# Patient Record
Sex: FEMALE | Race: ASIAN | NOT HISPANIC OR LATINO | Employment: UNEMPLOYED
[De-identification: names, ages, dates, MRNs, and addresses within clinical notes are randomized per-mention and may not be internally consistent; named-entity substitution may affect disease eponyms.]

---

## 2018-06-08 ENCOUNTER — RECORDS - HEALTHEAST (OUTPATIENT)
Dept: ADMINISTRATIVE | Facility: OTHER | Age: 53
End: 2018-06-08

## 2018-12-27 ENCOUNTER — RECORDS - HEALTHEAST (OUTPATIENT)
Dept: ADMINISTRATIVE | Facility: OTHER | Age: 53
End: 2018-12-27

## 2018-12-28 ENCOUNTER — COMMUNICATION - HEALTHEAST (OUTPATIENT)
Dept: PULMONOLOGY | Facility: OTHER | Age: 53
End: 2018-12-28

## 2019-01-03 ENCOUNTER — AMBULATORY - HEALTHEAST (OUTPATIENT)
Dept: PULMONOLOGY | Facility: OTHER | Age: 54
End: 2019-01-03

## 2019-01-03 ENCOUNTER — COMMUNICATION - HEALTHEAST (OUTPATIENT)
Dept: PULMONOLOGY | Facility: OTHER | Age: 54
End: 2019-01-03

## 2019-01-03 DIAGNOSIS — J45.909 ASTHMA: ICD-10-CM

## 2019-02-22 ENCOUNTER — OFFICE VISIT - HEALTHEAST (OUTPATIENT)
Dept: PULMONOLOGY | Facility: OTHER | Age: 54
End: 2019-02-22

## 2019-02-22 ENCOUNTER — RECORDS - HEALTHEAST (OUTPATIENT)
Dept: ADMINISTRATIVE | Facility: OTHER | Age: 54
End: 2019-02-22

## 2019-02-22 DIAGNOSIS — J45.40 MODERATE PERSISTENT ASTHMA WITHOUT COMPLICATION: ICD-10-CM

## 2019-02-22 RX ORDER — BUDESONIDE AND FORMOTEROL FUMARATE DIHYDRATE 160; 4.5 UG/1; UG/1
2 AEROSOL RESPIRATORY (INHALATION) 2 TIMES DAILY
Qty: 1 INHALER | Refills: 11 | Status: SHIPPED | OUTPATIENT
Start: 2019-02-22

## 2019-02-22 RX ORDER — ALBUTEROL SULFATE 90 UG/1
2 AEROSOL, METERED RESPIRATORY (INHALATION) EVERY 6 HOURS PRN
Status: SHIPPED | COMMUNITY
Start: 2019-02-22

## 2019-02-22 ASSESSMENT — MIFFLIN-ST. JEOR: SCORE: 1115.27

## 2019-04-02 ENCOUNTER — OFFICE VISIT - HEALTHEAST (OUTPATIENT)
Dept: PULMONOLOGY | Facility: OTHER | Age: 54
End: 2019-04-02

## 2019-04-02 DIAGNOSIS — J45.40 MODERATE PERSISTENT ASTHMA WITHOUT COMPLICATION: ICD-10-CM

## 2019-04-02 RX ORDER — MONTELUKAST SODIUM 10 MG/1
10 TABLET ORAL AT BEDTIME
Qty: 30 TABLET | Refills: 11 | Status: SHIPPED | OUTPATIENT
Start: 2019-04-02 | End: 2024-09-29

## 2019-04-02 ASSESSMENT — MIFFLIN-ST. JEOR: SCORE: 1127.52

## 2020-11-26 ENCOUNTER — ANESTHESIA - HEALTHEAST (OUTPATIENT)
Dept: SURGERY | Facility: HOSPITAL | Age: 55
End: 2020-11-26

## 2020-11-26 ENCOUNTER — SURGERY - HEALTHEAST (OUTPATIENT)
Dept: SURGERY | Facility: HOSPITAL | Age: 55
End: 2020-11-26

## 2020-11-26 ASSESSMENT — MIFFLIN-ST. JEOR
SCORE: 1197.38
SCORE: 1197.38

## 2020-11-27 ENCOUNTER — SURGERY - HEALTHEAST (OUTPATIENT)
Dept: SURGERY | Facility: HOSPITAL | Age: 55
End: 2020-11-27

## 2020-11-27 ENCOUNTER — ANESTHESIA - HEALTHEAST (OUTPATIENT)
Dept: SURGERY | Facility: HOSPITAL | Age: 55
End: 2020-11-27

## 2020-11-27 ENCOUNTER — COMMUNICATION - HEALTHEAST (OUTPATIENT)
Dept: SCHEDULING | Facility: CLINIC | Age: 55
End: 2020-11-27

## 2020-11-29 ASSESSMENT — MIFFLIN-ST. JEOR
SCORE: 1256.38
SCORE: 1256.38

## 2020-12-04 ASSESSMENT — MIFFLIN-ST. JEOR
SCORE: 1164.72
SCORE: 1164.72

## 2020-12-06 ASSESSMENT — MIFFLIN-ST. JEOR
SCORE: 1154.28
SCORE: 1154.28

## 2020-12-14 ENCOUNTER — RECORDS - HEALTHEAST (OUTPATIENT)
Dept: ADMINISTRATIVE | Facility: OTHER | Age: 55
End: 2020-12-14

## 2021-01-15 ENCOUNTER — COMMUNICATION - HEALTHEAST (OUTPATIENT)
Dept: TELEHEALTH | Facility: CLINIC | Age: 56
End: 2021-01-15

## 2021-01-15 ENCOUNTER — HOSPITAL ENCOUNTER (OUTPATIENT)
Dept: CARDIOLOGY | Facility: HOSPITAL | Age: 56
Discharge: HOME | End: 2021-01-15

## 2021-01-15 DIAGNOSIS — I47.10 SUPRAVENTRICULAR TACHYCARDIA (H): ICD-10-CM

## 2021-01-15 LAB
AORTIC ROOT: 3.2 CM
AORTIC VALVE MEAN VELOCITY: 82.3 CM/S
ASCENDING AORTA: 3.5 CM
AV DIMENSIONLESS INDEX VTI: 0.8
AV MEAN GRADIENT: 3 MMHG
AV PEAK GRADIENT: 5.5 MMHG
AV VALVE AREA: 2.4 CM2
AV VELOCITY RATIO: 0.9
BSA FOR ECHO PROCEDURE: 1.62 M2
CV BLOOD PRESSURE: ABNORMAL MMHG
CV ECHO HEIGHT: 61 IN
CV ECHO WEIGHT: 135 LBS
DOP CALC AO PEAK VEL: 117 CM/S
DOP CALC AO VTI: 19.4 CM
DOP CALC LVOT AREA: 2.83 CM2
DOP CALC LVOT DIAMETER: 1.9 CM
DOP CALC LVOT PEAK VEL: 105 CM/S
DOP CALC LVOT STROKE VOLUME: 45.9 CM3
DOP CALCLVOT PEAK VEL VTI: 16.2 CM
EJECTION FRACTION: 58 % (ref 55–75)
FRACTIONAL SHORTENING: 22.2 % (ref 28–44)
INTERVENTRICULAR SEPTUM IN END DIASTOLE: 0.9 CM (ref 0.6–0.9)
IVS/PW RATIO: 1
LA AREA 1: 12.3 CM2
LA AREA 2: 13 CM2
LEFT ATRIUM LENGTH: 4.73 CM
LEFT ATRIUM SIZE: 2.6 CM
LEFT ATRIUM TO AORTIC ROOT RATIO: 0.81 NO UNITS
LEFT ATRIUM VOLUME INDEX: 17.7 ML/M2
LEFT ATRIUM VOLUME: 28.7 ML
LEFT VENTRICLE CARDIAC INDEX: 2.3 L/MIN/M2
LEFT VENTRICLE CARDIAC OUTPUT: 3.8 L/MIN
LEFT VENTRICLE DIASTOLIC VOLUME INDEX: 20.4 CM3/M2 (ref 29–61)
LEFT VENTRICLE DIASTOLIC VOLUME: 33 CM3 (ref 46–106)
LEFT VENTRICLE HEART RATE: 82 BPM
LEFT VENTRICLE MASS INDEX: 82 G/M2
LEFT VENTRICLE SYSTOLIC VOLUME INDEX: 8.6 CM3/M2 (ref 8–24)
LEFT VENTRICLE SYSTOLIC VOLUME: 14 CM3 (ref 14–42)
LEFT VENTRICULAR INTERNAL DIMENSION IN DIASTOLE: 4.5 CM (ref 3.8–5.2)
LEFT VENTRICULAR INTERNAL DIMENSION IN SYSTOLE: 3.5 CM (ref 2.2–3.5)
LEFT VENTRICULAR MASS: 132.8 G
LEFT VENTRICULAR OUTFLOW TRACT MEAN GRADIENT: 2 MMHG
LEFT VENTRICULAR OUTFLOW TRACT MEAN VELOCITY: 64.9 CM/S
LEFT VENTRICULAR OUTFLOW TRACT PEAK GRADIENT: 4 MMHG
LEFT VENTRICULAR POSTERIOR WALL IN END DIASTOLE: 0.9 CM (ref 0.6–0.9)
LV STROKE VOLUME INDEX: 28.3 ML/M2
MITRAL VALVE E/A RATIO: 0.6
MV AVERAGE E/E' RATIO: 6.4 CM/S
MV DECELERATION TIME: 148 MS
MV E'TISSUE VEL-LAT: 9.26 CM/S
MV E'TISSUE VEL-MED: 6.24 CM/S
MV LATERAL E/E' RATIO: 5.3
MV MEDIAL E/E' RATIO: 7.9
MV PEAK A VELOCITY: 80 CM/S
MV PEAK E VELOCITY: 49.4 CM/S
NUC REST DIASTOLIC VOLUME INDEX: 2158.4 LBS
NUC REST SYSTOLIC VOLUME INDEX: 61 IN
RIGHT ATRIUM PEAK SYSTOLIC PRESSURE: 10 MMHG
TRICUSPID REGURGITATION PEAK PRESSURE GRADIENT: 19.4 MMHG
TRICUSPID VALVE ANULAR PLANE SYSTOLIC EXCURSION: 1.5 CM
TRICUSPID VALVE PEAK REGURGITANT VELOCITY: 220 CM/S

## 2021-01-15 ASSESSMENT — MIFFLIN-ST. JEOR: SCORE: 1154.28

## 2021-05-27 NOTE — PROGRESS NOTES
Pulmonary Clinic Follow Up    Cc: asthma, poorly controlled    HPI: 53yoF with history of asthma presents for follow up.    ID: first met her 2/2019 with worsening dyspnea. [place]ng[place]ing troubles since in [place]. Disability since 1996 due to asthma.   We discovered she was taking dulera PRN and rarely her albuterol.     She returns today with the aid of a [employer] . She is still not using inhalers correctly. Using Symbicort once dailiy and albuterol once or twice per week. She has not been taking the Singulair.     ACT previously 16: 4+4+4+2+3=17    Triggers include cigarette smoke and car exhaust.     ROS: 12-point review performed and notable for hearing loss, visual disturbance, shortness of breath, snoring, wheezing, confusion and depression and anxiety. She also mentions numbness and tingling of her right arm (sees PCP 4/20/19) The remainder reviewed and negative.     Current Outpatient Medications on File Prior to Visit   Medication Sig Dispense Refill     albuterol (PROAIR HFA;PROVENTIL HFA;VENTOLIN HFA) 90 mcg/actuation inhaler Inhale 2 puffs every 6 (six) hours as needed for wheezing.       budesonide-formoterol (SYMBICORT) 160-4.5 mcg/actuation inhaler Inhale 2 puffs 2 (two) times a day. 1 Inhaler 11     montelukast (SINGULAIR) 10 mg tablet Take 10 mg by mouth at bedtime.       No current facility-administered medications on file prior to visit.      Vitals:    04/02/19 1558   BP: 130/80   Pulse: (!) 125   Resp: 12   SpO2: 95%   RA  Gen: pleasant female in no distress      HEENT: no lymphadenopathy  C/V: RRR, S1 and S2  Resp:clear bilaterally with decent air entry  Ext: no clubbing or cyanosis  Skin: no rashes  Neuro: grossly normal    PFT 2019 personally reviewed  FEV1/FVC is 65 and is reduced.  FEV1 is 88% predicted and is normal.  FVC is 115% predicted and is normal.  There was improvement in spirometry after a single inhaled dose of bronchodilator.  TLC is 210% predicted and is  increased.  RV is 282% predicted and is increased.  DLCO is 123% predicted and is increased when it   is corrected for hemoglobin.  The flow volume loop is normal No.  NIOx done <25, no ongoing airway inflammation      ASSESSMENT/PLAN:  Moderate persistent asthma  Rediscussed Symbicort two times a day  Add Singulair  Albuterol PRN  RTC 6 months

## 2021-06-02 VITALS — HEIGHT: 59 IN | WEIGHT: 133.3 LBS | BODY MASS INDEX: 26.87 KG/M2

## 2021-06-02 VITALS — HEIGHT: 59 IN | WEIGHT: 136 LBS | BODY MASS INDEX: 27.42 KG/M2

## 2021-06-05 VITALS
BODY MASS INDEX: 25.47 KG/M2 | HEIGHT: 61 IN | HEIGHT: 61 IN | WEIGHT: 134.9 LBS | BODY MASS INDEX: 25.47 KG/M2 | WEIGHT: 134.9 LBS

## 2021-06-05 VITALS — WEIGHT: 134.9 LBS | BODY MASS INDEX: 25.47 KG/M2 | HEIGHT: 61 IN

## 2021-06-13 NOTE — ANESTHESIA PREPROCEDURE EVALUATION
Anesthesia Evaluation      Patient summary reviewed     Airway   Comment: Intubated   Pulmonary - normal exam   (+) asthma                           Cardiovascular - negative ROS and normal exam   Neuro/Psych - negative ROS     Endo/Other - negative ROS      GI/Hepatic/Renal - negative ROS           Dental                         Anesthesia Plan  Planned anesthetic: general endotracheal    COVID neg 11/26  ASA 4   Induction: inhalational   Anesthetic plan and risks discussed with: child/children    Post-op plan: extended intubation/vent support

## 2021-06-13 NOTE — ANESTHESIA POSTPROCEDURE EVALUATION
Patient: Kerry Mills  Procedure(s):  INCISION AND DRAINAGE, NECK  Anesthesia type: general    Patient location: ICU  Last vitals:   Vitals Value Taken Time   /70 11/27/20 1300   Temp 36.6  C (97.9  F) 11/27/20 1230   Pulse 55 11/27/20 1359   Resp 16 11/27/20 1359   SpO2 98 % 11/27/20 1359   Vitals shown include unvalidated device data.  Post vital signs: stable  Level of consciousness: return to baseline  Post-anesthesia pain: pain controlled  Post-anesthesia nausea and vomiting: no  Pulmonary: return to baseline, ventilator  Cardiovascular: stable and blood pressure at baseline  Hydration: adequate  Anesthetic events: no    QCDR Measures:  ASA# 11 - Laury-op Cardiac Arrest: ASA11B - Patient did NOT experience unanticipated cardiac arrest  ASA# 12 - Laury-op Mortality Rate: ASA12B - Patient did NOT die  ASA# 13 - PACU Re-Intubation Rate: ASA13B - Patient did NOT require a new airway mgmt  ASA# 10 - Composite Anes Safety: ASA10A - No serious adverse event    Additional Notes:

## 2021-06-13 NOTE — ANESTHESIA CARE TRANSFER NOTE
Last vitals:   Vitals:    11/27/20 1052   BP: 118/77   Pulse: 65   Resp: 16   Temp: 36.6  C (97.8  F)   SpO2: 98%     Patient's level of consciousness is unresponsive  Spontaneous respirations: no  Maintains airway independently: no  Dentition unchanged: yes  Oropharynx: oropharynx clear of all foreign objects    QCDR Measures:  ASA# 20 - Surgical Safety Checklist: WHO surgical safety checklist completed prior to induction    PQRS# 430 - Adult PONV Prevention: 4558F - Pt received => 2 anti-emetic agents (different classes) preop & intraop  ASA# 8 - Peds PONV Prevention: NA - Not pediatric patient, not GA or 2 or more risk factors NOT present  PQRS# 424 - Laury-op Temp Management: 4559F - At least one body temp DOCUMENTED => 35.5C or 95.9F within required timeframe  PQRS# 426 - PACU Transfer Protocol: - Transfer of care checklist used  ASA# 14 - Acute Post-op Pain: ASA14B - Patient did NOT experience pain >= 7 out of 10

## 2021-06-16 PROBLEM — R00.0 SINUS TACHYCARDIA: Status: ACTIVE | Noted: 2020-12-03

## 2021-06-16 PROBLEM — J45.40 MODERATE PERSISTENT ASTHMA WITHOUT COMPLICATION: Status: ACTIVE | Noted: 2019-02-22

## 2021-06-16 PROBLEM — K12.2 LUDWIG'S ANGINA: Status: ACTIVE | Noted: 2020-11-26

## 2021-06-18 NOTE — LETTER
Letter by Britt Rosales MD at      Author: Britt Rosales MD Service: -- Author Type: --    Filed:  Encounter Date: 1/3/2019 Status: (Other)       Kerry Mills  [address] [place] [id number]    January 3, 2019    Dear Ms. Mills,    Welcome to [facility]! Your appointment information is below.   Please bring the following to your appointment:    Insurance Card, so we may scan it for our records    Drivers license or valid ID, so we may scan it for our records    Co-pay (as applicable per your insurance plan)    A current list of your medications including over the counter products such as vitamins and supplements    Your medical records including copies of X-Ray films if you are transferring your care from another clinic.  If you do not have your records, please fill out the release of information form and we will request those records.     Provider: Britt Rosales MD  Appointment Date:  Friday, February 22, 2019  Arrival Time:   10:00 PFT,  11:00 dr morris.    Location: [address]        [facility], [address]    **Please allow adequate time for your commute and parking. If you are more than 10 minutes late, you may be asked to reschedule.     If you need to cancel or reschedule your appointment, please notify us at least 24 hours prior to your appointment time so we are able to make this time available for another patient.    Thank you for choosing the [facility] for your health care needs. If you have any questions, please do not hesitate to contact us at any time at   [phone]. We look forward to caring for you.     Sincerely,     [facility] staff

## 2021-06-24 NOTE — PROGRESS NOTES
Pulmonary Clinic Visit    Cc: shortness of breath    HPI: 53 y.o. female with history of asthma presents with ongoing shortness of breath.     She is here today with . She reports breathing trouble started in her teens after her 2nd pregnancy (has 10 children). [place], but started in [place]. Breathing was worse there and improved in [place]. She was hospitalized once for her asthma, 15 years ago in [place]. She has been on disability since  due to her asthma. 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    ACT: 2+4+4+3+3=16    Unfortunately she is not taking her medications correctly. She was on airduo but felt the dulera worked better. Her pharmacy said she could have either based on her insurnace but neither was refilled.       Past Medical History:   Diagnosis Date     Asthma      Chronic constipation          Social History     Tobacco Use     Smoking status: Never Smoker     Smokeless tobacco: Never Used   Substance Use Topics     Alcohol use: Not on file         Family History   Problem Relation Age of Onset     Kidney failure Mother         on Dialysis     Hypertension Mother      Gout Mother      No Medical Problems Father          age 60 in [place]     Kidney failure Brother      Asthma Son          Current Outpatient Medications   Medication Sig     albuterol (PROAIR HFA;PROVENTIL HFA;VENTOLIN HFA) 90 mcg/actuation inhaler Inhale 2 puffs every 6 (six) hours as needed for wheezing.     mometasone-formoterol (DULERA) 200-5 mcg/actuation HFAA inhaler Inhale 2 puffs 2 (two) times a day.     montelukast (SINGULAIR) 10 mg tablet Take 10 mg by mouth at bedtime.       No Known Allergies      Review of Systems   Constitutional: Positive for unexpected weight change.   HENT: Negative.    Eyes: Negative.    Respiratory: Positive for cough, shortness of breath and wheezing.    Cardiovascular: Positive  for palpitations.   Gastrointestinal: Positive for diarrhea.   Endocrine: Negative.    Genitourinary: Negative.    Musculoskeletal: Positive for arthralgias and myalgias.   Allergic/Immunologic: Negative.    Neurological: Positive for dizziness.   Hematological: Negative.    Psychiatric/Behavioral: Positive for dysphoric mood. The patient is nervous/anxious.        Vitals:    02/22/19 1048   BP: 118/66   Pulse: 72   Resp: 16   SpO2: 93%   RA  Physical Exam   Constitutional: She appears well-developed and well-nourished.   HENT:   Head: Normocephalic and atraumatic.   Eyes: Conjunctivae and EOM are normal.   Neck: Normal range of motion. No tracheal deviation present.   Cardiovascular: Normal rate and regular rhythm.   Pulmonary/Chest: Effort normal. She has no wheezes. She has no rales.   Abdominal: Soft. There is no tenderness.   Musculoskeletal: Normal range of motion. She exhibits no edema.   Neurological: She is alert.   Skin: Skin is warm and dry.   Psychiatric: She has a normal mood and affect. Her behavior is normal. Thought content normal.     PFT 2019 personally reviewed  FEV1/FVC is 65 and is reduced.  FEV1 is 88% predicted and is normal.  FVC is 115% predicted and is normal.  There was improvement in spirometry after a single inhaled dose of bronchodilator.  TLC is 210% predicted and is increased.  RV is 282% predicted and is increased.  DLCO is 123% predicted and is increased when it   is corrected for hemoglobin.  The flow volume loop is normal No.  NIOx done <25, no ongoing airway inflammation    Assessment/Plan  53yoF with poorly controlled asthma in part due to non-compliance in not understanding her inhalers. Discussed at length proper use of controller and rescue inhalers.   RTC 5 weeks    Britt Rosales MD  Electronically signed on 2/22/2019 11:20 AM    >50% of this 45 minute visit spent in direct patient counseling and coordination of care.

## 2021-06-24 NOTE — PROGRESS NOTES
Instructed on proper use of her inhalers, demonstrated back correctly.  She had no questions at this time.

## 2021-12-03 ENCOUNTER — HOSPITAL ENCOUNTER (OUTPATIENT)
Dept: MAMMOGRAPHY | Facility: CLINIC | Age: 56
Discharge: HOME | End: 2021-12-03
Attending: REGISTERED NURSE | Admitting: REGISTERED NURSE
Payer: COMMERCIAL

## 2021-12-03 DIAGNOSIS — Z12.31 ENCOUNTER FOR SCREENING MAMMOGRAM FOR MALIGNANT NEOPLASM OF BREAST: ICD-10-CM

## 2021-12-03 PROCEDURE — 77067 SCR MAMMO BI INCL CAD: CPT

## 2024-06-13 ENCOUNTER — HOSPITAL ENCOUNTER (OUTPATIENT)
Dept: MAMMOGRAPHY | Facility: CLINIC | Age: 59
Discharge: HOME | End: 2024-06-13
Attending: FAMILY MEDICINE
Payer: COMMERCIAL

## 2024-06-13 DIAGNOSIS — N63.20 LEFT BREAST LUMP: ICD-10-CM

## 2024-06-13 PROCEDURE — 76642 ULTRASOUND BREAST LIMITED: CPT | Mod: LT

## 2024-06-13 PROCEDURE — 77062 BREAST TOMOSYNTHESIS BI: CPT

## 2024-06-18 ENCOUNTER — HOSPITAL ENCOUNTER (OUTPATIENT)
Dept: MAMMOGRAPHY | Facility: CLINIC | Age: 59
Discharge: HOME | End: 2024-06-18
Attending: FAMILY MEDICINE
Payer: COMMERCIAL

## 2024-06-18 DIAGNOSIS — N63.20 LEFT BREAST LUMP: ICD-10-CM

## 2024-06-18 PROCEDURE — 88360 TUMOR IMMUNOHISTOCHEM/MANUAL: CPT | Mod: 26 | Performed by: PATHOLOGY

## 2024-06-18 PROCEDURE — 250N000009 HC RX 250: Performed by: RADIOLOGY

## 2024-06-18 PROCEDURE — 999N000065 MA POST PROCEDURE LEFT

## 2024-06-18 PROCEDURE — 88377 M/PHMTRC ALYS ISHQUANT/SEMIQ: CPT

## 2024-06-18 PROCEDURE — 272N000713 US BREAST BIOPSY CORE NEEDLE LEFT

## 2024-06-18 PROCEDURE — 88305 TISSUE EXAM BY PATHOLOGIST: CPT | Mod: 26 | Performed by: PATHOLOGY

## 2024-06-18 PROCEDURE — 88377 M/PHMTRC ALYS ISHQUANT/SEMIQ: CPT | Mod: 26 | Performed by: MEDICAL GENETICS

## 2024-06-18 PROCEDURE — 88360 TUMOR IMMUNOHISTOCHEM/MANUAL: CPT | Mod: TC

## 2024-06-18 RX ADMIN — LIDOCAINE HYDROCHLORIDE 10 ML: 10 SOLUTION INTRAVENOUS at 11:29

## 2024-06-18 RX ADMIN — LIDOCAINE HYDROCHLORIDE 10 ML: 10 SOLUTION INTRAVENOUS at 11:32

## 2024-06-18 NOTE — LETTER
[facility]  [facility], [address]  [facility] [address]  Phone: [phone]  Fax: [phone]    June 18, 2024        Kerry Mills  [address] [place] [id number]          To whom it may concern:    RE: Kerry Mills    Patient was seen and treated today at our clinic. Pt cannot lay prone for 24 hours beginning at 1130am, Tuesday, 6/18/24, due to procedure that was performed today.    Please contact me for questions or concerns.      Sincerely,      Dr Cinthia Syed, Radiologist

## 2024-06-18 NOTE — PROGRESS NOTES
Kerry arrived at [facility], accompanied by daughter-in-lawGinny. In-person [facility] , [place], [place] [id number], with Radha Marks, is present for today's visit. I escorted them to the Breast [place] consult room. Pt was identified using full name and . Wristband is on pt wrist. Pt was able to state which procedure and correct side breast biopsy was occurring today. I reviewed the consent form with the pt, using , before pt signed.     I reviewed post breast biopsy care. Pt acknowledged understanding of post biopsy care. Pt was given written post breast biopsy care handouts to take home in English and Hmong.    I explained results are expected in 3-5 business days and Breast Center RN will call pt at number pt provided today. Pt requests we call daughter in lawGinny, at [phone], if pt does not answer the phone. Pt is aware results will be given if pt is on the call as well. Pt has does not have active MyChart. Pt verbalizes understanding.    Pt had no questions or concerns.     I escorted pt and  to the changing room and pt changed into gown. Pt placed personal belongings in a locker and pt has the key. I escorted pt and  to US room and each sat on a chair. I offered pt the aroma therapy of [employer] oil and a warm blanket, and pt declined both. I notified "[employer] Norman Zuñiga, pt was ready and waiting in US room with .     Nidhi Frank, RN, BSN, CBCN  [facility]

## 2024-06-25 ENCOUNTER — TELEPHONE (OUTPATIENT)
Dept: MAMMOGRAPHY | Facility: CLINIC | Age: 59
End: 2024-06-25
Payer: COMMERCIAL

## 2024-06-25 ENCOUNTER — APPOINTMENT (OUTPATIENT)
Dept: INTERPRETER SERVICES | Facility: CLINIC | Age: 59
End: 2024-06-25
Payer: COMMERCIAL

## 2024-06-25 NOTE — TELEPHONE ENCOUNTER
[facility], the pt was telephoned so I could update pt with status of pending breast biopsy results that were expected today.     I was informed by the [facility] , [facility], that Kerry's results are expected to be available tomorrow per Dr Dawson, from pt's 6/18/24 breast biopsy. I relayed that message to pt.     Nidhi Frank, RN, BSN, [facility]

## 2024-06-26 ENCOUNTER — TELEPHONE (OUTPATIENT)
Dept: MAMMOGRAPHY | Facility: CLINIC | Age: 59
End: 2024-06-26

## 2024-06-26 ENCOUNTER — VIRTUAL VISIT (OUTPATIENT)
Dept: INTERPRETER SERVICES | Facility: CLINIC | Age: 59
End: 2024-06-26
Payer: COMMERCIAL

## 2024-06-26 NOTE — TELEPHONE ENCOUNTER
Using [facility] , Lance Martinez, for this call.    At the request of Radiologist, Dr. Justyna Rosas, I telephoned patient, confirming pt identity using name and , to inform patient of malignant pathology from 24, left breast biopsy performed at [facility]. We discussed breast anatomy, pathology findings, and next steps. Patient's questions were answered to the best of my ability.     Patient is scheduled for consult with Breast Surgeon, Dr Iwona Choe on 24, arriving at 1:00pm, at [facility]. Patient verbalized understanding of appointment details, location, and visitor policy of up to two people being allowed to accompany pt to appt.     Pt requested I call daughter-in-law, Ginny, at [phone], to give results and to give appt information and location of appt. Ginny accompanied pt to breast biopsy appt on 24.    I provided emotional support and encouragement. Pt verbalized understanding of information given and acceptance of plan. Calls welcomed.    I left a courtesy message regarding the above, with Jacquelyn at [facility] for Dr Ceasar Zuñiga.     Nidhi Frank, RN, BSN, [facility]

## 2024-06-27 ENCOUNTER — PATIENT OUTREACH (OUTPATIENT)
Dept: ONCOLOGY | Facility: CLINIC | Age: 59
End: 2024-06-27
Payer: COMMERCIAL

## 2024-06-27 DIAGNOSIS — C50.912 INVASIVE DUCTAL CARCINOMA OF BREAST, FEMALE, LEFT (H): Primary | ICD-10-CM

## 2024-06-27 NOTE — PROGRESS NOTES
New Patient Oncology Nurse Navigator Note     Referring provider: Iwona Choe DO     Referring Clinic/Organization: [facility] Surgery     Referred to (specialty:) Medical Oncology      Date Referral Received: June 27, 2024     Evaluation for:  C50.912 (ICD-10-CM) - Invasive ductal carcinoma of breast, female, left (H)     Clinical History (per Nurse review of records provided):      Patient presented with a palpable lump in the left breast. Diagnostic imaging was performed on 6/13.   Targeted left breast ultrasound at the area of palpable concern at the 10:00 position, 1 cm from the nipple demonstrates a 2.3 x 3.0 x 3.1 cm irregular, hypoechoic mass with lobulated margins and mild increased internal vascularity.  Sonographic evaluation of the left axilla demonstrates a few nonenlarged morphologically normal lymph nodes.  IMPRESSION: BI-RADS CATEGORY: 5 - Highly Suggestive of Malignancy.    1.  Suspicious mass within the left breast at the 10:00 position correlates with the palpable area of concern. Recommendation ultrasound-guided biopsy of this mass.  2.  No left axillary lymphadenopathy.    6/18/24 - Case: [id number]   BREAST BIOPSY WITH NEOPLASTIC LESION:  - BIOPSY TYPE: ULTRASOUND-GUIDED CORE BIOPSIES  - BIOPSY SITE: LEFT BREAST, 10:00, 1 CM FROM NIPPLE  - HISTOLOGIC TYPE: INVASIVE DUCTAL CARCINOMA  - JAN HISTOLOGIC SCORE:  - TUBULAR DIFFERENTIATION SCORE 3  - NUCLEAR PLEOMORPHISM SCORE 3  - MITOTIC RATE SCORE 1  - OVERALL GRADE 2 (TOTAL SCORE 7/9)  - EXTENT OF TUMOR: 18 MM TUMOR LENGTH  - DUCTAL CARCINOMA IN SITU (DCIS): NOT IDENTIFIED  - SMALL-VESSEL LYMPHATIC INVASION: NOT IDENTIFIED  - MICROCALCIFICATIONS: NOT IDENTIFIED  - ADDITIONAL FINDINGS: EXTENSIVE NECROSIS, PROLIFERATIVE  FIBROCYSTIC CHANGES  - ADDITIONAL STUDIES: SEE BREAST BIOMARKER SYNOPTIC REPORT BELOW  - SUMMARY OF SYNOPSIS:  ESTROGEN RECEPTORS NEGATIVE (<1% OF TUMOR NUCLEI STAIN)  PROGESTERONE RECEPTORS NEGATIVE (<1% OF TUMOR  NUCLEI STAIN)  HER2/CAPRICE RECEPTORS EQUIVOCAL (2+ MEMBRANE STAINING)  HER2 by FISH subsequently reported - Please see report (book marked) for details    Patient speaks Hmong and her daughter in law, Ginny, has been involved with care and appointments.     Patient has appt with Dr. Choe on 7/2 at 1:15pm at [facility].     6/27 1127 - Telephoned and spoke with Ginny, patient's daughter-in-law. Explained my role and purpose for the call. Writer received referral, reviewed for appropriate plan, and call warm transferred to New Patient Scheduling for completion.    Records Location: See Bookmarked material     Records Needed: Breast imaging for past 5 years

## 2024-06-28 NOTE — TELEPHONE ENCOUNTER
RECORDS STATUS - BREAST    RECORDS REQUESTED FROM: [facility]   NOTES DETAILS STATUS   OFFICE NOTE from referring provider [facility] Dr. Iwona Choe   OPERATIVE REPORT Epic 6/18/24: US Breast Bx   MEDICATION LIST [facility]    LABS     PATHOLOGY REPORTS  (Tissue diagnosis, Stage, ER/MS percentage positive and intensity of staining, HER2 IHC, FISH, and all biopsies from breast and any distant metastasis)                 Report in Epic 6/18/24: [id number]    IMAGING (NEED IMAGES & REPORT)     MAMMO PACS 6/18/24-12/3/21   ULTRASOUND PACS 6/18/24: US Breast Bx  6/13/24: US Breast

## 2024-07-01 LAB — INTERPRETATION: NORMAL

## 2024-07-02 ENCOUNTER — PRE VISIT (OUTPATIENT)
Dept: ONCOLOGY | Facility: HOSPITAL | Age: 59
End: 2024-07-02
Payer: COMMERCIAL

## 2024-07-02 ENCOUNTER — ONCOLOGY VISIT (OUTPATIENT)
Dept: ONCOLOGY | Facility: HOSPITAL | Age: 59
End: 2024-07-02
Attending: SURGERY
Payer: COMMERCIAL

## 2024-07-02 ENCOUNTER — OFFICE VISIT (OUTPATIENT)
Dept: SURGERY | Facility: HOSPITAL | Age: 59
End: 2024-07-02
Attending: FAMILY MEDICINE
Payer: COMMERCIAL

## 2024-07-02 VITALS
DIASTOLIC BLOOD PRESSURE: 87 MMHG | BODY MASS INDEX: 27.38 KG/M2 | OXYGEN SATURATION: 97 % | HEART RATE: 80 BPM | WEIGHT: 145 LBS | RESPIRATION RATE: 16 BRPM | SYSTOLIC BLOOD PRESSURE: 154 MMHG | TEMPERATURE: 98 F | HEIGHT: 61 IN

## 2024-07-02 VITALS — HEIGHT: 61 IN | WEIGHT: 145.5 LBS | BODY MASS INDEX: 27.47 KG/M2

## 2024-07-02 DIAGNOSIS — C50.912 INVASIVE DUCTAL CARCINOMA OF BREAST, FEMALE, LEFT (H): Primary | ICD-10-CM

## 2024-07-02 PROCEDURE — G0463 HOSPITAL OUTPT CLINIC VISIT: HCPCS | Mod: 27 | Performed by: INTERNAL MEDICINE

## 2024-07-02 PROCEDURE — T1013 SIGN LANG/ORAL INTERPRETER: HCPCS | Mod: GT | Performed by: INTERPRETER

## 2024-07-02 PROCEDURE — G0463 HOSPITAL OUTPT CLINIC VISIT: HCPCS | Performed by: SURGERY

## 2024-07-02 PROCEDURE — 99204 OFFICE O/P NEW MOD 45 MIN: CPT | Performed by: SURGERY

## 2024-07-02 PROCEDURE — 99245 OFF/OP CONSLTJ NEW/EST HI 55: CPT | Performed by: INTERNAL MEDICINE

## 2024-07-02 RX ORDER — CEFAZOLIN SODIUM/WATER 2 G/20 ML
2 SYRINGE (ML) INTRAVENOUS
Status: CANCELLED | OUTPATIENT
Start: 2024-07-09

## 2024-07-02 ASSESSMENT — PAIN SCALES - GENERAL
PAINLEVEL: SEVERE PAIN (7)
PAINLEVEL: SEVERE PAIN (7)

## 2024-07-02 NOTE — LETTER
2024      Kerry Mills  [address] [place] [id number]      Dear Colleague,    Thank you for referring your patient, Kerry Mills, to the [facility]. Please see a copy of my visit note below.    [facility] Hematology and Oncology Consult Note    Patient: Kerry Mills  MRN: [id number]  Date of Service: 2024       Reason for Visit    Chief Complaint   Patient presents with     Oncology Clinic Visit     New consult invasive ductal carcinoma of breast cancer             ECOG Performance 0 - Independent        _____________________________________________________________________________    History Of Present Illness    Ms. Kerry Mills is a very pleasant 58 year old female of long descent who has recently been diagnosed with breast cancer.  She is here with her daughter and daughter-in-law to establish care and to proceed with next steps.  Patient initially noticed a lump in her breast and presented to her physician after talking to her family.  She was found to have a suspicious mass in her left breast along.  Ultrasound was 2.3 into 3.1 to 3.0 cm and was irregular hypoechoic and had increased internal vascularity.Patient then underwent a biopsy and was found to be consistent with an invasive ductal carcinoma that was overall grade 2 lymphovascular invasion was not identified the tumor was ER negative AL negative and HER2/kobe 2+.  HER2/kobe testing by FISH was negative.  Patient reports that she had no pain in the mass prior to the biopsy and there was no nipple discharge.    Patient denies any family history of breast or ovarian cancer she has 3 sisters and 2 brothers who are alive and they do not have any history of cancer.  Both parents are  and there was no cancer in them.  Patient has 7 daughters and 3 sons and none of them have any cancer.    Menarche was at age 17 menopause was at 51 patient had 10 full-term pregnancies  But did not use of any oral  "contraceptive pills or exogenous hormones      Review of systems.  Apart from describing in history, the remainder of comprehensive ROS was negative.    Past History    Past Medical History:   Diagnosis Date     Asthma      Chronic constipation      Past Surgical History:   Procedure Laterality Date     INCISION AND DRAINAGE OF WOUND N/A 2020    Procedure: INCISION AND DRAINAGE, NECK;  Surgeon: Arnel James MD;  Location: [facility];  Service: ENT     PICC INSERTION - TRIPLE LUMEN  2020          TRACHEOSTOMY N/A 2020    Procedure: EMERGENT INTUBATION IN OR WITH CREATION, TRACHEOSTOMY;  Surgeon: Dennise Justice MD;  Location: [facility];  Service: General     Family History   Problem Relation Age of Onset     Kidney failure Mother         on Dialysis     Hypertension Mother      Gout Mother      No Known Problems Father          age 60 in [place]     Kidney failure Brother      Asthma Son      Social History     Socioeconomic History     Marital status:      Spouse name: None     Number of children: None     Years of education: None     Highest education level: None   Tobacco Use     Smoking status: Never     Smokeless tobacco: Never       Allergies    No Known Allergies    Physical Exam    BP (!) 154/87   Pulse 80   Temp 98  F (36.7  C)   Resp 16   Ht 1.549 m (5' 1\")   Wt 65.8 kg (145 lb)   SpO2 97%   BMI 27.40 kg/m      General: alert, awake, not in acute distress  HEENT: Head: Normal, normocephalic, atraumatic.  Eye: Normal external eye, conjunctiva, lids cornea, MIKAYLA.  Nose: Normal external nose, mucus membranes and septum.  Pharynx: Normal buccal mucosa. Normal pharynx.  Neck / Thyroid: Supple, no masses, nodes, nodules or enlargement.  Lymphatics: No abnormally enlarged lymph nodes.  Chest: Normal chest wall and respirations. Clear to auscultation.  Heart: S1 S2 RRR, no murmur.   Abdomen: abdomen is soft without significant tenderness, masses, " organomegaly or guarding  Extremities: normal strength, tone, and muscle mass  Skin: normal. no rash or abnormalities  CNS: non focal.    Lab Results    No results found for this or any previous visit (from the past 168 hour(s)).    Imaging Results    US Breast Biopsy Core Needle Left    Addendum Date: 6/27/2024    Pathology shows invasive ductal carcinoma. This is consistent with imaging findings. A verbal report was given to the patient. Surgical consultation is recommended.     Result Date: 6/27/2024  US Breast Biopsy Core Needle Left INDICATION: Left breast mass. PROCEDURE: Informed consent was obtained from the patient. Ultrasound was used to localize the mass at the 10 o'clock position of the left breast, 1 cm from the nipple.  The skin was marked, then prepped and draped in a sterile fashion. 1% lidocaine was used for local anesthesia. Under direct sonographic guidance, a 12-gauge coaxial needle was used to obtain 3 core biopsies.  A biopsy marking clip was then placed.  Digital mammograms performed in a separate room, using separate equipment, to document clip placement. The mammogram showed the clip to be in good position. The patient tolerated this well; there are no immediate complications.    IMPRESSION: 1. Ultrasound-guided biopsy of mass in the left breast. Pathology pending. 2. Post procedure mammogram for marker placement     MA Post Procedure Left    Addendum Date: 6/27/2024    Pathology shows invasive ductal carcinoma. This is consistent with imaging findings. A verbal report was given to the patient. Surgical consultation is recommended.     Result Date: 6/27/2024  US Breast Biopsy Core Needle Left INDICATION: Left breast mass. PROCEDURE: Informed consent was obtained from the patient. Ultrasound was used to localize the mass at the 10 o'clock position of the left breast, 1 cm from the nipple.  The skin was marked, then prepped and draped in a sterile fashion. 1% lidocaine was used for local  anesthesia. Under direct sonographic guidance, a 12-gauge coaxial needle was used to obtain 3 core biopsies.  A biopsy marking clip was then placed.  Digital mammograms performed in a separate room, using separate equipment, to document clip placement. The mammogram showed the clip to be in good position. The patient tolerated this well; there are no immediate complications.    IMPRESSION: 1. Ultrasound-guided biopsy of mass in the left breast. Pathology pending. 2. Post procedure mammogram for marker placement     US Breast Left Limited 1-3 Quadrants    Result Date: 6/14/2024  EXAM: MA DIAGNOSTIC BILATERAL W/ JAYLIN, US BREAST LEFT LIMITED 1-3 QUADRANTS, 6/13/2024 9:16 AM COMPARISONS: 12/3/2021 HISTORY: 58 year old female presents with a palpable lump within the left breast for a few weeks. Patient also reports associated tenderness. BREAST DENSITY: There are scattered areas of fibroglandular density. FINDINGS: A BB marker was placed on the skin overlying the area of palpable concern within the left breast. There is an underlying irregular, dense mass. Recommend targeted left breast ultrasound at the area of palpable concern for further evaluation. The remaining left breast parenchyma is unremarkable. There are no new or suspicious masses, calcifications, or architectural distortion within the right breast. Visual inspection of the left breast demonstrates a normal-appearing, everted left nipple. Physical examination at the palpable area of concern demonstrates a firm, mobile mass. Targeted left breast ultrasound at the area of palpable concern at the 10:00 position, 1 cm from the nipple demonstrates a 2.3 x 3.0 x 3.1 cm irregular, hypoechoic mass with lobulated margins and mild increased internal vascularity. Sonographic evaluation of the left axilla demonstrates a few nonenlarged morphologically normal lymph nodes.     IMPRESSION: BI-RADS CATEGORY: 5 - Highly Suggestive of Malignancy.  1.  Suspicious mass within  the left breast at the 10:00 position correlates with the palpable area of concern. Recommendation ultrasound-guided biopsy of this mass. 2.  No left axillary lymphadenopathy. RECOMMENDED FOLLOW-UP: Biopsy. I personally discussed the findings and recommendations with the patient, and the patient's daughter-in-law who served as her , at the conclusion of the examination. GEOVANNA DUGAN MD   SYSTEM ID:  [id number]    MA Diagnostic Bilateral w/Jaylin    Result Date: 6/13/2024  EXAM: MA DIAGNOSTIC BILATERAL W/ JAYLIN, US BREAST LEFT LIMITED 1-3 QUADRANTS, 6/13/2024 9:16 AM COMPARISONS: 12/3/2021 HISTORY: 58 year old female presents with a palpable lump within the left breast for a few weeks. Patient also reports associated tenderness. BREAST DENSITY: There are scattered areas of fibroglandular density. FINDINGS: A BB marker was placed on the skin overlying the area of palpable concern within the left breast. There is an underlying irregular, dense mass. Recommend targeted left breast ultrasound at the area of palpable concern for further evaluation. The remaining left breast parenchyma is unremarkable. There are no new or suspicious masses, calcifications, or architectural distortion within the right breast. Visual inspection of the left breast demonstrates a normal-appearing, everted left nipple. Physical examination at the palpable area of concern demonstrates a firm, mobile mass. Targeted left breast ultrasound at the area of palpable concern at the 10:00 position, 1 cm from the nipple demonstrates a 2.3 x 3.0 x 3.1 cm irregular, hypoechoic mass with lobulated margins and mild increased internal vascularity. Sonographic evaluation of the left axilla demonstrates a few nonenlarged morphologically normal lymph nodes.     IMPRESSION: BI-RADS CATEGORY: 5 - Highly Suggestive of Malignancy.  1.  Suspicious mass within the left breast at the 10:00 position correlates with the palpable area of concern. Recommendation  ultrasound-guided biopsy of this mass. 2.  No left axillary lymphadenopathy. RECOMMENDED FOLLOW-UP: Biopsy. I personally discussed the findings and recommendations with the patient, and the patient's daughter-in-law who served as her , at the conclusion of the examination. GEOVANNA DUGAN MD   SYSTEM ID:  [id number]         Assessment/Plan    Triple negative breast cancer T2 N0 M0 stage IIa  This is a 58-year-old white female of [place] descent who was recently been diagnosed with a triple negative breast cancer.  On ultrasound the mass is 3.1 to 3.0 cm.  The diagnosis prognosis treatment options and the rationale behind treatment was discussed with the patient through the  and then later on explained separately to the daughter and the daughter-in-law in detail.  I explained to them that breast cancer in the standard age is treated upon the subtype and triple negative breast cancer is a distinct subtype for this on treatment protocol.  Patients who have at least stage II disease benefit from neoadjuvant chemotherapy as the degree of pathologic response determine subsequent treatment after surgery.  I would like to treat her on the basis of the clinical trial keynote 522 which was published in the [employer] Journal of medicine in February 2020.  This trial randomized stage II and stage IIIa triple negative breast cancer patients to new adjuvant chemotherapy alone versus neoadjuvant chemotherapy and immunotherapy.  The immunotherapy arm had a complete pathologic response of 64% versus 51% and the chemotherapy on the arm.  The disease-free survival was also significantly superior in the immunotherapy arm.  75% of the patients in the trial or stage II.  Based on that I will plan to treat her with 4 cycles of carboplatinum Taxol and pembrolizumab followed by 4 cycles of AC and pembrolizumab prior to surgery.  We will monitor response clinically if possible however if clinical response is not evident we  "will do an interim MRI.  Baseline MRI of the breast would also be done.  She will then be referred to surgery and if she does has a complete pathologic response she will complete 1 year of immunotherapy with pembrolizumab.  If she does not has a complete pathologic response I will consider the use of capecitabine at that point in time.  I expect the significant decrease in the size of the mass and most likely she would be able to have a lumpectomy at that point but that decision will be made by Dr. Loera at the time when chemotherapy is completed.  She will also need a consult with genetics as ordered triple negative cancer patient should be evaluated for genetic mutations and an extended gene panel should be done.  Patient will need a port to be placed which is already been ordered.    1.  Patient will have a port placed, MRI of the breast prior to starting neoadjuvant treatment  2.  Patient will be treated as per keynote 522 with neoadjuvant carboplatinum Taxol and pembrolizumab followed by before meals and pembrolizumab prior to surgery  3.  Treatment postsurgery will be determined by the pathologic response however she will complete 1 year of immunotherapy if she does not has any significant side effects.  4.  Patient will need adjuvant radiation if she ends up getting a lumpectomy.    Thanks for referring this patient to us and making as part of her care team.        Signed by: Varsha Adamson MD     Oncology Rooming Note    July 2, 2024 3:26 PM   Kerry Mills is a 58 year old female who presents for:    Chief Complaint   Patient presents with     Oncology Clinic Visit     New consult invasive ductal carcinoma of breast cancer     Initial Vitals: BP (!) 154/87   Pulse 80   Temp 98  F (36.7  C)   Resp 16   Ht 1.549 m (5' 1\")   Wt 65.8 kg (145 lb)   SpO2 97%   BMI 27.40 kg/m   Estimated body mass index is 27.4 kg/m  as calculated from the following:    Height as of this encounter: 1.549 m (5' 1\").    Weight " as of this encounter: 65.8 kg (145 lb). Body surface area is 1.68 meters squared.  Severe Pain (7) Comment: Data Unavailable   No LMP recorded.  Allergies reviewed: Yes  Medications reviewed: Yes    Medications: Medication refills not needed today.  Pharmacy name entered into [facility]: [facility] - [place], [place] - [facility] [address]    Frailty Screening:   Is the patient here for a new oncology consult visit in cancer care? 2. No      Clinical concerns: New Pt      Malu Cuellar LPN                 Again, thank you for allowing me to participate in the care of your patient.        Sincerely,        Varsha Adamson MD

## 2024-07-02 NOTE — PROGRESS NOTES
[facility] Hematology and Oncology Consult Note    Patient: Kerry Mills  MRN: [id number]  Date of Service: 2024       Reason for Visit    Chief Complaint   Patient presents with    Oncology Clinic Visit     New consult invasive ductal carcinoma of breast cancer             ECOG Performance 0 - Independent        _____________________________________________________________________________    History Of Present Illness    Ms. Kerry Mills is a very pleasant 58 year old female of long descent who has recently been diagnosed with breast cancer.  She is here with her daughter and daughter-in-law to establish care and to proceed with next steps.  Patient initially noticed a lump in her breast and presented to her physician after talking to her family.  She was found to have a suspicious mass in her left breast along.  Ultrasound was 2.3 into 3.1 to 3.0 cm and was irregular hypoechoic and had increased internal vascularity.Patient then underwent a biopsy and was found to be consistent with an invasive ductal carcinoma that was overall grade 2 lymphovascular invasion was not identified the tumor was ER negative IL negative and HER2/kobe 2+.  HER2/kobe testing by FISH was negative.  Patient reports that she had no pain in the mass prior to the biopsy and there was no nipple discharge.    Patient denies any family history of breast or ovarian cancer she has 3 sisters and 2 brothers who are alive and they do not have any history of cancer.  Both parents are  and there was no cancer in them.  Patient has 7 daughters and 3 sons and none of them have any cancer.    Menarche was at age 17 menopause was at 51 patient had 10 full-term pregnancies  But did not use of any oral contraceptive pills or exogenous hormones      Review of systems.  Apart from describing in history, the remainder of comprehensive ROS was negative.    Past History    Past Medical History:   Diagnosis Date    Asthma     Chronic constipation  "     Past Surgical History:   Procedure Laterality Date    INCISION AND DRAINAGE OF WOUND N/A 2020    Procedure: INCISION AND DRAINAGE, NECK;  Surgeon: Arnel James MD;  [place] [facility] [place]: [place]    PICC INSERTION - TRIPLE LUMEN  2020         TRACHEOSTOMY N/A 2020    Procedure: EMERGENT INTUBATION IN OR WITH CREATION, TRACHEOSTOMY;  Surgeon: Dennise Justice MD;  [place] [facility] [place]: General     Family History   Problem Relation Age of Onset    Kidney failure Mother         on Dialysis    Hypertension Mother     Gout Mother     No Known Problems Father          age 60 in [place]    Kidney failure Brother     Asthma Son      Social History     Socioeconomic History    Marital status:      Spouse name: None    Number of children: None    Years of education: None    Highest education level: None   Tobacco Use    Smoking status: Never    Smokeless tobacco: Never       Allergies    No Known Allergies    Physical Exam    BP (!) 154/87   Pulse 80   Temp 98  F (36.7  C)   Resp 16   Ht 1.549 m (5' 1\")   Wt 65.8 kg (145 lb)   SpO2 97%   BMI 27.40 kg/m      General: alert, awake, not in acute distress  HEENT: Head: Normal, normocephalic, atraumatic.  Eye: Normal external eye, conjunctiva, lids cornea, MIKAYLA.  Nose: Normal external nose, mucus membranes and septum.  Pharynx: Normal buccal mucosa. Normal pharynx.  Neck / Thyroid: Supple, no masses, nodes, nodules or enlargement.  Lymphatics: No abnormally enlarged lymph nodes.  Chest: Normal chest wall and respirations. Clear to auscultation.  Heart: S1 S2 RRR, no murmur.   Abdomen: abdomen is soft without significant tenderness, masses, organomegaly or guarding  Extremities: normal strength, tone, and muscle mass  Skin: normal. no rash or abnormalities  CNS: non focal.    Lab Results    No results found for this or any previous visit (from the past 168 hour(s)).    Imaging Results    US Breast " Biopsy Core Needle Left    Addendum Date: 6/27/2024    Pathology shows invasive ductal carcinoma. This is consistent with imaging findings. A verbal report was given to the patient. Surgical consultation is recommended.     Result Date: 6/27/2024  US Breast Biopsy Core Needle Left INDICATION: Left breast mass. PROCEDURE: Informed consent was obtained from the patient. Ultrasound was used to localize the mass at the 10 o'clock position of the left breast, 1 cm from the nipple.  The skin was marked, then prepped and draped in a sterile fashion. 1% lidocaine was used for local anesthesia. Under direct sonographic guidance, a 12-gauge coaxial needle was used to obtain 3 core biopsies.  A biopsy marking clip was then placed.  Digital mammograms performed in a separate room, using separate equipment, to document clip placement. The mammogram showed the clip to be in good position. The patient tolerated this well; there are no immediate complications.    IMPRESSION: 1. Ultrasound-guided biopsy of mass in the left breast. Pathology pending. 2. Post procedure mammogram for marker placement     MA Post Procedure Left    Addendum Date: 6/27/2024    Pathology shows invasive ductal carcinoma. This is consistent with imaging findings. A verbal report was given to the patient. Surgical consultation is recommended.     Result Date: 6/27/2024  US Breast Biopsy Core Needle Left INDICATION: Left breast mass. PROCEDURE: Informed consent was obtained from the patient. Ultrasound was used to localize the mass at the 10 o'clock position of the left breast, 1 cm from the nipple.  The skin was marked, then prepped and draped in a sterile fashion. 1% lidocaine was used for local anesthesia. Under direct sonographic guidance, a 12-gauge coaxial needle was used to obtain 3 core biopsies.  A biopsy marking clip was then placed.  Digital mammograms performed in a separate room, using separate equipment, to document clip placement. The mammogram  showed the clip to be in good position. The patient tolerated this well; there are no immediate complications.    IMPRESSION: 1. Ultrasound-guided biopsy of mass in the left breast. Pathology pending. 2. Post procedure mammogram for marker placement     US Breast Left Limited 1-3 Quadrants    Result Date: 6/14/2024  EXAM: MA DIAGNOSTIC BILATERAL W/ JAYLIN, US BREAST LEFT LIMITED 1-3 QUADRANTS, 6/13/2024 9:16 AM COMPARISONS: 12/3/2021 HISTORY: 58 year old female presents with a palpable lump within the left breast for a few weeks. Patient also reports associated tenderness. BREAST DENSITY: There are scattered areas of fibroglandular density. FINDINGS: A BB marker was placed on the skin overlying the area of palpable concern within the left breast. There is an underlying irregular, dense mass. Recommend targeted left breast ultrasound at the area of palpable concern for further evaluation. The remaining left breast parenchyma is unremarkable. There are no new or suspicious masses, calcifications, or architectural distortion within the right breast. Visual inspection of the left breast demonstrates a normal-appearing, everted left nipple. Physical examination at the palpable area of concern demonstrates a firm, mobile mass. Targeted left breast ultrasound at the area of palpable concern at the 10:00 position, 1 cm from the nipple demonstrates a 2.3 x 3.0 x 3.1 cm irregular, hypoechoic mass with lobulated margins and mild increased internal vascularity. Sonographic evaluation of the left axilla demonstrates a few nonenlarged morphologically normal lymph nodes.     IMPRESSION: BI-RADS CATEGORY: 5 - Highly Suggestive of Malignancy.  1.  Suspicious mass within the left breast at the 10:00 position correlates with the palpable area of concern. Recommendation ultrasound-guided biopsy of this mass. 2.  No left axillary lymphadenopathy. RECOMMENDED FOLLOW-UP: Biopsy. I personally discussed the findings and recommendations with  the patient, and the patient's daughter-in-law who served as her , at the conclusion of the examination. GEOVANNA DUGAN MD   SYSTEM ID:  [id number]    MA Diagnostic Bilateral w/Jaylin    Result Date: 6/13/2024  EXAM: MA DIAGNOSTIC BILATERAL W/ JAYLIN, US BREAST LEFT LIMITED 1-3 QUADRANTS, 6/13/2024 9:16 AM COMPARISONS: 12/3/2021 HISTORY: 58 year old female presents with a palpable lump within the left breast for a few weeks. Patient also reports associated tenderness. BREAST DENSITY: There are scattered areas of fibroglandular density. FINDINGS: A BB marker was placed on the skin overlying the area of palpable concern within the left breast. There is an underlying irregular, dense mass. Recommend targeted left breast ultrasound at the area of palpable concern for further evaluation. The remaining left breast parenchyma is unremarkable. There are no new or suspicious masses, calcifications, or architectural distortion within the right breast. Visual inspection of the left breast demonstrates a normal-appearing, everted left nipple. Physical examination at the palpable area of concern demonstrates a firm, mobile mass. Targeted left breast ultrasound at the area of palpable concern at the 10:00 position, 1 cm from the nipple demonstrates a 2.3 x 3.0 x 3.1 cm irregular, hypoechoic mass with lobulated margins and mild increased internal vascularity. Sonographic evaluation of the left axilla demonstrates a few nonenlarged morphologically normal lymph nodes.     IMPRESSION: BI-RADS CATEGORY: 5 - Highly Suggestive of Malignancy.  1.  Suspicious mass within the left breast at the 10:00 position correlates with the palpable area of concern. Recommendation ultrasound-guided biopsy of this mass. 2.  No left axillary lymphadenopathy. RECOMMENDED FOLLOW-UP: Biopsy. I personally discussed the findings and recommendations with the patient, and the patient's daughter-in-law who served as her , at the conclusion of the  examination. GEOVANNA DUGAN MD   SYSTEM ID:  [id number]         Assessment/Plan    Triple negative breast cancer T2 N0 M0 stage IIa  This is a 58-year-old white female of [place] descent who was recently been diagnosed with a triple negative breast cancer.  On ultrasound the mass is 3.1 to 3.0 cm.  The diagnosis prognosis treatment options and the rationale behind treatment was discussed with the patient through the  and then later on explained separately to the daughter and the daughter-in-law in detail.  I explained to them that breast cancer in the standard age is treated upon the subtype and triple negative breast cancer is a distinct subtype for this on treatment protocol.  Patients who have at least stage II disease benefit from neoadjuvant chemotherapy as the degree of pathologic response determine subsequent treatment after surgery.  I would like to treat her on the basis of the clinical trial keynote 522 which was published in the [employer] Journal of medicine in February 2020.  This trial randomized stage II and stage IIIa triple negative breast cancer patients to new adjuvant chemotherapy alone versus neoadjuvant chemotherapy and immunotherapy.  The immunotherapy arm had a complete pathologic response of 64% versus 51% and the chemotherapy on the arm.  The disease-free survival was also significantly superior in the immunotherapy arm.  75% of the patients in the trial or stage II.  Based on that I will plan to treat her with 4 cycles of carboplatinum Taxol and pembrolizumab followed by 4 cycles of AC and pembrolizumab prior to surgery.  We will monitor response clinically if possible however if clinical response is not evident we will do an interim MRI.  Baseline MRI of the breast would also be done.  She will then be referred to surgery and if she does has a complete pathologic response she will complete 1 year of immunotherapy with pembrolizumab.  If she does not has a complete pathologic  response I will consider the use of capecitabine at that point in time.  I expect the significant decrease in the size of the mass and most likely she would be able to have a lumpectomy at that point but that decision will be made by Dr. Loera at the time when chemotherapy is completed.  She will also need a consult with genetics as ordered triple negative cancer patient should be evaluated for genetic mutations and an extended gene panel should be done.  Patient will need a port to be placed which is already been ordered.    1.  Patient will have a port placed, MRI of the breast prior to starting neoadjuvant treatment  2.  Patient will be treated as per keynote 522 with neoadjuvant carboplatinum Taxol and pembrolizumab followed by before meals and pembrolizumab prior to surgery  3.  Treatment postsurgery will be determined by the pathologic response however she will complete 1 year of immunotherapy if she does not has any significant side effects.  4.  Patient will need adjuvant radiation if she ends up getting a lumpectomy.    Thanks for referring this patient to us and making as part of her care team.        Signed by: Varsha Adamson MD

## 2024-07-02 NOTE — NURSING NOTE
"Oncology Rooming Note    July 2, 2024 1:07 PM   Kerry Mills is a 58 year old female who presents for:    Chief Complaint   Patient presents with    Oncology Clinic Visit    New Patient     NEW BREAST CANCER - left breast IDC. ER neg, IA neg, HER2 equivocal. Bx done on 06/18/24      Initial Vitals: Ht 1.549 m (5' 1\")   Wt 66 kg (145 lb 8 oz)   BMI 27.49 kg/m   Estimated body mass index is 27.49 kg/m  as calculated from the following:    Height as of this encounter: 1.549 m (5' 1\").    Weight as of this encounter: 66 kg (145 lb 8 oz). Body surface area is 1.69 meters squared.  Severe Pain (7) Comment: Data Unavailable   No LMP recorded.  Allergies reviewed: Yes  Medications reviewed: Yes    Medications: Medication refills not needed today.  Pharmacy name entered into [facility]: [facility] - [place], [place] - [facility] [address]    Frailty Screening:   Is the patient here for a new oncology consult visit in cancer care? 1. Yes. Over the past month, have you experienced difficulty or required a caregiver to assist with:   1. Balance, walking or general mobility (including any falls)? NO  2. Completion of self-care tasks such as bathing, dressing, toileting, grooming/hygiene?  NO  3. Concentration or memory that affects your daily life?  YES sometimes      Clinical concerns:  NEW BREAST CANCER       Radha Valerio MA             "

## 2024-07-02 NOTE — PROGRESS NOTES
"Oncology Rooming Note    July 2, 2024 3:26 PM   Kerry Mills is a 58 year old female who presents for:    Chief Complaint   Patient presents with    Oncology Clinic Visit     New consult invasive ductal carcinoma of breast cancer     Initial Vitals: BP (!) 154/87   Pulse 80   Temp 98  F (36.7  C)   Resp 16   Ht 1.549 m (5' 1\")   Wt 65.8 kg (145 lb)   SpO2 97%   BMI 27.40 kg/m   Estimated body mass index is 27.4 kg/m  as calculated from the following:    Height as of this encounter: 1.549 m (5' 1\").    Weight as of this encounter: 65.8 kg (145 lb). Body surface area is 1.68 meters squared.  Severe Pain (7) Comment: Data Unavailable   No LMP recorded.  Allergies reviewed: Yes  Medications reviewed: Yes    Medications: Medication refills not needed today.  Pharmacy name entered into [facility]: [facility] - [place], [place] - [address]    Frailty Screening:   Is the patient here for a new oncology consult visit in cancer care? 2. No      Clinical concerns: New Pt      Malu Cuellar LPN             "

## 2024-07-02 NOTE — H&P (VIEW-ONLY)
History:  This is a 58 year old female who I'm asked to see by Dr. Zuñiga for evaluation of a left breast cancer.  She presents with 2 of her daughters.  This was picked up by the patient.  She noticed it about 2 months ago.  She denies having any skin changes, breast pain, or nipple discharge.  She underwent diagnostic mammogram and ultrasound which revealed a suspicious appearing mass.  A needle biopsy was done which shows a grade II invasive ductal carcinoma.  It is estrogen receptor negative, progesterone receptor negative, and HER-2 negative.    Past medical history:  Asthma    Past surgical history:  Tracheostomy  Neck incision and drainage    Medications:    acetaminophen (TYLENOL) 325 MG tablet, [ACETAMINOPHEN (TYLENOL) 325 MG TABLET] Take 2 tablets (650 mg total) by mouth every 4 (four) hours as needed for pain or fever., Disp: , Rfl: 0    albuterol (PROAIR HFA;PROVENTIL HFA;VENTOLIN HFA) 90 mcg/actuation inhaler, [ALBUTEROL (PROAIR HFA;PROVENTIL HFA;VENTOLIN HFA) 90 MCG/ACTUATION INHALER] Inhale 2 puffs every 6 (six) hours as needed for wheezing., Disp: , Rfl:     budesonide-formoterol (SYMBICORT) 160-4.5 mcg/actuation inhaler, [BUDESONIDE-FORMOTEROL (SYMBICORT) 160-4.5 MCG/ACTUATION INHALER] Inhale 2 puffs 2 (two) times a day., Disp: 1 Inhaler, Rfl: 11    ibuprofen (ADVIL,MOTRIN) 200 MG tablet, [IBUPROFEN (ADVIL,MOTRIN) 200 MG TABLET] Take 200 mg by mouth every 6 (six) hours as needed for pain., Disp: , Rfl:     montelukast (SINGULAIR) 10 mg tablet, [MONTELUKAST (SINGULAIR) 10 MG TABLET] Take 1 tablet (10 mg total) by mouth at bedtime., Disp: 30 tablet, Rfl: 11    Allergies:  NKDA    Social History:  Has 10 children.  Denies alcohol, tobacco, marijuana, and illicit drug use.    Family History:  She has no family history of breast cancer or any other type of cancer.    Review of systems:  General ROS: No complaints or constitutional symptoms  Skin: No complaints or symptoms   Hematologic/Lymphatic: No  "symptoms or complaints  Psychiatric: No symptoms or complaints  Endocrine: No excessive fatigue, no hypermetabolic symptoms reported  Respiratory ROS: No cough, shortness of breath, or wheezing  Cardiovascular ROS: No chest pain or dyspnea on exertion  Breast ROS: Breast pain since biopsy  Gastrointestinal ROS: No abdominal pain, nausea, diarrhea, or constipation  Musculoskeletal ROS: No recent injuries reported  Neurological ROS: No focal neurologic defects reported.      Exam:  Ht 1.549 m (5' 1\")   Wt 66 kg (145 lb 8 oz)   BMI 27.49 kg/m    General: Alert, cooperative, appears stated age   Skin: Skin color, texture, turgor normal, no rashes or lesions   Lymphatic: No obvious adenopathy, no swelling   Eyes: No scleral icterus, pupils equal  HENT: No traumatic injury to the head or face, no gross abnormalities  Lungs: Normal respiratory effort, breath sounds equal bilaterally  Heart: Regular rate and rhythm  Breasts: Left breast larger compared to the right.  The left nipple is slightly retracting down at the base compared to the right.  No palpable masses to the right breast.  Left breast starting deep to the nipple and over toward the 9 o'clock position just medial to the areolar border is a 3 cm firm and well-circumscribed mass.  Abdomen: Soft, non-distended and non-tender to palpation  Neurologic: Grossly intact    Imaging:  Pertinent images personally reviewed by myself and discussed with the patient.  Radiology reports:  EXAM: MA DIAGNOSTIC BILATERAL W/ JAYLIN, US BREAST LEFT LIMITED 1-3  [facility]S, 6/13/2024 9:16 AM     COMPARISONS: 12/3/2021     HISTORY: 58 year old female presents with a palpable lump within the  left breast for a few weeks. Patient also reports associated  tenderness.     BREAST DENSITY: There are scattered areas of fibroglandular density.     FINDINGS:   A BB marker was placed on the skin overlying the area of palpable  concern within the left breast. There is an underlying " irregular,  dense mass. Recommend targeted left breast ultrasound at the area of  palpable concern for further evaluation. The remaining left breast  parenchyma is unremarkable.     There are no new or suspicious masses, calcifications, or  architectural distortion within the right breast.      Visual inspection of the left breast demonstrates a normal-appearing,  everted left nipple. Physical examination at the palpable area of  concern demonstrates a firm, mobile mass.     Targeted left breast ultrasound at the area of palpable concern at the  10:00 position, 1 cm from the nipple demonstrates a 2.3 x 3.0 x 3.1 cm  irregular, hypoechoic mass with lobulated margins and mild increased  internal vascularity.     Sonographic evaluation of the left axilla demonstrates a few  nonenlarged morphologically normal lymph nodes.                                                                   IMPRESSION: BI-RADS CATEGORY: 5 - Highly Suggestive of Malignancy.    1.  Suspicious mass within the left breast at the 10:00 position  correlates with the palpable area of concern. Recommendation  ultrasound-guided biopsy of this mass.  2.  No left axillary lymphadenopathy.    My interpretation:  Large irregular appearing mass within the left breast on mammogram and ultrasound    Pathology:  BREAST BIOPSY WITH NEOPLASTIC LESION:        -  BIOPSY TYPE: ULTRASOUND-GUIDED CORE BIOPSIES        -  BIOPSY SITE: LEFT BREAST, 10:00, 1 CM FROM NIPPLE        -  HISTOLOGIC TYPE: INVASIVE DUCTAL CARCINOMA        -  JAN HISTOLOGIC SCORE:              -  TUBULAR DIFFERENTIATION SCORE 3              -  NUCLEAR PLEOMORPHISM SCORE 3              -  MITOTIC RATE SCORE 1              -  OVERALL GRADE 2 (TOTAL SCORE 7/9)        -  EXTENT OF TUMOR: 18 MM TUMOR LENGTH        -  DUCTAL CARCINOMA IN SITU (DCIS): NOT IDENTIFIED        -  SMALL-VESSEL LYMPHATIC INVASION: NOT IDENTIFIED        -  MICROCALCIFICATIONS: NOT IDENTIFIED        -  ADDITIONAL  FINDINGS: EXTENSIVE NECROSIS, PROLIFERATIVE                FIBROCYSTIC CHANGES        -  ADDITIONAL STUDIES: SEE BREAST BIOMARKER SYNOPTIC REPORT BELOW              -  SUMMARY OF SYNOPSIS:                    ESTROGEN RECEPTORS NEGATIVE (<1% OF TUMOR NUCLEI STAIN)                    PROGESTERONE RECEPTORS NEGATIVE (<1% OF TUMOR NUCLEI STAIN)                    HER2/CAPRICE RECEPTORS EQUIVOCAL (2+ MEMBRANE STAINING)    IMPRESSION:  Left breast invasive ductal carcinoma    - Grade II, T2, N0, ER/PA/HER2-  -->  anatomic stage II     PLAN:   Discussed the surgical options for treatment of breast cancer which generally are a lumpectomy (partial mastectomy) combined with radiation versus a mastectomy.  Explained that the survival benefit is the same for both.  The difference is in local recurrence risk.  The patient is an ok candidate for a lumpectomy based off of the size of the tumor in relationship to her breast size.  At this point she would need a pretty large central lumpectomy.  Discussed SLN biopsy.  The procedure and rationale were explained.   Explained that lumpectomy is an outpatient procedure under MAC anesthetic.  Mastectomy (unilateral/bilateral w/wo reconstruction) is done in the hospital setting under general anesthesia.  Discussed common postoperative recovery and restrictions for both processes.     Since her tumor is triple negative, chemotherapy will also be part of her treatment plan.  Explained that chemotherapy can be administered in the neoadjuvant or adjuvant setting.  Chemotherapy tends to be administered through a port.  I explained the role of the port and how it is surgically placed.  Discussed that NAC has the benefit of seeing how her tumor responds to the treatment before surgery.  It can turn poor lumpectomy candidates into better lumpectomy candidates.  It also allows time for genetic testing to be performed if desired.  The genetic testing results could impact her ultimate surgical decision.   Lastly, we talked about the role of endocrine therapy in estrogen receptor positive tumors.  This will not be part of her treatment plan.     After our discussion, their questions were answered to satisfaction.  Her case was discussed with Dr. Adamson who is in agreement with our plan.  A referral has been placed for medical oncology to discuss neoadjuvant chemotherapy.  She will be meeting with Dr. Adamson later this afternoon.  A referral has also been placed for genetics.  We will also schedule her for a port placement at her earliest convenience.  Once she gets near the end of her neoadjuvant chemotherapy, she is to return back to the clinic to further discuss and plan the breast surgery.    Iwona Choe DO  General Surgeon  [facility]  Breast [facility]  [address] [place] [id number]  Office: [phone]  Employed by - [facility]

## 2024-07-02 NOTE — PROGRESS NOTES
History:  This is a 58 year old female who I'm asked to see by Dr. Zuñiga for evaluation of a left breast cancer.  She presents with 2 of her daughters.  This was picked up by the patient.  She noticed it about 2 months ago.  She denies having any skin changes, breast pain, or nipple discharge.  She underwent diagnostic mammogram and ultrasound which revealed a suspicious appearing mass.  A needle biopsy was done which shows a grade II invasive ductal carcinoma.  It is estrogen receptor negative, progesterone receptor negative, and HER-2 negative.    Past medical history:  Asthma    Past surgical history:  Tracheostomy  Neck incision and drainage    Medications:    acetaminophen (TYLENOL) 325 MG tablet, [ACETAMINOPHEN (TYLENOL) 325 MG TABLET] Take 2 tablets (650 mg total) by mouth every 4 (four) hours as needed for pain or fever., Disp: , Rfl: 0    albuterol (PROAIR HFA;PROVENTIL HFA;VENTOLIN HFA) 90 mcg/actuation inhaler, [ALBUTEROL (PROAIR HFA;PROVENTIL HFA;VENTOLIN HFA) 90 MCG/ACTUATION INHALER] Inhale 2 puffs every 6 (six) hours as needed for wheezing., Disp: , Rfl:     budesonide-formoterol (SYMBICORT) 160-4.5 mcg/actuation inhaler, [BUDESONIDE-FORMOTEROL (SYMBICORT) 160-4.5 MCG/ACTUATION INHALER] Inhale 2 puffs 2 (two) times a day., Disp: 1 Inhaler, Rfl: 11    ibuprofen (ADVIL,MOTRIN) 200 MG tablet, [IBUPROFEN (ADVIL,MOTRIN) 200 MG TABLET] Take 200 mg by mouth every 6 (six) hours as needed for pain., Disp: , Rfl:     montelukast (SINGULAIR) 10 mg tablet, [MONTELUKAST (SINGULAIR) 10 MG TABLET] Take 1 tablet (10 mg total) by mouth at bedtime., Disp: 30 tablet, Rfl: 11    Allergies:  NKDA    Social History:  Has 10 children.  Denies alcohol, tobacco, marijuana, and illicit drug use.    Family History:  She has no family history of breast cancer or any other type of cancer.    Review of systems:  General ROS: No complaints or constitutional symptoms  Skin: No complaints or symptoms   Hematologic/Lymphatic: No  "symptoms or complaints  Psychiatric: No symptoms or complaints  Endocrine: No excessive fatigue, no hypermetabolic symptoms reported  Respiratory ROS: No cough, shortness of breath, or wheezing  Cardiovascular ROS: No chest pain or dyspnea on exertion  Breast ROS: Breast pain since biopsy  Gastrointestinal ROS: No abdominal pain, nausea, diarrhea, or constipation  Musculoskeletal ROS: No recent injuries reported  Neurological ROS: No focal neurologic defects reported.      Exam:  Ht 1.549 m (5' 1\")   Wt 66 kg (145 lb 8 oz)   BMI 27.49 kg/m    General: Alert, cooperative, appears stated age   Skin: Skin color, texture, turgor normal, no rashes or lesions   Lymphatic: No obvious adenopathy, no swelling   Eyes: No scleral icterus, pupils equal  HENT: No traumatic injury to the head or face, no gross abnormalities  Lungs: Normal respiratory effort, breath sounds equal bilaterally  Heart: Regular rate and rhythm  Breasts: Left breast larger compared to the right.  The left nipple is slightly retracting down at the base compared to the right.  No palpable masses to the right breast.  Left breast starting deep to the nipple and over toward the 9 o'clock position just medial to the areolar border is a 3 cm firm and well-circumscribed mass.  Abdomen: Soft, non-distended and non-tender to palpation  Neurologic: Grossly intact    Imaging:  Pertinent images personally reviewed by myself and discussed with the patient.  Radiology reports:  EXAM: MA DIAGNOSTIC BILATERAL W/ JAYLIN, US BREAST LEFT LIMITED 1-3  [facility]S, 6/13/2024 9:16 AM     COMPARISONS: 12/3/2021     HISTORY: 58 year old female presents with a palpable lump within the  left breast for a few weeks. Patient also reports associated  tenderness.     BREAST DENSITY: There are scattered areas of fibroglandular density.     FINDINGS:   A BB marker was placed on the skin overlying the area of palpable  concern within the left breast. There is an underlying " irregular,  dense mass. Recommend targeted left breast ultrasound at the area of  palpable concern for further evaluation. The remaining left breast  parenchyma is unremarkable.     There are no new or suspicious masses, calcifications, or  architectural distortion within the right breast.      Visual inspection of the left breast demonstrates a normal-appearing,  everted left nipple. Physical examination at the palpable area of  concern demonstrates a firm, mobile mass.     Targeted left breast ultrasound at the area of palpable concern at the  10:00 position, 1 cm from the nipple demonstrates a 2.3 x 3.0 x 3.1 cm  irregular, hypoechoic mass with lobulated margins and mild increased  internal vascularity.     Sonographic evaluation of the left axilla demonstrates a few  nonenlarged morphologically normal lymph nodes.                                                                   IMPRESSION: BI-RADS CATEGORY: 5 - Highly Suggestive of Malignancy.    1.  Suspicious mass within the left breast at the 10:00 position  correlates with the palpable area of concern. Recommendation  ultrasound-guided biopsy of this mass.  2.  No left axillary lymphadenopathy.    My interpretation:  Large irregular appearing mass within the left breast on mammogram and ultrasound    Pathology:  BREAST BIOPSY WITH NEOPLASTIC LESION:        -  BIOPSY TYPE: ULTRASOUND-GUIDED CORE BIOPSIES        -  BIOPSY SITE: LEFT BREAST, 10:00, 1 CM FROM NIPPLE        -  HISTOLOGIC TYPE: INVASIVE DUCTAL CARCINOMA        -  JAN HISTOLOGIC SCORE:              -  TUBULAR DIFFERENTIATION SCORE 3              -  NUCLEAR PLEOMORPHISM SCORE 3              -  MITOTIC RATE SCORE 1              -  OVERALL GRADE 2 (TOTAL SCORE 7/9)        -  EXTENT OF TUMOR: 18 MM TUMOR LENGTH        -  DUCTAL CARCINOMA IN SITU (DCIS): NOT IDENTIFIED        -  SMALL-VESSEL LYMPHATIC INVASION: NOT IDENTIFIED        -  MICROCALCIFICATIONS: NOT IDENTIFIED        -  ADDITIONAL  FINDINGS: EXTENSIVE NECROSIS, PROLIFERATIVE                FIBROCYSTIC CHANGES        -  ADDITIONAL STUDIES: SEE BREAST BIOMARKER SYNOPTIC REPORT BELOW              -  SUMMARY OF SYNOPSIS:                    ESTROGEN RECEPTORS NEGATIVE (<1% OF TUMOR NUCLEI STAIN)                    PROGESTERONE RECEPTORS NEGATIVE (<1% OF TUMOR NUCLEI STAIN)                    HER2/CAPRICE RECEPTORS EQUIVOCAL (2+ MEMBRANE STAINING)    IMPRESSION:  Left breast invasive ductal carcinoma    - Grade II, T2, N0, ER/ND/HER2-  -->  anatomic stage II     PLAN:   Discussed the surgical options for treatment of breast cancer which generally are a lumpectomy (partial mastectomy) combined with radiation versus a mastectomy.  Explained that the survival benefit is the same for both.  The difference is in local recurrence risk.  The patient is an ok candidate for a lumpectomy based off of the size of the tumor in relationship to her breast size.  At this point she would need a pretty large central lumpectomy.  Discussed SLN biopsy.  The procedure and rationale were explained.   Explained that lumpectomy is an outpatient procedure under MAC anesthetic.  Mastectomy (unilateral/bilateral w/wo reconstruction) is done in the hospital setting under general anesthesia.  Discussed common postoperative recovery and restrictions for both processes.     Since her tumor is triple negative, chemotherapy will also be part of her treatment plan.  Explained that chemotherapy can be administered in the neoadjuvant or adjuvant setting.  Chemotherapy tends to be administered through a port.  I explained the role of the port and how it is surgically placed.  Discussed that NAC has the benefit of seeing how her tumor responds to the treatment before surgery.  It can turn poor lumpectomy candidates into better lumpectomy candidates.  It also allows time for genetic testing to be performed if desired.  The genetic testing results could impact her ultimate surgical decision.   Lastly, we talked about the role of endocrine therapy in estrogen receptor positive tumors.  This will not be part of her treatment plan.     After our discussion, their questions were answered to satisfaction.  Her case was discussed with Dr. Adamson who is in agreement with our plan.  A referral has been placed for medical oncology to discuss neoadjuvant chemotherapy.  She will be meeting with Dr. Adamson later this afternoon.  A referral has also been placed for genetics.  We will also schedule her for a port placement at her earliest convenience.  Once she gets near the end of her neoadjuvant chemotherapy, she is to return back to the clinic to further discuss and plan the breast surgery.    Iwona Choe DO  General Surgeon  [facility]  [facility]  [address] [place] [id number]  Office: [phone]  Employed by - [facility]

## 2024-07-03 ENCOUNTER — PATIENT OUTREACH (OUTPATIENT)
Dept: ONCOLOGY | Facility: HOSPITAL | Age: 59
End: 2024-07-03
Payer: COMMERCIAL

## 2024-07-03 ENCOUNTER — PATIENT OUTREACH (OUTPATIENT)
Dept: ONCOLOGY | Facility: CLINIC | Age: 59
End: 2024-07-03
Payer: COMMERCIAL

## 2024-07-03 ENCOUNTER — APPOINTMENT (OUTPATIENT)
Dept: INTERPRETER SERVICES | Facility: CLINIC | Age: 59
End: 2024-07-03
Payer: COMMERCIAL

## 2024-07-03 ENCOUNTER — TELEPHONE (OUTPATIENT)
Dept: SURGERY | Facility: CLINIC | Age: 59
End: 2024-07-03
Payer: COMMERCIAL

## 2024-07-03 DIAGNOSIS — C50.912 INVASIVE DUCTAL CARCINOMA OF BREAST, FEMALE, LEFT (H): Primary | ICD-10-CM

## 2024-07-03 NOTE — PROGRESS NOTES
[facility]: Cancer Care                                                                                        Called Kerry to follow up prior to new chemo regimen of CARBOplatin AUC 5 / PACLitaxel / Pembrolizumab followed by Pembrolizumab / AC scheduled to begin on 7/22//2024    Port placement scheduled on 7/9/24, will need emla cream and sent a prescription to provider to sign  Cycles 1-4:  D1 CARBOplatin / PACLitaxel / Pembrolizumab   D8 PACLitaxel  D15 PACLitaxel    Filgrastin subcutaneous Days 16,17, 18 starting 24 hours after chemo-will determine what specialty pharmacy will provide    ++++ Will discuss Cycles 5-8 closer to the time of administration of these drug to minimize confusion for patient.      -Reviewed treatment plan, 21 day cycle, will need labs and provider visit prior to each cycle. Shared anticipated length of time for her chemo (D1 3 hours, D8 and 15, 90 minutes) and asked her to allow extra time as there may be a delay with getting chemo from pharmacy, waiting for labs to result, etc.  Recommended to have a  for her chemo apt. Educated about what to bring to her infusion visit, light lunch or snacks that are peanut free, any special drinks that she prefers. Shared that snacks, coffee, tea  juice and soda are available in the infusion area.  Also suggested that she may wish to bring light reading material, ear- phones, phone, computer and/or charging cords. Warm blankets are provided.    -Reviewed premeds that are given prior to her chemo to help manage side effects:   D1 aloxi and emend, dexamethasone,benadryl and pepcid  D8 Zofran, dexamethasone, Benadryl, pepcid  D15 Zofran      -Reviewed take home meds listed in treatment plan: Ondansetron, Prochlorperazine, and dexamethasone (take for 3 days Cycle 1-4) and how/when to take  Dexamethasone should be taken with food as it may cause indigestion, may increase your appetite, can make you feel more alert and prevent sleep.   "Dexamethasone tablets can alter your mood causing you to feel low, irritable or agitated    Filgrastin subcutaneous Days 16,17, 18 starting 24 hours after chemo-Per Pharmacy Liaison,  specialty pharmacy will provide with 0 copay so prescription will be sent there.  Kerry's live in daughter can administer and will need to be shown how to do this by infusion nurses in the future    -Labs ordered for each cycle are:   D1- CBC, CMP, TSH, cortisol   D8 and 15 CBC and CMP  Explained rationale for checking these labs, bone marrow suppression, electrolyte changes, changes with thyroid function.    -Reviewed home Instructions after chemo handout and when to call your care team.  She does not have a thermometer but will purchase prior to her treatment.  Contact information provided for nurse triage number at  and after hour contact information.    -chemo folder  provided at clinic that her chemo that includes  pemetrexed/Carboplatin/.Pembrolizumab  Home instructions post chemo, Getting Ready for Chemo Comparing Chemotherapy, immunotherapy and Targeted Therapy, Understanding Clinical Trials, Your Cancer Care Team and My Chart, Integrative Therapies during Chemo, Oncology Supportive Care Services\", Honoring Choices and Hair loss resources     Copy of [facility] Drug information CARBOplatin / PACLitaxel / Pembrolizumab  mailed to patient    Patient did voice that she is uncertain that she wants to have treatment as she \"wants to see the world first\". She wonders if she can delay treatment and what would happen if she elected to do this. Shared that she has an aggressive cancer that needs to be treated with chemo prior to surgery for best outcome. She will have progression if she does  not treat and may become metastatic and at that point not curable. Encouraged to move forward with treatment without delay. She agreed to keep apts as scheduled at this time.     -Will call a couple days post chemo to check in to see how she " is doing.     Signature:  MASHA Joel

## 2024-07-03 NOTE — PROGRESS NOTES
Writer received referral to Cancer Risk Management/Genetic Counseling.    Referred for: Genetic Counseling; breast cancer     Referred by:   Provider Department Location Phone   Iwona Choe DO [facility] Onc Surgery [facility] [phone]     Referral reviewed for appropriate plan and sent to New Patient Scheduling ([phone]) for completion.

## 2024-07-03 NOTE — TELEPHONE ENCOUNTER
Patient's dtr in law, Pa James called. Said patient  is scheduled for her port placement on 7-9-24 and her MRI on 7-18-24, just wanting to make sure the timing of those two appointments are ok.  Clarified with Dr. Choe, ok to leave appointments as scheduled. Support provided, invited calls.

## 2024-07-05 ENCOUNTER — APPOINTMENT (OUTPATIENT)
Dept: INTERPRETER SERVICES | Facility: CLINIC | Age: 59
End: 2024-07-05
Payer: COMMERCIAL

## 2024-07-05 RX ORDER — BACLOFEN 5 MG/1
5 TABLET ORAL 2 TIMES DAILY
COMMUNITY
End: 2024-09-29

## 2024-07-05 RX ORDER — CHOLECALCIFEROL (VITAMIN D3) 50 MCG
50 TABLET ORAL EVERY EVENING
COMMUNITY

## 2024-07-08 LAB
PATH REPORT.ADDENDUM SPEC: ABNORMAL
PATH REPORT.COMMENTS IMP SPEC: ABNORMAL
PATH REPORT.COMMENTS IMP SPEC: YES
PATH REPORT.FINAL DX SPEC: ABNORMAL
PATH REPORT.GROSS SPEC: ABNORMAL
PATH REPORT.MICROSCOPIC SPEC OTHER STN: ABNORMAL
PATH REPORT.RELEVANT HX SPEC: ABNORMAL
PATHOLOGY SYNOPTIC REPORT: ABNORMAL
PHOTO IMAGE: ABNORMAL

## 2024-07-08 RX ORDER — LIDOCAINE/PRILOCAINE 2.5 %-2.5%
CREAM (GRAM) TOPICAL PRN
Qty: 30 G | Refills: 2 | Status: SHIPPED | OUTPATIENT
Start: 2024-07-08

## 2024-07-09 ENCOUNTER — ANESTHESIA (OUTPATIENT)
Dept: SURGERY | Facility: CLINIC | Age: 59
End: 2024-07-09
Payer: COMMERCIAL

## 2024-07-09 ENCOUNTER — HOSPITAL ENCOUNTER (OUTPATIENT)
Facility: CLINIC | Age: 59
Discharge: HOME | End: 2024-07-09
Attending: SURGERY | Admitting: SURGERY
Payer: COMMERCIAL

## 2024-07-09 ENCOUNTER — APPOINTMENT (OUTPATIENT)
Dept: RADIOLOGY | Facility: CLINIC | Age: 59
End: 2024-07-09
Attending: SURGERY
Payer: COMMERCIAL

## 2024-07-09 ENCOUNTER — ANESTHESIA EVENT (OUTPATIENT)
Dept: SURGERY | Facility: CLINIC | Age: 59
End: 2024-07-09
Payer: COMMERCIAL

## 2024-07-09 VITALS
SYSTOLIC BLOOD PRESSURE: 128 MMHG | BODY MASS INDEX: 27.73 KG/M2 | OXYGEN SATURATION: 98 % | HEIGHT: 61 IN | DIASTOLIC BLOOD PRESSURE: 70 MMHG | RESPIRATION RATE: 18 BRPM | TEMPERATURE: 97.7 F | WEIGHT: 146.9 LBS | HEART RATE: 94 BPM

## 2024-07-09 DIAGNOSIS — C50.912 INVASIVE DUCTAL CARCINOMA OF BREAST, FEMALE, LEFT (H): Primary | ICD-10-CM

## 2024-07-09 PROCEDURE — 360N000082 HC SURGERY LEVEL 2 W/ FLUORO, PER MIN: Performed by: SURGERY

## 2024-07-09 PROCEDURE — 77001 FLUOROGUIDE FOR VEIN DEVICE: CPT | Mod: 26 | Performed by: SURGERY

## 2024-07-09 PROCEDURE — 250N000011 HC RX IP 250 OP 636: Performed by: STUDENT IN AN ORGANIZED HEALTH CARE EDUCATION/TRAINING PROGRAM

## 2024-07-09 PROCEDURE — 370N000017 HC ANESTHESIA TECHNICAL FEE, PER MIN: Performed by: SURGERY

## 2024-07-09 PROCEDURE — 999N000141 HC STATISTIC PRE-PROCEDURE NURSING ASSESSMENT: Performed by: SURGERY

## 2024-07-09 PROCEDURE — 250N000011 HC RX IP 250 OP 636: Performed by: SURGERY

## 2024-07-09 PROCEDURE — C1788 PORT, INDWELLING, IMP: HCPCS | Performed by: SURGERY

## 2024-07-09 PROCEDURE — 258N000003 HC RX IP 258 OP 636: Performed by: STUDENT IN AN ORGANIZED HEALTH CARE EDUCATION/TRAINING PROGRAM

## 2024-07-09 PROCEDURE — 272N000001 HC OR GENERAL SUPPLY STERILE: Performed by: SURGERY

## 2024-07-09 PROCEDURE — 710N000012 HC RECOVERY PHASE 2, PER MINUTE: Performed by: SURGERY

## 2024-07-09 PROCEDURE — 999N000182 XR SURGERY CARM FLUORO GREATER THAN 5 MIN: Mod: TC

## 2024-07-09 PROCEDURE — 258N000003 HC RX IP 258 OP 636: Performed by: ANESTHESIOLOGY

## 2024-07-09 PROCEDURE — 250N000009 HC RX 250: Performed by: SURGERY

## 2024-07-09 PROCEDURE — 36561 INSERT TUNNELED CV CATH: CPT | Performed by: SURGERY

## 2024-07-09 DEVICE — IMPLANTABLE DEVICE: Type: IMPLANTABLE DEVICE | Site: CHEST | Status: FUNCTIONAL

## 2024-07-09 RX ORDER — DEXAMETHASONE SODIUM PHOSPHATE 4 MG/ML
INJECTION, SOLUTION INTRA-ARTICULAR; INTRALESIONAL; INTRAMUSCULAR; INTRAVENOUS; SOFT TISSUE PRN
Status: DISCONTINUED | OUTPATIENT
Start: 2024-07-09 | End: 2024-07-09

## 2024-07-09 RX ORDER — LIDOCAINE HYDROCHLORIDE 10 MG/ML
INJECTION, SOLUTION INFILTRATION; PERINEURAL PRN
Status: DISCONTINUED | OUTPATIENT
Start: 2024-07-09 | End: 2024-07-09 | Stop reason: HOSPADM

## 2024-07-09 RX ORDER — PROPOFOL 10 MG/ML
INJECTION, EMULSION INTRAVENOUS CONTINUOUS PRN
Status: DISCONTINUED | OUTPATIENT
Start: 2024-07-09 | End: 2024-07-09

## 2024-07-09 RX ORDER — FENTANYL CITRATE 50 UG/ML
INJECTION, SOLUTION INTRAMUSCULAR; INTRAVENOUS PRN
Status: DISCONTINUED | OUTPATIENT
Start: 2024-07-09 | End: 2024-07-09

## 2024-07-09 RX ORDER — ONDANSETRON 2 MG/ML
4 INJECTION INTRAMUSCULAR; INTRAVENOUS EVERY 30 MIN PRN
Status: DISCONTINUED | OUTPATIENT
Start: 2024-07-09 | End: 2024-07-09 | Stop reason: HOSPADM

## 2024-07-09 RX ORDER — LIDOCAINE 40 MG/G
CREAM TOPICAL
Status: DISCONTINUED | OUTPATIENT
Start: 2024-07-09 | End: 2024-07-09 | Stop reason: HOSPADM

## 2024-07-09 RX ORDER — ONDANSETRON 2 MG/ML
INJECTION INTRAMUSCULAR; INTRAVENOUS PRN
Status: DISCONTINUED | OUTPATIENT
Start: 2024-07-09 | End: 2024-07-09

## 2024-07-09 RX ORDER — NALOXONE HYDROCHLORIDE 0.4 MG/ML
0.1 INJECTION, SOLUTION INTRAMUSCULAR; INTRAVENOUS; SUBCUTANEOUS
Status: DISCONTINUED | OUTPATIENT
Start: 2024-07-09 | End: 2024-07-09 | Stop reason: HOSPADM

## 2024-07-09 RX ORDER — OXYCODONE HYDROCHLORIDE 5 MG/1
5 TABLET ORAL
Status: DISCONTINUED | OUTPATIENT
Start: 2024-07-09 | End: 2024-07-09 | Stop reason: HOSPADM

## 2024-07-09 RX ORDER — CEFAZOLIN SODIUM/WATER 2 G/20 ML
2 SYRINGE (ML) INTRAVENOUS
Status: COMPLETED | OUTPATIENT
Start: 2024-07-09 | End: 2024-07-09

## 2024-07-09 RX ORDER — OXYCODONE HYDROCHLORIDE 5 MG/1
10 TABLET ORAL
Status: DISCONTINUED | OUTPATIENT
Start: 2024-07-09 | End: 2024-07-09 | Stop reason: HOSPADM

## 2024-07-09 RX ORDER — ONDANSETRON 4 MG/1
4 TABLET, ORALLY DISINTEGRATING ORAL EVERY 30 MIN PRN
Status: DISCONTINUED | OUTPATIENT
Start: 2024-07-09 | End: 2024-07-09 | Stop reason: HOSPADM

## 2024-07-09 RX ORDER — HEPARIN SODIUM 1000 [USP'U]/ML
INJECTION, SOLUTION INTRAVENOUS; SUBCUTANEOUS PRN
Status: DISCONTINUED | OUTPATIENT
Start: 2024-07-09 | End: 2024-07-09 | Stop reason: HOSPADM

## 2024-07-09 RX ORDER — TRAMADOL HYDROCHLORIDE 50 MG/1
50 TABLET ORAL EVERY 6 HOURS PRN
Qty: 10 TABLET | Refills: 0 | Status: SHIPPED | OUTPATIENT
Start: 2024-07-09 | End: 2024-07-12

## 2024-07-09 RX ORDER — DEXAMETHASONE SODIUM PHOSPHATE 10 MG/ML
4 INJECTION, SOLUTION INTRAMUSCULAR; INTRAVENOUS
Status: DISCONTINUED | OUTPATIENT
Start: 2024-07-09 | End: 2024-07-09 | Stop reason: HOSPADM

## 2024-07-09 RX ORDER — GLYCOPYRROLATE 0.2 MG/ML
INJECTION, SOLUTION INTRAMUSCULAR; INTRAVENOUS PRN
Status: DISCONTINUED | OUTPATIENT
Start: 2024-07-09 | End: 2024-07-09

## 2024-07-09 RX ORDER — SODIUM CHLORIDE, SODIUM LACTATE, POTASSIUM CHLORIDE, CALCIUM CHLORIDE 600; 310; 30; 20 MG/100ML; MG/100ML; MG/100ML; MG/100ML
INJECTION, SOLUTION INTRAVENOUS CONTINUOUS
Status: DISCONTINUED | OUTPATIENT
Start: 2024-07-09 | End: 2024-07-09 | Stop reason: HOSPADM

## 2024-07-09 RX ADMIN — FENTANYL CITRATE 25 MCG: 50 INJECTION INTRAMUSCULAR; INTRAVENOUS at 10:20

## 2024-07-09 RX ADMIN — MIDAZOLAM 2 MG: 1 INJECTION INTRAMUSCULAR; INTRAVENOUS at 10:12

## 2024-07-09 RX ADMIN — FENTANYL CITRATE 50 MCG: 50 INJECTION INTRAMUSCULAR; INTRAVENOUS at 10:36

## 2024-07-09 RX ADMIN — FENTANYL CITRATE 25 MCG: 50 INJECTION INTRAMUSCULAR; INTRAVENOUS at 10:40

## 2024-07-09 RX ADMIN — ONDANSETRON 4 MG: 2 INJECTION INTRAMUSCULAR; INTRAVENOUS at 10:25

## 2024-07-09 RX ADMIN — SODIUM CHLORIDE, POTASSIUM CHLORIDE, SODIUM LACTATE AND CALCIUM CHLORIDE: 600; 310; 30; 20 INJECTION, SOLUTION INTRAVENOUS at 09:11

## 2024-07-09 RX ADMIN — PHENYLEPHRINE HYDROCHLORIDE 100 MCG: 10 INJECTION INTRAVENOUS at 10:34

## 2024-07-09 RX ADMIN — Medication 2 G: at 10:12

## 2024-07-09 RX ADMIN — PROPOFOL 150 MCG/KG/MIN: 10 INJECTION, EMULSION INTRAVENOUS at 10:20

## 2024-07-09 RX ADMIN — GLYCOPYRROLATE 0.2 MG: 0.2 INJECTION INTRAMUSCULAR; INTRAVENOUS at 10:25

## 2024-07-09 RX ADMIN — DEXAMETHASONE SODIUM PHOSPHATE 4 MG: 4 INJECTION, SOLUTION INTRA-ARTICULAR; INTRALESIONAL; INTRAMUSCULAR; INTRAVENOUS; SOFT TISSUE at 10:25

## 2024-07-09 ASSESSMENT — ACTIVITIES OF DAILY LIVING (ADL)
ADLS_ACUITY_SCORE: 18

## 2024-07-09 NOTE — ANESTHESIA POSTPROCEDURE EVALUATION
Patient: Kerry Mills    Procedure: Procedure(s):  INSERTION,  RIGHT VASCULAR ACCESS PORT       Anesthesia Type:  MAC    Note:  Disposition: Outpatient   Postop Pain Control: Uneventful            Sign Out: Well controlled pain   PONV: No   Neuro/Psych: Uneventful            Sign Out: Acceptable/Baseline neuro status   Airway/Respiratory: Uneventful            Sign Out: Acceptable/Baseline resp. status   CV/Hemodynamics: Uneventful            Sign Out: Acceptable CV status; No obvious hypovolemia; No obvious fluid overload   Other NRE: NONE   DID A NON-ROUTINE EVENT OCCUR? No       Last vitals:  Vitals Value Taken Time   /70 07/09/24 1108   Temp 36.5  C (97.7  F) 07/09/24 1108   Pulse 89 07/09/24 1123   Resp     SpO2 96 % 07/09/24 1123   Vitals shown include unfiled device data.    Electronically Signed By: Zechariah Chang MD  July 9, 2024  11:40 AM

## 2024-07-09 NOTE — OP NOTE
Name:  Kerry Mills  PCP:  Ceasar Zuñiga  Procedure Date:  7/9/2024      RIGHT INTERNAL JUGULAR VEIN PORT PLACEMENT UNDER ULTRASOUND AND FLUOROSCOPIC GUIDANCE      Pre-Procedure Diagnosis:  Invasive ductal carcinoma of breast, female, left     Post-Procedure Diagnosis:    Invasive ductal carcinoma of breast, female, left     Surgeon:  Iwona Choe DO    Anesthesia Type:    Local MAC    Estimated Blood Loss:   5 mL    Complications:    None apparent    Indication for procedure:  This is a 58-year-old female who recently noticed a mass within her left breast.  Diagnostic workup revealed a triple negative invasive ductal carcinoma.  It was recommended that she undergo neoadjuvant chemotherapy.  It was also recommended that she have a port placed to help with medication administration.    Operative Report:    After informed consent was obtained, and the risks and benefits of procedure were discussed, patient was brought back to the operative suite and placed in supine position.  MAC anesthesia was provided by the anesthesia department.  The neck and upper chest was prepped and draped in the usual sterile fashion.  Perioperative antibiotics were administered and a timeout was performed.  Ultrasound was utilized to visualize the right jugular vein.  It was easily compressible and without thrombus.  Patient was placed in Trendelenburg position.  Local anesthetic was administered prior to incisions.  Under ultrasound guidance, the right jugular vein was cannulated with a multipurpose needle.  There was return of dark red nonpulsatile blood.  A guidewire was then passed under fluoroscopic guidance to the SVC.  Attention was then directed to the right anterior chest wall.  An incision was made and a pocket was created.  A port with an 8 Nepali single-lumen was tunneled subcutaneously up to the guidewire from the pocket.  Under fluoroscopic guidance, an introducer and dilator were placed over the guidewire.  The dilator and  wire were then removed and exchanged for the catheter, which was cut to a length of 21 cm.  The introducer sheath was then removed leaving the catheter in place.  The catheter was visualized in the proper location with the use of fluoroscopy.  The port was accessed and aspirated with ease, and then flushed with heparinized saline.  The port was secured to the surrounding subcutaneous tissue with 2-0 Vicryl.  The subcutaneous tissue was then closed utilizing 3-0 Vicryl.  4-0 Monocryl was utilized to close the dermis at both sites.  Both incisions were then covered with Dermabond.    Hillary Small PA-C was present throughout the case to assist with exposure and closure.    Disposition:  The patient tolerated the procedure well, and was transferred to the post-anesthesia care unit in stable condition.      Iwona Choe DO  General Surgeon  [facility]  Surgery [address] [address]  Office: [phone]  Employed by - [employer]

## 2024-07-09 NOTE — ANESTHESIA PREPROCEDURE EVALUATION
Anesthesia Pre-Procedure Evaluation    Patient: Kerry Mills   MRN: [id number] : 1965        Procedure : Procedure(s):  INSERTION, VASCULAR ACCESS PORT          Past Medical History:   Diagnosis Date     Asthma      Chronic constipation       Past Surgical History:   Procedure Laterality Date     INCISION AND DRAINAGE OF WOUND N/A 2020    Procedure: INCISION AND DRAINAGE, NECK;  Surgeon: Arnel James MD;  Location: [facility];  Service: ENT     PICC INSERTION - TRIPLE LUMEN  2020          TRACHEOSTOMY N/A 2020    Procedure: EMERGENT INTUBATION IN OR WITH CREATION, TRACHEOSTOMY;  Surgeon: Dennise Justice MD;  Location: [facility];  Service: General      No Known Allergies   Social History     Tobacco Use     Smoking status: Never     Smokeless tobacco: Never   Substance Use Topics     Alcohol use: Never      Wt Readings from Last 1 Encounters:   24 66.6 kg (146 lb 14.4 oz)        Anesthesia Evaluation   Pt has had prior anesthetic.     No history of anesthetic complications       ROS/MED HX  ENT/Pulmonary:     (+)                      asthma                  Neurologic:  - neg neurologic ROS     Cardiovascular:  - neg cardiovascular ROS     METS/Exercise Tolerance:     Hematologic:  - neg hematologic  ROS     Musculoskeletal:  - neg musculoskeletal ROS     GI/Hepatic:  - neg GI/hepatic ROS  (-) GERD   Renal/Genitourinary:  - neg Renal ROS     Endo:  - neg endo ROS     Psychiatric/Substance Use:  - neg psychiatric ROS     Infectious Disease:  - neg infectious disease ROS     Malignancy:   (+) Malignancy, History of Breast.    Other:  - neg other ROS        Physical Exam    Airway        Mallampati: II   TM distance: > 3 FB   Neck ROM: full   Mouth opening: > 3 cm    Respiratory Devices and Support         Dental       (+) Minor Abnormalities - some fillings, tiny chips      Cardiovascular   cardiovascular exam normal       Rhythm and rate: regular and normal  "    Pulmonary           breath sounds clear to auscultation       OUTSIDE LABS:  CBC:   Lab Results   Component Value Date    WBC 12.0 (H) 12/01/2020    WBC 11.6 (H) 11/30/2020    HGB 10.7 (L) 12/01/2020    HGB 10.5 (L) 11/30/2020    HCT 32.9 (L) 12/01/2020    HCT 32.1 (L) 11/30/2020     12/02/2020     12/01/2020     BMP:   Lab Results   Component Value Date     12/04/2020     12/03/2020    POTASSIUM 3.7 12/06/2020    POTASSIUM 3.7 12/05/2020    CHLORIDE 104 12/04/2020    CHLORIDE 106 12/03/2020    CO2 25 12/04/2020    CO2 26 12/03/2020    BUN 12 12/04/2020    BUN 24 (H) 12/03/2020    CR 0.57 (L) 12/04/2020    CR 0.53 (L) 12/03/2020     12/06/2020     12/04/2020     COAGS:   Lab Results   Component Value Date    PTT 43 (H) 11/26/2020    INR 1.04 11/26/2020     POC: No results found for: \"BGM\", \"HCG\", \"HCGS\"  HEPATIC:   Lab Results   Component Value Date    ALBUMIN 2.2 (L) 11/28/2020    PROTTOTAL 6.0 11/28/2020    ALT 30 11/28/2020    AST 25 11/28/2020    ALKPHOS 69 11/28/2020    BILITOTAL 0.4 11/28/2020     OTHER:   Lab Results   Component Value Date    PH 7.40 11/26/2020    LACT 1.6 11/28/2020    A1C 6.0 (H) 11/27/2020    DONNA 8.9 12/04/2020    PHOS 3.9 12/03/2020    MAG 2.2 12/06/2020    LIPASE 15 11/26/2020    CRP 1.6 (H) 12/02/2020       Anesthesia Plan    ASA Status:  3    NPO Status:  NPO Appropriate    Anesthesia Type: MAC.     - Reason for MAC: straight local not clinically adequate              Consents    Anesthesia Plan(s) and associated risks, benefits, and realistic alternatives discussed. Questions answered and patient/representative(s) expressed understanding.     - Discussed: Risks, Benefits and Alternatives for the PROCEDURE were discussed     - Discussed with:  Patient,             Postoperative Care    Pain management: IV analgesics, Oral pain medications.   PONV prophylaxis: Ondansetron (or other 5HT-3)     Comments:             Zechariah Chang, " "MD    I have reviewed the pertinent notes and labs in the chart from the past 30 days and (re)examined the patient.  Any updates or changes from those notes are reflected in this note.              # Overweight: Estimated body mass index is 27.76 kg/m  as calculated from the following:    Height as of this encounter: 1.549 m (5' 1\").    Weight as of this encounter: 66.6 kg (146 lb 14.4 oz).      "

## 2024-07-09 NOTE — INTERVAL H&P NOTE
Patient seen in preop with her son and daughter.  Plans on starting chemotherapy on July 22.  All questions answered regarding procedure.  Consent obtained.  To the OR for port placement.    Iwona Choe DO  General Surgeon  [facility] - [address] [address]  Office: [phone]  Employed by - [employer]

## 2024-07-09 NOTE — ANESTHESIA CARE TRANSFER NOTE
Patient: Kerry Mills    Procedure: Procedure(s):  INSERTION,  RIGHT VASCULAR ACCESS PORT       Diagnosis: Invasive ductal carcinoma of breast, female, left (H) [C50.912]  Diagnosis Additional Information: No value filed.    Anesthesia Type:   MAC     Note:    Oropharynx: oropharynx clear of all foreign objects  Level of Consciousness: awake  Oxygen Supplementation: room air    Independent Airway: airway patency satisfactory and stable  Dentition: dentition unchanged  Vital Signs Stable: post-procedure vital signs reviewed and stable  Report to RN Given: handoff report given  Patient transferred to: Phase II    Handoff Report: Identifed the Patient, Identified the Reponsible Provider, Reviewed the pertinent medical history, Discussed the surgical course, Reviewed Intra-OP anesthesia mangement and issues during anesthesia, Set expectations for post-procedure period and Allowed opportunity for questions and acknowledgement of understanding      Vitals:  Vitals Value Taken Time   /70 07/09/24 1108   Temp 36.5  C (97.7  F) 07/09/24 1108   Pulse 89 07/09/24 1123   Resp 12    SpO2 98 % 07/09/24 1130   Vitals shown include unfiled device data.    Electronically Signed By: NEW [facility] CRNA  July 9, 2024  11:59 AM

## 2024-07-12 ENCOUNTER — PATIENT OUTREACH (OUTPATIENT)
Dept: ONCOLOGY | Facility: HOSPITAL | Age: 59
End: 2024-07-12
Payer: COMMERCIAL

## 2024-07-12 NOTE — PROGRESS NOTES
[facility]: Cancer Care                                                                                        Called Ginny, daughter in law of Kerry, consent to communicate was signed at office visit on 7/2 but does not appear to have been scanned into the chart. Ginny does help Kerry with her scheduling and they have reached out to help Kerry get scheduled for her genetic consult but she did not understand why this was needed. Shared that Kerry has triple negative cancer patient should be evaluated for genetic mutations, shared rationale/implications for Kerry and her family for additional screening if Kerry is positive for a genetic mutation. Ginny stated understanding and is willing to schedule the consult with better explanation of rationale. She was warm transferred to scheduling and genetic consult scheduled on 7/19.    Signature:  Marycruz Tate RN

## 2024-07-17 ENCOUNTER — PATIENT OUTREACH (OUTPATIENT)
Dept: ONCOLOGY | Facility: HOSPITAL | Age: 59
End: 2024-07-17
Payer: COMMERCIAL

## 2024-07-17 RX ORDER — ONDANSETRON 8 MG/1
8 TABLET, FILM COATED ORAL EVERY 8 HOURS PRN
Qty: 30 TABLET | Refills: 2 | Status: SHIPPED | OUTPATIENT
Start: 2024-07-17 | End: 2024-07-22

## 2024-07-17 RX ORDER — DEXAMETHASONE 4 MG/1
8 TABLET ORAL DAILY
Qty: 6 TABLET | Refills: 7 | Status: SHIPPED | OUTPATIENT
Start: 2024-07-17 | End: 2024-07-22

## 2024-07-17 RX ORDER — PROCHLORPERAZINE MALEATE 10 MG
10 TABLET ORAL EVERY 6 HOURS PRN
Qty: 30 TABLET | Refills: 2 | Status: SHIPPED | OUTPATIENT
Start: 2024-07-17 | End: 2024-07-22

## 2024-07-17 NOTE — PROGRESS NOTES
[facility]: Cancer Care                                                                                          Called Ginny, daughter in law of Kerry, writer saw the consent to communicate  that was signed at office visit on 7/2 but does not appear to have been scanned into the chart. Shared that the take home meds for chemo were released from the treatment plan and sent to [facility], reviewed the medications. Also shared that the filgrastim-aafi (NIVESTYM) 300 MCG/0.5ML injection was released to [facility]. Per our past discussion, she was aware of this drug and Kerry's daughter will administer. Shared that [facility] should be calling her to follow up with her about delivery. They will need teaching for this and should bring, infusion nurses can review while they are in the infusion bay. This drug is not needed until days 16-18. She stated understanding.    Signature:  Marycruz Tate RN

## 2024-07-18 ENCOUNTER — HOSPITAL ENCOUNTER (OUTPATIENT)
Dept: MRI IMAGING | Facility: CLINIC | Age: 59
Discharge: HOME | End: 2024-07-18
Attending: INTERNAL MEDICINE | Admitting: INTERNAL MEDICINE
Payer: COMMERCIAL

## 2024-07-18 DIAGNOSIS — C50.912 INVASIVE DUCTAL CARCINOMA OF BREAST, FEMALE, LEFT (H): ICD-10-CM

## 2024-07-18 PROCEDURE — 77049 MRI BREAST C-+ W/CAD BI: CPT

## 2024-07-18 PROCEDURE — A9585 GADOBUTROL INJECTION: HCPCS | Performed by: INTERNAL MEDICINE

## 2024-07-18 PROCEDURE — 255N000002 HC RX 255 OP 636: Performed by: INTERNAL MEDICINE

## 2024-07-18 PROCEDURE — 250N000011 HC RX IP 250 OP 636: Performed by: INTERNAL MEDICINE

## 2024-07-18 RX ORDER — HEPARIN SODIUM (PORCINE) LOCK FLUSH IV SOLN 100 UNIT/ML 100 UNIT/ML
SOLUTION INTRAVENOUS
Status: COMPLETED
Start: 2024-07-18 | End: 2024-07-18

## 2024-07-18 RX ORDER — GADOBUTROL 604.72 MG/ML
6.5 INJECTION INTRAVENOUS ONCE
Status: COMPLETED | OUTPATIENT
Start: 2024-07-18 | End: 2024-07-18

## 2024-07-18 RX ORDER — HEPARIN SODIUM (PORCINE) LOCK FLUSH IV SOLN 100 UNIT/ML 100 UNIT/ML
5 SOLUTION INTRAVENOUS ONCE
Status: COMPLETED | OUTPATIENT
Start: 2024-07-18 | End: 2024-07-18

## 2024-07-18 RX ADMIN — HEPARIN 5 ML: 100 SYRINGE at 13:55

## 2024-07-18 RX ADMIN — HEPARIN SODIUM (PORCINE) LOCK FLUSH IV SOLN 100 UNIT/ML 5 ML: 100 SOLUTION at 13:55

## 2024-07-18 RX ADMIN — GADOBUTROL 6.5 ML: 604.72 INJECTION INTRAVENOUS at 13:54

## 2024-07-19 ENCOUNTER — PATIENT OUTREACH (OUTPATIENT)
Dept: ONCOLOGY | Facility: HOSPITAL | Age: 59
End: 2024-07-19

## 2024-07-19 ENCOUNTER — PATIENT OUTREACH (OUTPATIENT)
Dept: ONCOLOGY | Facility: HOSPITAL | Age: 59
End: 2024-07-19
Payer: COMMERCIAL

## 2024-07-19 ENCOUNTER — VIRTUAL VISIT (OUTPATIENT)
Dept: ONCOLOGY | Facility: CLINIC | Age: 59
End: 2024-07-19
Attending: SURGERY
Payer: COMMERCIAL

## 2024-07-19 DIAGNOSIS — Z80.9 FAMILY HISTORY OF CANCER: ICD-10-CM

## 2024-07-19 DIAGNOSIS — C50.912 INVASIVE DUCTAL CARCINOMA OF BREAST, FEMALE, LEFT (H): Primary | ICD-10-CM

## 2024-07-19 DIAGNOSIS — Z01.818 ENCOUNTER FOR ECHOCARDIOGRAM BEFORE INITIATION OF CHEMOTHERAPY: ICD-10-CM

## 2024-07-19 PROCEDURE — 96040 HC GENETIC COUNSELING, EACH 30 MINUTES: CPT | Mod: TEL,95 | Performed by: GENETIC COUNSELOR, MS

## 2024-07-19 PROCEDURE — T1013 SIGN LANG/ORAL INTERPRETER: HCPCS | Mod: U4,TEL,95 | Performed by: INTERPRETER

## 2024-07-19 NOTE — PROGRESS NOTES
Virtual Visit Details    Type of service:  video visit switched to telephone visit due to audio issues (total time spent: 35 minutes)    Originating Location (pt. Location): Home  Distant Location (provider location):  Off-site  Platform used for Video Visit: Fabio    7/19/2024    Referring Provider: Iwona Choe DO    Presenting Information:   I met with Kerry Mills today for genetic counseling as part of the Cancer Risk Management Program (virtual visit) to discuss her personal history of triple negative breast cancer.  She is here today to review this history, cancer screening recommendations, and available genetic testing options.    Personal History:  Kerry is a 58 year old female. 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    Family History: (Please see scanned pedigree for detailed family history information)  Kerry has 7 daughters and 3 sons  Her had a history of cancer prior to passing away (possibly colorectal).  Limited history is available  Her reported ethnicity is ong.      Discussion:  The features of sporadic, familial, and hereditary cancers were discussed, as it pertains to Kerry's history of cancer. Kerry's personal history of triple negative breast cancer may be suggestive of a hereditary cancer syndrome.  A detailed handout regarding hereditary cancer and the information we discussed can be found in the after visit summary. Topics included: inheritance pattern, cancer risks, cancer screening recommendations, as well as risks, benefits and limitations of testing.  Kerry meets current National Comprehensive Cancer Network (NCCN) criteria for genetic testing of Hereditary Breast and Ovarian Cancer syndrome (BRCA1 and BRCA2.    We discussed that there are additional genes that could cause increased risk for the cancers in Kerry's family. As many of these genes present with overlapping features in a family and accurate cancer risk cannot always be established based upon  the pedigree analysis alone, it would be reasonable for Kerry to consider panel genetic testing to analyze multiple genes at once.  The information from genetic testing may determine additional cancer screening and risk management options, including earlier/more frequent imaging, and possible risk reducing surgeries.  For individuals with an active cancer diagnosis, the results from genetic testing may guide targeted therapies (i.e. immunotherapy for individuals with Peters syndrome, PARP inhibitors, etc.). These recommendations will be discussed in detail once genetic testing is completed.   An individual's personal and/or family history of cancer may be explained by more than one inherited cancer syndrome.  We reviewed available genetic testing options to address this history, including a tailored disease-focused panel, and expanded multi-cancer panels. Discussion included risks, benefits and limitations of testing.  Kerry elected to proceed with genetic testing today.    The purpose of this genetic testing is to determine if Kerry carries an inherited variant(s) associated with increased risk to develop cancer.  Genes included on this test may be associated with multiple types of cancer and are also associated with varying levels of cancer risk.  Depending on these cancer risks, specific screening and medical management recommendations may be available.  Possible results from testing typically include  positive  (pathogenic/likely pathogenic variant),  negative  (normal), or  variant of unknown significance  (VUS).  Genetic test results have implications for Kerry's family. Should a variant be identified, close relatives may also have a risk to carry the genetic variant. Some of these genes may have additional reproductive risks and options for further testing.    There are federal laws in place that prohibit health insurers and employers from discriminating based on genetic information (for example, the Genetic  Information Nondiscrimination Act (BETSY) of 2008; some exceptions to apply.   This protection does not cover life insurance, long term care, or disability insurances.   The informed consent process has occurred, as I have provided the patient with an explanation of genetic testing and risk management options. I discussed the risks, benefits and alternatives to testing. The patient was given the opportunity to ask questions, and their questions were answered. The patient has provided consent to germline genetic testing (facilitated by interpretor).       Genetic Testing: order placed for BRCA1/2 testing (automatic reflex to Comprehensive Expanded cancer panel)    Plan:  1) Betsy elected to proceed with germline BRCA1 and BRCA2 genetic testing, including reflex to Comprehensive Expanded cancer panel.  2) A blood draw will be scheduled at an [facility] clinic   3) Betsy will be contacted once testing is completed to schedule a return genetic counseling visit, in which the results from testing and medical management recommendations will be discussed.  Test turn around time: approximately 4-6 weeks.        Vidhi Carpenter MS, [facility] [place]  Licensed, Certified Genetic Counselor

## 2024-07-19 NOTE — LETTER
7/19/2024      Kerry Mills  [address] [place] [address]      Dear Colleague,    Thank you for referring your patient, Kerry Mills, to the [facility]. Please see a copy of my visit note below.    Virtual Visit Details    Type of service:  video visit switched to telephone visit due to audio issues (total time spent: 35 minutes)    Originating Location (pt. Location): Home  Distant Location (provider location):  Off-site  Platform used for Video Visit: ImageTag    7/19/2024    Referring Provider: Iwona Choe DO    Presenting Information:   I met with Kerry Mills today for genetic counseling as part of the Cancer Risk Management Program (virtual visit) to discuss her personal history of triple negative breast cancer.  She is here today to review this history, cancer screening recommendations, and available genetic testing options.    Personal History:  Kerry is a 58 year old female. 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    Family History: (Please see scanned pedigree for detailed family history information)  Kerry has 7 daughters and 3 sons  Her had a history of cancer prior to passing away (possibly colorectal).  Limited history is available  Her reported ethnicity is ong.      Discussion:  The features of sporadic, familial, and hereditary cancers were discussed, as it pertains to Kerry's history of cancer. Kerry's personal history of triple negative breast cancer may be suggestive of a hereditary cancer syndrome.  A detailed handout regarding hereditary cancer and the information we discussed can be found in the after visit summary. Topics included: inheritance pattern, cancer risks, cancer screening recommendations, as well as risks, benefits and limitations of testing.  Kerry meets current National Comprehensive Cancer Network (NCCN) criteria for genetic testing of Hereditary Breast and Ovarian Cancer syndrome (BRCA1 and BRCA2.    We discussed that  there are additional genes that could cause increased risk for the cancers in Kerry's family. As many of these genes present with overlapping features in a family and accurate cancer risk cannot always be established based upon the pedigree analysis alone, it would be reasonable for Kerry to consider panel genetic testing to analyze multiple genes at once.  The information from genetic testing may determine additional cancer screening and risk management options, including earlier/more frequent imaging, and possible risk reducing surgeries.  For individuals with an active cancer diagnosis, the results from genetic testing may guide targeted therapies (i.e. immunotherapy for individuals with Peters syndrome, PARP inhibitors, etc.). These recommendations will be discussed in detail once genetic testing is completed.   An individual's personal and/or family history of cancer may be explained by more than one inherited cancer syndrome.  We reviewed available genetic testing options to address this history, including a tailored disease-focused panel, and expanded multi-cancer panels. Discussion included risks, benefits and limitations of testing.  Kerry elected to proceed with genetic testing today.    The purpose of this genetic testing is to determine if Kerry carries an inherited variant(s) associated with increased risk to develop cancer.  Genes included on this test may be associated with multiple types of cancer and are also associated with varying levels of cancer risk.  Depending on these cancer risks, specific screening and medical management recommendations may be available.  Possible results from testing typically include  positive  (pathogenic/likely pathogenic variant),  negative  (normal), or  variant of unknown significance  (VUS).  Genetic test results have implications for Kerry's family. Should a variant be identified, close relatives may also have a risk to carry the genetic variant. Some of these genes may  have additional reproductive risks and options for further testing.    There are federal laws in place that prohibit health insurers and employers from discriminating based on genetic information (for example, the Genetic Information Nondiscrimination Act (BETSY) of 2008; some exceptions to apply.   This protection does not cover life insurance, long term care, or disability insurances.   The informed consent process has occurred, as I have provided the patient with an explanation of genetic testing and risk management options. I discussed the risks, benefits and alternatives to testing. The patient was given the opportunity to ask questions, and their questions were answered. The patient has provided consent to germline genetic testing (facilitated by interpretor).       Genetic Testing: order placed for BRCA1/2 testing (automatic reflex to Comprehensive Expanded cancer panel)    Plan:  1) Betsy elected to proceed with germline BRCA1 and BRCA2 genetic testing, including reflex to Comprehensive Expanded cancer panel.  2) A blood draw will be scheduled at an [facility] clinic   3) Betsy will be contacted once testing is completed to schedule a return genetic counseling visit, in which the results from testing and medical management recommendations will be discussed.  Test turn around time: approximately 4-6 weeks.        Vidhi Carpenter MS, [facility] [place]  Licensed, Certified Genetic Counselor            Again, thank you for allowing me to participate in the care of your patient.        Sincerely,        Vidhi Carpenter GC

## 2024-07-19 NOTE — PROGRESS NOTES
Per infusion charge nurse, previously ordered chemotherapy regimen has been denied by patient's insurance. Dr. Adamson has ordered new chemo therapy regimen to start on Monday but patient will need STAT ECHO prior to start on Monday AM.     Call to patient utilizing phone interpretor. Updated patient that her planned medication has changed to a new medication and Echo is needed prior to chemo start. Kerry verbalizes understanding and is agreeable to plan at this time. Encouraged Kerry to return call to clinic with any questions or concerns.    Specific questions as to new treatment plan can be discussed with Dr. Adamson in person Monday during appointment. Patient transferred to schedulers to get set up with Echocardiogram.    Olivia Woo RN  07/19/24  2:38 PM

## 2024-07-19 NOTE — PATIENT INSTRUCTIONS
Assessing Cancer Risk  Cancer is a common diagnosis which impacts many families.  Individuals may develop cancer due to environmental factors (such as exposures and lifestyle), aging, genetic predisposition, or a combination of these factors.  The vast majority of cancer diagnoses are considered sporadic, and not primarily due to an inherited factor. Approximately 5-10% of cancer diagnoses are thought to be caused by inherited risk factors.       Many of the genes we are born with impact our risk of certain diseases, such as cancer.  When one of these genes is not working properly due to a mistake (known as a  mutation  or  variant ), this may lead to an increased risk of developing cancer.      Families impacted by a hereditary cancer syndrome are more likely to have relatives across several generations diagnosed with cancer, earlier ages of diagnoses (prior to age 50), certain rare tumors, or relatives diagnosed with multiple primary cancers.  However, this may not be the case for all families which carry a cancer risk gene, such as those with small family size or limited history information.         Genetic Testing  For some families, genetic testing may help to explain why their cancer developed, provide tailored management options, and clarify the risk of developing cancer in the future.      Genetic testing involves a simple blood or saliva test and will look at the genetic information in select genes for variants associated with cancer risk.  This testing may include analysis of a single gene due to a known variant in the family, multiple genes most associated with the cancers in a family, or an expanded panel of genes related to many types of cancers.    Results  There are several possible genetic test results, including:   Positive--a harmful mutation (also known as a  pathogenic  or  likely pathogenic  variant) was identified in a gene associated with increased cancer risk.  These risks, as well as  medical management options, depend on the specific genetic variant identified.    Negative--no variants were identified in the genes analyzed   Variant of unknown significance--a variant was identified in one or more genes, though it is currently unclear how this impacts cancer risk in the family.  Genetic testing labs are working to collect evidence about these uncertain variants and may provide updates in the future.    Medical Management  If a harmful mutation is found in a cancer risk gene, there may be increased cancer surveillance or preventative surgeries that can be offered. This information will be discussed after genetic testing is completed. If no mutations are found on genetic testing, screening is often recommended based on personal and/or family history of cancer. All cancer risk management options should be discussed in more detail with an individual's medical providers.    Inheritance   Variants in most cancer risk genes are inherited in an autosomal dominant pattern.  This means that if a parent has a variant, each of their children will have a 50% chance of inheriting that same variant.  Therefore, each child would have a 50% chance of being at increased risk for developing cancer.    Some cancer risk genes are inherited in an autosomal recessive pattern.  These risks are present when an individual inherits mutations from both parents in the same gene.         Genetic Information Nondiscrimination Act (BETSY)  The Genetic Information Nondiscrimination Act of 2008 (BETSY) is a federal law that protects individuals from health insurance or employment discrimination based on a genetic test result alone (with some exceptions, including employers with fewer than 15 employees, and ).  However, BETSY's protection does not cover life insurance, long term care, or disability insurances.  The [facility] [place] is a great resource to learn more.    Questions to Think About  Regarding Genetic Testing  What effect will the test result have on me and my relationship with my family members if I have an inherited gene mutation?  If I don't have a gene mutation?  Should I share my test results, and how will my family react to this news, which may also affect them?  Are my children ready to learn new information that may one day affect their own health?      TURNAROUND TIME  4 - 6 weeks    INSURANCE COVERAGE   A prior authorization will be submitted when your provider submits the order for this panel. Testing will be held until prior authorization is obtained. You will be contacted once prior authorization is completed. For details regarding coverage and out-of-pocket estimates, please contact a  at the [facility] at [phone].    About the [facility]), [facility]  In collaborative effort with the [facility] and the [place] [facility], the [facility] offers a full range of diagnostic testing services, with a strong focus on quality, accuracy, and timeliness.    Please call us if you have any questions or concerns   (Appointments: [phone])    Zelalem Ruano, MS [facility]  [phone]  Belkis Kramer, MS, [facility] [phone]  Janis Senior, MS, [facility]  [phone]  Vidhi Carpenter, MS, [facility]  [phone]  Chelsea Godwin, MS, [facility]  [phone]  Fadumo Gonzalez, MS, [facility]  [phone]  Lubna Caro, MS, [facility]  [phone]

## 2024-07-19 NOTE — NURSING NOTE
Current patient location: [address] [place] [address] [facility] [address]    Is the patient currently in the state of [place]? YES    Visit mode:VIDEO    If the visit is dropped, the patient can be reconnected by: VIDEO VISIT: Text to cell phone:   Telephone Information:   Mobile [phone]       Will anyone else be joining the visit? NO  (If patient encounters technical issues they should call [phone]    How would you like to obtain your AVS? MyChart    Are changes needed to the allergy or medication list? N/A    Are refills needed on medications prescribed by this physician? NO    Reason for visit: Consult    ROBSON HERBERT

## 2024-07-22 ENCOUNTER — INFUSION THERAPY VISIT (OUTPATIENT)
Dept: INFUSION THERAPY | Facility: HOSPITAL | Age: 59
End: 2024-07-22
Attending: INTERNAL MEDICINE
Payer: COMMERCIAL

## 2024-07-22 ENCOUNTER — ONCOLOGY VISIT (OUTPATIENT)
Dept: ONCOLOGY | Facility: HOSPITAL | Age: 59
End: 2024-07-22
Attending: INTERNAL MEDICINE
Payer: COMMERCIAL

## 2024-07-22 ENCOUNTER — PATIENT OUTREACH (OUTPATIENT)
Dept: ONCOLOGY | Facility: HOSPITAL | Age: 59
End: 2024-07-22

## 2024-07-22 ENCOUNTER — VIRTUAL VISIT (OUTPATIENT)
Dept: INTERPRETER SERVICES | Facility: CLINIC | Age: 59
End: 2024-07-22

## 2024-07-22 ENCOUNTER — HOSPITAL ENCOUNTER (OUTPATIENT)
Dept: CARDIOLOGY | Facility: CLINIC | Age: 59
Discharge: HOME | End: 2024-07-22
Attending: INTERNAL MEDICINE | Admitting: INTERNAL MEDICINE
Payer: COMMERCIAL

## 2024-07-22 VITALS
BODY MASS INDEX: 27.24 KG/M2 | HEART RATE: 85 BPM | SYSTOLIC BLOOD PRESSURE: 107 MMHG | DIASTOLIC BLOOD PRESSURE: 77 MMHG | TEMPERATURE: 97.5 F | RESPIRATION RATE: 16 BRPM | WEIGHT: 144.3 LBS | HEIGHT: 61 IN | OXYGEN SATURATION: 96 %

## 2024-07-22 VITALS
OXYGEN SATURATION: 96 % | HEIGHT: 61 IN | HEART RATE: 85 BPM | TEMPERATURE: 97.5 F | BODY MASS INDEX: 27.24 KG/M2 | DIASTOLIC BLOOD PRESSURE: 77 MMHG | WEIGHT: 144.3 LBS | RESPIRATION RATE: 16 BRPM | SYSTOLIC BLOOD PRESSURE: 107 MMHG

## 2024-07-22 DIAGNOSIS — C50.912 INVASIVE DUCTAL CARCINOMA OF BREAST, FEMALE, LEFT (H): Primary | ICD-10-CM

## 2024-07-22 DIAGNOSIS — Z01.818 ENCOUNTER FOR ECHOCARDIOGRAM BEFORE INITIATION OF CHEMOTHERAPY: ICD-10-CM

## 2024-07-22 DIAGNOSIS — C50.912 INVASIVE DUCTAL CARCINOMA OF BREAST, FEMALE, LEFT (H): ICD-10-CM

## 2024-07-22 LAB
ALBUMIN SERPL BCG-MCNC: 4.2 G/DL (ref 3.5–5.2)
ALP SERPL-CCNC: 106 U/L (ref 40–150)
ALT SERPL W P-5'-P-CCNC: 28 U/L (ref 0–50)
ANION GAP SERPL CALCULATED.3IONS-SCNC: 7 MMOL/L (ref 7–15)
AST SERPL W P-5'-P-CCNC: 45 U/L (ref 0–45)
BASOPHILS # BLD AUTO: 0 10E3/UL (ref 0–0.2)
BASOPHILS NFR BLD AUTO: 0 %
BILIRUB SERPL-MCNC: 0.9 MG/DL
BUN SERPL-MCNC: 9.8 MG/DL (ref 6–20)
CALCIUM SERPL-MCNC: 9.2 MG/DL (ref 8.8–10.4)
CHLORIDE SERPL-SCNC: 104 MMOL/L (ref 98–107)
CORTIS SERPL-MCNC: 6.9 UG/DL
CREAT SERPL-MCNC: 0.61 MG/DL (ref 0.51–0.95)
EGFRCR SERPLBLD CKD-EPI 2021: >90 ML/MIN/1.73M2
EOSINOPHIL # BLD AUTO: 0.4 10E3/UL (ref 0–0.7)
EOSINOPHIL NFR BLD AUTO: 4 %
ERYTHROCYTE [DISTWIDTH] IN BLOOD BY AUTOMATED COUNT: 12.3 % (ref 10–15)
GLUCOSE SERPL-MCNC: 131 MG/DL (ref 70–99)
HCO3 SERPL-SCNC: 29 MMOL/L (ref 22–29)
HCT VFR BLD AUTO: 40.2 % (ref 35–47)
HGB BLD-MCNC: 13.3 G/DL (ref 11.7–15.7)
IMM GRANULOCYTES # BLD: 0 10E3/UL
IMM GRANULOCYTES NFR BLD: 0 %
LDH SERPL L TO P-CCNC: 196 U/L (ref 0–250)
LVEF ECHO: NORMAL
LYMPHOCYTES # BLD AUTO: 2.2 10E3/UL (ref 0.8–5.3)
LYMPHOCYTES NFR BLD AUTO: 24 %
MCH RBC QN AUTO: 29.6 PG (ref 26.5–33)
MCHC RBC AUTO-ENTMCNC: 33.1 G/DL (ref 31.5–36.5)
MCV RBC AUTO: 89 FL (ref 78–100)
MONOCYTES # BLD AUTO: 0.9 10E3/UL (ref 0–1.3)
MONOCYTES NFR BLD AUTO: 10 %
NEUTROPHILS # BLD AUTO: 5.6 10E3/UL (ref 1.6–8.3)
NEUTROPHILS NFR BLD AUTO: 62 %
NRBC # BLD AUTO: 0 10E3/UL
NRBC BLD AUTO-RTO: 0 /100
PLATELET # BLD AUTO: 237 10E3/UL (ref 150–450)
POTASSIUM SERPL-SCNC: 3.7 MMOL/L (ref 3.4–5.3)
PROT SERPL-MCNC: 7.4 G/DL (ref 6.4–8.3)
RBC # BLD AUTO: 4.5 10E6/UL (ref 3.8–5.2)
SODIUM SERPL-SCNC: 140 MMOL/L (ref 135–145)
TSH SERPL DL<=0.005 MIU/L-ACNC: 1.81 UIU/ML (ref 0.3–4.2)
WBC # BLD AUTO: 9.1 10E3/UL (ref 4–11)

## 2024-07-22 PROCEDURE — 96367 TX/PROPH/DG ADDL SEQ IV INF: CPT

## 2024-07-22 PROCEDURE — G0463 HOSPITAL OUTPT CLINIC VISIT: HCPCS | Performed by: INTERNAL MEDICINE

## 2024-07-22 PROCEDURE — 83615 LACTATE (LD) (LDH) ENZYME: CPT

## 2024-07-22 PROCEDURE — 96413 CHEMO IV INFUSION 1 HR: CPT

## 2024-07-22 PROCEDURE — 84443 ASSAY THYROID STIM HORMONE: CPT | Performed by: INTERNAL MEDICINE

## 2024-07-22 PROCEDURE — 80053 COMPREHEN METABOLIC PANEL: CPT | Performed by: INTERNAL MEDICINE

## 2024-07-22 PROCEDURE — 96372 THER/PROPH/DIAG INJ SC/IM: CPT | Performed by: INTERNAL MEDICINE

## 2024-07-22 PROCEDURE — 96377 APPLICATON ON-BODY INJECTOR: CPT | Mod: XS

## 2024-07-22 PROCEDURE — G2211 COMPLEX E/M VISIT ADD ON: HCPCS | Performed by: INTERNAL MEDICINE

## 2024-07-22 PROCEDURE — 96375 TX/PRO/DX INJ NEW DRUG ADDON: CPT

## 2024-07-22 PROCEDURE — 82533 TOTAL CORTISOL: CPT | Performed by: INTERNAL MEDICINE

## 2024-07-22 PROCEDURE — 93356 MYOCRD STRAIN IMG SPCKL TRCK: CPT | Performed by: INTERNAL MEDICINE

## 2024-07-22 PROCEDURE — 36591 DRAW BLOOD OFF VENOUS DEVICE: CPT | Performed by: INTERNAL MEDICINE

## 2024-07-22 PROCEDURE — T1013 SIGN LANG/ORAL INTERPRETER: HCPCS | Mod: U4,TEL,95 | Performed by: INTERPRETER

## 2024-07-22 PROCEDURE — 93356 MYOCRD STRAIN IMG SPCKL TRCK: CPT

## 2024-07-22 PROCEDURE — 85041 AUTOMATED RBC COUNT: CPT | Performed by: INTERNAL MEDICINE

## 2024-07-22 PROCEDURE — 258N000003 HC RX IP 258 OP 636: Performed by: INTERNAL MEDICINE

## 2024-07-22 PROCEDURE — 93306 TTE W/DOPPLER COMPLETE: CPT | Mod: 26 | Performed by: INTERNAL MEDICINE

## 2024-07-22 PROCEDURE — 99215 OFFICE O/P EST HI 40 MIN: CPT | Performed by: INTERNAL MEDICINE

## 2024-07-22 PROCEDURE — 250N000011 HC RX IP 250 OP 636: Performed by: INTERNAL MEDICINE

## 2024-07-22 PROCEDURE — 96411 CHEMO IV PUSH ADDL DRUG: CPT

## 2024-07-22 RX ORDER — DEXAMETHASONE 4 MG/1
8 TABLET ORAL DAILY
Qty: 6 TABLET | Refills: 0 | Status: SHIPPED | OUTPATIENT
Start: 2024-07-22 | End: 2024-07-30

## 2024-07-22 RX ORDER — DOXORUBICIN HYDROCHLORIDE 2 MG/ML
60 INJECTION, SOLUTION INTRAVENOUS ONCE
Status: COMPLETED | OUTPATIENT
Start: 2024-07-22 | End: 2024-07-22

## 2024-07-22 RX ORDER — PALONOSETRON 0.05 MG/ML
0.25 INJECTION, SOLUTION INTRAVENOUS ONCE
Status: CANCELLED | OUTPATIENT
Start: 2024-07-22

## 2024-07-22 RX ORDER — DOXORUBICIN HYDROCHLORIDE 2 MG/ML
60 INJECTION, SOLUTION INTRAVENOUS ONCE
Status: CANCELLED | OUTPATIENT
Start: 2024-07-22

## 2024-07-22 RX ORDER — HEPARIN SODIUM (PORCINE) LOCK FLUSH IV SOLN 100 UNIT/ML 100 UNIT/ML
5 SOLUTION INTRAVENOUS
Status: CANCELLED | OUTPATIENT
Start: 2024-07-22

## 2024-07-22 RX ORDER — ALBUTEROL SULFATE 90 UG/1
1-2 AEROSOL, METERED RESPIRATORY (INHALATION)
Status: CANCELLED
Start: 2024-07-22

## 2024-07-22 RX ORDER — ONDANSETRON 8 MG/1
8 TABLET, FILM COATED ORAL EVERY 8 HOURS PRN
Qty: 30 TABLET | Refills: 2 | Status: SHIPPED | OUTPATIENT
Start: 2024-07-22

## 2024-07-22 RX ORDER — METHYLPREDNISOLONE SODIUM SUCCINATE 125 MG/2ML
125 INJECTION, POWDER, LYOPHILIZED, FOR SOLUTION INTRAMUSCULAR; INTRAVENOUS
Status: CANCELLED
Start: 2024-07-22

## 2024-07-22 RX ORDER — DIPHENHYDRAMINE HYDROCHLORIDE 50 MG/ML
50 INJECTION INTRAMUSCULAR; INTRAVENOUS
Status: CANCELLED
Start: 2024-07-22

## 2024-07-22 RX ORDER — EPINEPHRINE 1 MG/ML
0.3 INJECTION, SOLUTION INTRAMUSCULAR; SUBCUTANEOUS EVERY 5 MIN PRN
Status: CANCELLED | OUTPATIENT
Start: 2024-07-22

## 2024-07-22 RX ORDER — HEPARIN SODIUM (PORCINE) LOCK FLUSH IV SOLN 100 UNIT/ML 100 UNIT/ML
5 SOLUTION INTRAVENOUS
Status: DISCONTINUED | OUTPATIENT
Start: 2024-07-22 | End: 2024-07-22 | Stop reason: HOSPADM

## 2024-07-22 RX ORDER — PROCHLORPERAZINE MALEATE 10 MG
10 TABLET ORAL EVERY 6 HOURS PRN
Qty: 30 TABLET | Refills: 2 | Status: SHIPPED | OUTPATIENT
Start: 2024-07-22

## 2024-07-22 RX ORDER — HEPARIN SODIUM,PORCINE 10 UNIT/ML
5-20 VIAL (ML) INTRAVENOUS DAILY PRN
Status: CANCELLED | OUTPATIENT
Start: 2024-07-22

## 2024-07-22 RX ORDER — MEPERIDINE HYDROCHLORIDE 50 MG/ML
25 INJECTION INTRAMUSCULAR; INTRAVENOUS; SUBCUTANEOUS EVERY 30 MIN PRN
Status: CANCELLED | OUTPATIENT
Start: 2024-07-22

## 2024-07-22 RX ORDER — ALBUTEROL SULFATE 0.83 MG/ML
2.5 SOLUTION RESPIRATORY (INHALATION)
Status: CANCELLED | OUTPATIENT
Start: 2024-07-22

## 2024-07-22 RX ORDER — LORAZEPAM 2 MG/ML
0.5 INJECTION INTRAMUSCULAR EVERY 4 HOURS PRN
Status: CANCELLED | OUTPATIENT
Start: 2024-07-22

## 2024-07-22 RX ORDER — PALONOSETRON 0.05 MG/ML
0.25 INJECTION, SOLUTION INTRAVENOUS ONCE
Status: COMPLETED | OUTPATIENT
Start: 2024-07-22 | End: 2024-07-22

## 2024-07-22 RX ADMIN — PEGFILGRASTIM 6 MG: KIT SUBCUTANEOUS at 14:52

## 2024-07-22 RX ADMIN — CYCLOPHOSPHAMIDE 1000 MG: 1 INJECTION, POWDER, FOR SOLUTION INTRAVENOUS; ORAL at 14:14

## 2024-07-22 RX ADMIN — FOSAPREPITANT: 150 INJECTION, POWDER, LYOPHILIZED, FOR SOLUTION INTRAVENOUS at 13:22

## 2024-07-22 RX ADMIN — DOXORUBICIN HYDROCHLORIDE 100 MG: 2 INJECTION, SOLUTION INTRAVENOUS at 14:00

## 2024-07-22 RX ADMIN — PALONOSETRON 0.25 MG: 0.05 INJECTION, SOLUTION INTRAVENOUS at 13:22

## 2024-07-22 RX ADMIN — HEPARIN 5 ML: 100 SYRINGE at 14:51

## 2024-07-22 RX ADMIN — SODIUM CHLORIDE 250 ML: 9 INJECTION, SOLUTION INTRAVENOUS at 13:22

## 2024-07-22 ASSESSMENT — PAIN SCALES - GENERAL: PAINLEVEL: NO PAIN (0)

## 2024-07-22 NOTE — LETTER
"7/22/2024      Kerry Mills  [address] [place] [address]      Dear Colleague,    Thank you for referring your patient, Kerry Mills, to the [facility] Please see a copy of my visit note below.    Oncology Rooming Note    July 22, 2024 9:55 AM   Kerry Mills is a 58 year old female who presents for:    Chief Complaint   Patient presents with     Oncology Clinic Visit     Invasive ductal carcinoma of breast, female, left     Initial Vitals: /77   Pulse 85   Temp 97.5  F (36.4  C)   Resp 16   Ht 1.549 m (5' 1\")   Wt 65.5 kg (144 lb 4.8 oz)   SpO2 96%   BMI 27.27 kg/m   Estimated body mass index is 27.27 kg/m  as calculated from the following:    Height as of this encounter: 1.549 m (5' 1\").    Weight as of this encounter: 65.5 kg (144 lb 4.8 oz). Body surface area is 1.68 meters squared.  No Pain (0) Comment: Data Unavailable   No LMP recorded. Patient is postmenopausal.  Allergies reviewed: Yes  Medications reviewed: Yes    Medications: Medication refills not needed today.  Pharmacy name entered into [facility]:    [facility] - [place], [place] - [address]  [facility] - [place], [place] - [address]    Frailty Screening:   Is the patient here for a new oncology consult visit in cancer care? 2. No      Clinical concerns:        Anahi Irwin              [facility] Progress Note    Patient: Kerry Mills  MRN: [id number]  Date of Service: Jul 22, 2024             ECOG Performance    0 - Independent          ______________________________________________________________________________  Oncologic history  1) invasive ductal carcinoma of the breast ER negative IA negative HER2/kobe 2+, negative by FISH.  3.9 cm in maximum dimension on MRI.  Diagnosed July 2024    Treatment  1) neoadjuvant AC started on 7/22/2024.  CarboTaxol and Pembro denied by insurance and   2)Plan to give AC followed by paclitaxel before " "referring patient for surgery    History of Present Illness    Ms. Kerry Mills is here in follow-up.  She is here to start chemotherapy.  She is accompanied by her daughter.  Most of the conversation was done through the .  Patient feels fine she has no concernsReview of systems  A comprehensive 12 point review of system was done that was negative except what is mentioned in the history of present illness    Past History    Past Medical History:   Diagnosis Date     Asthma      Chronic constipation        Past Surgical History:   Procedure Laterality Date     INCISION AND DRAINAGE OF WOUND N/A 11/27/2020    Procedure: INCISION AND DRAINAGE, NECK;  Surgeon: Arnel James MD;  Location: [facility];  Service: ENT     INSERT PORT VASCULAR ACCESS Right 7/9/2024    Procedure: INSERTION,  RIGHT VASCULAR ACCESS PORT;  Surgeon: Iwona Choe DO;  Location: [facility]     PICC INSERTION - TRIPLE LUMEN  11/26/2020          TRACHEOSTOMY N/A 11/26/2020    Procedure: EMERGENT INTUBATION IN OR WITH CREATION, TRACHEOSTOMY;  Surgeon: Dennise Justice MD;  Location: [facility];  Service: General       Physical Exam    /77   Pulse 85   Temp 97.5  F (36.4  C)   Resp 16   Ht 1.549 m (5' 1\")   Wt 65.5 kg (144 lb 4.8 oz)   SpO2 96%   BMI 27.27 kg/m      General: alert, awake, not in acute distress  HEENT: Head: Normal, normocephalic, atraumatic.  Eye: Normal external eye, conjunctiva, lids cornea, MIKAYLA.  Nose: Normal external nose, mucus membranes and septum.  Pharynx: Normal buccal mucosa. Normal pharynx.  Neck / Thyroid: Supple, no masses, nodes, nodules or enlargement.  Lymphatics: No abnormally enlarged lymph nodes.  Chest: Normal chest wall and respirations. Clear to auscultation.  No crackles or rhonchi's  Heart: S1 S2 RRR, no murmur.   Abdomen: abdomen is soft without significant tenderness, masses, organomegaly or guarding  Extremities: normal strength, tone, and muscle " mass  Skin: normal. no rash or abnormalities  CNS: non focal.    Lab Results    Recent Results (from the past 240 hour(s))   Comprehensive metabolic panel    Collection Time: 07/22/24  9:34 AM   Result Value Ref Range    Sodium 140 135 - 145 mmol/L    Potassium 3.7 3.4 - 5.3 mmol/L    Carbon Dioxide (CO2) 29 22 - 29 mmol/L    Anion Gap 7 7 - 15 mmol/L    Urea Nitrogen 9.8 6.0 - 20.0 mg/dL    Creatinine 0.61 0.51 - 0.95 mg/dL    GFR Estimate >90 >60 mL/min/1.73m2    Calcium 9.2 8.8 - 10.4 mg/dL    Chloride 104 98 - 107 mmol/L    Glucose 131 (H) 70 - 99 mg/dL    Alkaline Phosphatase 106 40 - 150 U/L    AST 45 0 - 45 U/L    ALT 28 0 - 50 U/L    Protein Total 7.4 6.4 - 8.3 g/dL    Albumin 4.2 3.5 - 5.2 g/dL    Bilirubin Total 0.9 <=1.2 mg/dL   TSH with free T4 reflex    Collection Time: 07/22/24  9:34 AM   Result Value Ref Range    TSH 1.81 0.30 - 4.20 uIU/mL   Lactate Dehydrogenase    Collection Time: 07/22/24  9:34 AM   Result Value Ref Range    Lactate Dehydrogenase 196 0 - 250 U/L   CBC with platelets and differential    Collection Time: 07/22/24  9:34 AM   Result Value Ref Range    WBC Count 9.1 4.0 - 11.0 10e3/uL    RBC Count 4.50 3.80 - 5.20 10e6/uL    Hemoglobin 13.3 11.7 - 15.7 g/dL    Hematocrit 40.2 35.0 - 47.0 %    MCV 89 78 - 100 fL    MCH 29.6 26.5 - 33.0 pg    MCHC 33.1 31.5 - 36.5 g/dL    RDW 12.3 10.0 - 15.0 %    Platelet Count 237 150 - 450 10e3/uL    % Neutrophils 62 %    % Lymphocytes 24 %    % Monocytes 10 %    % Eosinophils 4 %    % Basophils 0 %    % Immature Granulocytes 0 %    NRBCs per 100 WBC 0 <1 /100    Absolute Neutrophils 5.6 1.6 - 8.3 10e3/uL    Absolute Lymphocytes 2.2 0.8 - 5.3 10e3/uL    Absolute Monocytes 0.9 0.0 - 1.3 10e3/uL    Absolute Eosinophils 0.4 0.0 - 0.7 10e3/uL    Absolute Basophils 0.0 0.0 - 0.2 10e3/uL    Absolute Immature Granulocytes 0.0 <=0.4 10e3/uL    Absolute NRBCs 0.0 10e3/uL         Imaging    MR Breast Bilateral w/o & w Contrast    Result Date: 7/18/2024  EXAM:  Bilateral breast MRI with and without contrast, and computer aided kinetic analysis. HISTORY/INDICATION: Recently diagnosed LEFT breast cancer. COMPARISON: Mammogram and ultrasound dated 6/13/2024. No prior comparison MRI. TECHNIQUE: Multiplanar, multisequence imaging performed prior to contrast administration and at multiple time points after contrast administration. Post-processing including subtractions, MIPs and color encoding of post contrast dynamic acquisitions. IV contrast: 6.5 mL Gadavist SEDATION: None FINDINGS: Right Breast: Breast composition: Scattered fibroglandular tissue Background parenchymal enhancement: Mild No concerning areas of enhancement. Right Axilla: No lymphadenopathy. Right Internal Mammary Chain: No lymphadenopathy. Left Breast: Breast composition: Scattered fibroglandular tissue Background parenchymal enhancement: Mild Biopsy-proven LEFT breast invasive ductal carcinoma is seen as a heterogeneously enhancing irregularly shaped mass with indistinct margins measuring 3.9 x 3.0 x 3.3 cm (series 7 image 93 and series 14 image 120). No other concerning areas of enhancement are seen. Left Axilla: No lymphadenopathy. Left Internal Mammary Chain: No lymphadenopathy.     IMPRESSION: 1. Biopsy-proven LEFT breast cancer measures up to 3.9 cm in maximal dimension. 2. No other concerning areas of enhancement in either breast. 3. No lymphadenopathy. Right Breast: BI-RADS CATEGORY: 1 -  NEGATIVE. Left Breast: BI-RADS CATEGORY: 6 - Known Biopsy-Proven Malignancy-Appropriate Action Should Be Taken. RECOMMENDATION: Continued oncologic/surgical management. MATILDE MCGINNIS MD   SYSTEM ID:  [id number]    XR Surgery MARLA  Fluoro G/T 5 Min    Result Date: 7/9/2024  This exam was marked as non-reportable because it will not be read by a radiologist or a [place] non-radiologist provider.          Assessment and Plan      Triple negative breast cancer  Patient is here to start treatment.  We had originally planned  to start patient on carboplatinum Taxol and pembrolizumab.  That was denied by insurance.  At this time we will switch to the others regimen of AC followed by paclitaxel.  The side effects of AC including but not limited to alopecia tiredness fatigue nausea vomiting diarrhea mucositis neutropenia neutropenic fever and sepsis were all discussed with the patient in detail.  She will get Neulasta to prophylax against neutropenia.  She was told to call us if she has any fevers or any other side effects in the interim.  I will plan to do day 8 labs in the first cycle.  We will also plan to monitor the breast mass clinically on each visit.  I will consider doing an interim MRI after 4 cycles.  All these issues were discussed with the patient she had multiple questions that were answered.  Patient will get an echo done today prior to receiving Adriamycin.  1) patient received 4 cycles of dose dense AC followed by weekly paclitaxel as neoadjuvant chemotherapy  2) patient will be referred to surgery after chemotherapy is complete.  Interim evaluation of the tumor will be done  3).  Post op treatment will be determined by pathologic response        Varsha Adamson MD        CC: HIRAL CHAVEZ MD       Again, thank you for allowing me to participate in the care of your patient.        Sincerely,        Varsha Adamson MD

## 2024-07-22 NOTE — PROGRESS NOTES
"Oncology Rooming Note    July 22, 2024 9:55 AM   Kerry Mills is a 58 year old female who presents for:    Chief Complaint   Patient presents with    Oncology Clinic Visit     Invasive ductal carcinoma of breast, female, left     Initial Vitals: /77   Pulse 85   Temp 97.5  F (36.4  C)   Resp 16   Ht 1.549 m (5' 1\")   Wt 65.5 kg (144 lb 4.8 oz)   SpO2 96%   BMI 27.27 kg/m   Estimated body mass index is 27.27 kg/m  as calculated from the following:    Height as of this encounter: 1.549 m (5' 1\").    Weight as of this encounter: 65.5 kg (144 lb 4.8 oz). Body surface area is 1.68 meters squared.  No Pain (0) Comment: Data Unavailable   No LMP recorded. Patient is postmenopausal.  Allergies reviewed: Yes  Medications reviewed: Yes    Medications: Medication refills not needed today.  Pharmacy name entered into [facility]:    [facility] - [place], [place] - [address]  [facility] - [place], [place] - [address]    Frailty Screening:   Is the patient here for a new oncology consult visit in cancer care? 2. No      Clinical concerns:        Anahi Irwin            "

## 2024-07-22 NOTE — PATIENT INSTRUCTIONS
Take home medications     Ondansetron (Zofran) - use for nausea after the first 3 days.   Prochlorperazine (Compazine) - use for nausea for the first 3 days then use Ondansetron (Zofran) first.   Dexamethasone (Decadron) - Take in the morning with food, starting the day after chemotherapy (Tuesday 7/23) daily for 3 days (Tues, Wed & Thurs).  This will help with energy and nausea.     + Zofran and Compazine may cause constipation. Monitor bowel movements and use Senna and/or Miralax as needed to get relief.     +Take over the counter Claritin daily to help with bone pain from Neulasta OnPro.

## 2024-07-22 NOTE — PROGRESS NOTES
Infusion Nursing Note:  Kerry Mills presents today for C1D1 AC and Neulasta OnPro.    Patient seen by provider today: Yes: Dr. Adamson.   present during visit today: Yes, Language: Hmong.     Note: Patient is new to the infusion room today and is receiving Adriamycin, Cytoxan and Neulasta for the first time.  Patient oriented to infusion room, location of bathrooms and nutrition stations, and call light.  Verified that patient recieved written chemotherapy information previously.  Verbally reviewed chemotherapy teaching, side effects, take-home medications, and follow-up schedule with patient. Patient instructed to call triage with any questions or if she experiences a temperature >100.4, shaking chills, uncontrolled nausea/vomiting/diarrhea, dizziness, shortness of breath, bleeding not relieved with pressure, or with any other concerns.      Intravenous Access:  Implanted Port.    Treatment Conditions:  Lab Results   Component Value Date    HGB 13.3 07/22/2024    WBC 9.1 07/22/2024    ANEUTAUTO 5.6 07/22/2024     07/22/2024        Lab Results   Component Value Date     07/22/2024    POTASSIUM 3.7 07/22/2024    MAG 2.2 12/06/2020    CR 0.61 07/22/2024    DONNA 9.2 07/22/2024    BILITOTAL 0.9 07/22/2024    ALBUMIN 4.2 07/22/2024    ALT 28 07/22/2024    AST 45 07/22/2024     Results reviewed, labs MET treatment parameters, ok to proceed with treatment.  ECHO/MUGA completed 7/22/24  EF 60-65%.    Post Infusion Assessment:  Patient tolerated infusion without incident.  Blood return noted pre and post infusion.  Site patent and intact, free from redness, edema or discomfort.  No evidence of extravasations.  Access discontinued per protocol.     Neulasta On Pro- On Body injector applied to patient today on the left upper abdomen at 1500 with light facing up. Writer discussed Neulasta injection would start tomorrow at 1800, approximately 27 hours after application applied today.  Written and Verbal  instruction reviewed with patient.  Pt instructed when the dose delivery starts, it will take about 45 minutes to complete. Pt aware Neulasta Onpro On-Body should have green flashing light and to call triage or on-call MD if injector flashes red or appears to be leaking. Pt aware to keep Onpro On-Body Neualsta 4 inches away from electrical equipment and to avoid showering 4 hours prior to injection.     Discharge Plan:   Discharge instructions reviewed with: Patient and Family.  Patient and/or family verbalized understanding of discharge instructions and all questions answered.  Copy of AVS reviewed with patient and/or family.  Patient will return next Monday for lab check and 8/5 for labs, provider and chemotherapy treatment.  Patient discharged in stable condition accompanied by: self and daughter.  Departure Mode: Ambulatory.      Zoe Goodwin RN

## 2024-07-22 NOTE — PROGRESS NOTES
"[facility] Hematology and Oncology Progress Note    Patient: Kerry Mills  MRN: [id number]  Date of Service: Jul 22, 2024             ECOG Performance    0 - Independent          ______________________________________________________________________________  Oncologic history  1) invasive ductal carcinoma of the breast ER negative MI negative HER2/kobe 2+, negative by FISH.  3.9 cm in maximum dimension on MRI.  Diagnosed July 2024    Treatment  1) neoadjuvant AC started on 7/22/2024.  CarboTaxol and Pembro denied by insurance and   2)Plan to give AC followed by paclitaxel before referring patient for surgery    History of Present Illness    Ms. Kerry Mills is here in follow-up.  She is here to start chemotherapy.  She is accompanied by her daughter.  Most of the conversation was done through the .  Patient feels fine she has no concernsReview of systems  A comprehensive 12 point review of system was done that was negative except what is mentioned in the history of present illness    Past History    Past Medical History:   Diagnosis Date    Asthma     Chronic constipation        Past Surgical History:   Procedure Laterality Date    INCISION AND DRAINAGE OF WOUND N/A 11/27/2020    Procedure: INCISION AND DRAINAGE, NECK;  Surgeon: Arnel James MD;  Location: [facility] [place];  Service: ENT    INSERT PORT VASCULAR ACCESS Right 7/9/2024    Procedure: INSERTION,  RIGHT VASCULAR ACCESS PORT;  Surgeon: Iwona Choe DO;  Location: [facility]    PICC INSERTION - TRIPLE LUMEN  11/26/2020         TRACHEOSTOMY N/A 11/26/2020    Procedure: EMERGENT INTUBATION IN OR WITH CREATION, TRACHEOSTOMY;  Surgeon: Dennise Justice MD;  Location: [facility] [place];  Service: General       Physical Exam    /77   Pulse 85   Temp 97.5  F (36.4  C)   Resp 16   Ht 1.549 m (5' 1\")   Wt 65.5 kg (144 lb 4.8 oz)   SpO2 96%   BMI 27.27 kg/m      General: alert, awake, not in acute distress  HEENT: Head: " Normal, normocephalic, atraumatic.  Eye: Normal external eye, conjunctiva, lids cornea, MIKAYLA.  Nose: Normal external nose, mucus membranes and septum.  Pharynx: Normal buccal mucosa. Normal pharynx.  Neck / Thyroid: Supple, no masses, nodes, nodules or enlargement.  Lymphatics: No abnormally enlarged lymph nodes.  Chest: Normal chest wall and respirations. Clear to auscultation.  No crackles or rhonchi's  Heart: S1 S2 RRR, no murmur.   Abdomen: abdomen is soft without significant tenderness, masses, organomegaly or guarding  Extremities: normal strength, tone, and muscle mass  Skin: normal. no rash or abnormalities  CNS: non focal.    Lab Results    Recent Results (from the past 240 hour(s))   Comprehensive metabolic panel    Collection Time: 07/22/24  9:34 AM   Result Value Ref Range    Sodium 140 135 - 145 mmol/L    Potassium 3.7 3.4 - 5.3 mmol/L    Carbon Dioxide (CO2) 29 22 - 29 mmol/L    Anion Gap 7 7 - 15 mmol/L    Urea Nitrogen 9.8 6.0 - 20.0 mg/dL    Creatinine 0.61 0.51 - 0.95 mg/dL    GFR Estimate >90 >60 mL/min/1.73m2    Calcium 9.2 8.8 - 10.4 mg/dL    Chloride 104 98 - 107 mmol/L    Glucose 131 (H) 70 - 99 mg/dL    Alkaline Phosphatase 106 40 - 150 U/L    AST 45 0 - 45 U/L    ALT 28 0 - 50 U/L    Protein Total 7.4 6.4 - 8.3 g/dL    Albumin 4.2 3.5 - 5.2 g/dL    Bilirubin Total 0.9 <=1.2 mg/dL   TSH with free T4 reflex    Collection Time: 07/22/24  9:34 AM   Result Value Ref Range    TSH 1.81 0.30 - 4.20 uIU/mL   Lactate Dehydrogenase    Collection Time: 07/22/24  9:34 AM   Result Value Ref Range    Lactate Dehydrogenase 196 0 - 250 U/L   CBC with platelets and differential    Collection Time: 07/22/24  9:34 AM   Result Value Ref Range    WBC Count 9.1 4.0 - 11.0 10e3/uL    RBC Count 4.50 3.80 - 5.20 10e6/uL    Hemoglobin 13.3 11.7 - 15.7 g/dL    Hematocrit 40.2 35.0 - 47.0 %    MCV 89 78 - 100 fL    MCH 29.6 26.5 - 33.0 pg    MCHC 33.1 31.5 - 36.5 g/dL    RDW 12.3 10.0 - 15.0 %    Platelet Count 237 150  - 450 10e3/uL    % Neutrophils 62 %    % Lymphocytes 24 %    % Monocytes 10 %    % Eosinophils 4 %    % Basophils 0 %    % Immature Granulocytes 0 %    NRBCs per 100 WBC 0 <1 /100    Absolute Neutrophils 5.6 1.6 - 8.3 10e3/uL    Absolute Lymphocytes 2.2 0.8 - 5.3 10e3/uL    Absolute Monocytes 0.9 0.0 - 1.3 10e3/uL    Absolute Eosinophils 0.4 0.0 - 0.7 10e3/uL    Absolute Basophils 0.0 0.0 - 0.2 10e3/uL    Absolute Immature Granulocytes 0.0 <=0.4 10e3/uL    Absolute NRBCs 0.0 10e3/uL         Imaging    MR Breast Bilateral w/o & w Contrast    Result Date: 7/18/2024  EXAM: Bilateral breast MRI with and without contrast, and computer aided kinetic analysis. HISTORY/INDICATION: Recently diagnosed LEFT breast cancer. COMPARISON: Mammogram and ultrasound dated 6/13/2024. No prior comparison MRI. TECHNIQUE: Multiplanar, multisequence imaging performed prior to contrast administration and at multiple time points after contrast administration. Post-processing including subtractions, MIPs and color encoding of post contrast dynamic acquisitions. IV contrast: 6.5 mL Gadavist SEDATION: None FINDINGS: Right Breast: Breast composition: Scattered fibroglandular tissue Background parenchymal enhancement: Mild No concerning areas of enhancement. Right Axilla: No lymphadenopathy. Right Internal Mammary Chain: No lymphadenopathy. Left Breast: Breast composition: Scattered fibroglandular tissue Background parenchymal enhancement: Mild Biopsy-proven LEFT breast invasive ductal carcinoma is seen as a heterogeneously enhancing irregularly shaped mass with indistinct margins measuring 3.9 x 3.0 x 3.3 cm (series 7 image 93 and series 14 image 120). No other concerning areas of enhancement are seen. Left Axilla: No lymphadenopathy. Left Internal Mammary Chain: No lymphadenopathy.     IMPRESSION: 1. Biopsy-proven LEFT breast cancer measures up to 3.9 cm in maximal dimension. 2. No other concerning areas of enhancement in either breast. 3. No  lymphadenopathy. Right Breast: BI-RADS CATEGORY: 1 -  NEGATIVE. Left Breast: BI-RADS CATEGORY: 6 - Known Biopsy-Proven Malignancy-Appropriate Action Should Be Taken. RECOMMENDATION: Continued oncologic/surgical management. MATILDE MCGINNIS MD   SYSTEM ID:  [id number]    XR Surgery MARLA  Fluoro G/T 5 Min    Result Date: 7/9/2024  This exam was marked as non-reportable because it will not be read by a radiologist or a [place] non-radiologist provider.          Assessment and Plan      Triple negative breast cancer  Patient is here to start treatment.  We had originally planned to start patient on carboplatinum Taxol and pembrolizumab.  That was denied by insurance.  At this time we will switch to the others regimen of AC followed by paclitaxel.  The side effects of AC including but not limited to alopecia tiredness fatigue nausea vomiting diarrhea mucositis neutropenia neutropenic fever and sepsis were all discussed with the patient in detail.  She will get Neulasta to prophylax against neutropenia.  She was told to call us if she has any fevers or any other side effects in the interim.  I will plan to do day 8 labs in the first cycle.  We will also plan to monitor the breast mass clinically on each visit.  I will consider doing an interim MRI after 4 cycles.  All these issues were discussed with the patient she had multiple questions that were answered.  Patient will get an echo done today prior to receiving Adriamycin.  1) patient received 4 cycles of dose dense AC followed by weekly paclitaxel as neoadjuvant chemotherapy  2) patient will be referred to surgery after chemotherapy is complete.  Interim evaluation of the tumor will be done  3).  Post op treatment will be determined by pathologic response        Varsha Adamson MD        CC: HIRAL CHAVEZ MD

## 2024-07-22 NOTE — PROGRESS NOTES
[facility]: Cancer Care                                                                                          Spoke with Ginny by phone, consent to communicate was signed at office visit on 7/2 but does not appear to have been scanned into the chart.  She was not with Kerry today at her infusion apt but her sister in law was. Chemo drugs were changed due to insurance not approving the first regimen. New regimen was reviewed by MD and infusion nurse at clinic today with Kerry. Writer reviewed DOXOrubicin / Cyclophosphamide followed by Weekly PACLitaxel protocol with Ginny.  Ac is 14 day cycle. Kerry is scheduled for lab only on 7/29 to check counts. Reviewed take home meds and how to take dexamethasone, Take 2 tablets (8 mg) by mouth daily for 3 days Start on Day 2 of Cycles 1 through 4.  She will need a refill for cycles 2-4.   She understands that with this regimen, onpro for neulasta was placed, this plan does not require the filgrastim to be given subcutaneous at home as was planning in the first regimen. She stated understanding of the above. Will call to check in later this week. Will call Ginny with  and then conference Kerry into the phone call.     Signature:  Marycruz Tate RN

## 2024-07-24 ENCOUNTER — APPOINTMENT (OUTPATIENT)
Dept: INTERPRETER SERVICES | Facility: CLINIC | Age: 59
End: 2024-07-24
Payer: COMMERCIAL

## 2024-07-24 ENCOUNTER — PATIENT OUTREACH (OUTPATIENT)
Dept: ONCOLOGY | Facility: HOSPITAL | Age: 59
End: 2024-07-24
Payer: COMMERCIAL

## 2024-07-24 NOTE — PROGRESS NOTES
[facility]: Cancer Care Follow-Up Note                                    Discussion with Patient:                                                      Called Kerry, with the assistance of [facility] ,  to follow up post D1C1 AC, that was initiated on 7/22/24.  She shared that she had been fatigued since chemo. Did confirm that she is taking her dexamethasone as directed , Take 2 tablets (8 mg) by mouth daily for 3 days Start on Day 2.  Inquired on her fluid intake and she is drinking several glasses of water per day and urinates at least every hour and her urine is clear. She does not have an appetite and is eating two meals per day. Encouraged her to continue good oral hydration as this will help prevent dehydration which is also can cause fatigue. Did educate that she should eat small frequent meals as her body does require calories to heal, she will try to  do this. Inquired about her activity level and she is moving about the house and is walking on her patio and in her yard. She walks for a few minutes and then takes a rest. Encouraged her to continue to alternate activity and rest and educated that exercise/activity is one of the evidnece based ways to help manage fatigue. She will continue to engage in activity.  She has not experienced any nausea or vomiting,  She did have a bowel movement today, no diarrhea.  She does relay that she started to have a cough around the time of her biopsy, 6/18, but it has worsened since chemo, she coughs a couple times every 30-60 min and has yellow/green tinged mucus. No sore throat or runny nose. She does not have a fever and has not taken any fever reducers, but is on dex.  Will discuss her symptom with Dr. Adamson and follow up with his recommendations. Message sent/phoned MD. Will follow up           Dates of Treatment:                                                      Infusion given in last 28 days       Administered MAR Action Medication Dose  Rate Visit    07/22/2024 14:00 Given DOXOrubicin (ADRIAMYCIN) injection 100 mg 100 mg   Infusion Therapy Visit on 07/22/2024 in [facility]    07/22/2024 14:14 New Bag cycloPHOSphamide (CYTOXAN) 1,000 mg in sodium chloride 0.9 % 325 mL infusion 1,000 mg 650 mL/hr Infusion Therapy Visit on 07/22/2024 in [facility]            Assessment:                                                      See assessment above, patient reports cough  with yellow/ greenish sputum that is worsened since chemo. No fever.        Intervention/Education provided during outreach:                                                       See assessment above. Will follow up with recommendations from Dr. Adamson upon response.  Patient has lab only on D8 scheduled on 7/29.  Confirmed patient has clinic and triage numbers    Signature:  Marycruz Tate RN

## 2024-07-25 ENCOUNTER — APPOINTMENT (OUTPATIENT)
Dept: INTERPRETER SERVICES | Facility: CLINIC | Age: 59
End: 2024-07-25
Payer: COMMERCIAL

## 2024-07-25 NOTE — PROGRESS NOTES
[facility]: Cancer Care                                                                                          Called Kerry to follow up after our phone conversation on 7/24 about her symptom of cough, Of note, She reported that she has had a cough since her bx on 6/18 and it has worsened since chemo, she coughs a couple times every 30-60 min and has yellow/green tinged mucus. She has a slight sore throat  from coughing or runny nose. She still does not have a fever and has not taken any fever reducers, but is on dex.   Per Dr. Adamson, no antibiotics at this time, manage wit supportive care. She is continuing to drink 6-8 glasses of fluid per day,  relayed that warm fluids can be soothing, encouraged warm showers with humidity, can use cough drops, alternate rest and activity. She was directed to continue to monitor her temp and call us if she starts to run 100.5 or higher or if her URI symptoms worsen. She stated understanding, Support and encouragment provided.    Signature:  Marycruz Tate RN

## 2024-07-27 ENCOUNTER — APPOINTMENT (OUTPATIENT)
Dept: INTERPRETER SERVICES | Facility: CLINIC | Age: 59
End: 2024-07-27
Payer: COMMERCIAL

## 2024-07-27 ENCOUNTER — HEALTH MAINTENANCE LETTER (OUTPATIENT)
Age: 59
End: 2024-07-27

## 2024-07-29 ENCOUNTER — LAB (OUTPATIENT)
Dept: INFUSION THERAPY | Facility: HOSPITAL | Age: 59
End: 2024-07-29
Attending: SURGERY
Payer: COMMERCIAL

## 2024-07-29 ENCOUNTER — TELEPHONE (OUTPATIENT)
Dept: ONCOLOGY | Facility: HOSPITAL | Age: 59
End: 2024-07-29

## 2024-07-29 DIAGNOSIS — C50.912 INVASIVE DUCTAL CARCINOMA OF BREAST, FEMALE, LEFT (H): ICD-10-CM

## 2024-07-29 LAB
BASOPHILS # BLD MANUAL: 0 10E3/UL (ref 0–0.2)
BASOPHILS NFR BLD MANUAL: 1 %
EOSINOPHIL # BLD MANUAL: 0.2 10E3/UL (ref 0–0.7)
EOSINOPHIL NFR BLD MANUAL: 9 %
ERYTHROCYTE [DISTWIDTH] IN BLOOD BY AUTOMATED COUNT: 11.9 % (ref 10–15)
HCT VFR BLD AUTO: 35.9 % (ref 35–47)
HGB BLD-MCNC: 12 G/DL (ref 11.7–15.7)
LYMPHOCYTES # BLD MANUAL: 1 10E3/UL (ref 0.8–5.3)
LYMPHOCYTES NFR BLD MANUAL: 55 %
MCH RBC QN AUTO: 29.6 PG (ref 26.5–33)
MCHC RBC AUTO-ENTMCNC: 33.4 G/DL (ref 31.5–36.5)
MCV RBC AUTO: 89 FL (ref 78–100)
MONOCYTES # BLD MANUAL: 0.1 10E3/UL (ref 0–1.3)
MONOCYTES NFR BLD MANUAL: 6 %
NEUTROPHILS # BLD MANUAL: 0.5 10E3/UL (ref 1.6–8.3)
NEUTROPHILS NFR BLD MANUAL: 29 %
NRBC # BLD AUTO: 0 10E3/UL
NRBC BLD AUTO-RTO: 0 /100
PLAT MORPH BLD: ABNORMAL
PLATELET # BLD AUTO: 107 10E3/UL (ref 150–450)
RBC # BLD AUTO: 4.05 10E6/UL (ref 3.8–5.2)
RBC MORPH BLD: ABNORMAL
VARIANT LYMPHS BLD QL SMEAR: PRESENT
WBC # BLD AUTO: 1.8 10E3/UL (ref 4–11)

## 2024-07-29 PROCEDURE — 85027 COMPLETE CBC AUTOMATED: CPT

## 2024-07-29 PROCEDURE — 36591 DRAW BLOOD OFF VENOUS DEVICE: CPT

## 2024-07-29 PROCEDURE — 85007 BL SMEAR W/DIFF WBC COUNT: CPT

## 2024-07-29 PROCEDURE — 250N000011 HC RX IP 250 OP 636: Performed by: SURGERY

## 2024-07-29 RX ORDER — HEPARIN SODIUM (PORCINE) LOCK FLUSH IV SOLN 100 UNIT/ML 100 UNIT/ML
300-600 SOLUTION INTRAVENOUS
Status: COMPLETED | OUTPATIENT
Start: 2024-07-29 | End: 2024-07-29

## 2024-07-29 RX ADMIN — HEPARIN 500 UNITS: 100 SYRINGE at 13:10

## 2024-07-29 NOTE — TELEPHONE ENCOUNTER
I called Kerry with the use of a [email or web address] interpretor, [place], [place] [id number]. I was able to identify Kerry using name and . I identified myself and that I was calling with lab results from today. I explained that her WBC and specifically the neutrophils are low. I explained that she is at significant risk of infection. I asked if she has a thermometer at home and she said no. I impressed upon her how important it is to have a thermometer. I gave her the phone number to call if she should get a fever. I told her the best way to avoid infection is good hand washing and avoiding ppl that are sick. I asked if she understood all the information that I just gave her and she said she did. She is without any signs of infection that I asked about. I wished her well and we ended the call. LEIF Davila RN, OCN, CBCN

## 2024-07-30 DIAGNOSIS — C50.912 INVASIVE DUCTAL CARCINOMA OF BREAST, FEMALE, LEFT (H): ICD-10-CM

## 2024-07-30 RX ORDER — DEXAMETHASONE 4 MG/1
8 TABLET ORAL DAILY
Qty: 6 TABLET | Refills: 2 | Status: SHIPPED | OUTPATIENT
Start: 2024-07-30 | End: 2024-09-29

## 2024-07-30 NOTE — TELEPHONE ENCOUNTER
"[facility]: Cancer Care                                                                                          Patient is scheduled for D1C2  AC on 8/5/24. Dexamethasone originally released from  Take Home medications in treatment plan  with 6 tablets, 0 refills with instructions to \"Take 2 tablets (8 mg) by mouth daily for 3 days Start on Day 2 of Cycles 1 through 4. - Oral.\"  Dex prescription was queued up with 2 refills to  provide enough dexamethasone for the remaining cycles of AC (2-4)    Signature:  Marycruz Tate RN   "

## 2024-07-31 ENCOUNTER — MYC MEDICAL ADVICE (OUTPATIENT)
Dept: ONCOLOGY | Facility: HOSPITAL | Age: 59
End: 2024-07-31
Payer: COMMERCIAL

## 2024-07-31 NOTE — TELEPHONE ENCOUNTER
Called and spoke with daughter in law Ginny. She reports that sore throat has been present since first treatment on 7/22. Pader looked in patient's mouth yesterday, did not see any sores or white patches. Patient does not have a fever. She has had a very mild cough since the sore throat started, but feels that she is coughing because of her throat. Patient does not have any nasal symptoms or throat swelling. Pader reports that patient is eating and drinking, but is wanting not to eat because of her throat. Family is encouraging her to keep eating. She has been drinking water or tea with honey and has used Tylenol for pain. Next appointment is on 8/5/24.    Message will be sent to Dr. Adamson to advise on symptom.    Mis Finn RN

## 2024-07-31 NOTE — TELEPHONE ENCOUNTER
Return call placed to Ginny. Dr. Adamson recommends symptomatic treatment for now. Discussed diet modifications to accommodate throat pain with foods that are moist, soft, and easy to chew and swallow. Foods that are cool or room temperature are typically less irritating. Drinking plenty of fluids to keep mouth and throat moist. Patient can continue to use Tylenol as needed. Patient should call the clinic or be seen if symptoms are worsening, any fever, or development of further upper respiratory symptoms prior to clinic visit on Monday 8/5/24. Ginny verbalizes understanding and will give information to patient.     Mis Finn RN

## 2024-08-05 ENCOUNTER — LAB (OUTPATIENT)
Dept: INFUSION THERAPY | Facility: HOSPITAL | Age: 59
End: 2024-08-05
Attending: INTERNAL MEDICINE
Payer: COMMERCIAL

## 2024-08-05 ENCOUNTER — PATIENT OUTREACH (OUTPATIENT)
Dept: ONCOLOGY | Facility: HOSPITAL | Age: 59
End: 2024-08-05

## 2024-08-05 ENCOUNTER — ONCOLOGY VISIT (OUTPATIENT)
Dept: ONCOLOGY | Facility: HOSPITAL | Age: 59
End: 2024-08-05
Attending: INTERNAL MEDICINE
Payer: COMMERCIAL

## 2024-08-05 VITALS
HEART RATE: 88 BPM | TEMPERATURE: 98.2 F | DIASTOLIC BLOOD PRESSURE: 64 MMHG | WEIGHT: 146.2 LBS | RESPIRATION RATE: 16 BRPM | HEIGHT: 61 IN | BODY MASS INDEX: 27.6 KG/M2 | SYSTOLIC BLOOD PRESSURE: 97 MMHG | OXYGEN SATURATION: 96 %

## 2024-08-05 VITALS
SYSTOLIC BLOOD PRESSURE: 138 MMHG | DIASTOLIC BLOOD PRESSURE: 79 MMHG | HEART RATE: 87 BPM | OXYGEN SATURATION: 96 % | RESPIRATION RATE: 16 BRPM | TEMPERATURE: 98.8 F

## 2024-08-05 DIAGNOSIS — C50.912 INVASIVE DUCTAL CARCINOMA OF BREAST, FEMALE, LEFT (H): Primary | ICD-10-CM

## 2024-08-05 DIAGNOSIS — R53.83 CHEMOTHERAPY-INDUCED FATIGUE: ICD-10-CM

## 2024-08-05 DIAGNOSIS — R53.0 NEOPLASTIC MALIGNANT RELATED FATIGUE: ICD-10-CM

## 2024-08-05 DIAGNOSIS — T45.1X5A CHEMOTHERAPY-INDUCED FATIGUE: ICD-10-CM

## 2024-08-05 LAB
BASOPHILS # BLD AUTO: 0 10E3/UL (ref 0–0.2)
BASOPHILS NFR BLD AUTO: 0 %
EOSINOPHIL # BLD AUTO: 0.1 10E3/UL (ref 0–0.7)
EOSINOPHIL NFR BLD AUTO: 1 %
ERYTHROCYTE [DISTWIDTH] IN BLOOD BY AUTOMATED COUNT: 12.5 % (ref 10–15)
HCT VFR BLD AUTO: 35.3 % (ref 35–47)
HGB BLD-MCNC: 11.6 G/DL (ref 11.7–15.7)
IMM GRANULOCYTES # BLD: 0.5 10E3/UL
IMM GRANULOCYTES NFR BLD: 5 %
LYMPHOCYTES # BLD AUTO: 1.8 10E3/UL (ref 0.8–5.3)
LYMPHOCYTES NFR BLD AUTO: 15 %
MCH RBC QN AUTO: 29.5 PG (ref 26.5–33)
MCHC RBC AUTO-ENTMCNC: 32.9 G/DL (ref 31.5–36.5)
MCV RBC AUTO: 90 FL (ref 78–100)
MONOCYTES # BLD AUTO: 1.4 10E3/UL (ref 0–1.3)
MONOCYTES NFR BLD AUTO: 12 %
NEUTROPHILS # BLD AUTO: 7.8 10E3/UL (ref 1.6–8.3)
NEUTROPHILS NFR BLD AUTO: 67 %
NRBC # BLD AUTO: 0 10E3/UL
NRBC BLD AUTO-RTO: 0 /100
PLATELET # BLD AUTO: 272 10E3/UL (ref 150–450)
RBC # BLD AUTO: 3.93 10E6/UL (ref 3.8–5.2)
WBC # BLD AUTO: 11.5 10E3/UL (ref 4–11)

## 2024-08-05 PROCEDURE — 250N000011 HC RX IP 250 OP 636

## 2024-08-05 PROCEDURE — 96372 THER/PROPH/DIAG INJ SC/IM: CPT

## 2024-08-05 PROCEDURE — 258N000003 HC RX IP 258 OP 636

## 2024-08-05 PROCEDURE — 96367 TX/PROPH/DG ADDL SEQ IV INF: CPT

## 2024-08-05 PROCEDURE — G0463 HOSPITAL OUTPT CLINIC VISIT: HCPCS | Mod: 25

## 2024-08-05 PROCEDURE — 99417 PROLNG OP E/M EACH 15 MIN: CPT

## 2024-08-05 PROCEDURE — G2211 COMPLEX E/M VISIT ADD ON: HCPCS

## 2024-08-05 PROCEDURE — 36591 DRAW BLOOD OFF VENOUS DEVICE: CPT | Performed by: INTERNAL MEDICINE

## 2024-08-05 PROCEDURE — 96377 APPLICATON ON-BODY INJECTOR: CPT | Mod: XS

## 2024-08-05 PROCEDURE — 96413 CHEMO IV INFUSION 1 HR: CPT

## 2024-08-05 PROCEDURE — 99215 OFFICE O/P EST HI 40 MIN: CPT

## 2024-08-05 PROCEDURE — 96375 TX/PRO/DX INJ NEW DRUG ADDON: CPT

## 2024-08-05 PROCEDURE — 85025 COMPLETE CBC W/AUTO DIFF WBC: CPT | Performed by: INTERNAL MEDICINE

## 2024-08-05 RX ORDER — ALBUTEROL SULFATE 0.83 MG/ML
2.5 SOLUTION RESPIRATORY (INHALATION)
Status: DISCONTINUED | OUTPATIENT
Start: 2024-08-05 | End: 2024-08-05 | Stop reason: HOSPADM

## 2024-08-05 RX ORDER — EPINEPHRINE 1 MG/ML
0.3 INJECTION, SOLUTION INTRAMUSCULAR; SUBCUTANEOUS EVERY 5 MIN PRN
Status: DISCONTINUED | OUTPATIENT
Start: 2024-08-05 | End: 2024-08-05 | Stop reason: HOSPADM

## 2024-08-05 RX ORDER — HEPARIN SODIUM,PORCINE 10 UNIT/ML
5-20 VIAL (ML) INTRAVENOUS DAILY PRN
Status: CANCELLED | OUTPATIENT
Start: 2024-08-05

## 2024-08-05 RX ORDER — MEPERIDINE HYDROCHLORIDE 50 MG/ML
25 INJECTION INTRAMUSCULAR; INTRAVENOUS; SUBCUTANEOUS EVERY 30 MIN PRN
Status: DISCONTINUED | OUTPATIENT
Start: 2024-08-05 | End: 2024-08-05 | Stop reason: HOSPADM

## 2024-08-05 RX ORDER — ALBUTEROL SULFATE 90 UG/1
1-2 AEROSOL, METERED RESPIRATORY (INHALATION)
Status: DISCONTINUED | OUTPATIENT
Start: 2024-08-05 | End: 2024-08-05 | Stop reason: HOSPADM

## 2024-08-05 RX ORDER — METHYLPREDNISOLONE SODIUM SUCCINATE 125 MG/2ML
125 INJECTION, POWDER, LYOPHILIZED, FOR SOLUTION INTRAMUSCULAR; INTRAVENOUS
Status: CANCELLED
Start: 2024-08-05

## 2024-08-05 RX ORDER — LORAZEPAM 2 MG/ML
0.5 INJECTION INTRAMUSCULAR EVERY 4 HOURS PRN
Status: CANCELLED | OUTPATIENT
Start: 2024-08-05

## 2024-08-05 RX ORDER — HEPARIN SODIUM,PORCINE 10 UNIT/ML
5-20 VIAL (ML) INTRAVENOUS DAILY PRN
Status: DISCONTINUED | OUTPATIENT
Start: 2024-08-05 | End: 2024-08-05 | Stop reason: HOSPADM

## 2024-08-05 RX ORDER — HEPARIN SODIUM (PORCINE) LOCK FLUSH IV SOLN 100 UNIT/ML 100 UNIT/ML
5 SOLUTION INTRAVENOUS
Status: DISCONTINUED | OUTPATIENT
Start: 2024-08-05 | End: 2024-08-05 | Stop reason: HOSPADM

## 2024-08-05 RX ORDER — ALBUTEROL SULFATE 90 UG/1
1-2 AEROSOL, METERED RESPIRATORY (INHALATION)
Status: CANCELLED
Start: 2024-08-05

## 2024-08-05 RX ORDER — LORAZEPAM 2 MG/ML
0.5 INJECTION INTRAMUSCULAR EVERY 4 HOURS PRN
Status: DISCONTINUED | OUTPATIENT
Start: 2024-08-05 | End: 2024-08-05 | Stop reason: HOSPADM

## 2024-08-05 RX ORDER — MEPERIDINE HYDROCHLORIDE 50 MG/ML
25 INJECTION INTRAMUSCULAR; INTRAVENOUS; SUBCUTANEOUS EVERY 30 MIN PRN
Status: CANCELLED | OUTPATIENT
Start: 2024-08-05

## 2024-08-05 RX ORDER — DOXORUBICIN HYDROCHLORIDE 2 MG/ML
60 INJECTION, SOLUTION INTRAVENOUS ONCE
Status: CANCELLED | OUTPATIENT
Start: 2024-08-05

## 2024-08-05 RX ORDER — DIPHENHYDRAMINE HYDROCHLORIDE 50 MG/ML
50 INJECTION INTRAMUSCULAR; INTRAVENOUS
Status: DISCONTINUED | OUTPATIENT
Start: 2024-08-05 | End: 2024-08-05 | Stop reason: HOSPADM

## 2024-08-05 RX ORDER — PALONOSETRON 0.05 MG/ML
0.25 INJECTION, SOLUTION INTRAVENOUS ONCE
Status: CANCELLED | OUTPATIENT
Start: 2024-08-05

## 2024-08-05 RX ORDER — DIPHENHYDRAMINE HYDROCHLORIDE 50 MG/ML
50 INJECTION INTRAMUSCULAR; INTRAVENOUS
Status: CANCELLED
Start: 2024-08-05

## 2024-08-05 RX ORDER — HEPARIN SODIUM (PORCINE) LOCK FLUSH IV SOLN 100 UNIT/ML 100 UNIT/ML
5 SOLUTION INTRAVENOUS
Status: CANCELLED | OUTPATIENT
Start: 2024-08-05

## 2024-08-05 RX ORDER — ALBUTEROL SULFATE 0.83 MG/ML
2.5 SOLUTION RESPIRATORY (INHALATION)
Status: CANCELLED | OUTPATIENT
Start: 2024-08-05

## 2024-08-05 RX ORDER — METHYLPREDNISOLONE SODIUM SUCCINATE 125 MG/2ML
125 INJECTION, POWDER, LYOPHILIZED, FOR SOLUTION INTRAMUSCULAR; INTRAVENOUS
Status: DISCONTINUED | OUTPATIENT
Start: 2024-08-05 | End: 2024-08-05 | Stop reason: HOSPADM

## 2024-08-05 RX ORDER — PALONOSETRON 0.05 MG/ML
0.25 INJECTION, SOLUTION INTRAVENOUS ONCE
Status: COMPLETED | OUTPATIENT
Start: 2024-08-05 | End: 2024-08-05

## 2024-08-05 RX ORDER — DOXORUBICIN HYDROCHLORIDE 2 MG/ML
60 INJECTION, SOLUTION INTRAVENOUS ONCE
Status: COMPLETED | OUTPATIENT
Start: 2024-08-05 | End: 2024-08-05

## 2024-08-05 RX ORDER — EPINEPHRINE 1 MG/ML
0.3 INJECTION, SOLUTION INTRAMUSCULAR; SUBCUTANEOUS EVERY 5 MIN PRN
Status: CANCELLED | OUTPATIENT
Start: 2024-08-05

## 2024-08-05 RX ADMIN — HEPARIN 5 ML: 100 SYRINGE at 16:33

## 2024-08-05 RX ADMIN — CYCLOPHOSPHAMIDE 1000 MG: 1 INJECTION, POWDER, FOR SOLUTION INTRAVENOUS; ORAL at 15:54

## 2024-08-05 RX ADMIN — PEGFILGRASTIM 6 MG: KIT SUBCUTANEOUS at 16:36

## 2024-08-05 RX ADMIN — FOSAPREPITANT: 150 INJECTION, POWDER, LYOPHILIZED, FOR SOLUTION INTRAVENOUS at 14:51

## 2024-08-05 RX ADMIN — SODIUM CHLORIDE 250 ML: 9 INJECTION, SOLUTION INTRAVENOUS at 14:47

## 2024-08-05 RX ADMIN — PALONOSETRON 0.25 MG: 0.05 INJECTION, SOLUTION INTRAVENOUS at 15:14

## 2024-08-05 RX ADMIN — DOXORUBICIN HYDROCHLORIDE 100 MG: 2 INJECTION, SOLUTION INTRAVENOUS at 15:31

## 2024-08-05 ASSESSMENT — PAIN SCALES - GENERAL: PAINLEVEL: EXTREME PAIN (8)

## 2024-08-05 NOTE — PROGRESS NOTES
Infusion Nursing Note:  Kerry Mills presents today for Infusion of Adriamycin and Cytoxan. Application of Neulasta ( Onpro)    Patient seen by provider today: Yes: Brianna Mancilla NP   present during visit today: Patient refused  services and a waiver form has been signed. Daughter Po here to interpret.    Note: Patient ambulated into Infusion Care accompanied by her daughter, Po. PORT was accessed in the clinic and labs drawn. Patient was seen by Brianna Mancilla NP. Premeds administered and PPE donned. Adriamycin double checked with Katerin ObregonRN. Patient tolerated infusion of Adriamycin without any problems. Cytoxan double checked with Katerin Obregon RN. Patient tolerated an infusion of Cytoxan without any problems. PORT flushed with Normal Saline and 6 ml Heparin and de-accessed. Dry 2 x 2 gauze taped over PORT site. Neulasta Onpro activated and applied  to patient's left upper abdomen. Reviewed purpose and activation of Onpro device. All questions answered and patient and her daughter were discharged.      Intravenous Access:  Implanted Port.    Treatment Conditions:  Lab Results   Component Value Date    HGB 11.6 (L) 08/05/2024    WBC 11.5 (H) 08/05/2024    ANEU 0.5 (L) 07/29/2024    ANEUTAUTO 7.8 08/05/2024     08/05/2024        Lab Results   Component Value Date     07/22/2024    POTASSIUM 3.7 07/22/2024    MAG 2.2 12/06/2020    CR 0.61 07/22/2024    DONNA 9.2 07/22/2024    BILITOTAL 0.9 07/22/2024    ALBUMIN 4.2 07/22/2024    ALT 28 07/22/2024    AST 45 07/22/2024       Results reviewed, labs MET treatment parameters, ok to proceed with treatment.      Post Infusion Assessment:  Patient tolerated infusion without incident.  Site patent and intact, free from redness, edema or discomfort.  No evidence of extravasations.  Access discontinued per protocol.       Discharge Plan:   Patient and/or family verbalized understanding of discharge instructions and all questions  answered.  Patient discharged in stable condition accompanied by: daughter.  Departure Mode: Ambulatory.      Brigida Jarrett RN,OCN

## 2024-08-05 NOTE — LETTER
8/5/2024      Kerry Mills  [address] [place] [address]      Dear Colleague,    Thank you for referring your patient, Kerry Mills, to the [facility] [place]. Please see a copy of my visit note below.    [facility] Outpatient Progress Note    Patient: Kerry Mills  MRN: [id number]  Date of Service: Aug 5, 2024          Reason for Visit    Chief Complaint   Patient presents with     Oncology Clinic Visit       Invasive ductal carcinoma of breast, female          Primary Hematologist/Oncologist: Dr. Adamson        Assessment/Plan  #. Invasive ductal carcinoma of breast, female, left (Triple negative breast cancer)  #. Neoplastic malignant related fatigue  #. Chemotherapy-induced fatigue  Patient reports fair toleration to first dose of AC, though she would prefer to proceed with surgery first.  She has increased fatigue and weakness, and is not eating as much since this treatment has started.  We reviewed labs today including a CBC which shows a hemoglobin of 11.6 which is a slight downtrend, and a white blood cell count of 11.5. I do recommend to proceed with treatment today, and patient is agreeable to this.  Does not wish to discuss further treatment plans in detail with Dr. Adamson at her next visit, specifically she would like to hold off on chemotherapy and proceed with surgery.  I did review the current recommendations and rationale for the current treatment plan.  Proceed with cycle 2 dose dense AC today, with labs in 1 week.  Follow-up with Dr. Adamson in 2 weeks for further discussion of treatment plan and consideration of cycle 3 AC.  We will also continue to monitor the breast mass clinically on each visit.  Today there has not been a considerable change noted.    Will consider doing an interim MRI after 4 cycles.    Pre-treatment echo completed prior to receiving Adriamycin.  Current plan is for 4 cycles of dose dense AC followed by weekly paclitaxel as  "neoadjuvant chemotherapy after which patient will be referred to surgery after chemotherapy is complete.    Post op treatment will be determined by pathologic response    ______________________________________________________________________________    History of Present Illness/ Interval History    Ms. Kerry Mills  is a 58 year old female patient returns in follow-up with her daughter who is also serving as .  She started C1D1 AC on 7/22, and is here today for C2. C1 went ok, though she feels weak and is not eating as much because it doesn't taste the same. Still eating, but a bit less. No nausea, diarrhea. No fevers, chills, body aches. Coughing a lot, now has pain to ribs on both sides. Cough is not new, but maybe more often. No pain with deep breaths.  Muscles feel sore with coughing though.  She has 1 tiny sore on the tip of her tongue.  Her throat pain seems to be improved.  Kerry tells me that she would prefer to skip chemotherapy and proceed right away to surgery noting that this first cycle was exhausting, and she worrys about the next ones.      ECOG performance status   0- Fully active, without restriction      Pain  Pain Score: Extreme Pain (8)  Pain Loc: Other - see comment (side pain from cough)    ROS  A 14 point review of systems was obtained.  Positive findings noted in the history.  The remainder of the review of system is otherwise negative.      Oncology History/Treatment  Oncologic history  1) invasive ductal carcinoma of the breast ER negative GA negative HER2/kobe 2+, negative by FISH.  3.9 cm in maximum dimension on MRI.  Diagnosed July 2024    Treatment  1) neoadjuvant AC started on 7/22/2024.  CarboTaxol and Pembro denied by insurance and   2)Plan to give AC followed by paclitaxel before referring patient for surgery    Physical Exam    BP 97/64   Pulse 88   Temp 98.2  F (36.8  C)   Resp 16   Ht 1.549 m (5' 1\")   Wt 66.3 kg (146 lb 3.2 oz)   SpO2 96%   BMI 27.62 kg/m  "     General: Well-appearing female in no acute distress. Cooperative in conversation.  Eyes: EOMI, PERRL. No scleral icterus.  ENT: Oral mucosa is moist without thrush.  1 tiny ulceration is noted on the tip of her tongue.  No significant erythema present  Lymphatic: Neck is supple without cervical, axillary, or supraclavicular lymphadenopathy.   Cardiovascular: RRR No murmurs, gallops, or rubs. No peripheral edema.  Breasts: Left breast mass palpable, nontender  Respiratory: CTA bilaterally. No wheezes or crackles.  Gastrointestinal: BS +. Abdomen soft, non-tender. No palpable hepatosplenomegaly or masses.   Neurologic: Alert and oriented x3. Cranial nerves II through XII are grossly intact.  Skin: No rashes, petechiae, or bruising noted. Warm, dry, intact.      Lab Results    Recent Results (from the past 168 hour(s))   CBC with platelets and differential   Result Value Ref Range    WBC Count 11.5 (H) 4.0 - 11.0 10e3/uL    RBC Count 3.93 3.80 - 5.20 10e6/uL    Hemoglobin 11.6 (L) 11.7 - 15.7 g/dL    Hematocrit 35.3 35.0 - 47.0 %    MCV 90 78 - 100 fL    MCH 29.5 26.5 - 33.0 pg    MCHC 32.9 31.5 - 36.5 g/dL    RDW 12.5 10.0 - 15.0 %    Platelet Count 272 150 - 450 10e3/uL    % Neutrophils 67 %    % Lymphocytes 15 %    % Monocytes 12 %    % Eosinophils 1 %    % Basophils 0 %    % Immature Granulocytes 5 %    NRBCs per 100 WBC 0 <1 /100    Absolute Neutrophils 7.8 1.6 - 8.3 10e3/uL    Absolute Lymphocytes 1.8 0.8 - 5.3 10e3/uL    Absolute Monocytes 1.4 (H) 0.0 - 1.3 10e3/uL    Absolute Eosinophils 0.1 0.0 - 0.7 10e3/uL    Absolute Basophils 0.0 0.0 - 0.2 10e3/uL    Absolute Immature Granulocytes 0.5 (H) <=0.4 10e3/uL    Absolute NRBCs 0.0 10e3/uL     I reviewed the above labs today.  Imaging    Echocardiogram Complete    Result Date: 2024  [id number]^ADAM^LYNSEY^[place] [address]  Name: BETSY PINEDO MRN: [id number] : 1965  Study Date: 07/22/2024 11:14 AM Age: 58 yrs Gender: Female Patient Location: [facility] Reason For Study: Invasive ductal carcinoma of breast, female, left (H), Encounter Ordering Physician: LYNSEY MEDINA Referring Physician: LYNSEY MEDINA Performed By: TODD  BSA: 1.7 m2 Height: 61 in Weight: 146 lb HR: 77 BP: 107/77 mmHg ______________________________________________________________________________ Procedure Complete Echo Adult. Myocardial Strain Imaging performed. No hemodynamically significant valvular abnormalities on 2D or color flow imaging. ______________________________________________________________________________ Interpretation Summary  The left ventricle is normal in size. Left ventricular function is normal.The ejection fraction is 60-65%. Global peak LV longitudinal strain is averaged at -19%. This is within reported normal limits (normal <-18%). Normal right ventricle size and systolic function. No hemodynamically significant valvular abnormalities on 2D or color flow imaging. ______________________________________________________________________________ Left Ventricle The left ventricle is normal in size. Left ventricular function is normal.The ejection fraction is 60-65%. There is normal left ventricular wall thickness. Left ventricular diastolic function is normal. Global peak LV longitudinal strain is averaged at -19%. This is within reported normal limits (normal <- 18%). No regional wall motion abnormalities noted.  Right Ventricle Normal right ventricle size and systolic function.  Atria Normal left atrial size. Right atrial size is normal. There is no color Doppler evidence of an atrial shunt.  Mitral Valve Mitral valve leaflets appear normal. There is no evidence of mitral stenosis or clinically significant mitral regurgitation. There is trace mitral regurgitation.  Tricuspid Valve The tricuspid valve is not well visualized, but is grossly normal. Right ventricle systolic  pressure estimate normal. There is mild (1+) tricuspid regurgitation.  Aortic Valve Aortic valve leaflets appear normal. There is no evidence of aortic stenosis or clinically significant aortic regurgitation.  Pulmonic Valve The pulmonic valve is not well visualized. This degree of valvular regurgitation is within normal limits.  Vessels The aorta root is normal. Normal size ascending aorta. IVC diameter <2.1 cm collapsing >50% with sniff suggests a normal RA pressure of 3 mmHg.  Pericardium There is no pericardial effusion.  ______________________________________________________________________________ MMode/2D Measurements & Calculations IVSd: 1.0 cm LVIDd: 4.3 cm LVIDs: 2.9 cm LVPWd: 1.0 cm FS: 32.8 % LV mass(C)d: 152.9 grams LV mass(C)dI: 92.5 grams/m2  Ao root diam: 3.5 cm asc Aorta Diam: 3.5 cm LVOT diam: 2.1 cm LVOT area: 3.6 cm2 Ao root diam index Ht(cm/m): 2.3 Ao root diam index BSA (cm/m2): 2.1 Asc Ao diam index BSA (cm/m2): 2.1 Asc Ao diam index Ht(cm/m): 2.3 EF Biplane: 60.8 % LA Volume (BP): 28.2 ml LA Volume Index (BP): 17.1 ml/m2  LA Volume Indexed (AL/bp): 18.5 ml/m2 RV Base: 2.9 cm RWT: 0.48 TAPSE: 2.0 cm  Time Measurements MM HR: 76.0 BPM  Doppler Measurements & Calculations MV E max antelmo: 71.0 cm/sec MV A max antelmo: 93.2 cm/sec MV E/A: 0.76 MV dec slope: 336.0 cm/sec2 MV dec time: 0.21 sec Ao V2 max: 127.6 cm/sec Ao max P.0 mmHg Ao V2 mean: 95.5 cm/sec Ao mean PG: 3.9 mmHg Ao V2 VTI: 26.0 cm PAULA(I,D): 2.9 cm2 PAULA(V,D): 2.9 cm2 LV V1 max P.1 mmHg LV V1 max: 100.9 cm/sec LV V1 VTI: 20.5 cm SV(LVOT): 74.4 ml SI(LVOT): 45.0 ml/m2 PA V2 max: 77.8 cm/sec PA max P.4 mmHg PA acc time: 0.17 sec TR max antelmo: 224.0 cm/sec TR max P.1 mmHg AV Antelmo Ratio (DI): 0.79 PAULA Index (cm2/m2): 1.7 E/E': 9.5 E/E' avg: 10.2 Lateral E/e': 10.1 Medial E/e': 10.3 Peak E' Antelmo: 7.5 cm/sec RV S Antelmo: 10.2 cm/sec  Measurements from QLAB LV GLS Endo Peak A2C (AS): -19.6 % LV GLS Endo Peak A3C (AS): -19.0 % LV GLS Endo  Peak A4C (AS): -18.9 % LV GLS Endo Peak Avg (AS): -19.1 % ______________________________________________________________________________ Report approved by: Christianne Noyola 07/22/2024 12:31 PM       MR Breast Bilateral w/o & w Contrast    Result Date: 7/18/2024  EXAM: Bilateral breast MRI with and without contrast, and computer aided kinetic analysis. HISTORY/INDICATION: Recently diagnosed LEFT breast cancer. COMPARISON: Mammogram and ultrasound dated 6/13/2024. No prior comparison MRI. TECHNIQUE: Multiplanar, multisequence imaging performed prior to contrast administration and at multiple time points after contrast administration. Post-processing including subtractions, MIPs and color encoding of post contrast dynamic acquisitions. IV contrast: 6.5 mL Gadavist SEDATION: None FINDINGS: Right Breast: Breast composition: Scattered fibroglandular tissue Background parenchymal enhancement: Mild No concerning areas of enhancement. Right Axilla: No lymphadenopathy. Right Internal Mammary Chain: No lymphadenopathy. Left Breast: Breast composition: Scattered fibroglandular tissue Background parenchymal enhancement: Mild Biopsy-proven LEFT breast invasive ductal carcinoma is seen as a heterogeneously enhancing irregularly shaped mass with indistinct margins measuring 3.9 x 3.0 x 3.3 cm (series 7 image 93 and series 14 image 120). No other concerning areas of enhancement are seen. Left Axilla: No lymphadenopathy. Left Internal Mammary Chain: No lymphadenopathy.     IMPRESSION: 1. Biopsy-proven LEFT breast cancer measures up to 3.9 cm in maximal dimension. 2. No other concerning areas of enhancement in either breast. 3. No lymphadenopathy. Right Breast: BI-RADS CATEGORY: 1 -  NEGATIVE. Left Breast: BI-RADS CATEGORY: 6 - Known Biopsy-Proven Malignancy-Appropriate Action Should Be Taken. RECOMMENDATION: Continued oncologic/surgical management. MATILDE MCGINNIS MD   SYSTEM ID:  [id number]    XR Surgery MARLA  Fluoro G/T 5  "Min    Result Date: 7/9/2024  This exam was marked as non-reportable because it will not be read by a radiologist or a [facility] non-radiologist provider.        Billing  Total time 56 minutes, to include face to face visit, review of EMR, ordering, documentation and coordination of care on date of service   complexity modifier for longitudinal care.     Signed by: NEW Yost CNP    Oncology Rooming Note    August 5, 2024 1:19 PM   Kerry Mills is a 58 year old female who presents for:    Chief Complaint   Patient presents with     Oncology Clinic Visit       Invasive ductal carcinoma of breast, female        Initial Vitals: BP 97/64   Pulse 88   Temp 98.2  F (36.8  C)   Resp 16   Ht 1.549 m (5' 1\")   Wt 66.3 kg (146 lb 3.2 oz)   SpO2 96%   BMI 27.62 kg/m   Estimated body mass index is 27.62 kg/m  as calculated from the following:    Height as of this encounter: 1.549 m (5' 1\").    Weight as of this encounter: 66.3 kg (146 lb 3.2 oz). Body surface area is 1.69 meters squared.  Extreme Pain (8) Comment: Data Unavailable   No LMP recorded. Patient is postmenopausal.  Allergies reviewed: Yes  Medications reviewed: Yes    Medications: Medication refills not needed today.  Pharmacy name entered into [facility]:    [facility] - [place], [place] - [address]  [facility] - [place], [place] - [address]    Frailty Screening:   Is the patient here for a new oncology consult visit in cancer care? 2. No      Clinical concerns:  2 week labs and treatment;  waiver signed by patient, her daughter Juan Mills and myself      Anahi Irwin                Again, thank you for allowing me to participate in the care of your patient.        Sincerely,        NEW Yost CNP  "

## 2024-08-05 NOTE — PROGRESS NOTES
[facility]: Cancer Care                                                                                          Met with Kerry and her daughter Pa when she came to clinic for provider visit/ C2 AC. Paperwork documentation was completed for Mammogram insurance benefit reward program. She shared about her good support system, she has 7 daughters and they take turns bringing her to clinic. She is overall feeling ok today. She will discuss her care any symptoms further at provider visit today. She has our contact information for future reference. Encouraged her to call with any questions or concerns    Signature:  Marycruz Tate RN

## 2024-08-05 NOTE — PROGRESS NOTES
"Oncology Rooming Note    August 5, 2024 1:19 PM   Kerry Mills is a 58 year old female who presents for:    Chief Complaint   Patient presents with    Oncology Clinic Visit       Invasive ductal carcinoma of breast, female        Initial Vitals: BP 97/64   Pulse 88   Temp 98.2  F (36.8  C)   Resp 16   Ht 1.549 m (5' 1\")   Wt 66.3 kg (146 lb 3.2 oz)   SpO2 96%   BMI 27.62 kg/m   Estimated body mass index is 27.62 kg/m  as calculated from the following:    Height as of this encounter: 1.549 m (5' 1\").    Weight as of this encounter: 66.3 kg (146 lb 3.2 oz). Body surface area is 1.69 meters squared.  Extreme Pain (8) Comment: Data Unavailable   No LMP recorded. Patient is postmenopausal.  Allergies reviewed: Yes  Medications reviewed: Yes    Medications: Medication refills not needed today.  Pharmacy name entered into [employer]:    [facility] - [place], [place] - [address]  [facility] - [place], [place] - [address]    Frailty Screening:   Is the patient here for a new oncology consult visit in cancer care? 2. No      Clinical concerns:  2 week labs and treatment;  waiver signed by patient, her daughter Juan Mills and myself      Anahi Irwin              "

## 2024-08-05 NOTE — PROGRESS NOTES
[facility] Hematology and Oncology Outpatient Progress Note    Patient: Kerry Mills  MRN: [id number]  Date of Service: Aug 5, 2024          Reason for Visit    Chief Complaint   Patient presents with    Oncology Clinic Visit       Invasive ductal carcinoma of breast, female          Primary Hematologist/Oncologist: Dr. Adamson        Assessment/Plan  #. Invasive ductal carcinoma of breast, female, left (Triple negative breast cancer)  #. Neoplastic malignant related fatigue  #. Chemotherapy-induced fatigue  Patient reports fair toleration to first dose of AC, though she would prefer to proceed with surgery first.  She has increased fatigue and weakness, and is not eating as much since this treatment has started.  We reviewed labs today including a CBC which shows a hemoglobin of 11.6 which is a slight downtrend, and a white blood cell count of 11.5. I do recommend to proceed with treatment today, and patient is agreeable to this.  Does not wish to discuss further treatment plans in detail with Dr. Adamson at her next visit, specifically she would like to hold off on chemotherapy and proceed with surgery.  I did review the current recommendations and rationale for the current treatment plan.  Proceed with cycle 2 dose dense AC today, with labs in 1 week.  Follow-up with Dr. Adamson in 2 weeks for further discussion of treatment plan and consideration of cycle 3 AC.  We will also continue to monitor the breast mass clinically on each visit.  Today there has not been a considerable change noted.    Will consider doing an interim MRI after 4 cycles.    Pre-treatment echo completed prior to receiving Adriamycin.  Current plan is for 4 cycles of dose dense AC followed by weekly paclitaxel as neoadjuvant chemotherapy after which patient will be referred to surgery after chemotherapy is complete.    Post op treatment will be determined by pathologic  "response    ______________________________________________________________________________    History of Present Illness/ Interval History    Ms. Kerry Mills  is a 58 year old female patient returns in follow-up with her daughter who is also serving as .  She started C1D1 AC on 7/22, and is here today for C2. C1 went ok, though she feels weak and is not eating as much because it doesn't taste the same. Still eating, but a bit less. No nausea, diarrhea. No fevers, chills, body aches. Coughing a lot, now has pain to ribs on both sides. Cough is not new, but maybe more often. No pain with deep breaths.  Muscles feel sore with coughing though.  She has 1 tiny sore on the tip of her tongue.  Her throat pain seems to be improved.  Kerry tells me that she would prefer to skip chemotherapy and proceed right away to surgery noting that this first cycle was exhausting, and she worrys about the next ones.      ECOG performance status   0- Fully active, without restriction      Pain  Pain Score: Extreme Pain (8)  Pain Loc: Other - see comment (side pain from cough)    ROS  A 14 point review of systems was obtained.  Positive findings noted in the history.  The remainder of the review of system is otherwise negative.      Oncology History/Treatment  Oncologic history  1) invasive ductal carcinoma of the breast ER negative [facility] negative HER2/kobe 2+, negative by FISH.  3.9 cm in maximum dimension on MRI.  Diagnosed July 2024    Treatment  1) neoadjuvant AC started on 7/22/2024.  CarboTaxol and Pembro denied by insurance and   2)Plan to give AC followed by paclitaxel before referring patient for surgery    Physical Exam    BP 97/64   Pulse 88   Temp 98.2  F (36.8  C)   Resp 16   Ht 1.549 m (5' 1\")   Wt 66.3 kg (146 lb 3.2 oz)   SpO2 96%   BMI 27.62 kg/m      General: Well-appearing female in no acute distress. Cooperative in conversation.  Eyes: EOMI, PERRL. No scleral icterus.  ENT: Oral mucosa is moist without " thrush.  1 tiny ulceration is noted on the tip of her tongue.  No significant erythema present  Lymphatic: Neck is supple without cervical, axillary, or supraclavicular lymphadenopathy.   Cardiovascular: RRR No murmurs, gallops, or rubs. No peripheral edema.  Breasts: Left breast mass palpable, nontender  Respiratory: CTA bilaterally. No wheezes or crackles.  Gastrointestinal: BS +. Abdomen soft, non-tender. No palpable hepatosplenomegaly or masses.   Neurologic: Alert and oriented x3. Cranial nerves II through XII are grossly intact.  Skin: No rashes, petechiae, or bruising noted. Warm, dry, intact.      Lab Results    Recent Results (from the past 168 hour(s))   CBC with platelets and differential   Result Value Ref Range    WBC Count 11.5 (H) 4.0 - 11.0 10e3/uL    RBC Count 3.93 3.80 - 5.20 10e6/uL    Hemoglobin 11.6 (L) 11.7 - 15.7 g/dL    Hematocrit 35.3 35.0 - 47.0 %    MCV 90 78 - 100 fL    MCH 29.5 26.5 - 33.0 pg    MCHC 32.9 31.5 - 36.5 g/dL    RDW 12.5 10.0 - 15.0 %    Platelet Count 272 150 - 450 10e3/uL    % Neutrophils 67 %    % Lymphocytes 15 %    % Monocytes 12 %    % Eosinophils 1 %    % Basophils 0 %    % Immature Granulocytes 5 %    NRBCs per 100 WBC 0 <1 /100    Absolute Neutrophils 7.8 1.6 - 8.3 10e3/uL    Absolute Lymphocytes 1.8 0.8 - 5.3 10e3/uL    Absolute Monocytes 1.4 (H) 0.0 - 1.3 10e3/uL    Absolute Eosinophils 0.1 0.0 - 0.7 10e3/uL    Absolute Basophils 0.0 0.0 - 0.2 10e3/uL    Absolute Immature Granulocytes 0.5 (H) <=0.4 10e3/uL    Absolute NRBCs 0.0 10e3/uL     I reviewed the above labs today.  Imaging    Echocardiogram Complete    Result Date: 2024  [id number]^ADAM^[place] [address]  Name: BETSY PINEDO MRN: [id number] : 1965 Study Date: 2024 11:14 AM Age: 58 yrs Gender: Female Patient Location: [facility] Reason For Study: Invasive ductal carcinoma of breast, female, left (H),  Encounter Ordering Physician: LYNSEY MEDINA Referring Physician: LYNSEY MEDINA Performed By: TODD  BSA: 1.7 m2 Height: 61 in Weight: 146 lb HR: 77 BP: 107/77 mmHg ______________________________________________________________________________ Procedure Complete Echo Adult. Myocardial Strain Imaging performed. No hemodynamically significant valvular abnormalities on 2D or color flow imaging. ______________________________________________________________________________ Interpretation Summary  The left ventricle is normal in size. Left ventricular function is normal.The ejection fraction is 60-65%. Global peak LV longitudinal strain is averaged at -19%. This is within reported normal limits (normal <-18%). Normal right ventricle size and systolic function. No hemodynamically significant valvular abnormalities on 2D or color flow imaging. ______________________________________________________________________________ Left Ventricle The left ventricle is normal in size. Left ventricular function is normal.The ejection fraction is 60-65%. There is normal left ventricular wall thickness. Left ventricular diastolic function is normal. Global peak LV longitudinal strain is averaged at -19%. This is within reported normal limits (normal <- 18%). No regional wall motion abnormalities noted.  Right Ventricle Normal right ventricle size and systolic function.  Atria Normal left atrial size. Right atrial size is normal. There is no color Doppler evidence of an atrial shunt.  Mitral Valve Mitral valve leaflets appear normal. There is no evidence of mitral stenosis or clinically significant mitral regurgitation. There is trace mitral regurgitation.  Tricuspid Valve The tricuspid valve is not well visualized, but is grossly normal. Right ventricle systolic pressure estimate normal. There is mild (1+) tricuspid regurgitation.  Aortic Valve Aortic valve leaflets appear normal. There is no evidence of aortic  stenosis or clinically significant aortic regurgitation.  Pulmonic Valve The pulmonic valve is not well visualized. This degree of valvular regurgitation is within normal limits.  Vessels The aorta root is normal. Normal size ascending aorta. IVC diameter <2.1 cm collapsing >50% with sniff suggests a normal RA pressure of 3 mmHg.  Pericardium There is no pericardial effusion.  ______________________________________________________________________________ MMode/2D Measurements & Calculations IVSd: 1.0 cm LVIDd: 4.3 cm LVIDs: 2.9 cm LVPWd: 1.0 cm FS: 32.8 % LV mass(C)d: 152.9 grams LV mass(C)dI: 92.5 grams/m2  Ao root diam: 3.5 cm asc Aorta Diam: 3.5 cm LVOT diam: 2.1 cm LVOT area: 3.6 cm2 Ao root diam index Ht(cm/m): 2.3 Ao root diam index BSA (cm/m2): 2.1 Asc Ao diam index BSA (cm/m2): 2.1 Asc Ao diam index Ht(cm/m): 2.3 EF Biplane: 60.8 % LA Volume (BP): 28.2 ml LA Volume Index (BP): 17.1 ml/m2  LA Volume Indexed (AL/bp): 18.5 ml/m2 RV Base: 2.9 cm RWT: 0.48 TAPSE: 2.0 cm  Time Measurements MM HR: 76.0 BPM  Doppler Measurements & Calculations MV E max antelmo: 71.0 cm/sec MV A max antelmo: 93.2 cm/sec MV E/A: 0.76 MV dec slope: 336.0 cm/sec2 MV dec time: 0.21 sec Ao V2 max: 127.6 cm/sec Ao max P.0 mmHg Ao V2 mean: 95.5 cm/sec Ao mean PG: 3.9 mmHg Ao V2 VTI: 26.0 cm PAULA(I,D): 2.9 cm2 PAULA(V,D): 2.9 cm2 LV V1 max P.1 mmHg LV V1 max: 100.9 cm/sec LV V1 VTI: 20.5 cm SV(LVOT): 74.4 ml SI(LVOT): 45.0 ml/m2 PA V2 max: 77.8 cm/sec PA max P.4 mmHg PA acc time: 0.17 sec TR max antelmo: 224.0 cm/sec TR max P.1 mmHg AV Antelmo Ratio (DI): 0.79 PAULA Index (cm2/m2): 1.7 E/E': 9.5 E/E' avg: 10.2 Lateral E/e': 10.1 Medial E/e': 10.3 Peak E' Antelmo: 7.5 cm/sec RV S Antelmo: 10.2 cm/sec  Measurements from QLAB LV GLS Endo Peak A2C (AS): -19.6 % LV GLS Endo Peak A3C (AS): -19.0 % LV GLS Endo Peak A4C (AS): -18.9 % LV GLS Endo Peak Avg (AS): -19.1 % ______________________________________________________________________________ Report approved  by: Christianne Noyola 07/22/2024 12:31 PM       MR Breast Bilateral w/o & w Contrast    Result Date: 7/18/2024  EXAM: Bilateral breast MRI with and without contrast, and computer aided kinetic analysis. HISTORY/INDICATION: Recently diagnosed LEFT breast cancer. COMPARISON: Mammogram and ultrasound dated 6/13/2024. No prior comparison MRI. TECHNIQUE: Multiplanar, multisequence imaging performed prior to contrast administration and at multiple time points after contrast administration. Post-processing including subtractions, MIPs and color encoding of post contrast dynamic acquisitions. IV contrast: 6.5 mL Gadavist SEDATION: None FINDINGS: Right Breast: Breast composition: Scattered fibroglandular tissue Background parenchymal enhancement: Mild No concerning areas of enhancement. Right Axilla: No lymphadenopathy. Right Internal Mammary Chain: No lymphadenopathy. Left Breast: Breast composition: Scattered fibroglandular tissue Background parenchymal enhancement: Mild Biopsy-proven LEFT breast invasive ductal carcinoma is seen as a heterogeneously enhancing irregularly shaped mass with indistinct margins measuring 3.9 x 3.0 x 3.3 cm (series 7 image 93 and series 14 image 120). No other concerning areas of enhancement are seen. Left Axilla: No lymphadenopathy. Left Internal Mammary Chain: No lymphadenopathy.     IMPRESSION: 1. Biopsy-proven LEFT breast cancer measures up to 3.9 cm in maximal dimension. 2. No other concerning areas of enhancement in either breast. 3. No lymphadenopathy. Right Breast: BI-RADS CATEGORY: 1 -  NEGATIVE. Left Breast: BI-RADS CATEGORY: 6 - Known Biopsy-Proven Malignancy-Appropriate Action Should Be Taken. RECOMMENDATION: Continued oncologic/surgical management. MATILDE MCGINNIS MD   SYSTEM ID:  [id number]    XR Surgery MARLA  Fluoro G/T 5 Min    Result Date: 7/9/2024  This exam was marked as non-reportable because it will not be read by a radiologist or a [place] non-radiologist provider.         Billing  Total time 56 minutes, to include face to face visit, review of EMR, ordering, documentation and coordination of care on date of service   complexity modifier for longitudinal care.     Signed by: NEW Yost CNP

## 2024-08-12 ENCOUNTER — LAB (OUTPATIENT)
Dept: LAB | Facility: CLINIC | Age: 59
End: 2024-08-12
Payer: COMMERCIAL

## 2024-08-12 DIAGNOSIS — C50.912 INVASIVE DUCTAL CARCINOMA OF BREAST, FEMALE, LEFT (H): ICD-10-CM

## 2024-08-12 LAB
BASOPHILS # BLD AUTO: 0 10E3/UL (ref 0–0.2)
BASOPHILS NFR BLD AUTO: 1 %
EOSINOPHIL # BLD AUTO: 0 10E3/UL (ref 0–0.7)
EOSINOPHIL NFR BLD AUTO: 0 %
ERYTHROCYTE [DISTWIDTH] IN BLOOD BY AUTOMATED COUNT: 12.7 % (ref 10–15)
HCT VFR BLD AUTO: 36.3 % (ref 35–47)
HGB BLD-MCNC: 11.9 G/DL (ref 11.7–15.7)
IMM GRANULOCYTES # BLD: 0.1 10E3/UL
IMM GRANULOCYTES NFR BLD: 3 %
LYMPHOCYTES # BLD AUTO: 0.8 10E3/UL (ref 0.8–5.3)
LYMPHOCYTES NFR BLD AUTO: 44 %
MCH RBC QN AUTO: 29.4 PG (ref 26.5–33)
MCHC RBC AUTO-ENTMCNC: 32.8 G/DL (ref 31.5–36.5)
MCV RBC AUTO: 90 FL (ref 78–100)
MONOCYTES # BLD AUTO: 0.2 10E3/UL (ref 0–1.3)
MONOCYTES NFR BLD AUTO: 13 %
NEUTROPHILS # BLD AUTO: 0.7 10E3/UL (ref 1.6–8.3)
NEUTROPHILS NFR BLD AUTO: 40 %
NRBC # BLD AUTO: 0 10E3/UL
NRBC BLD AUTO-RTO: 0 /100
PLAT MORPH BLD: NORMAL
PLATELET # BLD AUTO: 142 10E3/UL (ref 150–450)
RBC # BLD AUTO: 4.05 10E6/UL (ref 3.8–5.2)
RBC MORPH BLD: NORMAL
WBC # BLD AUTO: 1.7 10E3/UL (ref 4–11)

## 2024-08-12 PROCEDURE — 85025 COMPLETE CBC W/AUTO DIFF WBC: CPT

## 2024-08-12 PROCEDURE — 36415 COLL VENOUS BLD VENIPUNCTURE: CPT

## 2024-08-19 ENCOUNTER — ONCOLOGY VISIT (OUTPATIENT)
Dept: ONCOLOGY | Facility: HOSPITAL | Age: 59
End: 2024-08-19
Attending: INTERNAL MEDICINE
Payer: COMMERCIAL

## 2024-08-19 ENCOUNTER — INFUSION THERAPY VISIT (OUTPATIENT)
Dept: INFUSION THERAPY | Facility: HOSPITAL | Age: 59
End: 2024-08-19
Attending: INTERNAL MEDICINE
Payer: COMMERCIAL

## 2024-08-19 ENCOUNTER — PATIENT OUTREACH (OUTPATIENT)
Dept: ONCOLOGY | Facility: HOSPITAL | Age: 59
End: 2024-08-19

## 2024-08-19 VITALS
HEART RATE: 81 BPM | SYSTOLIC BLOOD PRESSURE: 113 MMHG | RESPIRATION RATE: 16 BRPM | WEIGHT: 142.5 LBS | DIASTOLIC BLOOD PRESSURE: 73 MMHG | HEIGHT: 61 IN | BODY MASS INDEX: 26.91 KG/M2 | OXYGEN SATURATION: 95 % | TEMPERATURE: 98.2 F

## 2024-08-19 DIAGNOSIS — T45.1X5A CHEMOTHERAPY-INDUCED FATIGUE: ICD-10-CM

## 2024-08-19 DIAGNOSIS — R53.83 CHEMOTHERAPY-INDUCED FATIGUE: ICD-10-CM

## 2024-08-19 DIAGNOSIS — C50.912 INVASIVE DUCTAL CARCINOMA OF BREAST, FEMALE, LEFT (H): Primary | ICD-10-CM

## 2024-08-19 LAB
ALBUMIN SERPL BCG-MCNC: 3.7 G/DL (ref 3.5–5.2)
ALP SERPL-CCNC: 130 U/L (ref 40–150)
ALT SERPL W P-5'-P-CCNC: 29 U/L (ref 0–50)
ANION GAP SERPL CALCULATED.3IONS-SCNC: 17 MMOL/L (ref 7–15)
AST SERPL W P-5'-P-CCNC: 28 U/L (ref 0–45)
BASOPHILS # BLD MANUAL: 0.3 10E3/UL (ref 0–0.2)
BASOPHILS NFR BLD MANUAL: 2 %
BILIRUB SERPL-MCNC: 0.2 MG/DL
BUN SERPL-MCNC: 9.9 MG/DL (ref 6–20)
CALCIUM SERPL-MCNC: 8.7 MG/DL (ref 8.8–10.4)
CHLORIDE SERPL-SCNC: 103 MMOL/L (ref 98–107)
CREAT SERPL-MCNC: 0.51 MG/DL (ref 0.51–0.95)
EGFRCR SERPLBLD CKD-EPI 2021: >90 ML/MIN/1.73M2
EOSINOPHIL # BLD MANUAL: 0 10E3/UL (ref 0–0.7)
EOSINOPHIL NFR BLD MANUAL: 0 %
ERYTHROCYTE [DISTWIDTH] IN BLOOD BY AUTOMATED COUNT: 13.7 % (ref 10–15)
GLUCOSE SERPL-MCNC: 133 MG/DL (ref 70–99)
HCO3 SERPL-SCNC: 21 MMOL/L (ref 22–29)
HCT VFR BLD AUTO: 37.4 % (ref 35–47)
HGB BLD-MCNC: 12.3 G/DL (ref 11.7–15.7)
LYMPHOCYTES # BLD MANUAL: 1.8 10E3/UL (ref 0.8–5.3)
LYMPHOCYTES NFR BLD MANUAL: 14 %
MCH RBC QN AUTO: 29.5 PG (ref 26.5–33)
MCHC RBC AUTO-ENTMCNC: 32.9 G/DL (ref 31.5–36.5)
MCV RBC AUTO: 90 FL (ref 78–100)
METAMYELOCYTES # BLD MANUAL: 0.4 10E3/UL
METAMYELOCYTES NFR BLD MANUAL: 3 %
MONOCYTES # BLD MANUAL: 0.8 10E3/UL (ref 0–1.3)
MONOCYTES NFR BLD MANUAL: 6 %
MYELOCYTES # BLD MANUAL: 1 10E3/UL
MYELOCYTES NFR BLD MANUAL: 8 %
NEUTROPHILS # BLD MANUAL: 8.4 10E3/UL (ref 1.6–8.3)
NEUTROPHILS NFR BLD MANUAL: 67 %
NRBC # BLD AUTO: 0 10E3/UL
NRBC BLD AUTO-RTO: 0 /100
PLAT MORPH BLD: ABNORMAL
PLATELET # BLD AUTO: 236 10E3/UL (ref 150–450)
POTASSIUM SERPL-SCNC: 3.2 MMOL/L (ref 3.4–5.3)
PROT SERPL-MCNC: 6.6 G/DL (ref 6.4–8.3)
RBC # BLD AUTO: 4.17 10E6/UL (ref 3.8–5.2)
RBC MORPH BLD: ABNORMAL
SODIUM SERPL-SCNC: 141 MMOL/L (ref 135–145)
WBC # BLD AUTO: 12.5 10E3/UL (ref 4–11)

## 2024-08-19 PROCEDURE — 96411 CHEMO IV PUSH ADDL DRUG: CPT

## 2024-08-19 PROCEDURE — 80053 COMPREHEN METABOLIC PANEL: CPT

## 2024-08-19 PROCEDURE — 96375 TX/PRO/DX INJ NEW DRUG ADDON: CPT

## 2024-08-19 PROCEDURE — 36591 DRAW BLOOD OFF VENOUS DEVICE: CPT

## 2024-08-19 PROCEDURE — 250N000011 HC RX IP 250 OP 636: Performed by: INTERNAL MEDICINE

## 2024-08-19 PROCEDURE — G0463 HOSPITAL OUTPT CLINIC VISIT: HCPCS | Mod: 25 | Performed by: INTERNAL MEDICINE

## 2024-08-19 PROCEDURE — G2211 COMPLEX E/M VISIT ADD ON: HCPCS | Performed by: INTERNAL MEDICINE

## 2024-08-19 PROCEDURE — 96372 THER/PROPH/DIAG INJ SC/IM: CPT | Performed by: INTERNAL MEDICINE

## 2024-08-19 PROCEDURE — 96377 APPLICATON ON-BODY INJECTOR: CPT | Mod: XS

## 2024-08-19 PROCEDURE — 96367 TX/PROPH/DG ADDL SEQ IV INF: CPT

## 2024-08-19 PROCEDURE — 99215 OFFICE O/P EST HI 40 MIN: CPT | Performed by: INTERNAL MEDICINE

## 2024-08-19 PROCEDURE — 258N000003 HC RX IP 258 OP 636: Performed by: INTERNAL MEDICINE

## 2024-08-19 PROCEDURE — 96413 CHEMO IV INFUSION 1 HR: CPT

## 2024-08-19 PROCEDURE — T1013 SIGN LANG/ORAL INTERPRETER: HCPCS | Mod: U4 | Performed by: INTERPRETER

## 2024-08-19 PROCEDURE — 85007 BL SMEAR W/DIFF WBC COUNT: CPT

## 2024-08-19 PROCEDURE — 85027 COMPLETE CBC AUTOMATED: CPT

## 2024-08-19 RX ORDER — MEPERIDINE HYDROCHLORIDE 50 MG/ML
25 INJECTION INTRAMUSCULAR; INTRAVENOUS; SUBCUTANEOUS EVERY 30 MIN PRN
Status: CANCELLED | OUTPATIENT
Start: 2024-08-19

## 2024-08-19 RX ORDER — ALBUTEROL SULFATE 0.83 MG/ML
2.5 SOLUTION RESPIRATORY (INHALATION)
Status: CANCELLED | OUTPATIENT
Start: 2024-08-19

## 2024-08-19 RX ORDER — ALBUTEROL SULFATE 90 UG/1
1-2 AEROSOL, METERED RESPIRATORY (INHALATION)
Status: CANCELLED
Start: 2024-08-19

## 2024-08-19 RX ORDER — METHYLPREDNISOLONE SODIUM SUCCINATE 125 MG/2ML
125 INJECTION, POWDER, LYOPHILIZED, FOR SOLUTION INTRAMUSCULAR; INTRAVENOUS
Status: DISCONTINUED | OUTPATIENT
Start: 2024-08-19 | End: 2024-08-19 | Stop reason: HOSPADM

## 2024-08-19 RX ORDER — ALBUTEROL SULFATE 0.83 MG/ML
2.5 SOLUTION RESPIRATORY (INHALATION)
Status: DISCONTINUED | OUTPATIENT
Start: 2024-08-19 | End: 2024-08-19 | Stop reason: HOSPADM

## 2024-08-19 RX ORDER — PALONOSETRON 0.05 MG/ML
0.25 INJECTION, SOLUTION INTRAVENOUS ONCE
Status: CANCELLED | OUTPATIENT
Start: 2024-08-19

## 2024-08-19 RX ORDER — PALONOSETRON 0.05 MG/ML
0.25 INJECTION, SOLUTION INTRAVENOUS ONCE
Status: COMPLETED | OUTPATIENT
Start: 2024-08-19 | End: 2024-08-19

## 2024-08-19 RX ORDER — DOXORUBICIN HYDROCHLORIDE 2 MG/ML
60 INJECTION, SOLUTION INTRAVENOUS ONCE
Status: COMPLETED | OUTPATIENT
Start: 2024-08-19 | End: 2024-08-19

## 2024-08-19 RX ORDER — DIPHENHYDRAMINE HYDROCHLORIDE 50 MG/ML
50 INJECTION INTRAMUSCULAR; INTRAVENOUS
Status: CANCELLED
Start: 2024-08-19

## 2024-08-19 RX ORDER — MEPERIDINE HYDROCHLORIDE 50 MG/ML
25 INJECTION INTRAMUSCULAR; INTRAVENOUS; SUBCUTANEOUS EVERY 30 MIN PRN
Status: DISCONTINUED | OUTPATIENT
Start: 2024-08-19 | End: 2024-08-19 | Stop reason: HOSPADM

## 2024-08-19 RX ORDER — HEPARIN SODIUM (PORCINE) LOCK FLUSH IV SOLN 100 UNIT/ML 100 UNIT/ML
5 SOLUTION INTRAVENOUS
Status: DISCONTINUED | OUTPATIENT
Start: 2024-08-19 | End: 2024-08-19 | Stop reason: HOSPADM

## 2024-08-19 RX ORDER — EPINEPHRINE 1 MG/ML
0.3 INJECTION, SOLUTION INTRAMUSCULAR; SUBCUTANEOUS EVERY 5 MIN PRN
Status: CANCELLED | OUTPATIENT
Start: 2024-08-19

## 2024-08-19 RX ORDER — HEPARIN SODIUM,PORCINE 10 UNIT/ML
5-20 VIAL (ML) INTRAVENOUS DAILY PRN
Status: CANCELLED | OUTPATIENT
Start: 2024-08-19

## 2024-08-19 RX ORDER — ALBUTEROL SULFATE 90 UG/1
1-2 AEROSOL, METERED RESPIRATORY (INHALATION)
Status: DISCONTINUED | OUTPATIENT
Start: 2024-08-19 | End: 2024-08-19 | Stop reason: HOSPADM

## 2024-08-19 RX ORDER — EPINEPHRINE 1 MG/ML
0.3 INJECTION, SOLUTION INTRAMUSCULAR; SUBCUTANEOUS EVERY 5 MIN PRN
Status: DISCONTINUED | OUTPATIENT
Start: 2024-08-19 | End: 2024-08-19 | Stop reason: HOSPADM

## 2024-08-19 RX ORDER — DIPHENHYDRAMINE HYDROCHLORIDE 50 MG/ML
50 INJECTION INTRAMUSCULAR; INTRAVENOUS
Status: DISCONTINUED | OUTPATIENT
Start: 2024-08-19 | End: 2024-08-19 | Stop reason: HOSPADM

## 2024-08-19 RX ORDER — LORAZEPAM 2 MG/ML
0.5 INJECTION INTRAMUSCULAR EVERY 4 HOURS PRN
Status: CANCELLED | OUTPATIENT
Start: 2024-08-19

## 2024-08-19 RX ORDER — METHYLPREDNISOLONE SODIUM SUCCINATE 125 MG/2ML
125 INJECTION, POWDER, LYOPHILIZED, FOR SOLUTION INTRAMUSCULAR; INTRAVENOUS
Status: CANCELLED
Start: 2024-08-19

## 2024-08-19 RX ORDER — DOXORUBICIN HYDROCHLORIDE 2 MG/ML
60 INJECTION, SOLUTION INTRAVENOUS ONCE
Status: CANCELLED | OUTPATIENT
Start: 2024-08-19

## 2024-08-19 RX ORDER — HEPARIN SODIUM (PORCINE) LOCK FLUSH IV SOLN 100 UNIT/ML 100 UNIT/ML
5 SOLUTION INTRAVENOUS
Status: CANCELLED | OUTPATIENT
Start: 2024-08-19

## 2024-08-19 RX ADMIN — PALONOSETRON 0.25 MG: 0.05 INJECTION, SOLUTION INTRAVENOUS at 11:38

## 2024-08-19 RX ADMIN — DOXORUBICIN HYDROCHLORIDE 100 MG: 2 INJECTION, SOLUTION INTRAVENOUS at 12:46

## 2024-08-19 RX ADMIN — CYCLOPHOSPHAMIDE 1000 MG: 1 INJECTION, POWDER, FOR SOLUTION INTRAVENOUS; ORAL at 13:00

## 2024-08-19 RX ADMIN — PEGFILGRASTIM 6 MG: KIT SUBCUTANEOUS at 13:33

## 2024-08-19 RX ADMIN — SODIUM CHLORIDE 250 ML: 9 INJECTION, SOLUTION INTRAVENOUS at 11:38

## 2024-08-19 RX ADMIN — HEPARIN 5 ML: 100 SYRINGE at 13:33

## 2024-08-19 RX ADMIN — FOSAPREPITANT: 150 INJECTION, POWDER, LYOPHILIZED, FOR SOLUTION INTRAVENOUS at 11:58

## 2024-08-19 ASSESSMENT — PAIN SCALES - GENERAL: PAINLEVEL: NO PAIN (0)

## 2024-08-19 NOTE — PROGRESS NOTES
[facility] Hematology and Oncology Progress Note    Patient: Kerry Mills  MRN: [id number]  Date of Service: Aug 19, 2024           [facility] Hematology and Oncology Progress Note    Patient: Kerry Mills  MRN: [id number]  Date of Service: Aug 19, 2024             ECOG Performance    0 - Independent          ______________________________________________________________________________  Oncologic history  1) invasive ductal carcinoma of the breast ER negative [facility] negative HER2/kobe 2+, negative by FISH.  3.9 cm in maximum dimension on MRI.  Diagnosed July 2024    Treatment  1) neoadjuvant AC started on 7/22/2024.  CarboTaxol and Pembro denied by insurance and   2)Plan to give AC followed by paclitaxel before referring patient for surgery    History of Present Illness    Ms. Kerry Mills is here in follow-up.  She is due for her third cycle of chemotherapy today.  She continues to complain of tiredness and fatigue after the cycle.  She however denies any nausea vomiting diarrhea or mucositis.  She again insisted that she would like to go ahead and have surgery done and not get any more chemotherapy.  She has noticed that the mass has become softer and is harder to palpate      12 point review of system was negative    Past History    Past Medical History:   Diagnosis Date    Asthma     Chronic constipation        Past Surgical History:   Procedure Laterality Date    INCISION AND DRAINAGE OF WOUND N/A 11/27/2020    Procedure: INCISION AND DRAINAGE, NECK;  Surgeon: Arnel James MD;  Location: [facility];  Service: ENT    INSERT PORT VASCULAR ACCESS Right 7/9/2024    Procedure: INSERTION,  RIGHT VASCULAR ACCESS PORT;  Surgeon: Iwona Choe DO;  Location: [facility]    PICC INSERTION - TRIPLE LUMEN  11/26/2020         TRACHEOSTOMY N/A 11/26/2020    Procedure: EMERGENT INTUBATION IN OR WITH CREATION, TRACHEOSTOMY;  Surgeon: Dennise Justice MD;  Location: [facility];  Service:  "General       Physical Exam    /73 (BP Location: Right arm, Patient Position: Sitting, Cuff Size: Adult Regular)   Pulse 81   Temp 98.2  F (36.8  C) (Tympanic)   Resp 16   Ht 1.549 m (5' 1\")   Wt 64.6 kg (142 lb 8 oz)   SpO2 95%   BMI 26.93 kg/m      General: alert, awake, not in acute distress  HEENT: Head: Normal, normocephalic, atraumatic.  Eye: Normal external eye, conjunctiva, lids cornea, MIKAYLA.  Nose: Normal external nose, mucus membranes and septum.  Pharynx: Normal buccal mucosa. Normal pharynx.  Neck / Thyroid: Supple, no masses, nodes, nodules or enlargement.  Lymphatics: No abnormally enlarged lymph nodes.  Chest: Normal chest wall and respirations. Clear to auscultation.  No crackles or rhonchi's  Heart: S1 S2 RRR, no murmur.   Abdomen: abdomen is soft without significant tenderness, masses, organomegaly or guarding  Extremities: normal strength, tone, and muscle mass  Skin: normal. no rash or abnormalities  CNS: non focal.  Breast exam today showed that the mass was much harder to palpate and was much smaller    Lab Results    Recent Results (from the past 240 hour(s))   CBC with platelets and differential    Collection Time: 08/12/24 10:16 AM   Result Value Ref Range    WBC Count 1.7 (L) 4.0 - 11.0 10e3/uL    RBC Count 4.05 3.80 - 5.20 10e6/uL    Hemoglobin 11.9 11.7 - 15.7 g/dL    Hematocrit 36.3 35.0 - 47.0 %    MCV 90 78 - 100 fL    MCH 29.4 26.5 - 33.0 pg    MCHC 32.8 31.5 - 36.5 g/dL    RDW 12.7 10.0 - 15.0 %    Platelet Count 142 (L) 150 - 450 10e3/uL    % Neutrophils 40 %    % Lymphocytes 44 %    % Monocytes 13 %    % Eosinophils 0 %    % Basophils 1 %    % Immature Granulocytes 3 %    NRBCs per 100 WBC 0 <1 /100    Absolute Neutrophils 0.7 (L) 1.6 - 8.3 10e3/uL    Absolute Lymphocytes 0.8 0.8 - 5.3 10e3/uL    Absolute Monocytes 0.2 0.0 - 1.3 10e3/uL    Absolute Eosinophils 0.0 0.0 - 0.7 10e3/uL    Absolute Basophils 0.0 0.0 - 0.2 10e3/uL    Absolute Immature Granulocytes 0.1 <=0.4 " 10e3/uL    Absolute NRBCs 0.0 10e3/uL   RBC and Platelet Morphology    Collection Time: 24 10:16 AM   Result Value Ref Range    RBC Morphology Confirmed RBC Indices     Platelet Assessment  Automated Count Confirmed. Platelet morphology is normal.     Automated Count Confirmed. Platelet morphology is normal.   Comprehensive metabolic panel    Collection Time: 24 10:21 AM   Result Value Ref Range    Sodium 141 135 - 145 mmol/L    Potassium 3.2 (L) 3.4 - 5.3 mmol/L    Carbon Dioxide (CO2) 21 (L) 22 - 29 mmol/L    Anion Gap 17 (H) 7 - 15 mmol/L    Urea Nitrogen 9.9 6.0 - 20.0 mg/dL    Creatinine 0.51 0.51 - 0.95 mg/dL    GFR Estimate >90 >60 mL/min/1.73m2    Calcium 8.7 (L) 8.8 - 10.4 mg/dL    Chloride 103 98 - 107 mmol/L    Glucose 133 (H) 70 - 99 mg/dL    Alkaline Phosphatase 130 40 - 150 U/L    AST 28 0 - 45 U/L    ALT 29 0 - 50 U/L    Protein Total 6.6 6.4 - 8.3 g/dL    Albumin 3.7 3.5 - 5.2 g/dL    Bilirubin Total 0.2 <=1.2 mg/dL         Imaging    Echocardiogram Complete    Result Date: 2024  [id number]^ADAM^LYNSEY^[place] [address]  Name: BETSY PINEDO MRN: [id number] : 1965 Study Date: 2024 11:14 AM Age: 58 yrs Gender: Female Patient Location: [facility] Reason For Study: Invasive ductal carcinoma of breast, female, left (H), Encounter Ordering Physician: LYNSEY MEDINA Referring Physician: LYNSEY MEDINA Performed By: TODD  BSA: 1.7 m2 Height: 61 in Weight: 146 lb HR: 77 BP: 107/77 mmHg ______________________________________________________________________________ Procedure Complete Echo Adult. Myocardial Strain Imaging performed. No hemodynamically significant valvular abnormalities on 2D or color flow imaging. ______________________________________________________________________________ Interpretation Summary  The left ventricle is normal in size. Left ventricular function is  normal.The ejection fraction is 60-65%. Global peak LV longitudinal strain is averaged at -19%. This is within reported normal limits (normal <-18%). Normal right ventricle size and systolic function. No hemodynamically significant valvular abnormalities on 2D or color flow imaging. ______________________________________________________________________________ Left Ventricle The left ventricle is normal in size. Left ventricular function is normal.The ejection fraction is 60-65%. There is normal left ventricular wall thickness. Left ventricular diastolic function is normal. Global peak LV longitudinal strain is averaged at -19%. This is within reported normal limits (normal <- 18%). No regional wall motion abnormalities noted.  Right Ventricle Normal right ventricle size and systolic function.  Atria Normal left atrial size. Right atrial size is normal. There is no color Doppler evidence of an atrial shunt.  Mitral Valve Mitral valve leaflets appear normal. There is no evidence of mitral stenosis or clinically significant mitral regurgitation. There is trace mitral regurgitation.  Tricuspid Valve The tricuspid valve is not well visualized, but is grossly normal. Right ventricle systolic pressure estimate normal. There is mild (1+) tricuspid regurgitation.  Aortic Valve Aortic valve leaflets appear normal. There is no evidence of aortic stenosis or clinically significant aortic regurgitation.  Pulmonic Valve The pulmonic valve is not well visualized. This degree of valvular regurgitation is within normal limits.  Vessels The aorta root is normal. Normal size ascending aorta. IVC diameter <2.1 cm collapsing >50% with sniff suggests a normal RA pressure of 3 mmHg.  Pericardium There is no pericardial effusion.  ______________________________________________________________________________ MMode/2D Measurements & Calculations IVSd: 1.0 cm LVIDd: 4.3 cm LVIDs: 2.9 cm LVPWd: 1.0 cm FS: 32.8 % LV mass(C)d: 152.9 grams LV  mass(C)dI: 92.5 grams/m2  Ao root diam: 3.5 cm asc Aorta Diam: 3.5 cm LVOT diam: 2.1 cm LVOT area: 3.6 cm2 Ao root diam index Ht(cm/m): 2.3 Ao root diam index BSA (cm/m2): 2.1 Asc Ao diam index BSA (cm/m2): 2.1 Asc Ao diam index Ht(cm/m): 2.3 EF Biplane: 60.8 % LA Volume (BP): 28.2 ml LA Volume Index (BP): 17.1 ml/m2  LA Volume Indexed (AL/bp): 18.5 ml/m2 RV Base: 2.9 cm RWT: 0.48 TAPSE: 2.0 cm  Time Measurements MM HR: 76.0 BPM  Doppler Measurements & Calculations MV E max antelmo: 71.0 cm/sec MV A max antelmo: 93.2 cm/sec MV E/A: 0.76 MV dec slope: 336.0 cm/sec2 MV dec time: 0.21 sec Ao V2 max: 127.6 cm/sec Ao max P.0 mmHg Ao V2 mean: 95.5 cm/sec Ao mean PG: 3.9 mmHg Ao V2 VTI: 26.0 cm PAULA(I,D): 2.9 cm2 PAULA(V,D): 2.9 cm2 LV V1 max P.1 mmHg LV V1 max: 100.9 cm/sec LV V1 VTI: 20.5 cm SV(LVOT): 74.4 ml SI(LVOT): 45.0 ml/m2 PA V2 max: 77.8 cm/sec PA max P.4 mmHg PA acc time: 0.17 sec TR max antelmo: 224.0 cm/sec TR max P.1 mmHg AV Antelmo Ratio (DI): 0.79 PAULA Index (cm2/m2): 1.7 E/E': 9.5 E/E' avg: 10.2 Lateral E/e': 10.1 Medial E/e': 10.3 Peak E' Antelmo: 7.5 cm/sec RV S Antelmo: 10.2 cm/sec  Measurements from QLAB LV GLS Endo Peak A2C (AS): -19.6 % LV GLS Endo Peak A3C (AS): -19.0 % LV GLS Endo Peak A4C (AS): -18.9 % LV GLS Endo Peak Avg (AS): -19.1 % ______________________________________________________________________________ Report approved by: Christianne Noyola 2024 12:31 PM            Assessment and Plan      Breast cancer, triple negative patient receiving neoadjuvant chemotherapy    Patient is here in follow-up.  She is due for her third cycle of chemotherapy.  She continues to tolerate chemotherapy well with some fatigue.  There has been a significant decrease in the size of the mass on examination.  I will proceed with the third cycle today she will come back in 2 weeks for her fourth cycle.  Patient insisted during the visit that she does not wants any further chemo I was able to convince her to get  at least 4 cycles of AC.  I will plan to refer her to surgery after that.  She is already having a good response.  I did mention to her that I will like to complete paclitaxel after surgery and she is open to considering that at that time.  I will plan to do an ultrasound after the fourth cycle and will refer her to surgery.  She will also be a candidate for adjuvant radiation if she undergoes a lumpectomy.  The visit was done through the  patient did had a number of questions about the reasoning behind more chemo and that was explained to her.    Varsha Adamson MD        CC: HIRAL CHAVEZ MD       CC: HIRAL CHAVEZ MD

## 2024-08-19 NOTE — LETTER
"8/19/2024      Kerry Mills  [address] [place] [address]      Dear Colleague,    Thank you for referring your patient, Kerry Mills, to the [facility]. Please see a copy of my visit note below.    Oncology Rooming Note    August 19, 2024 10:49 AM   Kerry Mills is a 58 year old female who presents for:    Chief Complaint   Patient presents with     Oncology Clinic Visit     Invasive ductal carcinoma of breast, female, left      Initial Vitals: /73 (BP Location: Right arm, Patient Position: Sitting, Cuff Size: Adult Regular)   Pulse 81   Temp 98.2  F (36.8  C) (Tympanic)   Resp 16   Ht 1.549 m (5' 1\")   Wt 64.6 kg (142 lb 8 oz)   SpO2 95%   BMI 26.93 kg/m   Estimated body mass index is 26.93 kg/m  as calculated from the following:    Height as of this encounter: 1.549 m (5' 1\").    Weight as of this encounter: 64.6 kg (142 lb 8 oz). Body surface area is 1.67 meters squared.  No Pain (0) Comment: Data Unavailable   No LMP recorded. Patient is postmenopausal.  Allergies reviewed: Yes  Medications reviewed: Yes    Medications: Medication refills not needed today.  Pharmacy name entered into [facility]:    [facility] - [place], [place] - [address]  [facility] - [place], [place] - [address]    Frailty Screening:   Is the patient here for a new oncology consult visit in cancer care? 2. No      Clinical concerns:   Follow up on treatment plan per pt.       Pebbles Dumont MA              [facility] Hematology and Oncology Progress Note    Patient: Kerry Mills  MRN: [id number]  Date of Service: Aug 19, 2024           [facility] Hematology and Oncology Progress Note    Patient: Kerry Mills  MRN: [id number]  Date of Service: Aug 19, 2024             ECOG Performance    0 - Independent          ______________________________________________________________________________  Oncologic history  1) invasive ductal carcinoma of the breast " "ER negative IA negative HER2/kobe 2+, negative by FISH.  3.9 cm in maximum dimension on MRI.  Diagnosed July 2024    Treatment  1) neoadjuvant AC started on 7/22/2024.  CarboTaxol and Pembro denied by insurance and   2)Plan to give AC followed by paclitaxel before referring patient for surgery    History of Present Illness    Ms. Kerry Mills is here in follow-up.  She is due for her third cycle of chemotherapy today.  She continues to complain of tiredness and fatigue after the cycle.  She however denies any nausea vomiting diarrhea or mucositis.  She again insisted that she would like to go ahead and have surgery done and not get any more chemotherapy.  She has noticed that the mass has become softer and is harder to palpate      12 point review of system was negative    Past History    Past Medical History:   Diagnosis Date     Asthma      Chronic constipation        Past Surgical History:   Procedure Laterality Date     INCISION AND DRAINAGE OF WOUND N/A 11/27/2020    Procedure: INCISION AND DRAINAGE, NECK;  Surgeon: Arnel James MD;  Location: [facility];  Service: ENT     INSERT PORT VASCULAR ACCESS Right 7/9/2024    Procedure: INSERTION,  RIGHT VASCULAR ACCESS PORT;  Surgeon: Iwona Choe DO;  Location: [facility]     PICC INSERTION - TRIPLE LUMEN  11/26/2020          TRACHEOSTOMY N/A 11/26/2020    Procedure: EMERGENT INTUBATION IN OR WITH CREATION, TRACHEOSTOMY;  Surgeon: Dennise Justice MD;  Location: [facility];  Service: General       Physical Exam    /73 (BP Location: Right arm, Patient Position: Sitting, Cuff Size: Adult Regular)   Pulse 81   Temp 98.2  F (36.8  C) (Tympanic)   Resp 16   Ht 1.549 m (5' 1\")   Wt 64.6 kg (142 lb 8 oz)   SpO2 95%   BMI 26.93 kg/m      General: alert, awake, not in acute distress  HEENT: Head: Normal, normocephalic, atraumatic.  Eye: Normal external eye, conjunctiva, lids cornea, MIKAYLA.  Nose: Normal external nose, mucus membranes " and septum.  Pharynx: Normal buccal mucosa. Normal pharynx.  Neck / Thyroid: Supple, no masses, nodes, nodules or enlargement.  Lymphatics: No abnormally enlarged lymph nodes.  Chest: Normal chest wall and respirations. Clear to auscultation.  No crackles or rhonchi's  Heart: S1 S2 RRR, no murmur.   Abdomen: abdomen is soft without significant tenderness, masses, organomegaly or guarding  Extremities: normal strength, tone, and muscle mass  Skin: normal. no rash or abnormalities  CNS: non focal.  Breast exam today showed that the mass was much harder to palpate and was much smaller    Lab Results    Recent Results (from the past 240 hour(s))   CBC with platelets and differential    Collection Time: 08/12/24 10:16 AM   Result Value Ref Range    WBC Count 1.7 (L) 4.0 - 11.0 10e3/uL    RBC Count 4.05 3.80 - 5.20 10e6/uL    Hemoglobin 11.9 11.7 - 15.7 g/dL    Hematocrit 36.3 35.0 - 47.0 %    MCV 90 78 - 100 fL    MCH 29.4 26.5 - 33.0 pg    MCHC 32.8 31.5 - 36.5 g/dL    RDW 12.7 10.0 - 15.0 %    Platelet Count 142 (L) 150 - 450 10e3/uL    % Neutrophils 40 %    % Lymphocytes 44 %    % Monocytes 13 %    % Eosinophils 0 %    % Basophils 1 %    % Immature Granulocytes 3 %    NRBCs per 100 WBC 0 <1 /100    Absolute Neutrophils 0.7 (L) 1.6 - 8.3 10e3/uL    Absolute Lymphocytes 0.8 0.8 - 5.3 10e3/uL    Absolute Monocytes 0.2 0.0 - 1.3 10e3/uL    Absolute Eosinophils 0.0 0.0 - 0.7 10e3/uL    Absolute Basophils 0.0 0.0 - 0.2 10e3/uL    Absolute Immature Granulocytes 0.1 <=0.4 10e3/uL    Absolute NRBCs 0.0 10e3/uL   RBC and Platelet Morphology    Collection Time: 08/12/24 10:16 AM   Result Value Ref Range    RBC Morphology Confirmed RBC Indices     Platelet Assessment  Automated Count Confirmed. Platelet morphology is normal.     Automated Count Confirmed. Platelet morphology is normal.   Comprehensive metabolic panel    Collection Time: 08/19/24 10:21 AM   Result Value Ref Range    Sodium 141 135 - 145 mmol/L    Potassium 3.2 (L)  3.4 - 5.3 mmol/L    Carbon Dioxide (CO2) 21 (L) 22 - 29 mmol/L    Anion Gap 17 (H) 7 - 15 mmol/L    Urea Nitrogen 9.9 6.0 - 20.0 mg/dL    Creatinine 0.51 0.51 - 0.95 mg/dL    GFR Estimate >90 >60 mL/min/1.73m2    Calcium 8.7 (L) 8.8 - 10.4 mg/dL    Chloride 103 98 - 107 mmol/L    Glucose 133 (H) 70 - 99 mg/dL    Alkaline Phosphatase 130 40 - 150 U/L    AST 28 0 - 45 U/L    ALT 29 0 - 50 U/L    Protein Total 6.6 6.4 - 8.3 g/dL    Albumin 3.7 3.5 - 5.2 g/dL    Bilirubin Total 0.2 <=1.2 mg/dL         Imaging    Echocardiogram Complete    Result Date: 2024  [id number]^ADAM^LYNSEY^[place] [address]  Name: BETSY PINEDO MRN: [id number] : 1965 Study Date: 2024 11:14 AM Age: 58 yrs Gender: Female Patient Location: [facility] Reason For Study: Invasive ductal carcinoma of breast, female, left (H), Encounter Ordering Physician: LYNSEY MEDINA Referring Physician: LYNSEY MEDINA Performed By: TODD  BSA: 1.7 m2 Height: 61 in Weight: 146 lb HR: 77 BP: 107/77 mmHg ______________________________________________________________________________ Procedure Complete Echo Adult. Myocardial Strain Imaging performed. No hemodynamically significant valvular abnormalities on 2D or color flow imaging. ______________________________________________________________________________ Interpretation Summary  The left ventricle is normal in size. Left ventricular function is normal.The ejection fraction is 60-65%. Global peak LV longitudinal strain is averaged at -19%. This is within reported normal limits (normal <-18%). Normal right ventricle size and systolic function. No hemodynamically significant valvular abnormalities on 2D or color flow imaging. ______________________________________________________________________________ Left Ventricle The left ventricle is normal in size. Left ventricular function is normal.The ejection  fraction is 60-65%. There is normal left ventricular wall thickness. Left ventricular diastolic function is normal. Global peak LV longitudinal strain is averaged at -19%. This is within reported normal limits (normal <- 18%). No regional wall motion abnormalities noted.  Right Ventricle Normal right ventricle size and systolic function.  Atria Normal left atrial size. Right atrial size is normal. There is no color Doppler evidence of an atrial shunt.  Mitral Valve Mitral valve leaflets appear normal. There is no evidence of mitral stenosis or clinically significant mitral regurgitation. There is trace mitral regurgitation.  Tricuspid Valve The tricuspid valve is not well visualized, but is grossly normal. Right ventricle systolic pressure estimate normal. There is mild (1+) tricuspid regurgitation.  Aortic Valve Aortic valve leaflets appear normal. There is no evidence of aortic stenosis or clinically significant aortic regurgitation.  Pulmonic Valve The pulmonic valve is not well visualized. This degree of valvular regurgitation is within normal limits.  Vessels The aorta root is normal. Normal size ascending aorta. IVC diameter <2.1 cm collapsing >50% with sniff suggests a normal RA pressure of 3 mmHg.  Pericardium There is no pericardial effusion.  ______________________________________________________________________________ MMode/2D Measurements & Calculations IVSd: 1.0 cm LVIDd: 4.3 cm LVIDs: 2.9 cm LVPWd: 1.0 cm FS: 32.8 % LV mass(C)d: 152.9 grams LV mass(C)dI: 92.5 grams/m2  Ao root diam: 3.5 cm asc Aorta Diam: 3.5 cm LVOT diam: 2.1 cm LVOT area: 3.6 cm2 Ao root diam index Ht(cm/m): 2.3 Ao root diam index BSA (cm/m2): 2.1 Asc Ao diam index BSA (cm/m2): 2.1 Asc Ao diam index Ht(cm/m): 2.3 EF Biplane: 60.8 % LA Volume (BP): 28.2 ml LA Volume Index (BP): 17.1 ml/m2  LA Volume Indexed (AL/bp): 18.5 ml/m2 RV Base: 2.9 cm RWT: 0.48 TAPSE: 2.0 cm  Time Measurements MM HR: 76.0 BPM  Doppler Measurements &  Calculations MV E max antelmo: 71.0 cm/sec MV A max antelmo: 93.2 cm/sec MV E/A: 0.76 MV dec slope: 336.0 cm/sec2 MV dec time: 0.21 sec Ao V2 max: 127.6 cm/sec Ao max P.0 mmHg Ao V2 mean: 95.5 cm/sec Ao mean PG: 3.9 mmHg Ao V2 VTI: 26.0 cm PAULA(I,D): 2.9 cm2 PAULA(V,D): 2.9 cm2 LV V1 max P.1 mmHg LV V1 max: 100.9 cm/sec LV V1 VTI: 20.5 cm SV(LVOT): 74.4 ml SI(LVOT): 45.0 ml/m2 PA V2 max: 77.8 cm/sec PA max P.4 mmHg PA acc time: 0.17 sec TR max antelmo: 224.0 cm/sec TR max P.1 mmHg AV Antelmo Ratio (DI): 0.79 PAULA Index (cm2/m2): 1.7 E/E': 9.5 E/E' avg: 10.2 Lateral E/e': 10.1 Medial E/e': 10.3 Peak E' Antelmo: 7.5 cm/sec RV S Antelmo: 10.2 cm/sec  Measurements from QLAB LV GLS Endo Peak A2C (AS): -19.6 % LV GLS Endo Peak A3C (AS): -19.0 % LV GLS Endo Peak A4C (AS): -18.9 % LV GLS Endo Peak Avg (AS): -19.1 % ______________________________________________________________________________ Report approved by: Christianne Noyola 2024 12:31 PM            Assessment and Plan      Breast cancer, triple negative patient receiving neoadjuvant chemotherapy    Patient is here in follow-up.  She is due for her third cycle of chemotherapy.  She continues to tolerate chemotherapy well with some fatigue.  There has been a significant decrease in the size of the mass on examination.  I will proceed with the third cycle today she will come back in 2 weeks for her fourth cycle.  Patient insisted during the visit that she does not wants any further chemo I was able to convince her to get at least 4 cycles of AC.  I will plan to refer her to surgery after that.  She is already having a good response.  I did mention to her that I will like to complete paclitaxel after surgery and she is open to considering that at that time.  I will plan to do an ultrasound after the fourth cycle and will refer her to surgery.  She will also be a candidate for adjuvant radiation if she undergoes a lumpectomy.  The visit was done through the   patient did had a number of questions about the reasoning behind more chemo and that was explained to her.    Varsha Adamson MD        CC: HIRAL CHAVEZ MD       CC: HIRAL CHAVEZ MD       Again, thank you for allowing me to participate in the care of your patient.        Sincerely,        Varsha Adamson MD

## 2024-08-19 NOTE — PROGRESS NOTES
[facility]: Cancer Care Follow-Up Note                                    Discussion with Patient:                                                      Met with Kerry, and her daughter in law, Ginny, in the infusion bay when she was here for C3 chemo.  Ginny assisted ith interpreting today and Kerry was also able to speak some English. She states that she is walking outside in her garden each day and has picked corn  daily to make a special corn pancake that is appealing to her appetite.She states that it makes her feel good to be up and about each day. She has good support from all of her children and they are encouraging her to continue  with her treatment. She relays that her side effects are well  managed and she is tolerated C3 better than C1 and 2. Encouraged her to call our clinic if she needs any help with symptom management or any questions or concerns arise. She has no concerns at this time for writer.   Of note, Per Dr. Adamson, she does not need D8C3 labs, disregard in the plan     Dates of Treatment:                                                      Infusion given in last 28 days       Administered MAR Action Medication Dose Rate Visit    07/22/2024 14:00 Given DOXOrubicin (ADRIAMYCIN) injection 100 mg 100 mg   Infusion Therapy Visit on 07/22/2024 in [facility]    07/22/2024 14:14 New Bag cycloPHOSphamide (CYTOXAN) 1,000 mg in sodium chloride 0.9 % 325 mL infusion 1,000 mg 650 mL/hr Infusion Therapy Visit on 07/22/2024 in [facility]    08/05/2024 15:31 Given DOXOrubicin (ADRIAMYCIN) injection 100 mg 100 mg   Infusion Therapy Visit on 08/05/2024 in [facility]    08/05/2024 15:54 New Bag cycloPHOSphamide (CYTOXAN) 1,000 mg in sodium chloride 0.9 % 325 mL infusion 1,000 mg 650 mL/hr Infusion Therapy Visit on 08/05/2024 in [facility]    08/19/2024 12:46 Given DOXOrubicin (ADRIAMYCIN)  injection 100 mg 100 mg   Infusion Therapy Visit on 08/19/2024 in [facility]    08/19/2024 13:00 New Bag cycloPHOSphamide (CYTOXAN) 1,000 mg in sodium chloride 0.9 % 325 mL infusion 1,000 mg 650 mL/hr Infusion Therapy Visit on 08/19/2024 in [facility]            Assessment:                                                      D1C3, see above    Intervention/Education provided during outreach:                                                       Patient will keep her apts as scheduled.    Confirmed patient has clinic and triage numbers    Signature:  Marycruz Tate RN

## 2024-08-19 NOTE — PROGRESS NOTES
"Oncology Rooming Note    August 19, 2024 10:49 AM   Kerry Mills is a 58 year old female who presents for:    Chief Complaint   Patient presents with    Oncology Clinic Visit     Invasive ductal carcinoma of breast, female, left      Initial Vitals: /73 (BP Location: Right arm, Patient Position: Sitting, Cuff Size: Adult Regular)   Pulse 81   Temp 98.2  F (36.8  C) (Tympanic)   Resp 16   Ht 1.549 m (5' 1\")   Wt 64.6 kg (142 lb 8 oz)   SpO2 95%   BMI 26.93 kg/m   Estimated body mass index is 26.93 kg/m  as calculated from the following:    Height as of this encounter: 1.549 m (5' 1\").    Weight as of this encounter: 64.6 kg (142 lb 8 oz). Body surface area is 1.67 meters squared.  No Pain (0) Comment: Data Unavailable   No LMP recorded. Patient is postmenopausal.  Allergies reviewed: Yes  Medications reviewed: Yes    Medications: Medication refills not needed today.  Pharmacy name entered into [facility]:    [facility] - [place], [place] - [address]  [facility] - [place], [place] - [address]    Frailty Screening:   Is the patient here for a new oncology consult visit in cancer care? 2. No      Clinical concerns:   Follow up on treatment plan per pt.       Pebbles Dumont MA            "

## 2024-08-19 NOTE — PROGRESS NOTES
Infusion Nursing Note:  Kerry Mills presents today for C3D1 Adriamyacin and Cytoxan.    Patient seen by provider today: Yes:    present during visit today: Not Applicable.    Note: Patient presents to Infusion center with her daughter in law to assist with interpretation. Aloxi, Emend and Dexamethasone given for pre-medications.      Intravenous Access:  Implanted Port.    Treatment Conditions:  Results reviewed, labs MET treatment parameters, ok to proceed with treatment.      Post Infusion Assessment:  Patient tolerated infusion without incident.  Site patent and intact, free from redness, edema or discomfort.  No evidence of extravasations.  Access discontinued per protocol.       Discharge Plan:   Patient and/or family verbalized understanding of discharge instructions and all questions answered.      Katerin Obregon RN

## 2024-08-28 ENCOUNTER — DOCUMENTATION ONLY (OUTPATIENT)
Dept: LAB | Facility: CLINIC | Age: 59
End: 2024-08-28
Payer: COMMERCIAL

## 2024-08-28 NOTE — PROGRESS NOTES
Prior authorization for Kerry Mills's genetic testing is approved. Authorization valid 7/23/24-12/23/24. eOOP <$[id number] so MDL will proceed with testing once lab draw received. Patient scheduled for upcoming lab appointment on 9/3/24. We will reach out with results when they are available.      Meliton Martinez  Genomics Billing    [facility]  Molecular Diagnostics Laboratory  [address] [address]  [email or web address]  Office: [phone]  Fax:   Employed by [facility]

## 2024-09-03 ENCOUNTER — HOSPITAL ENCOUNTER (OUTPATIENT)
Dept: RADIOLOGY | Facility: CLINIC | Age: 59
Discharge: HOME | End: 2024-09-03
Attending: INTERNAL MEDICINE | Admitting: INTERNAL MEDICINE
Payer: COMMERCIAL

## 2024-09-03 ENCOUNTER — LAB (OUTPATIENT)
Dept: INFUSION THERAPY | Facility: HOSPITAL | Age: 59
End: 2024-09-03
Attending: INTERNAL MEDICINE
Payer: COMMERCIAL

## 2024-09-03 ENCOUNTER — PATIENT OUTREACH (OUTPATIENT)
Dept: ONCOLOGY | Facility: HOSPITAL | Age: 59
End: 2024-09-03

## 2024-09-03 ENCOUNTER — ONCOLOGY VISIT (OUTPATIENT)
Dept: ONCOLOGY | Facility: HOSPITAL | Age: 59
End: 2024-09-03
Attending: INTERNAL MEDICINE
Payer: COMMERCIAL

## 2024-09-03 VITALS
DIASTOLIC BLOOD PRESSURE: 70 MMHG | WEIGHT: 142.9 LBS | BODY MASS INDEX: 26.98 KG/M2 | HEART RATE: 80 BPM | OXYGEN SATURATION: 98 % | RESPIRATION RATE: 19 BRPM | SYSTOLIC BLOOD PRESSURE: 117 MMHG | HEIGHT: 61 IN

## 2024-09-03 DIAGNOSIS — C50.912 INVASIVE DUCTAL CARCINOMA OF BREAST, FEMALE, LEFT (H): Primary | ICD-10-CM

## 2024-09-03 DIAGNOSIS — T45.1X5A CHEMOTHERAPY-INDUCED FATIGUE: ICD-10-CM

## 2024-09-03 DIAGNOSIS — R53.83 CHEMOTHERAPY-INDUCED FATIGUE: ICD-10-CM

## 2024-09-03 DIAGNOSIS — C50.912 INVASIVE DUCTAL CARCINOMA OF BREAST, FEMALE, LEFT (H): ICD-10-CM

## 2024-09-03 LAB
ALBUMIN SERPL BCG-MCNC: 3.9 G/DL (ref 3.5–5.2)
ALP SERPL-CCNC: 104 U/L (ref 40–150)
ALT SERPL W P-5'-P-CCNC: 18 U/L (ref 0–50)
ANION GAP SERPL CALCULATED.3IONS-SCNC: 14 MMOL/L (ref 7–15)
AST SERPL W P-5'-P-CCNC: 24 U/L (ref 0–45)
BASOPHILS # BLD MANUAL: 0 10E3/UL (ref 0–0.2)
BASOPHILS NFR BLD MANUAL: 0 %
BILIRUB SERPL-MCNC: 0.3 MG/DL
BUN SERPL-MCNC: 6.6 MG/DL (ref 6–20)
CALCIUM SERPL-MCNC: 8.7 MG/DL (ref 8.8–10.4)
CHLORIDE SERPL-SCNC: 105 MMOL/L (ref 98–107)
CREAT SERPL-MCNC: 0.53 MG/DL (ref 0.51–0.95)
EGFRCR SERPLBLD CKD-EPI 2021: >90 ML/MIN/1.73M2
EOSINOPHIL # BLD MANUAL: 0 10E3/UL (ref 0–0.7)
EOSINOPHIL NFR BLD MANUAL: 0 %
ERYTHROCYTE [DISTWIDTH] IN BLOOD BY AUTOMATED COUNT: 15.9 % (ref 10–15)
GLUCOSE SERPL-MCNC: 142 MG/DL (ref 70–99)
HCO3 SERPL-SCNC: 24 MMOL/L (ref 22–29)
HCT VFR BLD AUTO: 35.3 % (ref 35–47)
HGB BLD-MCNC: 11.5 G/DL (ref 11.7–15.7)
LDH SERPL L TO P-CCNC: 207 U/L (ref 0–250)
LYMPHOCYTES # BLD MANUAL: 1.8 10E3/UL (ref 0.8–5.3)
LYMPHOCYTES NFR BLD MANUAL: 12 %
MCH RBC QN AUTO: 30.2 PG (ref 26.5–33)
MCHC RBC AUTO-ENTMCNC: 32.6 G/DL (ref 31.5–36.5)
MCV RBC AUTO: 93 FL (ref 78–100)
METAMYELOCYTES # BLD MANUAL: 0.3 10E3/UL
METAMYELOCYTES NFR BLD MANUAL: 2 %
MONOCYTES # BLD MANUAL: 0.5 10E3/UL (ref 0–1.3)
MONOCYTES NFR BLD MANUAL: 3 %
MYELOCYTES # BLD MANUAL: 0.3 10E3/UL
MYELOCYTES NFR BLD MANUAL: 2 %
NEUTROPHILS # BLD MANUAL: 12.2 10E3/UL (ref 1.6–8.3)
NEUTROPHILS NFR BLD MANUAL: 81 %
NRBC # BLD AUTO: 0 10E3/UL
NRBC BLD AUTO-RTO: 0 /100
PLAT MORPH BLD: ABNORMAL
PLATELET # BLD AUTO: 176 10E3/UL (ref 150–450)
POTASSIUM SERPL-SCNC: 3.3 MMOL/L (ref 3.4–5.3)
PROT SERPL-MCNC: 6.6 G/DL (ref 6.4–8.3)
RBC # BLD AUTO: 3.81 10E6/UL (ref 3.8–5.2)
RBC MORPH BLD: ABNORMAL
SODIUM SERPL-SCNC: 143 MMOL/L (ref 135–145)
VARIANT LYMPHS BLD QL SMEAR: PRESENT
WBC # BLD AUTO: 15.1 10E3/UL (ref 4–11)

## 2024-09-03 PROCEDURE — 84460 ALANINE AMINO (ALT) (SGPT): CPT

## 2024-09-03 PROCEDURE — 85007 BL SMEAR W/DIFF WBC COUNT: CPT

## 2024-09-03 PROCEDURE — 258N000003 HC RX IP 258 OP 636: Performed by: INTERNAL MEDICINE

## 2024-09-03 PROCEDURE — 96413 CHEMO IV INFUSION 1 HR: CPT

## 2024-09-03 PROCEDURE — 83615 LACTATE (LD) (LDH) ENZYME: CPT

## 2024-09-03 PROCEDURE — 99215 OFFICE O/P EST HI 40 MIN: CPT | Performed by: INTERNAL MEDICINE

## 2024-09-03 PROCEDURE — 96377 APPLICATON ON-BODY INJECTOR: CPT | Mod: XS

## 2024-09-03 PROCEDURE — 96375 TX/PRO/DX INJ NEW DRUG ADDON: CPT

## 2024-09-03 PROCEDURE — 96372 THER/PROPH/DIAG INJ SC/IM: CPT | Performed by: INTERNAL MEDICINE

## 2024-09-03 PROCEDURE — G2211 COMPLEX E/M VISIT ADD ON: HCPCS | Performed by: INTERNAL MEDICINE

## 2024-09-03 PROCEDURE — 96411 CHEMO IV PUSH ADDL DRUG: CPT

## 2024-09-03 PROCEDURE — G0463 HOSPITAL OUTPT CLINIC VISIT: HCPCS | Performed by: INTERNAL MEDICINE

## 2024-09-03 PROCEDURE — 36591 DRAW BLOOD OFF VENOUS DEVICE: CPT

## 2024-09-03 PROCEDURE — 96367 TX/PROPH/DG ADDL SEQ IV INF: CPT

## 2024-09-03 PROCEDURE — 85014 HEMATOCRIT: CPT

## 2024-09-03 PROCEDURE — 250N000011 HC RX IP 250 OP 636: Performed by: INTERNAL MEDICINE

## 2024-09-03 PROCEDURE — 71046 X-RAY EXAM CHEST 2 VIEWS: CPT

## 2024-09-03 PROCEDURE — 82374 ASSAY BLOOD CARBON DIOXIDE: CPT

## 2024-09-03 RX ORDER — ALBUTEROL SULFATE 90 UG/1
1-2 AEROSOL, METERED RESPIRATORY (INHALATION)
Status: DISCONTINUED | OUTPATIENT
Start: 2024-09-03 | End: 2024-09-03 | Stop reason: HOSPADM

## 2024-09-03 RX ORDER — MEPERIDINE HYDROCHLORIDE 50 MG/ML
25 INJECTION INTRAMUSCULAR; INTRAVENOUS; SUBCUTANEOUS EVERY 30 MIN PRN
Status: CANCELLED | OUTPATIENT
Start: 2024-09-03

## 2024-09-03 RX ORDER — EPINEPHRINE 1 MG/ML
0.3 INJECTION, SOLUTION INTRAMUSCULAR; SUBCUTANEOUS EVERY 5 MIN PRN
Status: CANCELLED | OUTPATIENT
Start: 2024-09-03

## 2024-09-03 RX ORDER — EPINEPHRINE 1 MG/ML
0.3 INJECTION, SOLUTION INTRAMUSCULAR; SUBCUTANEOUS EVERY 5 MIN PRN
Status: DISCONTINUED | OUTPATIENT
Start: 2024-09-03 | End: 2024-09-03 | Stop reason: HOSPADM

## 2024-09-03 RX ORDER — DOXORUBICIN HYDROCHLORIDE 2 MG/ML
60 INJECTION, SOLUTION INTRAVENOUS ONCE
Status: COMPLETED | OUTPATIENT
Start: 2024-09-03 | End: 2024-09-03

## 2024-09-03 RX ORDER — PALONOSETRON 0.05 MG/ML
0.25 INJECTION, SOLUTION INTRAVENOUS ONCE
Status: COMPLETED | OUTPATIENT
Start: 2024-09-03 | End: 2024-09-03

## 2024-09-03 RX ORDER — PALONOSETRON 0.05 MG/ML
0.25 INJECTION, SOLUTION INTRAVENOUS ONCE
Status: CANCELLED | OUTPATIENT
Start: 2024-09-03

## 2024-09-03 RX ORDER — DOXORUBICIN HYDROCHLORIDE 2 MG/ML
60 INJECTION, SOLUTION INTRAVENOUS ONCE
Status: CANCELLED | OUTPATIENT
Start: 2024-09-03

## 2024-09-03 RX ORDER — METHYLPREDNISOLONE SODIUM SUCCINATE 125 MG/2ML
125 INJECTION, POWDER, LYOPHILIZED, FOR SOLUTION INTRAMUSCULAR; INTRAVENOUS
Status: CANCELLED
Start: 2024-09-03

## 2024-09-03 RX ORDER — ALBUTEROL SULFATE 0.83 MG/ML
2.5 SOLUTION RESPIRATORY (INHALATION)
Status: DISCONTINUED | OUTPATIENT
Start: 2024-09-03 | End: 2024-09-03 | Stop reason: HOSPADM

## 2024-09-03 RX ORDER — DIPHENHYDRAMINE HYDROCHLORIDE 50 MG/ML
50 INJECTION INTRAMUSCULAR; INTRAVENOUS
Status: DISCONTINUED | OUTPATIENT
Start: 2024-09-03 | End: 2024-09-03 | Stop reason: HOSPADM

## 2024-09-03 RX ORDER — HEPARIN SODIUM,PORCINE 10 UNIT/ML
5-20 VIAL (ML) INTRAVENOUS DAILY PRN
Status: DISCONTINUED | OUTPATIENT
Start: 2024-09-03 | End: 2024-09-03 | Stop reason: HOSPADM

## 2024-09-03 RX ORDER — ALBUTEROL SULFATE 90 UG/1
1-2 AEROSOL, METERED RESPIRATORY (INHALATION)
Status: CANCELLED
Start: 2024-09-03

## 2024-09-03 RX ORDER — HEPARIN SODIUM,PORCINE 10 UNIT/ML
5-20 VIAL (ML) INTRAVENOUS DAILY PRN
Status: CANCELLED | OUTPATIENT
Start: 2024-09-03

## 2024-09-03 RX ORDER — HEPARIN SODIUM (PORCINE) LOCK FLUSH IV SOLN 100 UNIT/ML 100 UNIT/ML
5 SOLUTION INTRAVENOUS
Status: DISCONTINUED | OUTPATIENT
Start: 2024-09-03 | End: 2024-09-03 | Stop reason: HOSPADM

## 2024-09-03 RX ORDER — ALBUTEROL SULFATE 0.83 MG/ML
2.5 SOLUTION RESPIRATORY (INHALATION)
Status: CANCELLED | OUTPATIENT
Start: 2024-09-03

## 2024-09-03 RX ORDER — HEPARIN SODIUM (PORCINE) LOCK FLUSH IV SOLN 100 UNIT/ML 100 UNIT/ML
5 SOLUTION INTRAVENOUS
Status: CANCELLED | OUTPATIENT
Start: 2024-09-03

## 2024-09-03 RX ORDER — LORAZEPAM 2 MG/ML
0.5 INJECTION INTRAMUSCULAR EVERY 4 HOURS PRN
Status: CANCELLED | OUTPATIENT
Start: 2024-09-03

## 2024-09-03 RX ORDER — DIPHENHYDRAMINE HYDROCHLORIDE 50 MG/ML
50 INJECTION INTRAMUSCULAR; INTRAVENOUS
Status: CANCELLED
Start: 2024-09-03

## 2024-09-03 RX ORDER — METHYLPREDNISOLONE SODIUM SUCCINATE 125 MG/2ML
125 INJECTION, POWDER, LYOPHILIZED, FOR SOLUTION INTRAMUSCULAR; INTRAVENOUS
Status: DISCONTINUED | OUTPATIENT
Start: 2024-09-03 | End: 2024-09-03 | Stop reason: HOSPADM

## 2024-09-03 RX ORDER — MEPERIDINE HYDROCHLORIDE 50 MG/ML
25 INJECTION INTRAMUSCULAR; INTRAVENOUS; SUBCUTANEOUS EVERY 30 MIN PRN
Status: DISCONTINUED | OUTPATIENT
Start: 2024-09-03 | End: 2024-09-03 | Stop reason: HOSPADM

## 2024-09-03 RX ADMIN — PALONOSETRON 0.25 MG: 0.05 INJECTION, SOLUTION INTRAVENOUS at 12:35

## 2024-09-03 RX ADMIN — PEGFILGRASTIM 6 MG: KIT SUBCUTANEOUS at 14:12

## 2024-09-03 RX ADMIN — DOXORUBICIN HYDROCHLORIDE 100 MG: 2 INJECTION, SOLUTION INTRAVENOUS at 13:23

## 2024-09-03 RX ADMIN — HEPARIN 5 ML: 100 SYRINGE at 14:11

## 2024-09-03 RX ADMIN — SODIUM CHLORIDE 250 ML: 9 INJECTION, SOLUTION INTRAVENOUS at 12:35

## 2024-09-03 RX ADMIN — CYCLOPHOSPHAMIDE 1000 MG: 1 INJECTION, POWDER, FOR SOLUTION INTRAVENOUS; ORAL at 13:35

## 2024-09-03 RX ADMIN — FOSAPREPITANT: 150 INJECTION, POWDER, LYOPHILIZED, FOR SOLUTION INTRAVENOUS at 12:58

## 2024-09-03 ASSESSMENT — PAIN SCALES - GENERAL: PAINLEVEL: EXTREME PAIN (8)

## 2024-09-03 NOTE — PROGRESS NOTES
[facility] Hematology and Oncology Progress Note    Patient: Kerry Mills  MRN: [id number]  Date of Service: Sep 3, 2024           [facility] Hematology and Oncology Progress Note    Patient: Kerry Mills  MRN: [id number]  Date of Service: Sep 3, 2024             ECOG Performance    0 - Independent          ______________________________________________________________________________  Oncologic history  1) invasive ductal carcinoma of the breast ER negative MO negative HER2/kobe 2+, negative by FISH.  3.9 cm in maximum dimension on MRI.  Diagnosed July 2024    Treatment  1) neoadjuvant AC started on 7/22/2024.  CarboTaxol and Pembro denied by insurance and   2)Plan to give AC followed by paclitaxel before referring patient for surgery    History of Present Illness  Meliton is here for follow-up and is accompanied by her daughter.  She is due for her fourth cycle of AC.  She has had a great clinical response and her breast mass was not palpable even on her last visit.  She continues to say that she cannot feel it anymore.  She is having a cough and is bringing up some green phlegm and is worried about this.  She denies any significant nausea or vomiting.  She denies any diarrhea or any febrile illnesses      12 point review of system was negative    Past History    Past Medical History:   Diagnosis Date    Asthma     Chronic constipation        Past Surgical History:   Procedure Laterality Date    INCISION AND DRAINAGE OF WOUND N/A 11/27/2020    Procedure: INCISION AND DRAINAGE, NECK;  Surgeon: Arnel James MD;  Location: [facility] OR;  Service: ENT    INSERT PORT VASCULAR ACCESS Right 7/9/2024    Procedure: INSERTION,  RIGHT VASCULAR ACCESS PORT;  Surgeon: Iwona Choe DO;  Location: [facility]    PICC INSERTION - TRIPLE LUMEN  11/26/2020         TRACHEOSTOMY N/A 11/26/2020    Procedure: EMERGENT INTUBATION IN OR WITH CREATION, TRACHEOSTOMY;  Surgeon: Dennise Justice MD;  Location:  "[facility] OR;  Service: General       Physical Exam    /70 (BP Location: Left arm, Patient Position: Sitting, Cuff Size: Adult Regular)   Pulse 80   Resp 19   Ht 1.549 m (5' 1\")   Wt 64.8 kg (142 lb 14.4 oz)   SpO2 98%   BMI 27.00 kg/m      General: alert, awake, not in acute distress  HEENT: Head: Normal, normocephalic, atraumatic.  Eye: Normal external eye, conjunctiva, lids cornea, MIKAYLA.  Nose: Normal external nose, mucus membranes and septum.  Pharynx: Normal buccal mucosa. Normal pharynx.  Neck / Thyroid: Supple, no masses, nodes, nodules or enlargement.  Lymphatics: No abnormally enlarged lymph nodes.  Chest: Normal chest wall and respirations. Clear to auscultation.  No crackles or rhonchi's  Heart: S1 S2 RRR, no murmur.   Abdomen: abdomen is soft without significant tenderness, masses, organomegaly or guarding  Extremities: normal strength, tone, and muscle mass  Skin: normal. no rash or abnormalities  CNS: non focal.  Breast exam today showed that the mass was much harder to palpate and was much smaller    Lab Results    Recent Results (from the past 240 hour(s))   Comprehensive metabolic panel    Collection Time: 09/03/24 10:50 AM   Result Value Ref Range    Sodium 143 135 - 145 mmol/L    Potassium 3.3 (L) 3.4 - 5.3 mmol/L    Carbon Dioxide (CO2) 24 22 - 29 mmol/L    Anion Gap 14 7 - 15 mmol/L    Urea Nitrogen 6.6 6.0 - 20.0 mg/dL    Creatinine 0.53 0.51 - 0.95 mg/dL    GFR Estimate >90 >60 mL/min/1.73m2    Calcium 8.7 (L) 8.8 - 10.4 mg/dL    Chloride 105 98 - 107 mmol/L    Glucose 142 (H) 70 - 99 mg/dL    Alkaline Phosphatase 104 40 - 150 U/L    AST 24 0 - 45 U/L    ALT 18 0 - 50 U/L    Protein Total 6.6 6.4 - 8.3 g/dL    Albumin 3.9 3.5 - 5.2 g/dL    Bilirubin Total 0.3 <=1.2 mg/dL   Lactate Dehydrogenase    Collection Time: 09/03/24 10:50 AM   Result Value Ref Range    Lactate Dehydrogenase 207 0 - 250 U/L   CBC with platelets and differential    Collection Time: 09/03/24 10:50 AM "   Result Value Ref Range    WBC Count 15.1 (H) 4.0 - 11.0 10e3/uL    RBC Count 3.81 3.80 - 5.20 10e6/uL    Hemoglobin 11.5 (L) 11.7 - 15.7 g/dL    Hematocrit 35.3 35.0 - 47.0 %    MCV 93 78 - 100 fL    MCH 30.2 26.5 - 33.0 pg    MCHC 32.6 31.5 - 36.5 g/dL    RDW 15.9 (H) 10.0 - 15.0 %    Platelet Count 176 150 - 450 10e3/uL    NRBCs per 100 WBC 0 <1 /100    Absolute NRBCs 0.0 10e3/uL   Manual Differential    Collection Time: 09/03/24 10:50 AM   Result Value Ref Range    % Neutrophils 81 %    % Lymphocytes 12 %    % Monocytes 3 %    % Eosinophils 0 %    % Basophils 0 %    % Metamyelocytes 2 %    % Myelocytes 2 %    Absolute Neutrophils 12.2 (H) 1.6 - 8.3 10e3/uL    Absolute Lymphocytes 1.8 0.8 - 5.3 10e3/uL    Absolute Monocytes 0.5 0.0 - 1.3 10e3/uL    Absolute Eosinophils 0.0 0.0 - 0.7 10e3/uL    Absolute Basophils 0.0 0.0 - 0.2 10e3/uL    Absolute Metamyelocytes 0.3 (H) <=0.0 10e3/uL    Absolute Myelocytes 0.3 (H) <=0.0 10e3/uL    RBC Morphology Confirmed RBC Indices     Platelet Assessment  Automated Count Confirmed. Platelet morphology is normal.     Automated Count Confirmed. Platelet morphology is normal.    Reactive Lymphocytes Present (A) None Seen         Imaging    No results found.        Assessment and Plan      Breast cancer, triple negative patient receiving neoadjuvant chemotherapy   patient is here in follow-up.  She is due for her fourth cycle of AC today.  She has had an excellent clinical response and the breast mass is no longer palpable.  Patient says today that she no longer wants any more chemo after the fourth cycle and would like to go ahead with surgery.  I had an extensive discussion through the  went through her daughter about the benefit of having a complete course of neoadjuvant chemotherapy prior to surgery.  I explained to her that it would give her important information that would tell us if we need to give her more chemo in the form of capecitabine once her surgery is  complete.  Patient ask a number of questions but continues to insist that she does not wants any chemo right now.  She would like to go ahead with surgery after the fourth cycle of AC and will consider chemo after surgery is complete.  I will have the patient get an ultrasound done and have her come back to see me in 2 weeks.  I will also tentatively set the patient up to see our surgical colleagues in 2 to 3 weeks.  The daughter did want to talk to her mother at home to see whether they can convince her to continue chemo.  If the patient does change her mind they will call us and we will switch her to weekly paclitaxel in 2 weeks.  If she does not change her mind she will see Dr. Motley and proceed with surgery .all other intervention would then be done after her surgical management is complete.  1.Patient to get her fourth cycle of AC today and will have an ultrasound In 1 to 2 weeks to monitor for response  2.Patient does not want to proceed with paclitaxel in 2 weeks and wants to go ahead with surgery despite an extensive discussion.  Patient to see Dr. Santillan in 2 to 3 weeks to be evaluated for surgery  3.  Will proceed with paclitaxel in 2 weeks if patient changes her mind.    Cough  Patient does complains of a cough productive of some green phlegm.  She is afebrile.  Will do an x-ray today.      Varsha Adamson MD        CC: HIRAL CHAVEZ MD       CC: HIRAL CHAVEZ MD

## 2024-09-03 NOTE — PROGRESS NOTES
I have reviewed discharge instructions with the patient. The patient verbalized understanding. Pt in no distress at time of discharge and agreeable to terms of discharge. Pt states getting a ride home. [facility]: Cancer Care Follow-Up Note                                    Discussion with Patient:                                                      9/3/24 Patient was at clinic today for follow up and infusion apt. They requested a refill of dexamethasone. Writer review chart and noted that there should still be a refill on file.   1420 Called [facility] and confirmed that there was still one refill on file. Asked them to fill the prescription and the patient will be picking it up soon.  -1427 Called Ginny daughter in law of Kerry, and HUSSAIN with the above.   -1626 Called Ginny, daughter in law of Kerry and LM that writer was calling to follow up on the results of the imaging. Contact information was provided and invited a return call.  Of note, when Ginny calls back inform her about the dex refill and that the Chest X ray looks fine, no further recommendations.  1643 Called Kerry with the assistance of [employer]  [phone] and HUSSAIN that writer was calling to follow up with results. Contact information provided for return call    ADDENDUM  9/4/24  0940 Called Kerry with the assistance of [employer]  [phone]. Did not   0942 Called Ginny daughter in law of Kerry. Confirmed that  they were able to  the dexamethasone at [facility] yesterday.   Relayed that the Chest X ray looked fine, no concerns.  Ginny has spoken with Kerry further after they left clinic and Kerry is continuing to consider moving forward with more chemo at this time. She is scheduled on 9.17 in case she elects to continue with chemo. Shared that referral over to breast center with Dr. Choe and should be contacting her soon.       Dates of Treatment:                                                      Infusion given in last 28 days       Administered MAR Action Medication Dose Rate Visit    08/19/2024 12:46 Given DOXOrubicin (ADRIAMYCIN) injection 100 mg 100 mg   Infusion Therapy Visit on 08/19/2024 in [facility]  [facility]    08/19/2024 13:00 New Bag cycloPHOSphamide (CYTOXAN) 1,000 mg in sodium chloride 0.9 % 325 mL infusion 1,000 mg 650 mL/hr Infusion Therapy Visit on 08/19/2024 in [facility]    09/03/2024 13:23 Given DOXOrubicin (ADRIAMYCIN) injection 100 mg 100 mg   Infusion Therapy Visit on 09/03/2024 in [facility]    09/03/2024 13:35 New Bag cycloPHOSphamide (CYTOXAN) 1,000 mg in sodium chloride 0.9 % 325 mL infusion 1,000 mg 650 mL/hr Infusion Therapy Visit on 09/03/2024 in [facility]            Assessment:                                                      See above        Intervention/Education provided during outreach:                                                       Plan as documented above, will monitor.    Confirmed patient has clinic and triage numbers for future reference, Encouraged them to call when Dena makes her final decision bout chemo on 9/17.    Signature:  Marycruz Tate RN

## 2024-09-03 NOTE — LETTER
"9/3/2024      Kerry Mills  [address] [place] [address]      Dear Colleague,    Thank you for referring your patient, Kerry Mills, to the [facility]. Please see a copy of my visit note below.    Oncology Rooming Note    September 3, 2024 11:11 AM   Kerry Mills is a 58 year old female who presents for:    Chief Complaint   Patient presents with     Oncology Clinic Visit     Invasive ductal carcinoma of breast, female, left     Initial Vitals: /70 (BP Location: Left arm, Patient Position: Sitting, Cuff Size: Adult Regular)   Pulse 80   Resp 19   Ht 1.549 m (5' 1\")   Wt 64.8 kg (142 lb 14.4 oz)   SpO2 98%   BMI 27.00 kg/m   Estimated body mass index is 27 kg/m  as calculated from the following:    Height as of this encounter: 1.549 m (5' 1\").    Weight as of this encounter: 64.8 kg (142 lb 14.4 oz). Body surface area is 1.67 meters squared.  Extreme Pain (8) Comment: Data Unavailable   No LMP recorded. Patient is postmenopausal.  Allergies reviewed: Yes  Medications reviewed: Yes    Medications: Medication refills not needed today.  Pharmacy name entered into [facility]:    [facility] - [place], [place] - [address]  [facility] - [place], [place] - [address]    Frailty Screening:   Is the patient here for a new oncology consult visit in cancer care? 2. No    Concerns:   Left side rib cage area pain 7-8 score, x1 week. PT reported a cough causing left side pain, wheezing and headaches.       Pebbles Dumont MA              [facility] Hematology and Oncology Progress Note    Patient: Kerry Mills  MRN: [id number]  Date of Service: Sep 3, 2024           [facility] Hematology and Oncology Progress Note    Patient: Kerry Mills  MRN: [id number]  Date of Service: Sep 3, 2024             ECOG Performance    0 - Independent          ______________________________________________________________________________  Oncologic history  1) " "invasive ductal carcinoma of the breast ER negative [place] negative HER2/kobe 2+, negative by FISH.  3.9 cm in maximum dimension on MRI.  Diagnosed July 2024    Treatment  1) neoadjuvant AC started on 7/22/2024.  CarboTaxol and Pembro denied by insurance and   2)Plan to give AC followed by paclitaxel before referring patient for surgery    History of Present Illness  Meliton is here for follow-up and is accompanied by her daughter.  She is due for her fourth cycle of AC.  She has had a great clinical response and her breast mass was not palpable even on her last visit.  She continues to say that she cannot feel it anymore.  She is having a cough and is bringing up some green phlegm and is worried about this.  She denies any significant nausea or vomiting.  She denies any diarrhea or any febrile illnesses      12 point review of system was negative    Past History    Past Medical History:   Diagnosis Date     Asthma      Chronic constipation        Past Surgical History:   Procedure Laterality Date     INCISION AND DRAINAGE OF WOUND N/A 11/27/2020    Procedure: INCISION AND DRAINAGE, NECK;  Surgeon: Arnel James MD;  Location: [facility];  Service: ENT     INSERT PORT VASCULAR ACCESS Right 7/9/2024    Procedure: INSERTION,  RIGHT VASCULAR ACCESS PORT;  Surgeon: Iwona Choe DO;  Location: [facility]     PICC INSERTION - TRIPLE LUMEN  11/26/2020          TRACHEOSTOMY N/A 11/26/2020    Procedure: EMERGENT INTUBATION IN OR WITH CREATION, TRACHEOSTOMY;  Surgeon: Dennise Justice MD;  Location: [facility];  Service: General       Physical Exam    /70 (BP Location: Left arm, Patient Position: Sitting, Cuff Size: Adult Regular)   Pulse 80   Resp 19   Ht 1.549 m (5' 1\")   Wt 64.8 kg (142 lb 14.4 oz)   SpO2 98%   BMI 27.00 kg/m      General: alert, awake, not in acute distress  HEENT: Head: Normal, normocephalic, atraumatic.  Eye: Normal external eye, conjunctiva, lids cornea, MIKAYLA.  Nose: " Normal external nose, mucus membranes and septum.  Pharynx: Normal buccal mucosa. Normal pharynx.  Neck / Thyroid: Supple, no masses, nodes, nodules or enlargement.  Lymphatics: No abnormally enlarged lymph nodes.  Chest: Normal chest wall and respirations. Clear to auscultation.  No crackles or rhonchi's  Heart: S1 S2 RRR, no murmur.   Abdomen: abdomen is soft without significant tenderness, masses, organomegaly or guarding  Extremities: normal strength, tone, and muscle mass  Skin: normal. no rash or abnormalities  CNS: non focal.  Breast exam today showed that the mass was much harder to palpate and was much smaller    Lab Results    Recent Results (from the past 240 hour(s))   Comprehensive metabolic panel    Collection Time: 09/03/24 10:50 AM   Result Value Ref Range    Sodium 143 135 - 145 mmol/L    Potassium 3.3 (L) 3.4 - 5.3 mmol/L    Carbon Dioxide (CO2) 24 22 - 29 mmol/L    Anion Gap 14 7 - 15 mmol/L    Urea Nitrogen 6.6 6.0 - 20.0 mg/dL    Creatinine 0.53 0.51 - 0.95 mg/dL    GFR Estimate >90 >60 mL/min/1.73m2    Calcium 8.7 (L) 8.8 - 10.4 mg/dL    Chloride 105 98 - 107 mmol/L    Glucose 142 (H) 70 - 99 mg/dL    Alkaline Phosphatase 104 40 - 150 U/L    AST 24 0 - 45 U/L    ALT 18 0 - 50 U/L    Protein Total 6.6 6.4 - 8.3 g/dL    Albumin 3.9 3.5 - 5.2 g/dL    Bilirubin Total 0.3 <=1.2 mg/dL   Lactate Dehydrogenase    Collection Time: 09/03/24 10:50 AM   Result Value Ref Range    Lactate Dehydrogenase 207 0 - 250 U/L   CBC with platelets and differential    Collection Time: 09/03/24 10:50 AM   Result Value Ref Range    WBC Count 15.1 (H) 4.0 - 11.0 10e3/uL    RBC Count 3.81 3.80 - 5.20 10e6/uL    Hemoglobin 11.5 (L) 11.7 - 15.7 g/dL    Hematocrit 35.3 35.0 - 47.0 %    MCV 93 78 - 100 fL    MCH 30.2 26.5 - 33.0 pg    MCHC 32.6 31.5 - 36.5 g/dL    RDW 15.9 (H) 10.0 - 15.0 %    Platelet Count 176 150 - 450 10e3/uL    NRBCs per 100 WBC 0 <1 /100    Absolute NRBCs 0.0 10e3/uL   Manual Differential    Collection  Time: 09/03/24 10:50 AM   Result Value Ref Range    % Neutrophils 81 %    % Lymphocytes 12 %    % Monocytes 3 %    % Eosinophils 0 %    % Basophils 0 %    % Metamyelocytes 2 %    % Myelocytes 2 %    Absolute Neutrophils 12.2 (H) 1.6 - 8.3 10e3/uL    Absolute Lymphocytes 1.8 0.8 - 5.3 10e3/uL    Absolute Monocytes 0.5 0.0 - 1.3 10e3/uL    Absolute Eosinophils 0.0 0.0 - 0.7 10e3/uL    Absolute Basophils 0.0 0.0 - 0.2 10e3/uL    Absolute Metamyelocytes 0.3 (H) <=0.0 10e3/uL    Absolute Myelocytes 0.3 (H) <=0.0 10e3/uL    RBC Morphology Confirmed RBC Indices     Platelet Assessment  Automated Count Confirmed. Platelet morphology is normal.     Automated Count Confirmed. Platelet morphology is normal.    Reactive Lymphocytes Present (A) None Seen         Imaging    No results found.        Assessment and Plan      Breast cancer, triple negative patient receiving neoadjuvant chemotherapy   patient is here in follow-up.  She is due for her fourth cycle of AC today.  She has had an excellent clinical response and the breast mass is no longer palpable.  Patient says today that she no longer wants any more chemo after the fourth cycle and would like to go ahead with surgery.  I had an extensive discussion through the  went through her daughter about the benefit of having a complete course of neoadjuvant chemotherapy prior to surgery.  I explained to her that it would give her important information that would tell us if we need to give her more chemo in the form of capecitabine once her surgery is complete.  Patient ask a number of questions but continues to insist that she does not wants any chemo right now.  She would like to go ahead with surgery after the fourth cycle of AC and will consider chemo after surgery is complete.  I will have the patient get an ultrasound done and have her come back to see me in 2 weeks.  I will also tentatively set the patient up to see our surgical colleagues in 2 to 3 weeks.  The  daughter did want to talk to her mother at home to see whether they can convince her to continue chemo.  If the patient does change her mind they will call us and we will switch her to weekly paclitaxel in 2 weeks.  If she does not change her mind she will see Dr. Motley and proceed with surgery .all other intervention would then be done after her surgical management is complete.  1.Patient to get her fourth cycle of AC today and will have an ultrasound In 1 to 2 weeks to monitor for response  2.Patient does not want to proceed with paclitaxel in 2 weeks and wants to go ahead with surgery despite an extensive discussion.  Patient to see Dr. Santillan in 2 to 3 weeks to be evaluated for surgery  3.  Will proceed with paclitaxel in 2 weeks if patient changes her mind.    Cough  Patient does complains of a cough productive of some green phlegm.  She is afebrile.  Will do an x-ray today.      Varsha Adamson MD        CC: HIRAL CHAVEZ MD       CC: HIRAL CHAVEZ MD       Again, thank you for allowing me to participate in the care of your patient.        Sincerely,        Varsha Adamson MD

## 2024-09-03 NOTE — PROGRESS NOTES
"Oncology Rooming Note    September 3, 2024 11:11 AM   Kerry Mills is a 58 year old female who presents for:    Chief Complaint   Patient presents with    Oncology Clinic Visit     Invasive ductal carcinoma of breast, female, left     Initial Vitals: /70 (BP Location: Left arm, Patient Position: Sitting, Cuff Size: Adult Regular)   Pulse 80   Resp 19   Ht 1.549 m (5' 1\")   Wt 64.8 kg (142 lb 14.4 oz)   SpO2 98%   BMI 27.00 kg/m   Estimated body mass index is 27 kg/m  as calculated from the following:    Height as of this encounter: 1.549 m (5' 1\").    Weight as of this encounter: 64.8 kg (142 lb 14.4 oz). Body surface area is 1.67 meters squared.  Extreme Pain (8) Comment: Data Unavailable   No LMP recorded. Patient is postmenopausal.  Allergies reviewed: Yes  Medications reviewed: Yes    Medications: Medication refills not needed today.  Pharmacy name entered into [facility]:    [facility] - [place], [place] - [address]  [facility] - [place], [place] - [address]    Frailty Screening:   Is the patient here for a new oncology consult visit in cancer care? 2. No    Concerns:   Left side rib cage area pain 7-8 score, x1 week. PT reported a cough causing left side pain, wheezing and headaches.       Pebbles Dumont MA            "

## 2024-09-03 NOTE — PROGRESS NOTES
Infusion Nursing Note:  Kerry Mills presents today for D8C4 Adriamycin and cytoxan.    Patient seen by provider today: Yes: Dr Adamson   present during visit today: Yes, Language: hmong and her family.     Note: pt given emend/dex and aloxi as pre meds, Neulasta on pro applied to left lower abd and green light flashing. Instructed to remove after 630pm tomorrow.  Handout given to pt daughter for foods high in potassium and discussed with pt family    Intravenous Access:  Implanted Port.    Treatment Conditions:  Lab Results   Component Value Date    HGB 11.5 (L) 09/03/2024    WBC 15.1 (H) 09/03/2024    ANEU 12.2 (H) 09/03/2024    ANEUTAUTO 0.7 (L) 08/12/2024     09/03/2024        Lab Results   Component Value Date     09/03/2024    POTASSIUM 3.3 (L) 09/03/2024    MAG 2.2 12/06/2020    CR 0.53 09/03/2024    DONNA 8.7 (L) 09/03/2024    BILITOTAL 0.3 09/03/2024    ALBUMIN 3.9 09/03/2024    ALT 18 09/03/2024    AST 24 09/03/2024       Results reviewed, labs MET treatment parameters, ok to proceed with treatment.      Post Infusion Assessment:  Patient tolerated infusion without incident.  Blood return noted pre and post infusion.  Site patent and intact, free from redness, edema or discomfort.  Access discontinued per protocol.       Discharge Plan:   Patient and/or family verbalized understanding of discharge instructions and all questions answered.  Pt went over the [facility] radiology center for a chest xray after her visit here.    Edith White RN

## 2024-09-06 ENCOUNTER — TELEPHONE (OUTPATIENT)
Dept: SURGERY | Facility: CLINIC | Age: 59
End: 2024-09-06
Payer: COMMERCIAL

## 2024-09-06 ENCOUNTER — PATIENT OUTREACH (OUTPATIENT)
Dept: ONCOLOGY | Facility: HOSPITAL | Age: 59
End: 2024-09-06
Payer: COMMERCIAL

## 2024-09-06 NOTE — TELEPHONE ENCOUNTER
Per request of patient's Care Coordinator, Marycruz, called patient's dtr in law, Ginny, to help her set up a follow up appointment with Dr. Choe.  Ginny states patient prefers the  location. Scheduled her for 9-10-24.  In person  requested.

## 2024-09-06 NOTE — PROGRESS NOTES
[facility]: Cancer Care                                                                                        Called Ginny, Consent to Communicate on file dated 7/2/24, to check in. They have not yet been contacted by the Breast Clinic to arrange a follow up with Dr. Choe for consideration of surgical intervention. Kerry is scheduled for her ultrasound breast on 9/10/24. Per Ginny, Kerry is still undecided about whether she is willing to move forward with more chemo vs surgical intervention. She is scheduled for chemo on 9/17 in case she elects to continue with chemo. Surgical follow up should be scheduled to keep her care moving, in the event that she decides she will not have more chemo, and apt could be cancelled if she decides to continue with chemo. Attempted warm transfer to the Breast Center. Will have schedulers reach out to her directly. Breast Nurse updated.    Signature:  Marycruz Tate RN

## 2024-09-09 NOTE — PROGRESS NOTES
History:  Kerry Mills presents for follow-up of breast cancer.  She is currently undergoing NAC.  She has completed four rounds of AC.  She was thinking she needed a break from chemo.  But now with the  on the phone, she would like to complete it.    Physical exam:  BREAST: No masses palpable within the left breast    Imaging:  Report not available yet.  Tumor is still present within the left breast, but smaller than before NAC.    ASSESSMENT:  Kerry Mills is a 58-year-old female with a triple negative invasive ductal carcinoma involving the left breast    PLAN:  US reviewed.  Pt will complete chemo  Will follow back up with myself in December to further discuss lumpectomy vs mastectomy.  Pt and daughter understand that she would proceed with radiation after the lumpectomy.    35 min was spent in chart prep, discussion and coordination of care.    Iwona Choe DO  General Surgeon  [facility]  [facility] [place]  [address] [place] [id number]  Office: [phone]  Employed by - [facility]

## 2024-09-10 ENCOUNTER — ONCOLOGY VISIT (OUTPATIENT)
Dept: SURGERY | Facility: HOSPITAL | Age: 59
End: 2024-09-10
Attending: INTERNAL MEDICINE
Payer: COMMERCIAL

## 2024-09-10 ENCOUNTER — PATIENT OUTREACH (OUTPATIENT)
Dept: ONCOLOGY | Facility: HOSPITAL | Age: 59
End: 2024-09-10

## 2024-09-10 ENCOUNTER — HOSPITAL ENCOUNTER (OUTPATIENT)
Dept: MAMMOGRAPHY | Facility: CLINIC | Age: 59
Discharge: HOME | End: 2024-09-10
Attending: INTERNAL MEDICINE | Admitting: INTERNAL MEDICINE
Payer: COMMERCIAL

## 2024-09-10 VITALS — HEIGHT: 61 IN | BODY MASS INDEX: 26.36 KG/M2 | WEIGHT: 139.6 LBS

## 2024-09-10 DIAGNOSIS — C50.912 INVASIVE DUCTAL CARCINOMA OF BREAST, FEMALE, LEFT (H): Primary | ICD-10-CM

## 2024-09-10 DIAGNOSIS — T45.1X5A CHEMOTHERAPY-INDUCED FATIGUE: ICD-10-CM

## 2024-09-10 DIAGNOSIS — R53.83 CHEMOTHERAPY-INDUCED FATIGUE: ICD-10-CM

## 2024-09-10 DIAGNOSIS — C50.912 INVASIVE DUCTAL CARCINOMA OF BREAST, FEMALE, LEFT (H): ICD-10-CM

## 2024-09-10 PROCEDURE — T1013 SIGN LANG/ORAL INTERPRETER: HCPCS | Mod: U4 | Performed by: INTERPRETER

## 2024-09-10 PROCEDURE — 99214 OFFICE O/P EST MOD 30 MIN: CPT | Performed by: SURGERY

## 2024-09-10 PROCEDURE — 76642 ULTRASOUND BREAST LIMITED: CPT | Mod: LT

## 2024-09-10 PROCEDURE — G0463 HOSPITAL OUTPT CLINIC VISIT: HCPCS | Performed by: SURGERY

## 2024-09-10 ASSESSMENT — PAIN SCALES - GENERAL: PAINLEVEL: EXTREME PAIN (8)

## 2024-09-10 NOTE — PROGRESS NOTES
"Oncology Rooming Note    September 10, 2024 1:22 PM   Kerry Mills is a 58 year old female who presents for:    Chief Complaint   Patient presents with    Surgical Followup     Invasive ductal carcinoma of breast, female, left     Initial Vitals: Ht 1.549 m (5' 1\")   Wt 63.3 kg (139 lb 9.6 oz)   BMI 26.38 kg/m   Estimated body mass index is 26.38 kg/m  as calculated from the following:    Height as of this encounter: 1.549 m (5' 1\").    Weight as of this encounter: 63.3 kg (139 lb 9.6 oz). Body surface area is 1.65 meters squared.  Extreme Pain (8) Comment: Data Unavailable   No LMP recorded. Patient is postmenopausal.  Allergies reviewed: Yes  Medications reviewed: Yes    Medications: MEDICATION REFILLS NEEDED TODAY. Provider was notified.  Pharmacy name entered into [employer]:    [facility] - [place], [place] - [address]  [facility] - [place], [place] - [address]    Frailty Screening:   Is the patient here for a new oncology consult visit in cancer care? 2. No      Clinical concerns:    Multiple concerns. Pt is in clinic with daughter Renato rAriaga.    via phone - [phone] Raymundo  Pt reported cough causing left side underarm pain and discomfort that radiates to left side upper back.         Pebbles Dumont MA              "

## 2024-09-10 NOTE — PROGRESS NOTES
[facility]: Cancer Care Follow-Up Note                                    Discussion with Patient:                                                      Kerry was at clinic today and had a follow up with Dr. Choe, Surgeon.  She has made a decision that she wants to move forward with chemo at this time and will then proceed with surgery.  Spoke with Kerry with the assistance of a Hilda  via phone in an exam room. Reviewed her decision to continue chemo at this time and have surgery after all cycles are completed. She is scheduled on 9/17 for labs, provider visit and infusion. Shared that the chemo drug she will be given in the next cycles is Paclitaxel and will be administered weekly. Printed off the Drug sheet for Paclitaxel from [facility] and reviewed side effects. She will not need to take the dexamethasone on Day 2 of Paclitaxel as that was for Cycles 1-4 with AC.  She has a chemo folder from the past and still has the Home Instructions After Chemo sheet to refer to, briefly reviewed. She understands that this chemo is given weekly. She does have multiple family members but they work during the day. They can help sometimes to bring her but they need to take off work when they bring her. They would appreciate assistance with transportation when they are unable to bring her. SW referral placed and relayed that they will follow up with her to discuss resources. She would prefer that they talk with her daughter in law Pader when they reach out    Dates of Treatment:                                                      Infusion given in last 28 days       Administered MAR Action Medication Dose Rate Visit    08/19/2024 12:46 Given DOXOrubicin (ADRIAMYCIN) injection 100 mg 100 mg   Infusion Therapy Visit on 08/19/2024 in [facility]    08/19/2024 13:00 New Bag cycloPHOSphamide (CYTOXAN) 1,000 mg in sodium chloride 0.9 % 325 mL infusion 1,000 mg 650 mL/hr Infusion Therapy  Visit on 08/19/2024 in [facility]    09/03/2024 13:23 Given DOXOrubicin (ADRIAMYCIN) injection 100 mg 100 mg   Infusion Therapy Visit on 09/03/2024 in [facility]    09/03/2024 13:35 New Bag cycloPHOSphamide (CYTOXAN) 1,000 mg in sodium chloride 0.9 % 325 mL infusion 1,000 mg 650 mL/hr Infusion Therapy Visit on 09/03/2024 in [facility]            Assessment:                                                      Patient has completed 4 cycles of AC and is scheduled to start her first cycle of paclitaxel on 9/17/24    Intervention/Education provided during outreach:                                                       See documentation above    She will keep her apts as scheduled. She will see a provider every other cycle of chemo.    Signature:  Marycruz Tate RN

## 2024-09-11 ENCOUNTER — PATIENT OUTREACH (OUTPATIENT)
Dept: CARE COORDINATION | Facility: CLINIC | Age: 59
End: 2024-09-11
Payer: COMMERCIAL

## 2024-09-11 NOTE — PROGRESS NOTES
Social Work - Intervention  [facility]  Data/Intervention:    Patient Name: Kerry Mills Goes By: Kerry WELLINGTONB/Age: 1965 (58 year old)     Visit Type: Telephone  Referral Source: Marycruz Tate RN  Reason for Referral: Resources/transportation    Collaborated With:    -Ginny, pt's dtr in [facility]  -Marycruz Tate RN     Psychosocial Information/Concerns:  Kerry is a 58 year old with a diagnosis of breast cancer. Pt expressed interest in transportation resources at last visit. She requested SW to contact her dtr in law Ginny to discuss.      Intervention/Education/Resources Provided:  -SW called Ginny and introduced self and role on team  -Explored transportation needs. Family is planning to rotate taking pt to her appts. Pt doesn't want to be a burden but her family is able to assist. Confirmed they are aware she has a transportation benefit with her insurance.  -Discussed breast cancer grants. Declined need at this time, but will reach out if that changes.  -Emotional support provided.     Assessment/Plan:  Provided patient/family with contact information and availability.    DESEAN Uriarte, [facility]  Adult Oncology Clinics  [place] (M,W), [place] (T) & [place] (Th)  *I am off Friday  Office: [phone]

## 2024-09-16 NOTE — PROGRESS NOTES
[facility] Hematology and Oncology Outpatient Progress Note    Patient: Kerry Mills  MRN: [id number]  Date of Service: Sep 17, 2024          Reason for Visit    Chief Complaint   Patient presents with    Oncology Clinic Visit     Invasive ductal carcinoma of breast, female, left (H)       Primary Hematologist/Oncologist: Dr. Adamson        Assessment/Plan  #. Invasive ductal carcinoma of breast, female, left (triple negative breast cancer)  #. Neoplastic malignant related fatigue  #. Chemotherapy-induced fatigue  #. Cough, persistent since ~early August, 2024  Patient is clinically stable. She is here today for consideration of 1st dose of weekly paclitaxel. We reviewed her decision to continue chemo at this time and have surgery after all cycles are completed. She is feeling fair, but notes a persistent cough. We checked an xray recently that did not indicate an infection. She remains fever free and without evidence for infection on exam. No antibiotics recommended at this time. Will try some guaifenesin with codeine. Interim imaging with left breast ultrasound following DDAC was reviewed noting treatment effect with decrease size in known left breast malignancy. We reviewed labs today including a CBC which shows a hemoglobin of 11.0 which is a stable, and a white blood cell count of 6.5. I do recommend to proceed with first cycle of weekly paclitaxel, and patient is agreeable to this.    Proceed with paclitaxel today and weekly.   Follow-up with Dr. Adamson in 2 weeks for labs, and assessment prior to paclitaxel.   We will also continue to monitor the breast mass clinically on each visit.    Current plan is to continue weekly paclitaxel as neoadjuvant chemotherapy after which patient will be referred to surgery.  Will follow back up with Dr. Choe in December to further discuss lumpectomy vs mastectomy. Pt and daughter understand that she would proceed with radiation after the surgery.  "    ______________________________________________________________________________    History of Present Illness/ Interval History    Ms. Kerry Mills  is a 58 year old female patient returns in follow-up with her daughter for consideration of next cycle of chemotherapy.  She completed DDAC on 9/3/2024, and is here today for consideration of next phase of chemo using weekly paclitaxel. She notes ongoing cough and states its not getting better. She tells me she usually feels well when she eats well and she hasn't had the best appetite lately. Sometimes she has to force herself to eat. She is working on getting more protein intake into her diet. She denies N/V/D/C. She has pain when she coughs to the left ribs. Denies significant SOB. Cough is productive and leads to fatigue and frustration. Cough is not new, this has been present since beginning treatment. Recent xray on 9/3 did not show infection. Coughing causes pain in rib and doesn't seem to be getting better. Has phlegm. Has tried some OTC cough medicine without much relief.     ECOG performance status   0- Fully active, without restriction      Pain  Pain Score: Moderate Pain (5)    ROS  A 14 point review of systems was obtained.  Positive findings noted in the history.  The remainder of the review of system is otherwise negative.      Oncology History/Treatment  Oncologic history  1) invasive ductal carcinoma of the breast ER negative MS negative HER2/kobe 2+, negative by FISH.  3.9 cm in maximum dimension on MRI.  Diagnosed July 2024    Treatment  1) neoadjuvant AC started on 7/22/2024.  CarboTaxol and Pembro denied by insurance and   2)Plan to give AC followed by paclitaxel before referring patient for surgery    Physical Exam    /74   Pulse 85   Temp 98.1  F (36.7  C)   Resp 16   Ht 1.549 m (5' 1\")   Wt 63.1 kg (139 lb 1.6 oz)   SpO2 96%   BMI 26.28 kg/m      General: Well-appearing female in no acute distress. Cooperative in conversation.  Eyes: " EOMI, PERRL. No scleral icterus.  ENT: Oral mucosa is moist without thrush.  No significant erythema present  Lymphatic: Neck is supple without cervical, axillary, or supraclavicular lymphadenopathy.   Cardiovascular: RRR No murmurs, gallops, or rubs. No peripheral edema.  Breasts: Left breast mass palpable, nontender  Respiratory: CTA bilaterally. No wheezes or crackles.  Gastrointestinal: BS +. Abdomen soft, non-tender. No palpable hepatosplenomegaly or masses.   Neurologic: Alert and oriented x3. Cranial nerves II through XII are grossly intact.  Skin: No rashes, petechiae, or bruising noted. Warm, dry, intact.      Lab Results    Recent Results (from the past 168 hour(s))   Hepatic panel   Result Value Ref Range    Protein Total 6.9 6.4 - 8.3 g/dL    Albumin 4.1 3.5 - 5.2 g/dL    Bilirubin Total 0.4 <=1.2 mg/dL    Alkaline Phosphatase 108 40 - 150 U/L    AST 19 0 - 45 U/L    ALT 16 0 - 50 U/L    Bilirubin Direct <0.20 0.00 - 0.30 mg/dL   CBC with platelets and differential   Result Value Ref Range    WBC Count 6.5 4.0 - 11.0 10e3/uL    RBC Count 3.62 (L) 3.80 - 5.20 10e6/uL    Hemoglobin 11.0 (L) 11.7 - 15.7 g/dL    Hematocrit 33.4 (L) 35.0 - 47.0 %    MCV 92 78 - 100 fL    MCH 30.4 26.5 - 33.0 pg    MCHC 32.9 31.5 - 36.5 g/dL    RDW 16.9 (H) 10.0 - 15.0 %    Platelet Count 191 150 - 450 10e3/uL    NRBCs per 100 WBC 0 <1 /100    Absolute NRBCs 0.0 10e3/uL   Manual Differential   Result Value Ref Range    % Neutrophils 60 %    % Lymphocytes 25 %    % Monocytes 14 %    % Eosinophils 0 %    % Basophils 0 %    % Myelocytes 1 %    Absolute Neutrophils 3.9 1.6 - 8.3 10e3/uL    Absolute Lymphocytes 1.6 0.8 - 5.3 10e3/uL    Absolute Monocytes 0.9 0.0 - 1.3 10e3/uL    Absolute Eosinophils 0.0 0.0 - 0.7 10e3/uL    Absolute Basophils 0.0 0.0 - 0.2 10e3/uL    Absolute Myelocytes 0.1 (H) <=0.0 10e3/uL    RBC Morphology Confirmed RBC Indices     Platelet Assessment  Automated Count Confirmed. Platelet morphology is normal.      Automated Count Confirmed. Platelet morphology is normal.    Reactive Lymphocytes Present (A) None Seen       I reviewed the above labs today.  Imaging    US Breast Left Limited 1-3 Quadrants    Result Date: 9/10/2024  EXAM: US BREAST LEFT LIMITED 1-3 QUADRANTS, 9/10/2024 1:03 PM HISTORY: 58-year-old woman with known left breast invasive ductal carcinoma on neoadjuvant chemotherapy COMPARISONS: 6/13/2024 FINDINGS: ULTRASOUND: Ultrasound targeted to the left 10:00 1 cm from the nipple reveals decreased size of the irregular, hypoechoic solid mass, now measuring 1.9 x 1.5 x 1.2 cm, previously 3.1 x 3.0 x 2.3 cm.     IMPRESSION: BI-RADS CATEGORY: 6 - Known Biopsy-Proven Malignancy. Treatment effect with decreased size of known left breast malignancy. RECOMMENDED FOLLOW-UP: Clinical Follow-up. Results given to the patient. BRIDGETTE TERAN MD   SYSTEM ID:  [id number]    XR Chest 2 Views    Result Date: 9/3/2024  EXAM: XR CHEST 2 VIEWS LOCATION: [facility] DATE: 9/3/2024 INDICATION: pt coughing with green phlegm COMPARISON: 5/11/2024     IMPRESSION: Interval placement of a right IJ Port-A-Cath in good position. Interval clearing of the linear fibroatelectasis in the left base. Minimal fluid or thickening of the right minor fissure is unchanged. No new parenchymal disease. No effusions. Heart size normal size. No suspicious bone lesions.       Billing  Total time 60 minutes, to include face to face visit, review of EMR, ordering, documentation and coordination of care on date of service. The longitudinal plan of care for the diagnosis(es)/condition(s) as documented were addressed during this visit. Due to the added complexity in care, I will continue to support Kerry in the subsequent management and with ongoing continuity of care.    Signed by: NEW Yost CNP         Chart documentation with Dragon Voice recognition Software. Although reviewed after completion, some words and grammatical  errors may remain.

## 2024-09-17 ENCOUNTER — LAB (OUTPATIENT)
Dept: INFUSION THERAPY | Facility: HOSPITAL | Age: 59
End: 2024-09-17
Attending: INTERNAL MEDICINE
Payer: COMMERCIAL

## 2024-09-17 ENCOUNTER — ONCOLOGY VISIT (OUTPATIENT)
Dept: ONCOLOGY | Facility: HOSPITAL | Age: 59
End: 2024-09-17
Attending: INTERNAL MEDICINE
Payer: COMMERCIAL

## 2024-09-17 VITALS
HEART RATE: 85 BPM | HEIGHT: 61 IN | TEMPERATURE: 98.1 F | SYSTOLIC BLOOD PRESSURE: 116 MMHG | WEIGHT: 139.1 LBS | RESPIRATION RATE: 16 BRPM | BODY MASS INDEX: 26.26 KG/M2 | DIASTOLIC BLOOD PRESSURE: 74 MMHG | OXYGEN SATURATION: 96 %

## 2024-09-17 DIAGNOSIS — C50.912 INVASIVE DUCTAL CARCINOMA OF BREAST, FEMALE, LEFT (H): Primary | ICD-10-CM

## 2024-09-17 DIAGNOSIS — R05.9 COUGH, UNSPECIFIED TYPE: ICD-10-CM

## 2024-09-17 DIAGNOSIS — R53.83 CHEMOTHERAPY-INDUCED FATIGUE: ICD-10-CM

## 2024-09-17 DIAGNOSIS — R53.0 NEOPLASTIC MALIGNANT RELATED FATIGUE: ICD-10-CM

## 2024-09-17 DIAGNOSIS — T45.1X5A CHEMOTHERAPY-INDUCED FATIGUE: ICD-10-CM

## 2024-09-17 LAB
ALBUMIN SERPL BCG-MCNC: 4.1 G/DL (ref 3.5–5.2)
ALP SERPL-CCNC: 108 U/L (ref 40–150)
ALT SERPL W P-5'-P-CCNC: 16 U/L (ref 0–50)
AST SERPL W P-5'-P-CCNC: 19 U/L (ref 0–45)
BASOPHILS # BLD MANUAL: 0 10E3/UL (ref 0–0.2)
BASOPHILS NFR BLD MANUAL: 0 %
BILIRUB DIRECT SERPL-MCNC: <0.2 MG/DL (ref 0–0.3)
BILIRUB SERPL-MCNC: 0.4 MG/DL
EOSINOPHIL # BLD MANUAL: 0 10E3/UL (ref 0–0.7)
EOSINOPHIL NFR BLD MANUAL: 0 %
ERYTHROCYTE [DISTWIDTH] IN BLOOD BY AUTOMATED COUNT: 16.9 % (ref 10–15)
HCT VFR BLD AUTO: 33.4 % (ref 35–47)
HGB BLD-MCNC: 11 G/DL (ref 11.7–15.7)
LYMPHOCYTES # BLD MANUAL: 1.6 10E3/UL (ref 0.8–5.3)
LYMPHOCYTES NFR BLD MANUAL: 25 %
MCH RBC QN AUTO: 30.4 PG (ref 26.5–33)
MCHC RBC AUTO-ENTMCNC: 32.9 G/DL (ref 31.5–36.5)
MCV RBC AUTO: 92 FL (ref 78–100)
MONOCYTES # BLD MANUAL: 0.9 10E3/UL (ref 0–1.3)
MONOCYTES NFR BLD MANUAL: 14 %
MYELOCYTES # BLD MANUAL: 0.1 10E3/UL
MYELOCYTES NFR BLD MANUAL: 1 %
NEUTROPHILS # BLD MANUAL: 3.9 10E3/UL (ref 1.6–8.3)
NEUTROPHILS NFR BLD MANUAL: 60 %
NRBC # BLD AUTO: 0 10E3/UL
NRBC BLD AUTO-RTO: 0 /100
PLAT MORPH BLD: ABNORMAL
PLATELET # BLD AUTO: 191 10E3/UL (ref 150–450)
PROT SERPL-MCNC: 6.9 G/DL (ref 6.4–8.3)
RBC # BLD AUTO: 3.62 10E6/UL (ref 3.8–5.2)
RBC MORPH BLD: ABNORMAL
VARIANT LYMPHS BLD QL SMEAR: PRESENT
WBC # BLD AUTO: 6.5 10E3/UL (ref 4–11)

## 2024-09-17 PROCEDURE — 85007 BL SMEAR W/DIFF WBC COUNT: CPT

## 2024-09-17 PROCEDURE — 99215 OFFICE O/P EST HI 40 MIN: CPT

## 2024-09-17 PROCEDURE — 96413 CHEMO IV INFUSION 1 HR: CPT

## 2024-09-17 PROCEDURE — 80076 HEPATIC FUNCTION PANEL: CPT

## 2024-09-17 PROCEDURE — T1013 SIGN LANG/ORAL INTERPRETER: HCPCS | Mod: U4 | Performed by: INTERPRETER

## 2024-09-17 PROCEDURE — G2211 COMPLEX E/M VISIT ADD ON: HCPCS

## 2024-09-17 PROCEDURE — 96375 TX/PRO/DX INJ NEW DRUG ADDON: CPT

## 2024-09-17 PROCEDURE — 36591 DRAW BLOOD OFF VENOUS DEVICE: CPT

## 2024-09-17 PROCEDURE — G0463 HOSPITAL OUTPT CLINIC VISIT: HCPCS

## 2024-09-17 PROCEDURE — 96367 TX/PROPH/DG ADDL SEQ IV INF: CPT

## 2024-09-17 PROCEDURE — 258N000003 HC RX IP 258 OP 636

## 2024-09-17 PROCEDURE — 250N000011 HC RX IP 250 OP 636

## 2024-09-17 PROCEDURE — 99417 PROLNG OP E/M EACH 15 MIN: CPT

## 2024-09-17 PROCEDURE — 85027 COMPLETE CBC AUTOMATED: CPT

## 2024-09-17 RX ORDER — METHYLPREDNISOLONE SODIUM SUCCINATE 125 MG/2ML
125 INJECTION, POWDER, LYOPHILIZED, FOR SOLUTION INTRAMUSCULAR; INTRAVENOUS
Status: DISCONTINUED | OUTPATIENT
Start: 2024-09-17 | End: 2024-09-17 | Stop reason: HOSPADM

## 2024-09-17 RX ORDER — MEPERIDINE HYDROCHLORIDE 50 MG/ML
25 INJECTION INTRAMUSCULAR; INTRAVENOUS; SUBCUTANEOUS EVERY 30 MIN PRN
Status: DISCONTINUED | OUTPATIENT
Start: 2024-09-17 | End: 2024-09-17 | Stop reason: HOSPADM

## 2024-09-17 RX ORDER — DIPHENHYDRAMINE HYDROCHLORIDE 50 MG/ML
50 INJECTION INTRAMUSCULAR; INTRAVENOUS
Status: CANCELLED
Start: 2024-09-17

## 2024-09-17 RX ORDER — HEPARIN SODIUM (PORCINE) LOCK FLUSH IV SOLN 100 UNIT/ML 100 UNIT/ML
5 SOLUTION INTRAVENOUS
Status: DISCONTINUED | OUTPATIENT
Start: 2024-09-17 | End: 2024-09-17 | Stop reason: HOSPADM

## 2024-09-17 RX ORDER — LORAZEPAM 2 MG/ML
0.5 INJECTION INTRAMUSCULAR EVERY 4 HOURS PRN
Status: CANCELLED | OUTPATIENT
Start: 2024-09-17

## 2024-09-17 RX ORDER — ALBUTEROL SULFATE 90 UG/1
1-2 AEROSOL, METERED RESPIRATORY (INHALATION)
Status: CANCELLED
Start: 2024-09-17

## 2024-09-17 RX ORDER — ALBUTEROL SULFATE 0.83 MG/ML
2.5 SOLUTION RESPIRATORY (INHALATION)
Status: CANCELLED | OUTPATIENT
Start: 2024-09-17

## 2024-09-17 RX ORDER — ALBUTEROL SULFATE 0.83 MG/ML
2.5 SOLUTION RESPIRATORY (INHALATION)
Status: DISCONTINUED | OUTPATIENT
Start: 2024-09-17 | End: 2024-09-17 | Stop reason: HOSPADM

## 2024-09-17 RX ORDER — DIPHENHYDRAMINE HCL 50 MG
50 CAPSULE ORAL ONCE
Status: CANCELLED
Start: 2024-09-23

## 2024-09-17 RX ORDER — LORAZEPAM 2 MG/ML
0.5 INJECTION INTRAMUSCULAR EVERY 4 HOURS PRN
Status: CANCELLED | OUTPATIENT
Start: 2024-09-23

## 2024-09-17 RX ORDER — EPINEPHRINE 1 MG/ML
0.3 INJECTION, SOLUTION INTRAMUSCULAR; SUBCUTANEOUS EVERY 5 MIN PRN
Status: CANCELLED | OUTPATIENT
Start: 2024-09-23

## 2024-09-17 RX ORDER — HEPARIN SODIUM (PORCINE) LOCK FLUSH IV SOLN 100 UNIT/ML 100 UNIT/ML
5 SOLUTION INTRAVENOUS
Status: CANCELLED | OUTPATIENT
Start: 2024-09-17

## 2024-09-17 RX ORDER — DIPHENHYDRAMINE HYDROCHLORIDE 50 MG/ML
50 INJECTION INTRAMUSCULAR; INTRAVENOUS
Status: CANCELLED
Start: 2024-09-23

## 2024-09-17 RX ORDER — HEPARIN SODIUM,PORCINE 10 UNIT/ML
5-20 VIAL (ML) INTRAVENOUS DAILY PRN
Status: CANCELLED | OUTPATIENT
Start: 2024-09-17

## 2024-09-17 RX ORDER — CODEINE PHOSPHATE AND GUAIFENESIN 10; 100 MG/5ML; MG/5ML
1-2 SOLUTION ORAL EVERY 4 HOURS PRN
Qty: 237 ML | Refills: 0 | Status: SHIPPED | OUTPATIENT
Start: 2024-09-17 | End: 2024-09-24

## 2024-09-17 RX ORDER — HEPARIN SODIUM (PORCINE) LOCK FLUSH IV SOLN 100 UNIT/ML 100 UNIT/ML
5 SOLUTION INTRAVENOUS
Status: CANCELLED | OUTPATIENT
Start: 2024-09-23

## 2024-09-17 RX ORDER — METHYLPREDNISOLONE SODIUM SUCCINATE 125 MG/2ML
125 INJECTION, POWDER, LYOPHILIZED, FOR SOLUTION INTRAMUSCULAR; INTRAVENOUS
Status: CANCELLED
Start: 2024-09-17

## 2024-09-17 RX ORDER — MEPERIDINE HYDROCHLORIDE 50 MG/ML
25 INJECTION INTRAMUSCULAR; INTRAVENOUS; SUBCUTANEOUS EVERY 30 MIN PRN
Status: CANCELLED | OUTPATIENT
Start: 2024-09-17

## 2024-09-17 RX ORDER — ALBUTEROL SULFATE 0.83 MG/ML
2.5 SOLUTION RESPIRATORY (INHALATION)
Status: CANCELLED | OUTPATIENT
Start: 2024-09-23

## 2024-09-17 RX ORDER — DIPHENHYDRAMINE HCL 50 MG
50 CAPSULE ORAL ONCE
Status: CANCELLED
Start: 2024-09-17

## 2024-09-17 RX ORDER — EPINEPHRINE 1 MG/ML
0.3 INJECTION, SOLUTION INTRAMUSCULAR; SUBCUTANEOUS EVERY 5 MIN PRN
Status: CANCELLED | OUTPATIENT
Start: 2024-09-17

## 2024-09-17 RX ORDER — ALBUTEROL SULFATE 90 UG/1
1-2 AEROSOL, METERED RESPIRATORY (INHALATION)
Status: CANCELLED
Start: 2024-09-23

## 2024-09-17 RX ORDER — DIPHENHYDRAMINE HYDROCHLORIDE 50 MG/ML
50 INJECTION INTRAMUSCULAR; INTRAVENOUS
Status: DISCONTINUED | OUTPATIENT
Start: 2024-09-17 | End: 2024-09-17 | Stop reason: HOSPADM

## 2024-09-17 RX ORDER — HEPARIN SODIUM,PORCINE 10 UNIT/ML
5-20 VIAL (ML) INTRAVENOUS DAILY PRN
Status: CANCELLED | OUTPATIENT
Start: 2024-09-23

## 2024-09-17 RX ORDER — ALBUTEROL SULFATE 90 UG/1
1-2 AEROSOL, METERED RESPIRATORY (INHALATION)
Status: DISCONTINUED | OUTPATIENT
Start: 2024-09-17 | End: 2024-09-17 | Stop reason: HOSPADM

## 2024-09-17 RX ORDER — METHYLPREDNISOLONE SODIUM SUCCINATE 125 MG/2ML
125 INJECTION, POWDER, LYOPHILIZED, FOR SOLUTION INTRAMUSCULAR; INTRAVENOUS
Status: CANCELLED
Start: 2024-09-23

## 2024-09-17 RX ORDER — MEPERIDINE HYDROCHLORIDE 50 MG/ML
25 INJECTION INTRAMUSCULAR; INTRAVENOUS; SUBCUTANEOUS EVERY 30 MIN PRN
Status: CANCELLED | OUTPATIENT
Start: 2024-09-23

## 2024-09-17 RX ORDER — EPINEPHRINE 1 MG/ML
0.3 INJECTION, SOLUTION INTRAMUSCULAR; SUBCUTANEOUS EVERY 5 MIN PRN
Status: DISCONTINUED | OUTPATIENT
Start: 2024-09-17 | End: 2024-09-17 | Stop reason: HOSPADM

## 2024-09-17 RX ADMIN — SODIUM CHLORIDE 250 ML: 9 INJECTION, SOLUTION INTRAVENOUS at 10:53

## 2024-09-17 RX ADMIN — DEXAMETHASONE SODIUM PHOSPHATE: 10 INJECTION, SOLUTION INTRAMUSCULAR; INTRAVENOUS at 11:09

## 2024-09-17 RX ADMIN — DIPHENHYDRAMINE HYDROCHLORIDE 50 MG: 50 INJECTION INTRAMUSCULAR; INTRAVENOUS at 11:28

## 2024-09-17 RX ADMIN — FAMOTIDINE 20 MG: 10 INJECTION INTRAVENOUS at 10:53

## 2024-09-17 RX ADMIN — HEPARIN 5 ML: 100 SYRINGE at 13:10

## 2024-09-17 RX ADMIN — PACLITAXEL 132 MG: 6 INJECTION, SOLUTION, CONCENTRATE INTRAVENOUS at 12:09

## 2024-09-17 ASSESSMENT — PAIN SCALES - GENERAL: PAINLEVEL: MODERATE PAIN (5)

## 2024-09-17 NOTE — PROGRESS NOTES
Infusion Nursing Note:  Kerry Mills presents today for D1C5 Taxol.    Patient seen by provider today: Yes: Brianna Mancilla   present during visit today: Yes, Language: hmong and pt daughter also here translating.     Note: pt given iv dex/zofran, benadryl, pepcid .      Intravenous Access:  Implanted Port.    Treatment Conditions:  Lab Results   Component Value Date    HGB 11.0 (L) 09/17/2024    WBC 6.5 09/17/2024    ANEU 3.9 09/17/2024    ANEUTAUTO 0.7 (L) 08/12/2024     09/17/2024        Lab Results   Component Value Date     09/03/2024    POTASSIUM 3.3 (L) 09/03/2024    MAG 2.2 12/06/2020    CR 0.53 09/03/2024    DONNA 8.7 (L) 09/03/2024    BILITOTAL 0.4 09/17/2024    ALBUMIN 4.1 09/17/2024    ALT 16 09/17/2024    AST 19 09/17/2024       Results reviewed, labs MET treatment parameters, ok to proceed with treatment.      Post Infusion Assessment:  Patient tolerated infusion without incident.  Blood return noted pre and post infusion.  Site patent and intact, free from redness, edema or discomfort.  Access discontinued per protocol.       Discharge Plan:   Patient and/or family verbalized understanding of discharge instructions and all questions answered.      Edith White RN

## 2024-09-17 NOTE — LETTER
9/17/2024      Kerry Mills  [address] [place] [address]      Dear Colleague,    Thank you for referring your patient, Kerry Mills, to the [facility] [place]. Please see a copy of my visit note below.    [facility] Outpatient Progress Note    Patient: Kerry Mills  MRN: [id number]  Date of Service: Sep 17, 2024          Reason for Visit    Chief Complaint   Patient presents with     Oncology Clinic Visit     Invasive ductal carcinoma of breast, female, left (H)       Primary Hematologist/Oncologist: Dr. Adamson        Assessment/Plan  #. Invasive ductal carcinoma of breast, female, left (triple negative breast cancer)  #. Neoplastic malignant related fatigue  #. Chemotherapy-induced fatigue  #. Cough, persistent since ~early August, 2024  Patient is clinically stable. She is here today for consideration of 1st dose of weekly paclitaxel. We reviewed her decision to continue chemo at this time and have surgery after all cycles are completed. She is feeling fair, but notes a persistent cough. We checked an xray recently that did not indicate an infection. She remains fever free and without evidence for infection on exam. No antibiotics recommended at this time. Will try some guaifenesin with codeine. Interim imaging with left breast ultrasound following DDAC was reviewed noting treatment effect with decrease size in known left breast malignancy. We reviewed labs today including a CBC which shows a hemoglobin of 11.0 which is a stable, and a white blood cell count of 6.5. I do recommend to proceed with first cycle of weekly paclitaxel, and patient is agreeable to this.    Proceed with paclitaxel today and weekly.   Follow-up with Dr. Adamson in 2 weeks for labs, and assessment prior to paclitaxel.   We will also continue to monitor the breast mass clinically on each visit.    Current plan is to continue weekly paclitaxel as neoadjuvant chemotherapy after which patient  will be referred to surgery.  Will follow back up with Dr. Choe in December to further discuss lumpectomy vs mastectomy. Pt and daughter understand that she would proceed with radiation after the surgery.     ______________________________________________________________________________    History of Present Illness/ Interval History    Ms. Kerry Mills  is a 58 year old female patient returns in follow-up with her daughter for consideration of next cycle of chemotherapy.  She completed DDAC on 9/3/2024, and is here today for consideration of next phase of chemo using weekly paclitaxel. She notes ongoing cough and states its not getting better. She tells me she usually feels well when she eats well and she hasn't had the best appetite lately. Sometimes she has to force herself to eat. She is working on getting more protein intake into her diet. She denies N/V/D/C. She has pain when she coughs to the left ribs. Denies significant SOB. Cough is productive and leads to fatigue and frustration. Cough is not new, this has been present since beginning treatment. Recent xray on 9/3 did not show infection. Coughing causes pain in rib and doesn't seem to be getting better. Has phlegm. Has tried some OTC cough medicine without much relief.     ECOG performance status   0- Fully active, without restriction      Pain  Pain Score: Moderate Pain (5)    ROS  A 14 point review of systems was obtained.  Positive findings noted in the history.  The remainder of the review of system is otherwise negative.      Oncology History/Treatment  Oncologic history  1) invasive ductal carcinoma of the breast ER negative HI negative HER2/kobe 2+, negative by FISH.  3.9 cm in maximum dimension on MRI.  Diagnosed July 2024    Treatment  1) neoadjuvant AC started on 7/22/2024.  CarboTaxol and Pembro denied by insurance and   2)Plan to give AC followed by paclitaxel before referring patient for surgery    Physical Exam    /74   Pulse 85    "Temp 98.1  F (36.7  C)   Resp 16   Ht 1.549 m (5' 1\")   Wt 63.1 kg (139 lb 1.6 oz)   SpO2 96%   BMI 26.28 kg/m      General: Well-appearing female in no acute distress. Cooperative in conversation.  Eyes: EOMI, PERRL. No scleral icterus.  ENT: Oral mucosa is moist without thrush.  No significant erythema present  Lymphatic: Neck is supple without cervical, axillary, or supraclavicular lymphadenopathy.   Cardiovascular: RRR No murmurs, gallops, or rubs. No peripheral edema.  Breasts: Left breast mass palpable, nontender  Respiratory: CTA bilaterally. No wheezes or crackles.  Gastrointestinal: BS +. Abdomen soft, non-tender. No palpable hepatosplenomegaly or masses.   Neurologic: Alert and oriented x3. Cranial nerves II through XII are grossly intact.  Skin: No rashes, petechiae, or bruising noted. Warm, dry, intact.      Lab Results    Recent Results (from the past 168 hour(s))   Hepatic panel   Result Value Ref Range    Protein Total 6.9 6.4 - 8.3 g/dL    Albumin 4.1 3.5 - 5.2 g/dL    Bilirubin Total 0.4 <=1.2 mg/dL    Alkaline Phosphatase 108 40 - 150 U/L    AST 19 0 - 45 U/L    ALT 16 0 - 50 U/L    Bilirubin Direct <0.20 0.00 - 0.30 mg/dL   CBC with platelets and differential   Result Value Ref Range    WBC Count 6.5 4.0 - 11.0 10e3/uL    RBC Count 3.62 (L) 3.80 - 5.20 10e6/uL    Hemoglobin 11.0 (L) 11.7 - 15.7 g/dL    Hematocrit 33.4 (L) 35.0 - 47.0 %    MCV 92 78 - 100 fL    MCH 30.4 26.5 - 33.0 pg    MCHC 32.9 31.5 - 36.5 g/dL    RDW 16.9 (H) 10.0 - 15.0 %    Platelet Count 191 150 - 450 10e3/uL    NRBCs per 100 WBC 0 <1 /100    Absolute NRBCs 0.0 10e3/uL   Manual Differential   Result Value Ref Range    % Neutrophils 60 %    % Lymphocytes 25 %    % Monocytes 14 %    % Eosinophils 0 %    % Basophils 0 %    % Myelocytes 1 %    Absolute Neutrophils 3.9 1.6 - 8.3 10e3/uL    Absolute Lymphocytes 1.6 0.8 - 5.3 10e3/uL    Absolute Monocytes 0.9 0.0 - 1.3 10e3/uL    Absolute Eosinophils 0.0 0.0 - 0.7 10e3/uL    " Absolute Basophils 0.0 0.0 - 0.2 10e3/uL    Absolute Myelocytes 0.1 (H) <=0.0 10e3/uL    RBC Morphology Confirmed RBC Indices     Platelet Assessment  Automated Count Confirmed. Platelet morphology is normal.     Automated Count Confirmed. Platelet morphology is normal.    Reactive Lymphocytes Present (A) None Seen       I reviewed the above labs today.  Imaging    US Breast Left Limited 1-3 Quadrants    Result Date: 9/10/2024  EXAM: US BREAST LEFT LIMITED 1-3 QUADRANTS, 9/10/2024 1:03 PM HISTORY: 58-year-old woman with known left breast invasive ductal carcinoma on neoadjuvant chemotherapy COMPARISONS: 6/13/2024 FINDINGS: ULTRASOUND: Ultrasound targeted to the left 10:00 1 cm from the nipple reveals decreased size of the irregular, hypoechoic solid mass, now measuring 1.9 x 1.5 x 1.2 cm, previously 3.1 x 3.0 x 2.3 cm.     IMPRESSION: BI-RADS CATEGORY: 6 - Known Biopsy-Proven Malignancy. Treatment effect with decreased size of known left breast malignancy. RECOMMENDED FOLLOW-UP: Clinical Follow-up. Results given to the patient. BRIDGETTE TERAN MD   SYSTEM ID:  [id number]    XR Chest 2 Views    Result Date: 9/3/2024  EXAM: XR CHEST 2 VIEWS LOCATION: [facility] DATE: 9/3/2024 INDICATION: pt coughing with green phlegm COMPARISON: 5/11/2024     IMPRESSION: Interval placement of a right IJ Port-A-Cath in good position. Interval clearing of the linear fibroatelectasis in the left base. Minimal fluid or thickening of the right minor fissure is unchanged. No new parenchymal disease. No effusions. Heart size normal size. No suspicious bone lesions.       Billing  Total time 60 minutes, to include face to face visit, review of EMR, ordering, documentation and coordination of care on date of service. The longitudinal plan of care for the diagnosis(es)/condition(s) as documented were addressed during this visit. Due to the added complexity in care, I will continue to support Kerry in the subsequent  "management and with ongoing continuity of care.    Signed by: NEW Yost CNP         Chart documentation with Dragon Voice recognition Software. Although reviewed after completion, some words and grammatical errors may remain.      Oncology Rooming Note    September 17, 2024 9:36 AM   Kerry Mills is a 58 year old female who presents for:    Chief Complaint   Patient presents with     Oncology Clinic Visit     Invasive ductal carcinoma of breast, female, left (H)     Initial Vitals: /74   Pulse 85   Temp 98.1  F (36.7  C)   Resp 16   Ht 1.549 m (5' 1\")   Wt 63.1 kg (139 lb 1.6 oz)   SpO2 96%   BMI 26.28 kg/m   Estimated body mass index is 26.28 kg/m  as calculated from the following:    Height as of this encounter: 1.549 m (5' 1\").    Weight as of this encounter: 63.1 kg (139 lb 1.6 oz). Body surface area is 1.65 meters squared.  Moderate Pain (5) Comment: Data Unavailable   No LMP recorded. Patient is postmenopausal.  Allergies reviewed: Yes  Medications reviewed: Yes    Medications: Medication refills not needed today.  Pharmacy name entered into [email or web address]:    [facility] - [place], [place] - [address]  [facility] - [place], [place] - [facility] [address]    Frailty Screening:   Is the patient here for a new oncology consult visit in cancer care? 2. No      Clinical concerns: None      Malu Cuellar LPN                 Again, thank you for allowing me to participate in the care of your patient.        Sincerely,        NEW Yost CNP  "

## 2024-09-17 NOTE — PROGRESS NOTES
"Oncology Rooming Note    September 17, 2024 9:36 AM   Kerry Mills is a 58 year old female who presents for:    Chief Complaint   Patient presents with    Oncology Clinic Visit     Invasive ductal carcinoma of breast, female, left (H)     Initial Vitals: /74   Pulse 85   Temp 98.1  F (36.7  C)   Resp 16   Ht 1.549 m (5' 1\")   Wt 63.1 kg (139 lb 1.6 oz)   SpO2 96%   BMI 26.28 kg/m   Estimated body mass index is 26.28 kg/m  as calculated from the following:    Height as of this encounter: 1.549 m (5' 1\").    Weight as of this encounter: 63.1 kg (139 lb 1.6 oz). Body surface area is 1.65 meters squared.  Moderate Pain (5) Comment: Data Unavailable   No LMP recorded. Patient is postmenopausal.  Allergies reviewed: Yes  Medications reviewed: Yes    Medications: Medication refills not needed today.  Pharmacy name entered into [employer]:    [facility] - [place], [place] - [address]  [facility] - [place], [place] - [address]    Frailty Screening:   Is the patient here for a new oncology consult visit in cancer care? 2. No      Clinical concerns: None      Malu Cuellar LPN             "

## 2024-09-24 ENCOUNTER — LAB (OUTPATIENT)
Dept: INFUSION THERAPY | Facility: HOSPITAL | Age: 59
End: 2024-09-24
Attending: INTERNAL MEDICINE
Payer: COMMERCIAL

## 2024-09-24 VITALS
TEMPERATURE: 97.9 F | RESPIRATION RATE: 16 BRPM | OXYGEN SATURATION: 96 % | SYSTOLIC BLOOD PRESSURE: 147 MMHG | HEART RATE: 91 BPM | BODY MASS INDEX: 26.28 KG/M2 | HEIGHT: 61 IN | DIASTOLIC BLOOD PRESSURE: 91 MMHG

## 2024-09-24 DIAGNOSIS — R05.9 COUGH, UNSPECIFIED TYPE: ICD-10-CM

## 2024-09-24 DIAGNOSIS — C50.912 INVASIVE DUCTAL CARCINOMA OF BREAST, FEMALE, LEFT (H): Primary | ICD-10-CM

## 2024-09-24 LAB
BASOPHILS # BLD AUTO: 0 10E3/UL (ref 0–0.2)
BASOPHILS NFR BLD AUTO: 0 %
EOSINOPHIL # BLD AUTO: 0 10E3/UL (ref 0–0.7)
EOSINOPHIL NFR BLD AUTO: 0 %
ERYTHROCYTE [DISTWIDTH] IN BLOOD BY AUTOMATED COUNT: 16.7 % (ref 10–15)
HCT VFR BLD AUTO: 31.5 % (ref 35–47)
HGB BLD-MCNC: 10.6 G/DL (ref 11.7–15.7)
IMM GRANULOCYTES # BLD: 0.2 10E3/UL
IMM GRANULOCYTES NFR BLD: 2 %
LYMPHOCYTES # BLD AUTO: 0.8 10E3/UL (ref 0.8–5.3)
LYMPHOCYTES NFR BLD AUTO: 9 %
MCH RBC QN AUTO: 30.7 PG (ref 26.5–33)
MCHC RBC AUTO-ENTMCNC: 33.7 G/DL (ref 31.5–36.5)
MCV RBC AUTO: 91 FL (ref 78–100)
MONOCYTES # BLD AUTO: 0.6 10E3/UL (ref 0–1.3)
MONOCYTES NFR BLD AUTO: 7 %
NEUTROPHILS # BLD AUTO: 7.8 10E3/UL (ref 1.6–8.3)
NEUTROPHILS NFR BLD AUTO: 83 %
NRBC # BLD AUTO: 0 10E3/UL
NRBC BLD AUTO-RTO: 0 /100
PLATELET # BLD AUTO: 306 10E3/UL (ref 150–450)
RBC # BLD AUTO: 3.45 10E6/UL (ref 3.8–5.2)
WBC # BLD AUTO: 9.5 10E3/UL (ref 4–11)

## 2024-09-24 PROCEDURE — 96367 TX/PROPH/DG ADDL SEQ IV INF: CPT

## 2024-09-24 PROCEDURE — 85025 COMPLETE CBC W/AUTO DIFF WBC: CPT

## 2024-09-24 PROCEDURE — 96413 CHEMO IV INFUSION 1 HR: CPT

## 2024-09-24 PROCEDURE — 36592 COLLECT BLOOD FROM PICC: CPT

## 2024-09-24 PROCEDURE — 250N000011 HC RX IP 250 OP 636

## 2024-09-24 PROCEDURE — 258N000003 HC RX IP 258 OP 636

## 2024-09-24 PROCEDURE — 96375 TX/PRO/DX INJ NEW DRUG ADDON: CPT

## 2024-09-24 RX ORDER — METHYLPREDNISOLONE SODIUM SUCCINATE 125 MG/2ML
125 INJECTION, POWDER, LYOPHILIZED, FOR SOLUTION INTRAMUSCULAR; INTRAVENOUS
Status: DISCONTINUED | OUTPATIENT
Start: 2024-09-24 | End: 2024-09-24 | Stop reason: HOSPADM

## 2024-09-24 RX ORDER — CODEINE PHOSPHATE AND GUAIFENESIN 10; 100 MG/5ML; MG/5ML
1-2 SOLUTION ORAL EVERY 4 HOURS PRN
Qty: 237 ML | Refills: 0 | Status: SHIPPED | OUTPATIENT
Start: 2024-09-24

## 2024-09-24 RX ORDER — DIPHENHYDRAMINE HYDROCHLORIDE 50 MG/ML
50 INJECTION INTRAMUSCULAR; INTRAVENOUS
Status: DISCONTINUED | OUTPATIENT
Start: 2024-09-24 | End: 2024-09-24 | Stop reason: HOSPADM

## 2024-09-24 RX ORDER — MEPERIDINE HYDROCHLORIDE 50 MG/ML
25 INJECTION INTRAMUSCULAR; INTRAVENOUS; SUBCUTANEOUS EVERY 30 MIN PRN
Status: DISCONTINUED | OUTPATIENT
Start: 2024-09-24 | End: 2024-09-24 | Stop reason: HOSPADM

## 2024-09-24 RX ORDER — EPINEPHRINE 1 MG/ML
0.3 INJECTION, SOLUTION INTRAMUSCULAR; SUBCUTANEOUS EVERY 5 MIN PRN
Status: DISCONTINUED | OUTPATIENT
Start: 2024-09-24 | End: 2024-09-24 | Stop reason: HOSPADM

## 2024-09-24 RX ORDER — HEPARIN SODIUM (PORCINE) LOCK FLUSH IV SOLN 100 UNIT/ML 100 UNIT/ML
5 SOLUTION INTRAVENOUS
Status: DISCONTINUED | OUTPATIENT
Start: 2024-09-24 | End: 2024-09-24 | Stop reason: HOSPADM

## 2024-09-24 RX ORDER — ALBUTEROL SULFATE 90 UG/1
1-2 AEROSOL, METERED RESPIRATORY (INHALATION)
Status: DISCONTINUED | OUTPATIENT
Start: 2024-09-24 | End: 2024-09-24 | Stop reason: HOSPADM

## 2024-09-24 RX ORDER — ALBUTEROL SULFATE 0.83 MG/ML
2.5 SOLUTION RESPIRATORY (INHALATION)
Status: DISCONTINUED | OUTPATIENT
Start: 2024-09-24 | End: 2024-09-24 | Stop reason: HOSPADM

## 2024-09-24 RX ADMIN — DIPHENHYDRAMINE HYDROCHLORIDE 50 MG: 50 INJECTION INTRAMUSCULAR; INTRAVENOUS at 11:46

## 2024-09-24 RX ADMIN — PACLITAXEL 134 MG: 6 INJECTION, SOLUTION INTRAVENOUS at 12:33

## 2024-09-24 RX ADMIN — SODIUM CHLORIDE 250 ML: 9 INJECTION, SOLUTION INTRAVENOUS at 11:45

## 2024-09-24 RX ADMIN — FAMOTIDINE 20 MG: 10 INJECTION INTRAVENOUS at 12:23

## 2024-09-24 RX ADMIN — HEPARIN 5 ML: 100 SYRINGE at 13:40

## 2024-09-24 RX ADMIN — DEXAMETHASONE SODIUM PHOSPHATE: 10 INJECTION, SOLUTION INTRAMUSCULAR; INTRAVENOUS at 11:57

## 2024-09-24 NOTE — PROGRESS NOTES
Infusion Nursing Note:  Kerry Mills presents today for Day 1 Cycle 6 Taxol infusion.    Patient seen by provider today: No   present during visit today: Not Applicable.    Note: Kerry arrives ambulatory to [facility] infusion today for Day 1 Cycle 6 Taxol. She continues to have a cough, worse at night. She was prescribed cough medicine by gamaliel at the last visit but were unable to fill the prescription of cough syrup with codeine at the [facility]. Dr. Adamson resent prescription to [facility] today.      Intravenous Access:  Labs drawn without difficulty.  Implanted Port.    Treatment Conditions:  Lab Results   Component Value Date    HGB 10.6 (L) 09/24/2024    WBC 9.5 09/24/2024    ANEU 3.9 09/17/2024    ANEUTAUTO 7.8 09/24/2024     09/24/2024        Results reviewed, labs MET treatment parameters, ok to proceed with treatment.      Post Infusion Assessment:  Patient tolerated infusion without incident.  Blood return noted pre and post infusion.  Site patent and intact, free from redness, edema or discomfort.  No evidence of extravasations.  Access discontinued per protocol.       Discharge Plan:   Discharge instructions reviewed with: Patient and Family.  Patient and/or family verbalized understanding of discharge instructions and all questions answered.  AVS to patient via [employer].  Patient will return 10/1 for next appointment.   Patient discharged in stable condition accompanied by: daughter-in-law.  Departure Mode: Ambulatory.      Rachell Nelson RN

## 2024-09-29 ENCOUNTER — APPOINTMENT (OUTPATIENT)
Dept: CT IMAGING | Facility: CLINIC | Age: 59
End: 2024-09-29
Attending: EMERGENCY MEDICINE
Payer: COMMERCIAL

## 2024-09-29 ENCOUNTER — HOSPITAL ENCOUNTER (INPATIENT)
Facility: CLINIC | Age: 59
LOS: 1 days | Discharge: HOME | End: 2024-09-30
Attending: EMERGENCY MEDICINE | Admitting: INTERNAL MEDICINE
Payer: COMMERCIAL

## 2024-09-29 DIAGNOSIS — E87.6 HYPOKALEMIA: ICD-10-CM

## 2024-09-29 DIAGNOSIS — U07.1 COVID-19: ICD-10-CM

## 2024-09-29 DIAGNOSIS — R91.8 LEFT UPPER LOBE PULMONARY INFILTRATE: ICD-10-CM

## 2024-09-29 DIAGNOSIS — J45.901 EXACERBATION OF ASTHMA, UNSPECIFIED ASTHMA SEVERITY, UNSPECIFIED WHETHER PERSISTENT: ICD-10-CM

## 2024-09-29 DIAGNOSIS — J18.9 COMMUNITY ACQUIRED PNEUMONIA, UNSPECIFIED LATERALITY: Primary | ICD-10-CM

## 2024-09-29 LAB
ALBUMIN SERPL BCG-MCNC: 3.4 G/DL (ref 3.5–5.2)
ALP SERPL-CCNC: 77 U/L (ref 40–150)
ALT SERPL W P-5'-P-CCNC: 35 U/L (ref 0–50)
ANION GAP SERPL CALCULATED.3IONS-SCNC: 13 MMOL/L (ref 7–15)
AST SERPL W P-5'-P-CCNC: 43 U/L (ref 0–45)
ATRIAL RATE - MUSE: 116 BPM
BASE EXCESS BLDV CALC-SCNC: 4.8 MMOL/L (ref -3–3)
BASOPHILS # BLD AUTO: 0 10E3/UL (ref 0–0.2)
BASOPHILS NFR BLD AUTO: 1 %
BILIRUB DIRECT SERPL-MCNC: <0.2 MG/DL (ref 0–0.3)
BILIRUB SERPL-MCNC: 0.5 MG/DL
BUN SERPL-MCNC: 6 MG/DL (ref 6–20)
CALCIUM SERPL-MCNC: 8.5 MG/DL (ref 8.8–10.4)
CHLORIDE SERPL-SCNC: 96 MMOL/L (ref 98–107)
CREAT SERPL-MCNC: 0.62 MG/DL (ref 0.51–0.95)
CRP SERPL-MCNC: 42.8 MG/L
DIASTOLIC BLOOD PRESSURE - MUSE: 85 MMHG
EGFRCR SERPLBLD CKD-EPI 2021: >90 ML/MIN/1.73M2
EOSINOPHIL # BLD AUTO: 0 10E3/UL (ref 0–0.7)
EOSINOPHIL NFR BLD AUTO: 1 %
ERYTHROCYTE [DISTWIDTH] IN BLOOD BY AUTOMATED COUNT: 17.5 % (ref 10–15)
FLUAV RNA SPEC QL NAA+PROBE: NEGATIVE
FLUBV RNA RESP QL NAA+PROBE: NEGATIVE
GLUCOSE SERPL-MCNC: 95 MG/DL (ref 70–99)
HCO3 BLDV-SCNC: 29 MMOL/L (ref 21–28)
HCO3 SERPL-SCNC: 22 MMOL/L (ref 22–29)
HCT VFR BLD AUTO: 30.5 % (ref 35–47)
HGB BLD-MCNC: 10.5 G/DL (ref 11.7–15.7)
HOLD SPECIMEN: NORMAL
IMM GRANULOCYTES # BLD: 0 10E3/UL
IMM GRANULOCYTES NFR BLD: 1 %
INTERPRETATION ECG - MUSE: NORMAL
L PNEUMO1 AG UR QL IA: NEGATIVE
LACTATE SERPL-SCNC: 0.7 MMOL/L (ref 0.7–2)
LACTATE SERPL-SCNC: 1.2 MMOL/L (ref 0.7–2)
LDH SERPL L TO P-CCNC: 178 U/L (ref 0–250)
LYMPHOCYTES # BLD AUTO: 0.6 10E3/UL (ref 0.8–5.3)
LYMPHOCYTES NFR BLD AUTO: 14 %
MAGNESIUM SERPL-MCNC: 1.7 MG/DL (ref 1.7–2.3)
MAGNESIUM SERPL-MCNC: 1.9 MG/DL (ref 1.7–2.3)
MCH RBC QN AUTO: 31.1 PG (ref 26.5–33)
MCHC RBC AUTO-ENTMCNC: 34.4 G/DL (ref 31.5–36.5)
MCV RBC AUTO: 90 FL (ref 78–100)
MONOCYTES # BLD AUTO: 0.4 10E3/UL (ref 0–1.3)
MONOCYTES NFR BLD AUTO: 10 %
MRSA DNA SPEC QL NAA+PROBE: NEGATIVE
NEUTROPHILS # BLD AUTO: 3.1 10E3/UL (ref 1.6–8.3)
NEUTROPHILS NFR BLD AUTO: 75 %
NRBC # BLD AUTO: 0 10E3/UL
NRBC BLD AUTO-RTO: 0 /100
NT-PROBNP SERPL-MCNC: 62 PG/ML (ref 0–900)
O2/TOTAL GAS SETTING VFR VENT: 21 %
OXYHGB MFR BLDV: 65 % (ref 70–75)
P AXIS - MUSE: 48 DEGREES
PCO2 BLDV: 43 MM HG (ref 40–50)
PH BLDV: 7.44 [PH] (ref 7.32–7.43)
PLATELET # BLD AUTO: 204 10E3/UL (ref 150–450)
PO2 BLDV: 33 MM HG (ref 25–47)
POTASSIUM SERPL-SCNC: 3 MMOL/L (ref 3.4–5.3)
POTASSIUM SERPL-SCNC: 3.4 MMOL/L (ref 3.4–5.3)
PR INTERVAL - MUSE: 156 MS
PROCALCITONIN SERPL IA-MCNC: 0.16 NG/ML
PROT SERPL-MCNC: 5.9 G/DL (ref 6.4–8.3)
QRS DURATION - MUSE: 98 MS
QT - MUSE: 322 MS
QTC - MUSE: 447 MS
R AXIS - MUSE: -30 DEGREES
RBC # BLD AUTO: 3.38 10E6/UL (ref 3.8–5.2)
RSV RNA SPEC NAA+PROBE: NEGATIVE
S PNEUM AG SPEC QL: NEGATIVE
SA TARGET DNA: NEGATIVE
SAO2 % BLDV: 66.1 % (ref 70–75)
SARS-COV-2 RNA RESP QL NAA+PROBE: POSITIVE
SODIUM SERPL-SCNC: 131 MMOL/L (ref 135–145)
SPECIMEN TYPE: NORMAL
SYSTOLIC BLOOD PRESSURE - MUSE: 143 MMHG
T AXIS - MUSE: 37 DEGREES
TROPONIN T SERPL HS-MCNC: 16 NG/L
TROPONIN T SERPL HS-MCNC: 19 NG/L
TROPONIN T SERPL HS-MCNC: 26 NG/L
TROPONIN T SERPL HS-MCNC: 44 NG/L
VENTRICULAR RATE- MUSE: 116 BPM
WBC # BLD AUTO: 4.2 10E3/UL (ref 4–11)

## 2024-09-29 PROCEDURE — 120N000001 HC R&B MED SURG/OB

## 2024-09-29 PROCEDURE — 250N000009 HC RX 250: Performed by: INTERNAL MEDICINE

## 2024-09-29 PROCEDURE — 83735 ASSAY OF MAGNESIUM: CPT | Performed by: EMERGENCY MEDICINE

## 2024-09-29 PROCEDURE — 96361 HYDRATE IV INFUSION ADD-ON: CPT

## 2024-09-29 PROCEDURE — 93005 ELECTROCARDIOGRAM TRACING: CPT | Performed by: EMERGENCY MEDICINE

## 2024-09-29 PROCEDURE — 94640 AIRWAY INHALATION TREATMENT: CPT

## 2024-09-29 PROCEDURE — 84145 PROCALCITONIN (PCT): CPT | Performed by: EMERGENCY MEDICINE

## 2024-09-29 PROCEDURE — 71275 CT ANGIOGRAPHY CHEST: CPT

## 2024-09-29 PROCEDURE — 83615 LACTATE (LD) (LDH) ENZYME: CPT | Performed by: INTERNAL MEDICINE

## 2024-09-29 PROCEDURE — 250N000011 HC RX IP 250 OP 636: Performed by: EMERGENCY MEDICINE

## 2024-09-29 PROCEDURE — 36415 COLL VENOUS BLD VENIPUNCTURE: CPT | Performed by: INTERNAL MEDICINE

## 2024-09-29 PROCEDURE — 999N000157 HC STATISTIC RCP TIME EA 10 MIN

## 2024-09-29 PROCEDURE — 250N000009 HC RX 250: Performed by: EMERGENCY MEDICINE

## 2024-09-29 PROCEDURE — 272N000202 HC AEROBIKA WITH MANOMETER

## 2024-09-29 PROCEDURE — 94640 AIRWAY INHALATION TREATMENT: CPT | Mod: 76

## 2024-09-29 PROCEDURE — 96375 TX/PRO/DX INJ NEW DRUG ADDON: CPT

## 2024-09-29 PROCEDURE — 96365 THER/PROPH/DIAG IV INF INIT: CPT | Mod: 59

## 2024-09-29 PROCEDURE — 86140 C-REACTIVE PROTEIN: CPT | Performed by: INTERNAL MEDICINE

## 2024-09-29 PROCEDURE — 87899 AGENT NOS ASSAY W/OPTIC: CPT | Performed by: INTERNAL MEDICINE

## 2024-09-29 PROCEDURE — 83605 ASSAY OF LACTIC ACID: CPT | Performed by: EMERGENCY MEDICINE

## 2024-09-29 PROCEDURE — 258N000003 HC RX IP 258 OP 636: Performed by: EMERGENCY MEDICINE

## 2024-09-29 PROCEDURE — 87641 MR-STAPH DNA AMP PROBE: CPT | Performed by: INTERNAL MEDICINE

## 2024-09-29 PROCEDURE — 96367 TX/PROPH/DG ADDL SEQ IV INF: CPT

## 2024-09-29 PROCEDURE — 84132 ASSAY OF SERUM POTASSIUM: CPT | Performed by: INTERNAL MEDICINE

## 2024-09-29 PROCEDURE — 250N000013 HC RX MED GY IP 250 OP 250 PS 637: Performed by: INTERNAL MEDICINE

## 2024-09-29 PROCEDURE — 83735 ASSAY OF MAGNESIUM: CPT | Performed by: INTERNAL MEDICINE

## 2024-09-29 PROCEDURE — 82805 BLOOD GASES W/O2 SATURATION: CPT | Performed by: EMERGENCY MEDICINE

## 2024-09-29 PROCEDURE — 84484 ASSAY OF TROPONIN QUANT: CPT | Performed by: INTERNAL MEDICINE

## 2024-09-29 PROCEDURE — 87040 BLOOD CULTURE FOR BACTERIA: CPT | Performed by: EMERGENCY MEDICINE

## 2024-09-29 PROCEDURE — 87637 SARSCOV2&INF A&B&RSV AMP PRB: CPT | Performed by: EMERGENCY MEDICINE

## 2024-09-29 PROCEDURE — 99223 1ST HOSP IP/OBS HIGH 75: CPT | Performed by: INTERNAL MEDICINE

## 2024-09-29 PROCEDURE — 82310 ASSAY OF CALCIUM: CPT | Performed by: EMERGENCY MEDICINE

## 2024-09-29 PROCEDURE — 250N000013 HC RX MED GY IP 250 OP 250 PS 637: Performed by: EMERGENCY MEDICINE

## 2024-09-29 PROCEDURE — 258N000003 HC RX IP 258 OP 636: Performed by: INTERNAL MEDICINE

## 2024-09-29 PROCEDURE — 83880 ASSAY OF NATRIURETIC PEPTIDE: CPT | Performed by: EMERGENCY MEDICINE

## 2024-09-29 PROCEDURE — 99285 EMERGENCY DEPT VISIT HI MDM: CPT | Mod: 25

## 2024-09-29 PROCEDURE — 83605 ASSAY OF LACTIC ACID: CPT | Performed by: INTERNAL MEDICINE

## 2024-09-29 PROCEDURE — 80048 BASIC METABOLIC PNL TOTAL CA: CPT | Performed by: EMERGENCY MEDICINE

## 2024-09-29 PROCEDURE — 250N000011 HC RX IP 250 OP 636: Performed by: INTERNAL MEDICINE

## 2024-09-29 PROCEDURE — 82248 BILIRUBIN DIRECT: CPT | Performed by: INTERNAL MEDICINE

## 2024-09-29 PROCEDURE — 85025 COMPLETE CBC W/AUTO DIFF WBC: CPT | Performed by: EMERGENCY MEDICINE

## 2024-09-29 PROCEDURE — 36415 COLL VENOUS BLD VENIPUNCTURE: CPT | Performed by: EMERGENCY MEDICINE

## 2024-09-29 RX ORDER — AZITHROMYCIN 500 MG/5ML
500 INJECTION, POWDER, LYOPHILIZED, FOR SOLUTION INTRAVENOUS EVERY 24 HOURS
Status: DISCONTINUED | OUTPATIENT
Start: 2024-09-30 | End: 2024-09-30

## 2024-09-29 RX ORDER — ACETAMINOPHEN 650 MG/1
650 SUPPOSITORY RECTAL EVERY 4 HOURS PRN
Status: DISCONTINUED | OUTPATIENT
Start: 2024-09-29 | End: 2024-09-30 | Stop reason: HOSPADM

## 2024-09-29 RX ORDER — CALCIUM CARBONATE 500 MG/1
1000 TABLET, CHEWABLE ORAL 4 TIMES DAILY PRN
Status: DISCONTINUED | OUTPATIENT
Start: 2024-09-29 | End: 2024-09-30 | Stop reason: HOSPADM

## 2024-09-29 RX ORDER — PROCHLORPERAZINE MALEATE 10 MG
10 TABLET ORAL EVERY 6 HOURS PRN
Status: DISCONTINUED | OUTPATIENT
Start: 2024-09-29 | End: 2024-09-30 | Stop reason: HOSPADM

## 2024-09-29 RX ORDER — ONDANSETRON 2 MG/ML
4 INJECTION INTRAMUSCULAR; INTRAVENOUS EVERY 6 HOURS PRN
Status: DISCONTINUED | OUTPATIENT
Start: 2024-09-29 | End: 2024-09-30 | Stop reason: HOSPADM

## 2024-09-29 RX ORDER — LIDOCAINE 40 MG/G
CREAM TOPICAL
Status: DISCONTINUED | OUTPATIENT
Start: 2024-09-29 | End: 2024-09-30 | Stop reason: HOSPADM

## 2024-09-29 RX ORDER — ACETAMINOPHEN 325 MG/1
650 TABLET ORAL EVERY 4 HOURS PRN
Status: DISCONTINUED | OUTPATIENT
Start: 2024-09-29 | End: 2024-09-29

## 2024-09-29 RX ORDER — IPRATROPIUM BROMIDE AND ALBUTEROL SULFATE 2.5; .5 MG/3ML; MG/3ML
3 SOLUTION RESPIRATORY (INHALATION) ONCE
Status: COMPLETED | OUTPATIENT
Start: 2024-09-29 | End: 2024-09-29

## 2024-09-29 RX ORDER — ALBUTEROL SULFATE 90 UG/1
2 AEROSOL, METERED RESPIRATORY (INHALATION) EVERY 6 HOURS PRN
Status: DISCONTINUED | OUTPATIENT
Start: 2024-09-29 | End: 2024-09-30 | Stop reason: HOSPADM

## 2024-09-29 RX ORDER — ACETAMINOPHEN 325 MG/1
650 TABLET ORAL EVERY 4 HOURS PRN
Status: DISCONTINUED | OUTPATIENT
Start: 2024-09-29 | End: 2024-09-30 | Stop reason: HOSPADM

## 2024-09-29 RX ORDER — ENOXAPARIN SODIUM 100 MG/ML
40 INJECTION SUBCUTANEOUS EVERY 24 HOURS
Status: DISCONTINUED | OUTPATIENT
Start: 2024-09-29 | End: 2024-09-29

## 2024-09-29 RX ORDER — FLUTICASONE FUROATE AND VILANTEROL 200; 25 UG/1; UG/1
1 POWDER RESPIRATORY (INHALATION) DAILY
Status: DISCONTINUED | OUTPATIENT
Start: 2024-09-29 | End: 2024-09-30 | Stop reason: HOSPADM

## 2024-09-29 RX ORDER — AZITHROMYCIN 500 MG/5ML
500 INJECTION, POWDER, LYOPHILIZED, FOR SOLUTION INTRAVENOUS ONCE
Status: COMPLETED | OUTPATIENT
Start: 2024-09-29 | End: 2024-09-29

## 2024-09-29 RX ORDER — AMOXICILLIN 250 MG
2 CAPSULE ORAL 2 TIMES DAILY PRN
Status: DISCONTINUED | OUTPATIENT
Start: 2024-09-29 | End: 2024-09-30 | Stop reason: HOSPADM

## 2024-09-29 RX ORDER — PROCHLORPERAZINE 25 MG
25 SUPPOSITORY, RECTAL RECTAL EVERY 12 HOURS PRN
Status: DISCONTINUED | OUTPATIENT
Start: 2024-09-29 | End: 2024-09-30 | Stop reason: HOSPADM

## 2024-09-29 RX ORDER — ENOXAPARIN SODIUM 100 MG/ML
0.5 INJECTION SUBCUTANEOUS EVERY 12 HOURS
Status: DISCONTINUED | OUTPATIENT
Start: 2024-09-29 | End: 2024-09-30 | Stop reason: HOSPADM

## 2024-09-29 RX ORDER — ACETAMINOPHEN 325 MG/1
975 TABLET ORAL ONCE
Status: COMPLETED | OUTPATIENT
Start: 2024-09-29 | End: 2024-09-29

## 2024-09-29 RX ORDER — DEXAMETHASONE SODIUM PHOSPHATE 10 MG/ML
6 INJECTION, SOLUTION INTRAMUSCULAR; INTRAVENOUS EVERY 24 HOURS
Status: DISCONTINUED | OUTPATIENT
Start: 2024-09-29 | End: 2024-09-30 | Stop reason: HOSPADM

## 2024-09-29 RX ORDER — VANCOMYCIN HYDROCHLORIDE 1 G/200ML
1000 INJECTION, SOLUTION INTRAVENOUS EVERY 12 HOURS
Status: DISCONTINUED | OUTPATIENT
Start: 2024-09-29 | End: 2024-09-30

## 2024-09-29 RX ORDER — POTASSIUM CHLORIDE 1500 MG/1
40 TABLET, EXTENDED RELEASE ORAL ONCE
Status: COMPLETED | OUTPATIENT
Start: 2024-09-29 | End: 2024-09-29

## 2024-09-29 RX ORDER — METHYLPREDNISOLONE SODIUM SUCCINATE 125 MG/2ML
125 INJECTION, POWDER, LYOPHILIZED, FOR SOLUTION INTRAMUSCULAR; INTRAVENOUS ONCE
Status: COMPLETED | OUTPATIENT
Start: 2024-09-29 | End: 2024-09-29

## 2024-09-29 RX ORDER — PROCHLORPERAZINE MALEATE 10 MG
10 TABLET ORAL EVERY 6 HOURS PRN
Status: DISCONTINUED | OUTPATIENT
Start: 2024-09-29 | End: 2024-09-29

## 2024-09-29 RX ORDER — POTASSIUM CHLORIDE 1.5 G/1.58G
40 POWDER, FOR SOLUTION ORAL ONCE
Status: COMPLETED | OUTPATIENT
Start: 2024-09-29 | End: 2024-09-29

## 2024-09-29 RX ORDER — IPRATROPIUM BROMIDE AND ALBUTEROL SULFATE 2.5; .5 MG/3ML; MG/3ML
3 SOLUTION RESPIRATORY (INHALATION)
Status: DISCONTINUED | OUTPATIENT
Start: 2024-09-29 | End: 2024-09-30

## 2024-09-29 RX ORDER — CEFTRIAXONE 2 G/1
2 INJECTION, POWDER, FOR SOLUTION INTRAMUSCULAR; INTRAVENOUS ONCE
Status: COMPLETED | OUTPATIENT
Start: 2024-09-29 | End: 2024-09-29

## 2024-09-29 RX ORDER — ONDANSETRON 4 MG/1
8 TABLET, FILM COATED ORAL EVERY 8 HOURS PRN
Status: DISCONTINUED | OUTPATIENT
Start: 2024-09-29 | End: 2024-09-29

## 2024-09-29 RX ORDER — AMOXICILLIN 250 MG
1 CAPSULE ORAL 2 TIMES DAILY PRN
Status: DISCONTINUED | OUTPATIENT
Start: 2024-09-29 | End: 2024-09-30 | Stop reason: HOSPADM

## 2024-09-29 RX ORDER — IOPAMIDOL 755 MG/ML
75 INJECTION, SOLUTION INTRAVASCULAR ONCE
Status: COMPLETED | OUTPATIENT
Start: 2024-09-29 | End: 2024-09-29

## 2024-09-29 RX ORDER — MAGNESIUM OXIDE 400 MG/1
400 TABLET ORAL EVERY 4 HOURS
Status: COMPLETED | OUTPATIENT
Start: 2024-09-29 | End: 2024-09-30

## 2024-09-29 RX ORDER — CEFEPIME HYDROCHLORIDE 1 G/1
1 INJECTION, POWDER, FOR SOLUTION INTRAMUSCULAR; INTRAVENOUS EVERY 12 HOURS
Status: DISCONTINUED | OUTPATIENT
Start: 2024-09-29 | End: 2024-09-30 | Stop reason: HOSPADM

## 2024-09-29 RX ORDER — SODIUM CHLORIDE 9 MG/ML
INJECTION, SOLUTION INTRAVENOUS CONTINUOUS
Status: DISCONTINUED | OUTPATIENT
Start: 2024-09-29 | End: 2024-09-29

## 2024-09-29 RX ORDER — SODIUM CHLORIDE 9 MG/ML
INJECTION, SOLUTION INTRAVENOUS CONTINUOUS
Status: DISCONTINUED | OUTPATIENT
Start: 2024-09-29 | End: 2024-09-30

## 2024-09-29 RX ORDER — ONDANSETRON 4 MG/1
4 TABLET, ORALLY DISINTEGRATING ORAL EVERY 6 HOURS PRN
Status: DISCONTINUED | OUTPATIENT
Start: 2024-09-29 | End: 2024-09-30 | Stop reason: HOSPADM

## 2024-09-29 RX ORDER — VANCOMYCIN HYDROCHLORIDE
1250 ONCE
Status: COMPLETED | OUTPATIENT
Start: 2024-09-29 | End: 2024-09-29

## 2024-09-29 RX ORDER — TRAMADOL HYDROCHLORIDE 50 MG/1
50 TABLET ORAL EVERY 6 HOURS PRN
COMMUNITY

## 2024-09-29 RX ORDER — CODEINE PHOSPHATE AND GUAIFENESIN 10; 100 MG/5ML; MG/5ML
5-10 SOLUTION ORAL EVERY 4 HOURS PRN
Status: DISCONTINUED | OUTPATIENT
Start: 2024-09-29 | End: 2024-09-30 | Stop reason: HOSPADM

## 2024-09-29 RX ADMIN — IPRATROPIUM BROMIDE AND ALBUTEROL SULFATE 3 ML: .5; 3 SOLUTION RESPIRATORY (INHALATION) at 11:04

## 2024-09-29 RX ADMIN — IPRATROPIUM BROMIDE AND ALBUTEROL SULFATE 3 ML: .5; 3 SOLUTION RESPIRATORY (INHALATION) at 15:36

## 2024-09-29 RX ADMIN — SODIUM CHLORIDE 1000 ML: 9 INJECTION, SOLUTION INTRAVENOUS at 08:59

## 2024-09-29 RX ADMIN — VANCOMYCIN HYDROCHLORIDE 1250 MG: 5 INJECTION, POWDER, LYOPHILIZED, FOR SOLUTION INTRAVENOUS at 14:04

## 2024-09-29 RX ADMIN — FLUTICASONE FUROATE AND VILANTEROL TRIFENATATE 1 PUFF: 200; 25 POWDER RESPIRATORY (INHALATION) at 14:04

## 2024-09-29 RX ADMIN — ACETAMINOPHEN 975 MG: 325 TABLET ORAL at 09:03

## 2024-09-29 RX ADMIN — Medication 400 MG: at 20:37

## 2024-09-29 RX ADMIN — CEFTRIAXONE SODIUM 2 G: 2 INJECTION, POWDER, FOR SOLUTION INTRAMUSCULAR; INTRAVENOUS at 09:00

## 2024-09-29 RX ADMIN — POTASSIUM CHLORIDE 40 MEQ: 1.5 POWDER, FOR SOLUTION ORAL at 10:06

## 2024-09-29 RX ADMIN — POTASSIUM CHLORIDE 40 MEQ: 1500 TABLET, EXTENDED RELEASE ORAL at 20:37

## 2024-09-29 RX ADMIN — CEFEPIME 1 G: 1 INJECTION, POWDER, FOR SOLUTION INTRAMUSCULAR; INTRAVENOUS at 18:22

## 2024-09-29 RX ADMIN — DEXAMETHASONE SODIUM PHOSPHATE 6 MG: 10 INJECTION INTRAMUSCULAR; INTRAVENOUS at 14:04

## 2024-09-29 RX ADMIN — NIRMATRELVIR AND RITONAVIR 3 TABLET: KIT at 13:20

## 2024-09-29 RX ADMIN — IPRATROPIUM BROMIDE AND ALBUTEROL SULFATE 3 ML: .5; 3 SOLUTION RESPIRATORY (INHALATION) at 08:54

## 2024-09-29 RX ADMIN — NIRMATRELVIR AND RITONAVIR 3 TABLET: KIT at 20:37

## 2024-09-29 RX ADMIN — SODIUM CHLORIDE: 9 INJECTION, SOLUTION INTRAVENOUS at 13:22

## 2024-09-29 RX ADMIN — SODIUM CHLORIDE: 9 INJECTION, SOLUTION INTRAVENOUS at 18:23

## 2024-09-29 RX ADMIN — METHYLPREDNISOLONE SODIUM SUCCINATE 125 MG: 125 INJECTION, POWDER, FOR SOLUTION INTRAMUSCULAR; INTRAVENOUS at 11:32

## 2024-09-29 RX ADMIN — IOPAMIDOL 75 ML: 755 INJECTION, SOLUTION INTRAVENOUS at 09:51

## 2024-09-29 RX ADMIN — ENOXAPARIN SODIUM 30 MG: 30 INJECTION SUBCUTANEOUS at 14:04

## 2024-09-29 RX ADMIN — IPRATROPIUM BROMIDE AND ALBUTEROL SULFATE 3 ML: .5; 3 SOLUTION RESPIRATORY (INHALATION) at 19:30

## 2024-09-29 RX ADMIN — BENZOCAINE AND MENTHOL 1 LOZENGE: 15; 3.6 LOZENGE ORAL at 21:54

## 2024-09-29 RX ADMIN — SODIUM CHLORIDE: 9 INJECTION, SOLUTION INTRAVENOUS at 11:33

## 2024-09-29 RX ADMIN — AZITHROMYCIN MONOHYDRATE 500 MG: 500 INJECTION, POWDER, LYOPHILIZED, FOR SOLUTION INTRAVENOUS at 10:58

## 2024-09-29 ASSESSMENT — ACTIVITIES OF DAILY LIVING (ADL)
ADLS_ACUITY_SCORE: 38
ADLS_ACUITY_SCORE: 35
ADLS_ACUITY_SCORE: 38
ADLS_ACUITY_SCORE: 38
ADLS_ACUITY_SCORE: 35
ADLS_ACUITY_SCORE: 38
ADLS_ACUITY_SCORE: 38
ADLS_ACUITY_SCORE: 35
ADLS_ACUITY_SCORE: 38
ADLS_ACUITY_SCORE: 35
ADLS_ACUITY_SCORE: 38
ADLS_ACUITY_SCORE: 38
ADLS_ACUITY_SCORE: 35
ADLS_ACUITY_SCORE: 35
ADLS_ACUITY_SCORE: 38

## 2024-09-29 NOTE — H&P
Admission History & Physical  Kerry Mills, 1965, [id number]    [facility]  Ceasar Zuñiga, [phone]    Assessment and Plan:  59 year old female past medical history significant for recently diagnosed invasive ductal carcinoma of the left breast 7/24, sees [facility], currently on chemotherapy(paclitaxel) last dose on 9/23/2024, asthma, Moustapha's angina causing respiratory failure requiring intubation in 2020 presented to the emergency room with complaints of fever, cough, shortness of breath, being admitted for COVID-19 infection, pneumonia, possible sepsis, asthma exacerbation.    Community-acquired pneumonia  asthma exacerbation  COVID-19 infection  Possible COVID-pneumonia   Sepsis  Immunocompromised host  She is currently on room air  CT PE run in ED: There are a few scattered tree-in-bud opacities most notably in the right upper lobe suggestive of infectious/inflammatory bronchiolitis. There is a groundglass nodular opacity in the left upper lobe measuring 0.8 x 0.6 cm. While this could represent infectious or inflammatory change is technically indeterminant and continued attention on follow-up is recommended AND few scattered sub-6 mm pulmonary nodules.   Start cefepime, azithromycin and vancomycin  Check MRSA swab  Start dexamethasone  Continue Paxlovid  DuoNebs, flutter valve  If not improving may need oncology evaluation.  Check sputum cultures,  Streptococcus and Legionella antigen.  Monitor CBC, no neutropenia currently.  She will need repeat CT chest in 2 to 3 months     Mild hyponatremia  Mild hypokalemia  IV fluids, potassium replacement protocol    Sinus tachycardia  Could be from fever, sepsis  Monitor.  Replace electrolytes    History of invasive ductal carcinoma of the breast left breast ER negative MN negative HER2/kobe 2 +  Currently on chemotherapy  Last dose 9/23/2024  Follows with [facility] oncology.    History of SVT  Monitor    Chronic  "anemia  Hemoglobin stable      Clinically Significant Risk Factors Present on Admission        # Hypokalemia: Lowest K = 3 mmol/L in last 2 days, will replace as needed  # Hyponatremia: Lowest Na = 131 mmol/L in last 2 days, will monitor as appropriate             # Anemia: based on hgb <11       # Overweight: Estimated body mass index is 27.47 kg/m  as calculated from the following:    Height as of this encounter: 1.499 m (4' 11\").    Weight as of this encounter: 61.7 kg (136 lb).       # Asthma: noted on problem list           Medically Ready for Discharge: Anticipated in 2-4 Days       Chief Complaint: Fever, shortness of breath cough    HPI:     Kerry Mills is a 59 year old female past medical history significant for recently diagnosed invasive ductal carcinoma of the left breast 7/24, sees [place] oncology, currently on chemotherapy(paclitaxel) last dose on 9/23/2024, asthma, Moustapha's angina causing respiratory failure, intubation in 2020 presented to the emergency room with complaints of fever, cough, shortness of breath.  She reports she has been having chronic cough since July 2024, productive at times with greenish phlegm, but last couple of days she developed fever, worsening cough, wheezing, shortness of breath, chest pain with coughing.  Fever has high as 103  F in ED.  She has associated chills.  No leg swelling or orthopnea symptoms.  So far she is tolerating chemotherapy.  No other urinary or bowel complaints.  She has been vaccinated and boosted for COVID-19.  Denied any sick contacts.  Denied any recent weight gain or weight loss. Daughter-in-law Interpreted.    In ED upon arrival Tmax 103.1, sinus tachycardia heart rate of 128/min, blood pressure 133/73, she is saturating 95% on room air.  CBC showed no neutropenia, WBC 4.2.  BMP showed a mild hyponatremia sodium of 131, mild hypokalemia 3, proBNP normal 62, lactic acid of 0.7 procalcitonin of 0.16.  Venous blood gas showed pH of 7.44, bicarb of " 29, CO2 of 43.  Viral panel she tested positive for COVID, influenza, RSV negative.  CT chest PE run right upper lobe opacities concerning for infectious or inflammatory bronchiolitis and other areas of groundglass nodule, pulmonary nodules were noted KNOWN Left breast mass.  She was  given DuoNebs, ceftriaxone, azithromycin, Solu-Medrol, Paxlovid and fluids per sepsis protocol and admitted.  She was seen in the ED, appeared comfortable.    Medical History  Past Medical History:   Diagnosis Date    Asthma     Chronic constipation       Patient Active Problem List    Diagnosis Date Noted    Hypokalemia 09/29/2024     Priority: Medium    Exacerbation of asthma, unspecified asthma severity, unspecified whether persistent 09/29/2024     Priority: Medium    Left upper lobe pulmonary infiltrate 09/29/2024     Priority: Medium    COVID-19 09/29/2024     Priority: Medium    Invasive ductal carcinoma of breast, female, left (H) 07/02/2024     Priority: Medium     Triple negative stage II      Sinus tachycardia 12/03/2020     Priority: Medium    SVT (supraventricular tachycardia) (H)      Priority: Medium    Airway compromise      Priority: Medium    Gram-positive cocci bacteremia      Priority: Medium    Slow transit constipation      Priority: Medium    Submental abscess      Priority: Medium    Moustapha's angina 11/26/2020     Priority: Medium    Acute respiratory failure with hypoxia (H)      Priority: Medium    Hypoalbuminemia      Priority: Medium    Leukocytosis, unspecified type      Priority: Medium    Moderate persistent asthma without complication 02/22/2019     Priority: Medium        Surgical History  She  has a past surgical history that includes Picc Insertion - Triple Lumen (11/26/2020); Tracheostomy (N/A, 11/26/2020); Incision And Drainage Of Wound (N/A, 11/27/2020); and Insert port vascular access (Right, 7/9/2024).     Past Surgical History:   Procedure Laterality Date    INCISION AND DRAINAGE OF WOUND N/A  "2020    Procedure: INCISION AND DRAINAGE, NECK;  Surgeon: Arnel James MD;  Location: [facility];  Service: ENT    INSERT PORT VASCULAR ACCESS Right 2024    Procedure: INSERTION,  RIGHT VASCULAR ACCESS PORT;  Surgeon: Iwona Choe DO;  Location: [facility]    PICC INSERTION - TRIPLE LUMEN  2020         TRACHEOSTOMY N/A 2020    Procedure: EMERGENT INTUBATION IN OR WITH CREATION, TRACHEOSTOMY;  Surgeon: Dennise Justice MD;  Location: [facility];  Service: General       Allergies  No Known Allergies    Prior to Admission Medications   (Not in a hospital admission)      Social History  Reviewed, and she  reports that she has never smoked. She has never used smokeless tobacco. She reports that she does not drink alcohol and does not use drugs.  Social History     Tobacco Use    Smoking status: Never    Smokeless tobacco: Never   Substance Use Topics    Alcohol use: Never       Family History  Reviewed, and I have reviewed this patient's family history and updated it with pertinent information if needed.  Family History   Problem Relation Age of Onset    Kidney failure Mother         on Dialysis    Hypertension Mother     Gout Mother     No Known Problems Father          age 60 in [place]    Kidney failure Brother     Asthma Son           Review Of Systems  Complete As per admission HPI, all other systems reviewed and negative.     Physical Exam:  Vital signs:  Temp: (!) 103.1  F (39.5  C) Temp src: Oral BP: 133/73 Pulse: (!) 129   Resp: 25 SpO2: 95 % O2 Device: None (Room air)   Height: 149.9 cm (4' 11\") Weight: 61.7 kg (136 lb)  Estimated body mass index is 27.47 kg/m  as calculated from the following:    Height as of this encounter: 1.499 m (4' 11\").    Weight as of this encounter: 61.7 kg (136 lb).    General Appearance:  Awake Alert, orientedx3, mild tachypnea, no accessory muscle use.   Head:  Normocephalic, without obvious abnormality   Eyes:  PERRL, " conjunctiva/corneas clear   Throat: Oral mucosa moist   Neck: Supple,  no JVD   Lungs:   Wheezing in lower lobes bilaterally, respirations unlabored   Chest Wall:  No tenderness   Heart:  Regular rate and rhythm, S1, S2 normal, tachycardic no murmur   Abdomen:   Soft, non-tender, bowel sounds present,  no guarding, rigidity    Extremities:  no edema, no joint swelling   Skin: Skin color, texture, turgor normal, no rashes or lesions   Neurologic: Alert and oriented X 3, no focal deficits     Results:  Labs Ordered and Resulted from Time of ED Arrival to Time of ED Departure   BLOOD GAS VENOUS - Abnormal       Result Value    pH Venous 7.44 (*)     pCO2 Venous 43      pO2 Venous 33      Bicarbonate Venous 29 (*)     Base Excess/Deficit Venous 4.8 (*)     FIO2 21      Oxyhemoglobin Venous 65 (*)     O2 Sat, Venous 66.1 (*)    BASIC METABOLIC PANEL - Abnormal    Sodium 131 (*)     Potassium 3.0 (*)     Chloride 96 (*)     Carbon Dioxide (CO2) 22      Anion Gap 13      Urea Nitrogen 6.0      Creatinine 0.62      GFR Estimate >90      Calcium 8.5 (*)     Glucose 95     INFLUENZA A/B, RSV, & SARS-COV2 PCR - Abnormal    Influenza A PCR Negative      Influenza B PCR Negative      RSV PCR Negative      SARS CoV2 PCR Positive (*)    CBC WITH PLATELETS AND DIFFERENTIAL - Abnormal    WBC Count 4.2      RBC Count 3.38 (*)     Hemoglobin 10.5 (*)     Hematocrit 30.5 (*)     MCV 90      MCH 31.1      MCHC 34.4      RDW 17.5 (*)     Platelet Count 204      % Neutrophils 75      % Lymphocytes 14      % Monocytes 10      % Eosinophils 1      % Basophils 1      % Immature Granulocytes 1      NRBCs per 100 WBC 0      Absolute Neutrophils 3.1      Absolute Lymphocytes 0.6 (*)     Absolute Monocytes 0.4      Absolute Eosinophils 0.0      Absolute Basophils 0.0      Absolute Immature Granulocytes 0.0      Absolute NRBCs 0.0     LACTIC ACID WHOLE BLOOD WITH 1X REPEAT IN 2 HR WHEN >2 - Normal    Lactic Acid, Initial 0.7     PROCALCITONIN -  Normal    Procalcitonin 0.16     MAGNESIUM - Normal    Magnesium 1.7     N TERMINAL PRO BNP OUTPATIENT - Normal    N Terminal Pro BNP Outpatient 62     LACTIC ACID WHOLE BLOOD   HEPATIC FUNCTION PANEL   TROPONIN T, HIGH SENSITIVITY   TROPONIN T, HIGH SENSITIVITY   CRP INFLAMMATION   LACTATE DEHYDROGENASE   BLOOD CULTURE   MRSA MSSA PCR, NASAL SWAB   RESPIRATORY AEROBIC BACTERIAL CULTURE   LEGIONELLA URINARY ANTIGEN AND STREPTOCOCCUS PNEUMONIAE ANTIGEN      EKG:  EKG: Sinus tachycardia 116/min no other acute ST-T wave changes.  On    Imaging:   CT Chest Pulmonary Embolism w Contrast    Result Date: 9/29/2024  EXAM: CT CHEST PULMONARY EMBOLISM W CONTRAST LOCATION: [facility] DATE: 9/29/2024 INDICATION: invasive ductal carcinoma left breast, cough, fever, short of breath, upper back pain COMPARISON: None. TECHNIQUE: CT chest pulmonary angiogram during arterial phase injection of IV contrast. Multiplanar reformats and MIP reconstructions were performed. Dose reduction techniques were used. CONTRAST: 75ml Isovue 370 FINDINGS: ANGIOGRAM CHEST: Pulmonary arteries are normal caliber and negative for pulmonary emboli. Thoracic aorta is negative for dissection. LUNGS AND PLEURA: Mild mosaic attenuation of the lungs likely related to the exam being obtained in the expiratory phase of respiration. Mild bronchial wall thickening. There are a few scattered tree-in-bud opacities most notably in the anterior right upper lobe. There is a predominantly groundglass nodular opacity in the left upper lobe measuring 0.8 x 0.6 cm. Left lower lobe 0.5 x 0.4 cm nodule on series 7 image 176. Right lower lobe nodule on series 7 image 165 measuring 0.5 cm. MEDIASTINUM/AXILLAE: Heart size is normal. No adenopathy. Right IJ port terminates in the mid SVC. CORONARY ARTERY CALCIFICATION: None. UPPER ABDOMEN: Hepatic steatosis. MUSCULOSKELETAL: Degenerative changes of the spine. No suspicious osseous lesions. Left breast  mass measures 2.3 x 2.1 cm compatible with known malignancy.     IMPRESSION: 1.  No pulmonary embolus. 2.  There are a few scattered tree-in-bud opacities most notably in the right upper lobe suggestive of infectious/inflammatory bronchiolitis. 3.  There is a groundglass nodular opacity in the left upper lobe measuring 0.8 x 0.6 cm. While this could represent infectious or inflammatory change is technically indeterminant and continued attention on follow-up is recommended. 4.  Otherwise, there are also a few scattered sub-6 mm pulmonary nodules. Continued attention on follow-up. 5.  Left breast mass compatible with known malignancy.    US Breast Left Limited 1-3 Quadrants    Result Date: 9/10/2024  EXAM: US BREAST LEFT LIMITED 1-3 QUADRANTS, 9/10/2024 1:03 PM HISTORY: 58-year-old woman with known left breast invasive ductal carcinoma on neoadjuvant chemotherapy COMPARISONS: 6/13/2024 FINDINGS: ULTRASOUND: Ultrasound targeted to the left 10:00 1 cm from the nipple reveals decreased size of the irregular, hypoechoic solid mass, now measuring 1.9 x 1.5 x 1.2 cm, previously 3.1 x 3.0 x 2.3 cm.     IMPRESSION: BI-RADS CATEGORY: 6 - Known Biopsy-Proven Malignancy. Treatment effect with decreased size of known left breast malignancy. RECOMMENDED FOLLOW-UP: Clinical Follow-up. Results given to the patient. BRIDGETTE TERAN MD   SYSTEM ID:  [id number]    XR Chest 2 Views    Result Date: 9/3/2024  EXAM: XR CHEST 2 VIEWS LOCATION: [facility] DATE: 9/3/2024 INDICATION: pt coughing with green phlegm COMPARISON: 5/11/2024     IMPRESSION: Interval placement of a right IJ Port-A-Cath in good position. Interval clearing of the linear fibroatelectasis in the left base. Minimal fluid or thickening of the right minor fissure is unchanged. No new parenchymal disease. No effusions. Heart size normal size. No suspicious bone lesions.    Echocardiogram 7/22/2024  The left ventricle is normal in size.  Left ventricular  function is normal.The ejection fraction is 60-65%.  Global peak LV longitudinal strain is averaged at -19%. This is within  reported normal limits (normal <-18%).  Normal right ventricle size and systolic function.  No hemodynamically significant valvular abnormalities on 2D or color flow  imaging.      > 75 MINUTES SPENT BY ME on the date of service doing chart review, history, exam, documentation & further activities per the note.      Philly Amaro M.D  [facility] Service  Internal Medicine    9/29/2024  12:21 PM

## 2024-09-29 NOTE — PHARMACY-VANCOMYCIN DOSING SERVICE
Pharmacy Vancomycin Initial Note  Date of Service 2024  Patient's  1965  59 year old, female    Indication: Community Acquired Pneumonia and immunocompromised     Current estimated CrCl = Estimated Creatinine Clearance: 97.5 mL/min (based on SCr of 0.62 mg/dL).    Creatinine for last 3 days  2024:  8:40 AM Creatinine 0.62 mg/dL    Recent Vancomycin Level(s) for last 3 days  No results found for requested labs within last 3 days.      Vancomycin IV Administrations (past 72 hours)        No vancomycin orders with administrations in past 72 hours.                    Nephrotoxins and other renal medications (From now, onward)      Start     Dose/Rate Route Frequency Ordered Stop    24 2200  vancomycin (VANCOCIN) 1,000 mg in NaCl 0.9% 200 mL intermittent infusion         1,000 mg  over 60 Minutes Intravenous EVERY 12 HOURS 24 1308      24 1400  vancomycin (VANCOCIN) 1,250 mg in 0.9% NaCl 250 mL intermittent infusion         1,250 mg  166.7 mL/hr over 90 Minutes Intravenous ONCE 24 1308              Contrast Orders - past 72 hours (72h ago, onward)      Start     Dose/Rate Route Frequency Stop    24 1000  iopamidol (ISOVUE-370) solution 75 mL         75 mL Intravenous ONCE 24 0951            [email or web address] Prediction of Planned Initial Vancomycin Regimen  Loading dose: 1250 mg at 14:00 2024.  Regimen: 1000 mg IV every 12 hours.  Start time: 02:00 on 2024  Exposure target: AUC24 (range)400-600 mg/L.hr   AUC24,ss: 518 mg/L.hr  Probability of AUC24 > 400: 76 %  Ctrough,ss: 15.5 mg/L  Probability of Ctrough,ss > 20: 30 %  Probability of nephrotoxicity (Lodise MCKENNA ): 11 %          Plan:  Start vancomycin  1250 mg IV once then 1000mg q12h.   Vancomycin monitoring method: AUC  Vancomycin therapeutic monitoring goal: 400-600 mg*h/L  Pharmacy will check vancomycin levels as appropriate in 1-3 Days.    Serum creatinine levels will be ordered daily for the  first week of therapy and at least twice weekly for subsequent weeks.      Josefina Richmond, RPH

## 2024-09-29 NOTE — ED TRIAGE NOTES
Coming in  with cough, occasionally productive, fever.  No analgesics.  Fever since Friday.  Shortness of breath.  Triage done with language line audio.    RICARDO Castro MD at the bedside.         Triage Assessment (Adult)       Row Name 09/29/24 0811          Triage Assessment    Airway WDL WDL        Respiratory WDL    Respiratory WDL X        Skin Circulation/Temperature WDL    Skin Circulation/Temperature WDL WDL        Cardiac WDL    Cardiac WDL X;rhythm     Pulse Rate & Regularity tachycardic     Cardiac Rhythm ST        Peripheral/Neurovascular WDL    Peripheral Neurovascular WDL WDL        Cognitive/Neuro/Behavioral WDL    Cognitive/Neuro/Behavioral WDL WDL

## 2024-09-29 NOTE — PHARMACY-ADMISSION MEDICATION HISTORY
Pharmacist Admission Medication History    Admission medication history is complete. The information provided in this note is only as accurate as the sources available at the time of the update.    Information Source(s): Patient, Family member, and CareEverywhere/SureScripts via in-person    Pertinent Information: Family member interpreted for me    Changes made to PTA medication list:  Added: tramadol  Deleted: baclofen, dexamethasone, ibuprofen, montelukast  Changed: None    Allergies reviewed with patient and updates made in EHR: yes    Medication History Completed By: Josefina Richmond RPH 9/29/2024 11:52 AM    Current Facility-Administered Medications for the 9/29/24 encounter (Hospital Encounter)   Medication    sodium chloride (PF) 0.9% PF flush 10-20 mL     PTA Med List   Medication Sig Last Dose    acetaminophen (TYLENOL) 325 MG tablet [ACETAMINOPHEN (TYLENOL) 325 MG TABLET] Take 2 tablets (650 mg total) by mouth every 4 (four) hours as needed for pain or fever. 9/28/2024 at PM    albuterol (PROAIR HFA;PROVENTIL HFA;VENTOLIN HFA) 90 mcg/actuation inhaler [ALBUTEROL (PROAIR HFA;PROVENTIL HFA;VENTOLIN HFA) 90 MCG/ACTUATION INHALER] Inhale 2 puffs every 6 (six) hours as needed for wheezing. 9/28/2024 at PM    budesonide-formoterol (SYMBICORT) 160-4.5 mcg/actuation inhaler [BUDESONIDE-FORMOTEROL (SYMBICORT) 160-4.5 MCG/ACTUATION INHALER] Inhale 2 puffs 2 (two) times a day. 9/28/2024 at PM    guaiFENesin-codeine (ROBITUSSIN AC) 100-10 MG/5ML solution Take 5-10 mLs by mouth every 4 hours as needed for cough. 9/28/2024 at PM    lidocaine-prilocaine (EMLA) 2.5-2.5 % external cream Apply topically as needed for moderate pain or other (Apply to port site prior to access to minimize discomfort) Apply a small amount to port site 30 -60 minutes  prior to access to minimize discomfort Unknown    ondansetron (ZOFRAN) 8 MG tablet Take 1 tablet (8 mg) by mouth every 8 hours as needed for nausea (vomiting) Unknown     prochlorperazine (COMPAZINE) 10 MG tablet Take 1 tablet (10 mg) by mouth every 6 hours as needed for nausea or vomiting Unknown    traMADol (ULTRAM) 50 MG tablet Take 50 mg by mouth every 6 hours as needed for severe pain. not recent    vitamin D3 (CHOLECALCIFEROL) 50 mcg (2000 units) tablet Take 50 mcg by mouth every evening. 9/28/2024

## 2024-09-29 NOTE — ED PROVIDER NOTES
EMERGENCY DEPARTMENT ENCOUNTER      NAME: Kerry Mills  AGE: 59 year old female  YOB: 1965  MRN: [id number]  EVALUATION DATE & TIME: No admission date for patient encounter.    PCP: Ceasar Zuñiga    ED PROVIDER: Lolly Castro MD      Chief Complaint   Patient presents with    Shortness of Breath         FINAL IMPRESSION:  1. COVID-19    2. Hypokalemia          ED COURSE & MEDICAL DECISION MAKIN:11 AM I met with the patient, obtained history, performed an initial exam, and discussed options and plan for diagnostics and treatment here in the ED.   10:18 AM I am updating the patient on her positive covid result.   10:43 AM I am updating the patient.  10:58 AM I spoke with Dr. Amaro, hospitalist, who is agrees to admit the patient.     Pertinent Labs & Imaging studies reviewed. (See chart for details)  59 year old female presents to the Emergency Department for evaluation of fever, cough.  Patient currently actively on chemotherapy for invasive ductal breast carcinoma.  Plan for laboratory studies to ensure the patient is not neutropenic.  Recent counts are within normal limits.  Patient also with shortness of breath and upper back pain though she reports she has chronic shortness of breath with history of asthma.  Will plan for CT of the chest rule out PE and to evaluate for any infiltrates.  Given that the patient is febrile, with cough and shortness of breath, likely source of infection is pneumonia.  Plan to start the patient on empiric antibiotics and management of sepsis with fever, tachycardia, tachypnea.  Patient will likely require admission.    Workup is positive for COVID-19 infection, left upper lobe infiltrate on CT chest, will plan to treat as infectious in etiology and see if it clears.  CT chest is negative for PE.  Patient also with hypokalemia, given oral potassium.  Patient remains tachycardic, receiving IV fluids.  Started on broad-spectrum IV antibiotics for  community-acquired pneumonia.  Patient given 2 DuoNeb treatments, IV Solu-Medrol for asthma exacerbation.  She will be admitted to the hospital for ongoing management.     At the conclusion of the encounter I discussed the results of all of the tests and the disposition. The questions were answered. The patient or family acknowledged understanding and was agreeable with the care plan.       Medical Decision Making  Obtained supplemental history:Supplemental history obtained?: No  Reviewed external records: External records reviewed?: Documented in chart and Outpatient Record: 09/17/2024 [facility] Visit  Care impacted by chronic illness:Cancer/Chemotherapy and Chronic Lung Disease  Care significantly affected by social determinants of health:Access to Medical Care  Did you consider but not order tests?: Work up considered but not performed and documented in chart, if applicable  Did you interpret images independently?: Independent interpretation of ECG and images noted in documentation, when applicable.  Consultation discussion with other provider:Did you involve another provider (consultant, MH, pharmacy, etc.)?: I discussed the care with another health care provider, see documentation for details.  Admit.      MEDICATIONS GIVEN IN THE EMERGENCY:  Medications   sodium chloride (PF) 0.9% PF flush 3 mL (has no administration in time range)   sodium chloride (PF) 0.9% PF flush 3 mL (has no administration in time range)   sodium chloride 0.9 % infusion (has no administration in time range)   azithromycin (ZITHROMAX) 500 mg in  mL intermittent infusion (has no administration in time range)   nirmatrelvir and ritonavir (PAXLOVID) 300 mg/100 mg therapy pack 3 tablet (has no administration in time range)   sodium chloride 0.9% BOLUS 1,851 mL (1,000 mLs Intravenous $New Bag 9/29/24 0859)   acetaminophen (TYLENOL) tablet 975 mg (975 mg Oral $Given 9/29/24 0903)   cefTRIAXone (ROCEPHIN) 2 g  vial to attach to  ml bag for ADULTS or NS 50 ml bag for PEDS (0 g Intravenous Stopped 9/29/24 1008)   ipratropium - albuterol 0.5 mg/2.5 mg/3 mL (DUONEB) neb solution 3 mL (3 mLs Nebulization $Given 9/29/24 0854)   iopamidol (ISOVUE-370) solution 75 mL (75 mLs Intravenous $Given 9/29/24 0951)   potassium chloride (KLOR-CON) Packet 40 mEq (40 mEq Oral $Given 9/29/24 1006)       NEW PRESCRIPTIONS STARTED AT TODAY'S ER VISIT  New Prescriptions    No medications on file          =================================================================    HPI    Patient information was obtained from: the patient    Use of : Yes (Phone). Language: Hilda Mills is a 59 year old female with a pertinent history of invasive ductal carincoma of the left breast (diagnosed July 2024) on chemotherapy and asthma who presents to this ED by walk-in for evaluation of a fever and shortness of breath.     The patient developed a productive cough after her first cycle of chemotherapy on 09/17. On Friday (09/25), she developed a fever. She has not taken any medications for this today (09/29). She is chronically short of breath and has a history of asthma. She last used a nebulizer on Friday. She coughs to the point of feeling lightheaded. The patient endorses pain in her upper back that exacerbates the shortness of breath. She has also had intermittent nausea with a decreased appetite.     Per Chart Review, the patient was seen on 09/17/2024 at [facility] in [place] by Dr. Adamson. The patient was diagnosed with invasive ductal carcinoma and started on Adriamycin and Cytoxan on 07/22/2024. She was transitioned to her first cycle of chemotherapy with Paclitaxel, with a plan for weekly infusions. She had a chronic cough with a recent negative x-ray. Stable hemoglobin of 11 and white blood cell count of 6.5.       PAST MEDICAL HISTORY:  Past Medical History:   Diagnosis Date    Asthma     Chronic  "constipation        PAST SURGICAL HISTORY:  Past Surgical History:   Procedure Laterality Date    INCISION AND DRAINAGE OF WOUND N/A 2020    Procedure: INCISION AND DRAINAGE, NECK;  Surgeon: Arnel James MD;  Location: [facility];  Service: ENT    INSERT PORT VASCULAR ACCESS Right 2024    Procedure: INSERTION,  RIGHT VASCULAR ACCESS PORT;  Surgeon: Iwona Choe DO;  Location: [facility]    PICC INSERTION - TRIPLE LUMEN  2020         TRACHEOSTOMY N/A 2020    Procedure: EMERGENT INTUBATION IN OR WITH CREATION, TRACHEOSTOMY;  Surgeon: Dennise Justice MD;  Location: [facility];  Service: General           CURRENT MEDICATIONS:    acetaminophen (TYLENOL) 325 MG tablet  albuterol (PROAIR HFA;PROVENTIL HFA;VENTOLIN HFA) 90 mcg/actuation inhaler  Baclofen (LIORESAL) 5 MG tablet  budesonide-formoterol (SYMBICORT) 160-4.5 mcg/actuation inhaler  dexAMETHasone (DECADRON) 4 MG tablet  guaiFENesin-codeine (ROBITUSSIN AC) 100-10 MG/5ML solution  ibuprofen (ADVIL,MOTRIN) 200 MG tablet  lidocaine-prilocaine (EMLA) 2.5-2.5 % external cream  montelukast (SINGULAIR) 10 mg tablet  ondansetron (ZOFRAN) 8 MG tablet  prochlorperazine (COMPAZINE) 10 MG tablet  vitamin D3 (CHOLECALCIFEROL) 50 mcg (2000 units) tablet        ALLERGIES:  No Known Allergies    FAMILY HISTORY:  Family History   Problem Relation Age of Onset    Kidney failure Mother         on Dialysis    Hypertension Mother     Gout Mother     No Known Problems Father          age 60 in [place]    Kidney failure Brother     Asthma Son        SOCIAL HISTORY:   Social History     Socioeconomic History    Marital status: Legally    Tobacco Use    Smoking status: Never    Smokeless tobacco: Never   Substance and Sexual Activity    Alcohol use: Never    Drug use: Never       VITALS:  BP (!) 143/85   Pulse 120   Temp (!) 103.1  F (39.5  C) (Oral)   Resp 25   Ht 1.499 m (4' 11\")   Wt 61.7 kg (136 lb)   SpO2 96%  "  BMI 27.47 kg/m      PHYSICAL EXAM    Constitutional: Well developed, Well nourished, NAD  HENT: Normocephalic, Atraumatic, Bilateral external ears normal, Oropharynx normal, mucous membranes moist, Nose normal.   Neck- Normal range of motion, No tenderness, Supple, No stridor.  Eyes: PERRL, EOMI, Conjunctiva normal, No discharge.   Respiratory: Tachypnea, able to speak in full sentences. Expiratory wheeze.   Cardiovascular: Regular rhythm. Tachycardic.   GI: Bowel sounds normal, Soft, No tenderness,   Musculoskeletal: No edema. Good range of motion in all major joints. No tenderness to palpation or major deformities noted.   Integument: Warm, Dry, No erythema, No rash  Neurologic: Alert & oriented x 3, Normal motor function, Normal sensory function, No focal deficits noted. Normal gait.   Psychiatric: Affect normal, Judgment normal, Mood normal.      LAB:  All pertinent labs reviewed and interpreted.  Results for orders placed or performed during the hospital encounter of 09/29/24   CT Chest Pulmonary Embolism w Contrast    Impression    IMPRESSION:  1.  No pulmonary embolus.  2.  There are a few scattered tree-in-bud opacities most notably in the right upper lobe suggestive of infectious/inflammatory bronchiolitis.  3.  There is a groundglass nodular opacity in the left upper lobe measuring 0.8 x 0.6 cm. While this could represent infectious or inflammatory change is technically indeterminant and continued attention on follow-up is recommended.  4.  Otherwise, there are also a few scattered sub-6 mm pulmonary nodules. Continued attention on follow-up.  5.  Left breast mass compatible with known malignancy.   Lactic Acid Whole Blood with 1X Repeat in 2 HR when >2   Result Value Ref Range    Lactic Acid, Initial 0.7 0.7 - 2.0 mmol/L   Blood gas venous   Result Value Ref Range    pH Venous 7.44 (H) 7.32 - 7.43    pCO2 Venous 43 40 - 50 mm Hg    pO2 Venous 33 25 - 47 mm Hg    Bicarbonate Venous 29 (H) 21 - 28 mmol/L     Base Excess/Deficit Venous 4.8 (H) -3.0 - 3.0 mmol/L    FIO2 21     Oxyhemoglobin Venous 65 (L) 70 - 75 %    O2 Sat, Venous 66.1 (L) 70.0 - 75.0 %   Result Value Ref Range    Procalcitonin 0.16 <0.50 ng/mL   Basic metabolic panel   Result Value Ref Range    Sodium 131 (L) 135 - 145 mmol/L    Potassium 3.0 (L) 3.4 - 5.3 mmol/L    Chloride 96 (L) 98 - 107 mmol/L    Carbon Dioxide (CO2) 22 22 - 29 mmol/L    Anion Gap 13 7 - 15 mmol/L    Urea Nitrogen 6.0 6.0 - 20.0 mg/dL    Creatinine 0.62 0.51 - 0.95 mg/dL    GFR Estimate >90 >60 mL/min/1.73m2    Calcium 8.5 (L) 8.8 - 10.4 mg/dL    Glucose 95 70 - 99 mg/dL   Result Value Ref Range    Magnesium 1.7 1.7 - 2.3 mg/dL   Symptomatic Influenza A/B, RSV, & SARS-CoV2 PCR (COVID-19) Nasopharyngeal    Specimen: Nasopharyngeal; Swab   Result Value Ref Range    Influenza A PCR Negative Negative    Influenza B PCR Negative Negative    RSV PCR Negative Negative    SARS CoV2 PCR Positive (A) Negative   N terminal pro BNP outpatient   Result Value Ref Range    N Terminal Pro BNP Outpatient 62 0 - 900 pg/mL   CBC with platelets and differential   Result Value Ref Range    WBC Count 4.2 4.0 - 11.0 10e3/uL    RBC Count 3.38 (L) 3.80 - 5.20 10e6/uL    Hemoglobin 10.5 (L) 11.7 - 15.7 g/dL    Hematocrit 30.5 (L) 35.0 - 47.0 %    MCV 90 78 - 100 fL    MCH 31.1 26.5 - 33.0 pg    MCHC 34.4 31.5 - 36.5 g/dL    RDW 17.5 (H) 10.0 - 15.0 %    Platelet Count 204 150 - 450 10e3/uL    % Neutrophils 75 %    % Lymphocytes 14 %    % Monocytes 10 %    % Eosinophils 1 %    % Basophils 1 %    % Immature Granulocytes 1 %    NRBCs per 100 WBC 0 <1 /100    Absolute Neutrophils 3.1 1.6 - 8.3 10e3/uL    Absolute Lymphocytes 0.6 (L) 0.8 - 5.3 10e3/uL    Absolute Monocytes 0.4 0.0 - 1.3 10e3/uL    Absolute Eosinophils 0.0 0.0 - 0.7 10e3/uL    Absolute Basophils 0.0 0.0 - 0.2 10e3/uL    Absolute Immature Granulocytes 0.0 <=0.4 10e3/uL    Absolute NRBCs 0.0 10e3/uL   ECG 12-LEAD WITH MUSE (LHE)   Result Value  Ref Range    Systolic Blood Pressure 143 mmHg    Diastolic Blood Pressure 85 mmHg    Ventricular Rate 116 BPM    Atrial Rate 116 BPM    AK Interval 156 ms    QRS Duration 98 ms     ms    QTc 447 ms    P Axis 48 degrees    R AXIS -30 degrees    T Axis 37 degrees    Interpretation ECG       Sinus tachycardia  Left axis deviation  Abnormal ECG  When compared with ECG of 03-Dec-2020 09:59,  QRS axis Shifted left  Confirmed by SEE ED PROVIDER NOTE FOR, ECG INTERPRETATION ([id number]),  MERLYNURIS MILLER ([id number]) on 9/29/2024 9:11:19 AM         RADIOLOGY:  Reviewed all pertinent imaging. Please see official radiology report.  CT Chest Pulmonary Embolism w Contrast   Final Result   IMPRESSION:   1.  No pulmonary embolus.   2.  There are a few scattered tree-in-bud opacities most notably in the right upper lobe suggestive of infectious/inflammatory bronchiolitis.   3.  There is a groundglass nodular opacity in the left upper lobe measuring 0.8 x 0.6 cm. While this could represent infectious or inflammatory change is technically indeterminant and continued attention on follow-up is recommended.   4.  Otherwise, there are also a few scattered sub-6 mm pulmonary nodules. Continued attention on follow-up.   5.  Left breast mass compatible with known malignancy.          EKG:    Performed at: 8:05 AM, 9/29/2024    Impression: Sinus tachycardia. Left axis deviation. Abnormal EKG.    Rate: 116 BPM  Rhythm: Sinus tachycardia  AK Interval: 156 ms  QRS Interval: 98 ms  QTc Interval: 447 ms  ST Changes: None  Comparison: When compared with EKG of 12/03/2020, QRS axis shifted left.    I have independently reviewed and interpreted the EKG(s) documented above.      I, Xochilt Mobley, am serving as a scribe to document services personally performed by Lolly Castro MD, based on my observation and the provider's statements to me. I, Lolly Castro MD, attest that Xochilt Mobley is acting in a scribe capacity, has observed my  performance of the services and has documented them in accordance with my direction.    Lolly Castro MD  Emergency Medicine  [facility] EMERGENCY ROOM  [facility] [address] [phone]       Lolly Castro MD  09/29/24 9916

## 2024-09-30 ENCOUNTER — TELEPHONE (OUTPATIENT)
Dept: ONCOLOGY | Facility: HOSPITAL | Age: 59
End: 2024-09-30
Payer: COMMERCIAL

## 2024-09-30 VITALS
SYSTOLIC BLOOD PRESSURE: 153 MMHG | BODY MASS INDEX: 27.42 KG/M2 | WEIGHT: 136 LBS | DIASTOLIC BLOOD PRESSURE: 89 MMHG | OXYGEN SATURATION: 97 % | TEMPERATURE: 97.7 F | RESPIRATION RATE: 16 BRPM | HEIGHT: 59 IN | HEART RATE: 81 BPM

## 2024-09-30 LAB
ANION GAP SERPL CALCULATED.3IONS-SCNC: 9 MMOL/L (ref 7–15)
BACTERIA SPT CULT: NORMAL
BUN SERPL-MCNC: 5.2 MG/DL (ref 6–20)
CALCIUM SERPL-MCNC: 8.9 MG/DL (ref 8.8–10.4)
CHLORIDE SERPL-SCNC: 107 MMOL/L (ref 98–107)
CREAT SERPL-MCNC: 0.43 MG/DL (ref 0.51–0.95)
CRP SERPL-MCNC: 40 MG/L
EGFRCR SERPLBLD CKD-EPI 2021: >90 ML/MIN/1.73M2
ERYTHROCYTE [DISTWIDTH] IN BLOOD BY AUTOMATED COUNT: 17.5 % (ref 10–15)
GLUCOSE SERPL-MCNC: 132 MG/DL (ref 70–99)
GRAM STAIN RESULT: NORMAL
HCO3 SERPL-SCNC: 23 MMOL/L (ref 22–29)
HCT VFR BLD AUTO: 27.9 % (ref 35–47)
HGB BLD-MCNC: 9.4 G/DL (ref 11.7–15.7)
MAGNESIUM SERPL-MCNC: 2 MG/DL (ref 1.7–2.3)
MCH RBC QN AUTO: 30.4 PG (ref 26.5–33)
MCHC RBC AUTO-ENTMCNC: 33.7 G/DL (ref 31.5–36.5)
MCV RBC AUTO: 90 FL (ref 78–100)
PLATELET # BLD AUTO: 204 10E3/UL (ref 150–450)
POTASSIUM SERPL-SCNC: 3.5 MMOL/L (ref 3.4–5.3)
POTASSIUM SERPL-SCNC: 3.9 MMOL/L (ref 3.4–5.3)
RBC # BLD AUTO: 3.09 10E6/UL (ref 3.8–5.2)
SODIUM SERPL-SCNC: 139 MMOL/L (ref 135–145)
WBC # BLD AUTO: 3.4 10E3/UL (ref 4–11)

## 2024-09-30 PROCEDURE — 80048 BASIC METABOLIC PNL TOTAL CA: CPT | Performed by: INTERNAL MEDICINE

## 2024-09-30 PROCEDURE — 87070 CULTURE OTHR SPECIMN AEROBIC: CPT | Performed by: INTERNAL MEDICINE

## 2024-09-30 PROCEDURE — 94640 AIRWAY INHALATION TREATMENT: CPT

## 2024-09-30 PROCEDURE — 86140 C-REACTIVE PROTEIN: CPT | Performed by: INTERNAL MEDICINE

## 2024-09-30 PROCEDURE — 250N000011 HC RX IP 250 OP 636: Performed by: STUDENT IN AN ORGANIZED HEALTH CARE EDUCATION/TRAINING PROGRAM

## 2024-09-30 PROCEDURE — 250N000011 HC RX IP 250 OP 636: Performed by: INTERNAL MEDICINE

## 2024-09-30 PROCEDURE — 85027 COMPLETE CBC AUTOMATED: CPT | Performed by: INTERNAL MEDICINE

## 2024-09-30 PROCEDURE — 250N000013 HC RX MED GY IP 250 OP 250 PS 637: Performed by: STUDENT IN AN ORGANIZED HEALTH CARE EDUCATION/TRAINING PROGRAM

## 2024-09-30 PROCEDURE — 84132 ASSAY OF SERUM POTASSIUM: CPT | Performed by: INTERNAL MEDICINE

## 2024-09-30 PROCEDURE — 258N000003 HC RX IP 258 OP 636: Performed by: STUDENT IN AN ORGANIZED HEALTH CARE EDUCATION/TRAINING PROGRAM

## 2024-09-30 PROCEDURE — 999N000157 HC STATISTIC RCP TIME EA 10 MIN

## 2024-09-30 PROCEDURE — 250N000009 HC RX 250: Performed by: INTERNAL MEDICINE

## 2024-09-30 PROCEDURE — 99239 HOSP IP/OBS DSCHRG MGMT >30: CPT | Performed by: STUDENT IN AN ORGANIZED HEALTH CARE EDUCATION/TRAINING PROGRAM

## 2024-09-30 PROCEDURE — 83735 ASSAY OF MAGNESIUM: CPT | Performed by: INTERNAL MEDICINE

## 2024-09-30 PROCEDURE — 250N000013 HC RX MED GY IP 250 OP 250 PS 637: Performed by: INTERNAL MEDICINE

## 2024-09-30 RX ORDER — IPRATROPIUM BROMIDE AND ALBUTEROL SULFATE 2.5; .5 MG/3ML; MG/3ML
3 SOLUTION RESPIRATORY (INHALATION) EVERY 4 HOURS PRN
Status: DISCONTINUED | OUTPATIENT
Start: 2024-09-30 | End: 2024-09-30 | Stop reason: HOSPADM

## 2024-09-30 RX ORDER — CEFPODOXIME PROXETIL 200 MG/1
200 TABLET, FILM COATED ORAL 2 TIMES DAILY
Qty: 8 TABLET | Refills: 0 | Status: SHIPPED | OUTPATIENT
Start: 2024-09-30

## 2024-09-30 RX ORDER — HEPARIN SODIUM (PORCINE) LOCK FLUSH IV SOLN 100 UNIT/ML 100 UNIT/ML
5-10 SOLUTION INTRAVENOUS EVERY 8 HOURS
Status: DISCONTINUED | OUTPATIENT
Start: 2024-09-30 | End: 2024-09-30 | Stop reason: HOSPADM

## 2024-09-30 RX ORDER — MAGNESIUM OXIDE 400 MG/1
400 TABLET ORAL EVERY 4 HOURS
Status: DISCONTINUED | OUTPATIENT
Start: 2024-09-30 | End: 2024-09-30 | Stop reason: HOSPADM

## 2024-09-30 RX ORDER — AZITHROMYCIN 250 MG/1
500 TABLET, FILM COATED ORAL DAILY
Qty: 2 TABLET | Refills: 0 | Status: SHIPPED | OUTPATIENT
Start: 2024-10-01 | End: 2024-10-02

## 2024-09-30 RX ORDER — AZITHROMYCIN 500 MG/5ML
500 INJECTION, POWDER, LYOPHILIZED, FOR SOLUTION INTRAVENOUS EVERY 24 HOURS
Status: DISCONTINUED | OUTPATIENT
Start: 2024-09-30 | End: 2024-09-30 | Stop reason: HOSPADM

## 2024-09-30 RX ADMIN — HEPARIN 5 ML: 100 SYRINGE at 14:26

## 2024-09-30 RX ADMIN — FLUTICASONE FUROATE AND VILANTEROL TRIFENATATE 1 PUFF: 200; 25 POWDER RESPIRATORY (INHALATION) at 10:28

## 2024-09-30 RX ADMIN — NIRMATRELVIR AND RITONAVIR 3 TABLET: KIT at 10:27

## 2024-09-30 RX ADMIN — IPRATROPIUM BROMIDE AND ALBUTEROL SULFATE 3 ML: .5; 3 SOLUTION RESPIRATORY (INHALATION) at 07:29

## 2024-09-30 RX ADMIN — AZITHROMYCIN MONOHYDRATE 500 MG: 500 INJECTION, POWDER, LYOPHILIZED, FOR SOLUTION INTRAVENOUS at 11:41

## 2024-09-30 RX ADMIN — DEXAMETHASONE SODIUM PHOSPHATE 6 MG: 10 INJECTION INTRAMUSCULAR; INTRAVENOUS at 14:24

## 2024-09-30 RX ADMIN — Medication 400 MG: at 14:24

## 2024-09-30 RX ADMIN — ENOXAPARIN SODIUM 30 MG: 30 INJECTION SUBCUTANEOUS at 02:47

## 2024-09-30 RX ADMIN — CEFEPIME 1 G: 1 INJECTION, POWDER, FOR SOLUTION INTRAMUSCULAR; INTRAVENOUS at 05:48

## 2024-09-30 RX ADMIN — VANCOMYCIN HYDROCHLORIDE 1000 MG: 1 INJECTION, SOLUTION INTRAVENOUS at 02:48

## 2024-09-30 RX ADMIN — Medication 400 MG: at 00:40

## 2024-09-30 RX ADMIN — ENOXAPARIN SODIUM 30 MG: 30 INJECTION SUBCUTANEOUS at 14:26

## 2024-09-30 ASSESSMENT — ACTIVITIES OF DAILY LIVING (ADL)
ADLS_ACUITY_SCORE: 38

## 2024-09-30 NOTE — DISCHARGE SUMMARY
"[facility]  Hospitalist Discharge Summary      Date of Admission:  9/29/2024  Date of Discharge:  9/30/2024  Discharging Provider: MAE COHN MD  Discharge Service: Hospitalist Service    Discharge Diagnoses   COVID  Community-acquired pneumonia  Asthma exacerbation  Sepsis  Immunocompromised host  Leukopenia  Mild hyponatremia  Mild hypokalemia  History of invasive ductal carcinoma of the breast left breast ER negative SD negative HER2/kobe 2 +   History of SVT   Chronic anemia     Clinically Significant Risk Factors     # Overweight: Estimated body mass index is 27.47 kg/m  as calculated from the following:    Height as of this encounter: 1.499 m (4' 11\").    Weight as of this encounter: 61.7 kg (136 lb).       Follow-ups Needed After Discharge   Follow-up Appointments     Follow-up and recommended labs and tests       Follow up with your oncologist for lab draw (complete blood panel) in 3   days. Follow up with them for abnormal labs.            Unresulted Labs Ordered in the Past 30 Days of this Admission       Date and Time Order Name Status Description    9/29/2024 12:20 PM Respiratory Aerobic Bacterial Culture with Gram Stain In process     9/29/2024  8:09 AM Blood Culture Peripheral Blood Preliminary         These results will be followed up by our group    Discharge Disposition   Discharged to home  Condition at discharge: Stable    Hospital Course   59 year old female past medical history significant for recently diagnosed invasive ductal carcinoma of the left breast 7/24, sees [place] oncology, currently on chemotherapy(paclitaxel) last dose on 9/23/2024, asthma, 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 Her symptoms got significantly better on 9/30 after steroid, Paxlovid, Duoneb and " IV abx.. She is no longer wheezing, febrile or having much dyspnea/cough. She is discharged with Paxlovid, cefpodoxime and azithromycin.  Given she has mild leukopenia at discharge, she is advised to get CBC drawn at oncologist clinic in the next 3 days.    Consultations This Hospital Stay   PHARMACY TO DOSE VANCO  CARE MANAGEMENT / SOCIAL WORK IP CONSULT    Code Status   Full Code    Time Spent on this Encounter   I, MAE COHN MD, personally saw the patient today and spent greater than 30 minutes discharging this patient.       MAE COHN MD  [address] [address]  Phone: [phone]  Fax: [phone]  ______________________________________________________________________    Physical Exam   Vital Signs: Temp: 97.7  F (36.5  C) Temp src: Oral BP: (!) 153/89 Pulse: 81   Resp: 16 SpO2: 97 % O2 Device: None (Room air)    Weight: 136 lbs 0 oz  General Appearance: Alert and wake, not in distress  Respiratory: clear lungs, no crackles or wheezing  Cardiovascular: rhythmic, normal S1 and S2, no murmur  Neurology: oriented x 3  Psych: cooperative and calm, normal affect  GI: soft, non-tender, normal bowel sound  Extremities: LE edema: none       Primary Care Physician   HIRAL CHAVEZ    Discharge Orders      Reason for your hospital stay    COVID     Follow-up and recommended labs and tests     Follow up with your oncologist for lab draw (complete blood panel) in 3 days. Follow up with them for abnormal labs.     Activity    Your activity upon discharge: activity as tolerated     Diet    Follow this diet upon discharge: Current Diet:Orders Placed This Encounter      Combination Diet Regular Diet Adult       Significant Results and Procedures   Most Recent 3 CBC's:  Recent Labs   Lab Test 09/30/24  0553 09/29/24  0840 09/24/24  1034   WBC 3.4* 4.2 9.5   HGB 9.4* 10.5* 10.6*   MCV 90 90 91    204 306     Most Recent 3 BMP's:  Recent Labs    Lab Test 09/30/24  0553 09/30/24  0042 09/29/24  1553 09/29/24  0840 09/03/24  1050     --   --  131* 143   POTASSIUM 3.9 3.5 3.4 3.0* 3.3*   CHLORIDE 107  --   --  96* 105   CO2 23  --   --  22 24   BUN 5.2*  --   --  6.0 6.6   CR 0.43*  --   --  0.62 0.53   ANIONGAP 9  --   --  13 14   DONNA 8.9  --   --  8.5* 8.7*   *  --   --  95 142*     Most Recent 2 LFT's:  Recent Labs   Lab Test 09/29/24  1306 09/17/24  0916   AST 43 19   ALT 35 16   ALKPHOS 77 108   BILITOTAL 0.5 0.4   ,   Results for orders placed or performed during the hospital encounter of 09/29/24   CT Chest Pulmonary Embolism w Contrast    Narrative    EXAM: CT CHEST PULMONARY EMBOLISM W CONTRAST  LOCATION: [facility]  DATE: 9/29/2024    INDICATION: invasive ductal carcinoma left breast, cough, fever, short of breath, upper back pain  COMPARISON: None.  TECHNIQUE: CT chest pulmonary angiogram during arterial phase injection of IV contrast. Multiplanar reformats and MIP reconstructions were performed. Dose reduction techniques were used.   CONTRAST: 75ml Isovue 370    FINDINGS:  ANGIOGRAM CHEST: Pulmonary arteries are normal caliber and negative for pulmonary emboli. Thoracic aorta is negative for dissection.     LUNGS AND PLEURA: Mild mosaic attenuation of the lungs likely related to the exam being obtained in the expiratory phase of respiration. Mild bronchial wall thickening. There are a few scattered tree-in-bud opacities most notably in the anterior right   upper lobe. There is a predominantly groundglass nodular opacity in the left upper lobe measuring 0.8 x 0.6 cm. Left lower lobe 0.5 x 0.4 cm nodule on series 7 image 176. Right lower lobe nodule on series 7 image 165 measuring 0.5 cm.    MEDIASTINUM/AXILLAE: Heart size is normal. No adenopathy. Right IJ port terminates in the mid SVC.    CORONARY ARTERY CALCIFICATION: None.    UPPER ABDOMEN: Hepatic steatosis.    MUSCULOSKELETAL: Degenerative changes of  the spine. No suspicious osseous lesions. Left breast mass measures 2.3 x 2.1 cm compatible with known malignancy.      Impression    IMPRESSION:  1.  No pulmonary embolus.  2.  There are a few scattered tree-in-bud opacities most notably in the right upper lobe suggestive of infectious/inflammatory bronchiolitis.  3.  There is a groundglass nodular opacity in the left upper lobe measuring 0.8 x 0.6 cm. While this could represent infectious or inflammatory change is technically indeterminant and continued attention on follow-up is recommended.  4.  Otherwise, there are also a few scattered sub-6 mm pulmonary nodules. Continued attention on follow-up.  5.  Left breast mass compatible with known malignancy.       Discharge Medications   Current Discharge Medication List        START taking these medications    Details   azithromycin (ZITHROMAX) 250 MG tablet Take 2 tablets (500 mg) by mouth daily for 1 day.  Qty: 2 tablet, Refills: 0    Associated Diagnoses: Community acquired pneumonia, unspecified laterality      benzocaine-menthol (CEPACOL) 15-3.6 MG lozenge Place 1 lozenge inside cheek every hour as needed for sore throat (without fever).  Qty: 30 lozenge, Refills: 0    Associated Diagnoses: COVID-19      cefpodoxime (VANTIN) 200 MG tablet Take 1 tablet (200 mg) by mouth 2 times daily.  Qty: 8 tablet, Refills: 0    Associated Diagnoses: Community acquired pneumonia, unspecified laterality      nirmatrelvir and ritonavir (PAXLOVID) 300 mg/100 mg therapy pack Take 3 tablets by mouth 2 times daily for 7 doses. . Finish remainder of Paxlovid therapy pack that was started in the hospital.  Follow instructions on that pack.  Refills: 0    Comments: This order will not be sent to a retail pharmacy. This order is intended for the AVS summary and for continuation of the remainder of the Paxlovid pack dispensed by the inpatient pharmacy at home. Confirm that the patient has received the inpatient pharmacy supplied Paxlovid  to take home.  Associated Diagnoses: COVID-19           CONTINUE these medications which have NOT CHANGED    Details   acetaminophen (TYLENOL) 325 MG tablet [ACETAMINOPHEN (TYLENOL) 325 MG TABLET] Take 2 tablets (650 mg total) by mouth every 4 (four) hours as needed for pain or fever.  Refills: 0    Associated Diagnoses: Moustapha's angina      albuterol (PROAIR HFA;PROVENTIL HFA;VENTOLIN HFA) 90 mcg/actuation inhaler [ALBUTEROL (PROAIR HFA;PROVENTIL HFA;VENTOLIN HFA) 90 MCG/ACTUATION INHALER] Inhale 2 puffs every 6 (six) hours as needed for wheezing.      budesonide-formoterol (SYMBICORT) 160-4.5 mcg/actuation inhaler [BUDESONIDE-FORMOTEROL (SYMBICORT) 160-4.5 MCG/ACTUATION INHALER] Inhale 2 puffs 2 (two) times a day.  Qty: 1 Inhaler, Refills: 11    Associated Diagnoses: Moderate persistent asthma without complication      guaiFENesin-codeine (ROBITUSSIN AC) 100-10 MG/5ML solution Take 5-10 mLs by mouth every 4 hours as needed for cough.  Qty: 237 mL, Refills: 0    Associated Diagnoses: Cough, unspecified type      lidocaine-prilocaine (EMLA) 2.5-2.5 % external cream Apply topically as needed for moderate pain or other (Apply to port site prior to access to minimize discomfort) Apply a small amount to port site 30 -60 minutes  prior to access to minimize discomfort  Qty: 30 g, Refills: 2    Associated Diagnoses: Invasive ductal carcinoma of breast, female, left (H)      ondansetron (ZOFRAN) 8 MG tablet Take 1 tablet (8 mg) by mouth every 8 hours as needed for nausea (vomiting)  Qty: 30 tablet, Refills: 2    Associated Diagnoses: Invasive ductal carcinoma of breast, female, left (H)      prochlorperazine (COMPAZINE) 10 MG tablet Take 1 tablet (10 mg) by mouth every 6 hours as needed for nausea or vomiting  Qty: 30 tablet, Refills: 2    Associated Diagnoses: Invasive ductal carcinoma of breast, female, left (H)      traMADol (ULTRAM) 50 MG tablet Take 50 mg by mouth every 6 hours as needed for severe pain.      vitamin  D3 (CHOLECALCIFEROL) 50 mcg (2000 units) tablet Take 50 mcg by mouth every evening.           Allergies   No Known Allergies

## 2024-09-30 NOTE — PROGRESS NOTES
Patient is A & O. Hmong speaking, utilizing interpretor services. SBA with walker gait belt. Family in room. Visitor exception. VSS, on RA. Pt denies pain, but complains of throat discomfort. Voiding without difficulty. L PIV SL, R PORT previously accessed and infusing fluids. Due to change 9/30/24 or to deaccess. No mack or drains. Tolerating a regular diet. Anticipated discharge 10/1/24    Protocols:  Mg replaced  K replaced

## 2024-09-30 NOTE — TELEPHONE ENCOUNTER
Patient's daughter in law, Ginny, calls regarding upcoming appointment tomorrow. Patient is currently admitted in the hospital being discharged today. Patient was admitted with cough, shortness of breath and high fever. Patient is positive for covid-19, symptoms started on Friday. Per Ginny, patient is feeling better. She had Paclitaxel infusion on 9/24 and has it weekly for invasive ductal carcinoma of the breast, left, triple negative. Patient is scheduled for provider visit and infusion tomorrow 10/1. Ginny is wondering if patient should still come to visit tomorrow?    Mis Finn RN

## 2024-09-30 NOTE — CONSULTS
Care Management Follow Up    Length of Stay (days): 1    Expected Discharge Date: 10/01/2024     Concerns to be Addressed: no discharge needs identified     Patient plan of care discussed at interdisciplinary rounds: Yes    Anticipated Discharge Disposition: Home     Anticipated Discharge Services: None  Anticipated Discharge DME: None    Patient/family educated on Medicare website which has current facility and service quality ratings:    Education Provided on the Discharge Plan: Yes  Patient/Family in Agreement with the Plan: yes    Referrals Placed by CM/SW:    Private pay costs discussed: Not applicable    Handoff Completed: no    Additional Information:  11:36 AM  Consulted for high risk score. Chart review. Pt is a Covid positive patient and has recent diagnosis of cancer. Pt speaks Hmong. No CM needs identified. Anticipate will go home with family upon discharge.     Please re-consult CM if any additional discharge needs arise.       MAGGY Sainz

## 2024-09-30 NOTE — PROGRESS NOTES
RCAT Treatment Plan    Patient Score: 3   Patient Acuity: 5    Clinical Indication for Therapy: pt has a hx of asthma and is currently covid positive     Therapy Ordered: duoneb q4 prn    Assessment Summary: pt BS were clear /diminished when checked. Pt states she does albuterol mdi prn at home. Pt was on RA with spo2 reading 97%. Pt states her breathing is good and better than yesterday. Switching nebs to prn, family member is present in room and I explained the change which she was ok with.     Prateek Cool, RT  9/30/2024 09/30/24 0734   RCAT Assessment   Reason for Assessment Asthma  (covid)   Pulmonary Status 0   Surgical Status 0   Chest X-ray 2   Respiratory Pattern 0   Mental Status 0   Breath Sounds 1   Cough Effectiveness 0   Level of Activity 0   O2 Required for SpO2>=92% 0   Acuity Level (points) 3   Acuity Level  5

## 2024-09-30 NOTE — PROGRESS NOTES
Patient seen for scheduled Duoneb.  Patient breath sounds diminished and clear prior with increased aeration and some scattered expiratory wheezing post treatment.  Patient has a congested non productive cough and remains on room air.  The patient was also instructed on use of flutter valve.  RT to reassess in the AM.

## 2024-09-30 NOTE — TELEPHONE ENCOUNTER
[facility]: Cancer Care                                                                                          Called Ginny, daughter in law of Kerry, consent to communicate on file. LM to call writer regarding apt that is scheduled on 10/1/24 and plan Contact information was provided and return call requested.  Of note, when Ginny calls back, relay that we did get the message that she left on the triage line. Kerry was discharged from the hospital and they recommend lab recheck within 3 days. Spoke with HIRAM Reed and she will place orders for CBC and CMP and Kerry can get her labs drawn at PCP or hospital lab, avoid Oncology lab with positive covid infection.  She should keep the apts on 10/8 for lab and infusion and we will need to add a provider visit on that date. If Kerry is febrile or symptomatic on 10/8 we will need to delay her treatment/reschedule her apt.    Will monitor    Signature:  Marycruz Tate RN

## 2024-10-01 ENCOUNTER — PATIENT OUTREACH (OUTPATIENT)
Dept: CARE COORDINATION | Facility: CLINIC | Age: 59
End: 2024-10-01
Payer: COMMERCIAL

## 2024-10-01 ENCOUNTER — PATIENT OUTREACH (OUTPATIENT)
Dept: ONCOLOGY | Facility: HOSPITAL | Age: 59
End: 2024-10-01
Payer: COMMERCIAL

## 2024-10-01 DIAGNOSIS — E87.6 HYPOKALEMIA: ICD-10-CM

## 2024-10-01 DIAGNOSIS — C50.912 INVASIVE DUCTAL CARCINOMA OF BREAST, FEMALE, LEFT (H): ICD-10-CM

## 2024-10-01 DIAGNOSIS — R05.9 COUGH, UNSPECIFIED TYPE: Primary | ICD-10-CM

## 2024-10-01 NOTE — PROGRESS NOTES
[facility]: Cancer Care                                                                                          0800 Called Ginny, daughter in law of Kerry, consent to communicate on file. LM to call writer regarding apt that is scheduled on 10/1/24 and plan Contact information was provided and return call requested. (Ginny was called on 9/30 with no return call)    0810 Spoke with Ginny and  relayed that we did get the message that she left on the triage line. Kerry was discharged from the hospital and they recommend lab recheck within 3 days. Spoke with HIRAM Reed and she will place orders for CBC and CMP and Kerry can get her labs drawn at PCP or hospital lab to  avoid patient population at our Oncology  lab with positive covid infection. 10/1 apts are cancelled for lab/provider and infusion.She should keep the apts on 10/8 for lab and infusion and provider visit has been added on that date. Apt times reviewed. If Kerry is febrile or symptomatic on 10/8 we will need to delay her treatment/reschedule her apt. Ginny stated understanding. Ginny shared that Kerry is feeling much better and is afebrile at this time. Lab and infusion apt will be added for 10/15 to make sure that there is adequate apt availability. She was transferred to scheduling to arrange the 10/15 apts.    Ginny will take Kerry to the [facility] lab on 10/3 for recheck of CBC and CMP.  Lab hours given and they realize that they do not need an apt. Will monitor for the results. Ginny will also call writer after they go for lab check as there is not an exact apt time to monitor.    Signature:  Marycruz Tate RN

## 2024-10-01 NOTE — PROGRESS NOTES
[facility]: [facility]    Background: Transitional Care Management program identified per system criteria and reviewed by [facility] [place] team for possible outreach.    Assessment: Upon chart review, [facility] Team member will not proceed with patient outreach related to this episode of Transitional Care Management program due to reason below:    Patient has a follow up appointment with an appropriate provider today for hospital discharge.    Plan: Transitional Care Management episode addressed appropriately per reason noted above.      Sarah Neely MA  [facility] [place], [facility]    *Connected Care Resource Team does NOT follow patient ongoing. Referrals are identified based on internal discharge reports and the outreach is to ensure patient has an understanding of their discharge instructions.

## 2024-10-03 ENCOUNTER — LAB (OUTPATIENT)
Dept: LAB | Facility: CLINIC | Age: 59
End: 2024-10-03
Payer: COMMERCIAL

## 2024-10-03 ENCOUNTER — PATIENT OUTREACH (OUTPATIENT)
Dept: ONCOLOGY | Facility: HOSPITAL | Age: 59
End: 2024-10-03

## 2024-10-03 DIAGNOSIS — E87.6 HYPOKALEMIA: ICD-10-CM

## 2024-10-03 DIAGNOSIS — C50.912 INVASIVE DUCTAL CARCINOMA OF BREAST, FEMALE, LEFT (H): ICD-10-CM

## 2024-10-03 DIAGNOSIS — R05.9 COUGH, UNSPECIFIED TYPE: ICD-10-CM

## 2024-10-03 LAB
ALBUMIN SERPL BCG-MCNC: 3.9 G/DL (ref 3.5–5.2)
ALP SERPL-CCNC: 82 U/L (ref 40–150)
ALT SERPL W P-5'-P-CCNC: 43 U/L (ref 0–50)
ANION GAP SERPL CALCULATED.3IONS-SCNC: 7 MMOL/L (ref 7–15)
AST SERPL W P-5'-P-CCNC: 43 U/L (ref 0–45)
BASOPHILS # BLD MANUAL: 0 10E3/UL (ref 0–0.2)
BASOPHILS NFR BLD MANUAL: 0 %
BILIRUB SERPL-MCNC: 0.4 MG/DL
BUN SERPL-MCNC: 10.7 MG/DL (ref 6–20)
CALCIUM SERPL-MCNC: 9.2 MG/DL (ref 8.8–10.4)
CHLORIDE SERPL-SCNC: 105 MMOL/L (ref 98–107)
CREAT SERPL-MCNC: 0.68 MG/DL (ref 0.51–0.95)
EGFRCR SERPLBLD CKD-EPI 2021: >90 ML/MIN/1.73M2
EOSINOPHIL # BLD MANUAL: 0 10E3/UL (ref 0–0.7)
EOSINOPHIL NFR BLD MANUAL: 0 %
ERYTHROCYTE [DISTWIDTH] IN BLOOD BY AUTOMATED COUNT: 19.2 % (ref 10–15)
GLUCOSE SERPL-MCNC: 86 MG/DL (ref 70–99)
HCO3 SERPL-SCNC: 28 MMOL/L (ref 22–29)
HCT VFR BLD AUTO: 33.5 % (ref 35–47)
HGB BLD-MCNC: 11.1 G/DL (ref 11.7–15.7)
LYMPHOCYTES # BLD MANUAL: 1.7 10E3/UL (ref 0.8–5.3)
LYMPHOCYTES NFR BLD MANUAL: 26 %
MCH RBC QN AUTO: 31.2 PG (ref 26.5–33)
MCHC RBC AUTO-ENTMCNC: 33.1 G/DL (ref 31.5–36.5)
MCV RBC AUTO: 94 FL (ref 78–100)
MONOCYTES # BLD MANUAL: 1 10E3/UL (ref 0–1.3)
MONOCYTES NFR BLD MANUAL: 15 %
MYELOCYTES # BLD MANUAL: 0.7 10E3/UL
MYELOCYTES NFR BLD MANUAL: 10 %
NEUTROPHILS # BLD MANUAL: 3.3 10E3/UL (ref 1.6–8.3)
NEUTROPHILS NFR BLD MANUAL: 49 %
NRBC # BLD AUTO: 0.1 10E3/UL
NRBC # BLD AUTO: 0.1 10E3/UL
NRBC BLD AUTO-RTO: 1 /100
NRBC BLD MANUAL-RTO: 1 %
PLAT MORPH BLD: ABNORMAL
PLATELET # BLD AUTO: 290 10E3/UL (ref 150–450)
POTASSIUM SERPL-SCNC: 3.5 MMOL/L (ref 3.4–5.3)
PROT SERPL-MCNC: 6.7 G/DL (ref 6.4–8.3)
RBC # BLD AUTO: 3.56 10E6/UL (ref 3.8–5.2)
RBC MORPH BLD: ABNORMAL
SODIUM SERPL-SCNC: 140 MMOL/L (ref 135–145)
VARIANT LYMPHS BLD QL SMEAR: PRESENT
WBC # BLD AUTO: 6.7 10E3/UL (ref 4–11)

## 2024-10-03 PROCEDURE — 36415 COLL VENOUS BLD VENIPUNCTURE: CPT

## 2024-10-03 PROCEDURE — 80053 COMPREHEN METABOLIC PANEL: CPT

## 2024-10-03 PROCEDURE — 85007 BL SMEAR W/DIFF WBC COUNT: CPT

## 2024-10-03 PROCEDURE — 85027 COMPLETE CBC AUTOMATED: CPT

## 2024-10-03 NOTE — PROGRESS NOTES
[facility]: Cancer Care                                                                                          10/3/24 1700 Called Ginny. Daughter in law of Kerry, consent to communicate on file dated 9/6/24. LM that the labs that were drawn on 10/3 were stable. Writer will also place call to Kerry to review. Requested that she relay the info, as well, when she talks with her. Plan to keep apt on 10/8 as scheduled unless Kerry is symptomatic. Contact information provided and return call invited if any questions or concerned  10/3/24, 1705 Called Kerry with the assistance of [employer] . LM that writer was calling to follow up on results, will try again in the morning.    ADDENDUM  Called Kerry and  reviewed CBC and CMP from 10/3/24 and shared that her labs were stable. She had recent covid infection but reports feeling better each day. She does not have fever, cough is improving. She does note lightheadedness with position changes. Shared that she should increase her oral hydration as her BP is dropping with position changes with low fluid. Discussed strategies to increase oral intake.   She will plan to keep her apt on 10/8 as scheduled. She will call our office to reschedule if she is symptomatic.    Signature:  Marycruz Tate RN

## 2024-10-04 ENCOUNTER — APPOINTMENT (OUTPATIENT)
Dept: INTERPRETER SERVICES | Facility: CLINIC | Age: 59
End: 2024-10-04
Payer: COMMERCIAL

## 2024-10-04 LAB — BACTERIA BLD CULT: NO GROWTH

## 2024-10-07 NOTE — PROGRESS NOTES
[facility] Outpatient Progress Note    Patient: Kerry Mills  MRN: [id number]  Date of Service: Oct 8, 2024          Reason for Visit    Chief Complaint   Patient presents with    Oncology Clinic Visit       Invasive ductal carcinoma of breast, female, left          Primary Hematologist/Oncologist: Dr. Adamson        Assessment/Plan  #. Invasive ductal carcinoma of breast, female, left (triple negative breast cancer)  #. Neoplastic malignant related fatigue  #. Chemotherapy-induced fatigue  #. Cough, persistent since ~early August, 2024  Clinically stable. Recovering well from recent hospitalization d/t covid pneumonia. She has a bit of a lingering cough, but overall is improving. No further fevers. We reviewed labs including CMP and CBC. K+ is 3.3 today. CBC is adequate with normal WBC, Plt and Hgb counts. Will refill guaifenesin with codeine as this is helpful for her cough.   Proceed with paclitaxel today and weekly.   Follow-up with Dr. Adamson in 2 weeks for labs, and assessment prior to paclitaxel.   Encouraged increased protein intake for weight management.   Encouraged increased intake of potassium rich foods.   We will also continue to monitor the breast mass clinically on each visit.    Current plan is to continue weekly paclitaxel as neoadjuvant chemotherapy after which patient will be referred to surgery.  Will follow back up with Dr. Choe in December to further discuss lumpectomy vs mastectomy. Pt and daughter understand that she would proceed with radiation after the surgery.     ______________________________________________________________________________    History of Present Illness/ Interval History    Ms. Kerry Mills  is a 58 year old female patient returns in follow-up with her daughter for consideration of next cycle of chemotherapy.  She completed DDAC on 9/3/2024, and has been receiving weekly paclitaxel with good toleration. Unfortunately, Kerry was hospitalized on  "9/29/24 - 9/30/24 for COVID, pneumonia. Since that time she has been recovering nicely. Feels better. Breathing is improved. Still has a cough, but not a frequent. No fevers. No significant pain today. Except for covid hospitalization, she feels she was tolerating the weekly taxol infusions. Denies nausea or diarrhea. Denies numbness or tingling. She reports that she has been eating and drinking ok. She is able to eat but notes. Weight is going down a bit.       ECOG performance status   0- Fully active, without restriction      Pain  Pain Score: Severe Pain (7)  Pain Loc: Other - see comment (lung and chest)    ROS  A 14 point review of systems was obtained.  Positive findings noted in the history.  The remainder of the review of system is otherwise negative.      Oncology History/Treatment  Oncologic history  1) invasive ductal carcinoma of the breast ER negative [place] negative HER2/kobe 2+, negative by FISH.  3.9 cm in maximum dimension on MRI.  Diagnosed July 2024    Treatment  1) neoadjuvant AC started on 7/22/2024.  CarboTaxol and Pembro denied by insurance and   2)Plan to give AC followed by paclitaxel before referring patient for surgery    Physical Exam    /79   Pulse 83   Temp 97.2  F (36.2  C)   Resp 16   Ht 1.499 m (4' 11\")   Wt 62.4 kg (137 lb 8 oz)   SpO2 96%   BMI 27.77 kg/m      General: Well-appearing female in no acute distress. Cooperative in conversation.  Eyes: EOMI, PERRL. No scleral icterus.  ENT: Oral mucosa is moist without thrush.  No significant erythema present  Lymphatic: Neck is supple without cervical, axillary, or supraclavicular lymphadenopathy.   Cardiovascular: RRR No murmurs, gallops, or rubs. No peripheral edema.  Breasts: Left breast mass palpable, nontender  Respiratory: CTA bilaterally. No wheezes or crackles.  Gastrointestinal: BS +. Abdomen soft, non-tender. No palpable hepatosplenomegaly or masses.   Neurologic: Alert and oriented x3. Cranial nerves II through XII " are grossly intact.  Skin: No rashes, petechiae, or bruising noted. Warm, dry, intact.      Lab Results    Recent Results (from the past 168 hour(s))   Comprehensive metabolic panel (BMP + Alb, Alk Phos, ALT, AST, Total. Bili, TP)   Result Value Ref Range    Sodium 140 135 - 145 mmol/L    Potassium 3.5 3.4 - 5.3 mmol/L    Carbon Dioxide (CO2) 28 22 - 29 mmol/L    Anion Gap 7 7 - 15 mmol/L    Urea Nitrogen 10.7 6.0 - 20.0 mg/dL    Creatinine 0.68 0.51 - 0.95 mg/dL    GFR Estimate >90 >60 mL/min/1.73m2    Calcium 9.2 8.8 - 10.4 mg/dL    Chloride 105 98 - 107 mmol/L    Glucose 86 70 - 99 mg/dL    Alkaline Phosphatase 82 40 - 150 U/L    AST 43 0 - 45 U/L    ALT 43 0 - 50 U/L    Protein Total 6.7 6.4 - 8.3 g/dL    Albumin 3.9 3.5 - 5.2 g/dL    Bilirubin Total 0.4 <=1.2 mg/dL   CBC with platelets and differential   Result Value Ref Range    WBC Count 6.7 4.0 - 11.0 10e3/uL    RBC Count 3.56 (L) 3.80 - 5.20 10e6/uL    Hemoglobin 11.1 (L) 11.7 - 15.7 g/dL    Hematocrit 33.5 (L) 35.0 - 47.0 %    MCV 94 78 - 100 fL    MCH 31.2 26.5 - 33.0 pg    MCHC 33.1 31.5 - 36.5 g/dL    RDW 19.2 (H) 10.0 - 15.0 %    Platelet Count 290 150 - 450 10e3/uL    NRBCs per 100 WBC 1 (H) <1 /100    Absolute NRBCs 0.1 10e3/uL   Manual Differential   Result Value Ref Range    % Neutrophils 49 %    % Lymphocytes 26 %    % Monocytes 15 %    % Eosinophils 0 %    % Basophils 0 %    % Myelocytes 10 %    Absolute Neutrophils 3.3 1.6 - 8.3 10e3/uL    Absolute Lymphocytes 1.7 0.8 - 5.3 10e3/uL    Absolute Monocytes 1.0 0.0 - 1.3 10e3/uL    Absolute Eosinophils 0.0 0.0 - 0.7 10e3/uL    Absolute Basophils 0.0 0.0 - 0.2 10e3/uL    Absolute Myelocytes 0.7 (H) <=0.0 10e3/uL    RBC Morphology Confirmed RBC Indices     Platelet Assessment  Automated Count Confirmed. Platelet morphology is normal.     Automated Count Confirmed. Platelet morphology is normal.    Reactive Lymphocytes Present (A) None Seen    NRBCs per 100 WBC 1 %    Absolute NRBCs 0.1 10e3/uL    Comprehensive metabolic panel   Result Value Ref Range    Sodium 140 135 - 145 mmol/L    Potassium 3.3 (L) 3.4 - 5.3 mmol/L    Carbon Dioxide (CO2) 24 22 - 29 mmol/L    Anion Gap 11 7 - 15 mmol/L    Urea Nitrogen 7.5 6.0 - 20.0 mg/dL    Creatinine 0.58 0.51 - 0.95 mg/dL    GFR Estimate >90 >60 mL/min/1.73m2    Calcium 8.4 (L) 8.8 - 10.4 mg/dL    Chloride 105 98 - 107 mmol/L    Glucose 89 70 - 99 mg/dL    Alkaline Phosphatase 97 40 - 150 U/L    AST 37 0 - 45 U/L    ALT 44 0 - 50 U/L    Protein Total 6.4 6.4 - 8.3 g/dL    Albumin 3.9 3.5 - 5.2 g/dL    Bilirubin Total 0.4 <=1.2 mg/dL   Lactate Dehydrogenase   Result Value Ref Range    Lactate Dehydrogenase 242 0 - 250 U/L   CBC with platelets and differential   Result Value Ref Range    WBC Count 8.6 4.0 - 11.0 10e3/uL    RBC Count 3.79 (L) 3.80 - 5.20 10e6/uL    Hemoglobin 11.9 11.7 - 15.7 g/dL    Hematocrit 35.7 35.0 - 47.0 %    MCV 94 78 - 100 fL    MCH 31.4 26.5 - 33.0 pg    MCHC 33.3 31.5 - 36.5 g/dL    RDW 19.0 (H) 10.0 - 15.0 %    Platelet Count 246 150 - 450 10e3/uL   Manual Differential   Result Value Ref Range    % Neutrophils 55 %    % Lymphocytes 16 %    % Monocytes 23 %    % Eosinophils 0 %    % Basophils 0 %    % Metamyelocytes 1 %    % Myelocytes 5 %    Absolute Neutrophils 4.7 1.6 - 8.3 10e3/uL    Absolute Lymphocytes 1.4 0.8 - 5.3 10e3/uL    Absolute Monocytes 2.0 (H) 0.0 - 1.3 10e3/uL    Absolute Eosinophils 0.0 0.0 - 0.7 10e3/uL    Absolute Basophils 0.0 0.0 - 0.2 10e3/uL    Absolute Metamyelocytes 0.1 (H) <=0.0 10e3/uL    Absolute Myelocytes 0.4 (H) <=0.0 10e3/uL    RBC Morphology Confirmed RBC Indices     Platelet Assessment  Automated Count Confirmed. Platelet morphology is normal.     Automated Count Confirmed. Platelet morphology is normal.    Reactive Lymphocytes Present (A) None Seen    NRBCs per 100 WBC 1 %    Absolute NRBCs 0.1 10e3/uL       I reviewed the above labs today.  Imaging    CT Chest Pulmonary Embolism w Contrast    Result Date:  9/29/2024  EXAM: CT CHEST PULMONARY EMBOLISM W CONTRAST LOCATION: [facility] DATE: 9/29/2024 INDICATION: invasive ductal carcinoma left breast, cough, fever, short of breath, upper back pain COMPARISON: None. TECHNIQUE: CT chest pulmonary angiogram during arterial phase injection of IV contrast. Multiplanar reformats and MIP reconstructions were performed. Dose reduction techniques were used. CONTRAST: 75ml Isovue 370 FINDINGS: ANGIOGRAM CHEST: Pulmonary arteries are normal caliber and negative for pulmonary emboli. Thoracic aorta is negative for dissection. LUNGS AND PLEURA: Mild mosaic attenuation of the lungs likely related to the exam being obtained in the expiratory phase of respiration. Mild bronchial wall thickening. There are a few scattered tree-in-bud opacities most notably in the anterior right upper lobe. There is a predominantly groundglass nodular opacity in the left upper lobe measuring 0.8 x 0.6 cm. Left lower lobe 0.5 x 0.4 cm nodule on series 7 image 176. Right lower lobe nodule on series 7 image 165 measuring 0.5 cm. MEDIASTINUM/AXILLAE: Heart size is normal. No adenopathy. Right IJ port terminates in the mid SVC. CORONARY ARTERY CALCIFICATION: None. UPPER ABDOMEN: Hepatic steatosis. MUSCULOSKELETAL: Degenerative changes of the spine. No suspicious osseous lesions. Left breast mass measures 2.3 x 2.1 cm compatible with known malignancy.     IMPRESSION: 1.  No pulmonary embolus. 2.  There are a few scattered tree-in-bud opacities most notably in the right upper lobe suggestive of infectious/inflammatory bronchiolitis. 3.  There is a groundglass nodular opacity in the left upper lobe measuring 0.8 x 0.6 cm. While this could represent infectious or inflammatory change is technically indeterminant and continued attention on follow-up is recommended. 4.  Otherwise, there are also a few scattered sub-6 mm pulmonary nodules. Continued attention on follow-up. 5.  Left breast mass  compatible with known malignancy.    US Breast Left Limited 1-3 Quadrants    Result Date: 9/10/2024  EXAM: US BREAST LEFT LIMITED 1-3 QUADRANTS, 9/10/2024 1:03 PM HISTORY: 58-year-old woman with known left breast invasive ductal carcinoma on neoadjuvant chemotherapy COMPARISONS: 6/13/2024 FINDINGS: ULTRASOUND: Ultrasound targeted to the left 10:00 1 cm from the nipple reveals decreased size of the irregular, hypoechoic solid mass, now measuring 1.9 x 1.5 x 1.2 cm, previously 3.1 x 3.0 x 2.3 cm.     IMPRESSION: BI-RADS CATEGORY: 6 - Known Biopsy-Proven Malignancy. Treatment effect with decreased size of known left breast malignancy. RECOMMENDED FOLLOW-UP: Clinical Follow-up. Results given to the patient. BRIDGETTE TERAN MD   SYSTEM ID:  [id number]       Billing  Total time 50 minutes, to include face to face visit, review of EMR, ordering, documentation and coordination of care on date of service. The longitudinal plan of care for the diagnosis(es)/condition(s) as documented were addressed during this visit. Due to the added complexity in care, I will continue to support Kerry in the subsequent management and with ongoing continuity of care.    Signed by: NEW Yost CNP         Chart documentation with Dragon Voice recognition Software. Although reviewed after completion, some words and grammatical errors may remain.

## 2024-10-08 ENCOUNTER — ONCOLOGY VISIT (OUTPATIENT)
Dept: ONCOLOGY | Facility: HOSPITAL | Age: 59
End: 2024-10-08
Attending: INTERNAL MEDICINE
Payer: COMMERCIAL

## 2024-10-08 ENCOUNTER — INFUSION THERAPY VISIT (OUTPATIENT)
Dept: INFUSION THERAPY | Facility: HOSPITAL | Age: 59
End: 2024-10-08
Attending: INTERNAL MEDICINE
Payer: COMMERCIAL

## 2024-10-08 VITALS
HEIGHT: 59 IN | SYSTOLIC BLOOD PRESSURE: 108 MMHG | HEART RATE: 83 BPM | OXYGEN SATURATION: 96 % | RESPIRATION RATE: 16 BRPM | BODY MASS INDEX: 27.72 KG/M2 | WEIGHT: 137.5 LBS | DIASTOLIC BLOOD PRESSURE: 79 MMHG | TEMPERATURE: 97.2 F

## 2024-10-08 DIAGNOSIS — T45.1X5A CHEMOTHERAPY-INDUCED FATIGUE: ICD-10-CM

## 2024-10-08 DIAGNOSIS — R53.83 CHEMOTHERAPY-INDUCED FATIGUE: ICD-10-CM

## 2024-10-08 DIAGNOSIS — C50.912 INVASIVE DUCTAL CARCINOMA OF BREAST, FEMALE, LEFT (H): Primary | ICD-10-CM

## 2024-10-08 DIAGNOSIS — R05.9 COUGH, UNSPECIFIED TYPE: ICD-10-CM

## 2024-10-08 LAB
ALBUMIN SERPL BCG-MCNC: 3.9 G/DL (ref 3.5–5.2)
ALP SERPL-CCNC: 97 U/L (ref 40–150)
ALT SERPL W P-5'-P-CCNC: 44 U/L (ref 0–50)
ANION GAP SERPL CALCULATED.3IONS-SCNC: 11 MMOL/L (ref 7–15)
AST SERPL W P-5'-P-CCNC: 37 U/L (ref 0–45)
BASOPHILS # BLD MANUAL: 0 10E3/UL (ref 0–0.2)
BASOPHILS NFR BLD MANUAL: 0 %
BILIRUB SERPL-MCNC: 0.4 MG/DL
BUN SERPL-MCNC: 7.5 MG/DL (ref 6–20)
CALCIUM SERPL-MCNC: 8.4 MG/DL (ref 8.8–10.4)
CHLORIDE SERPL-SCNC: 105 MMOL/L (ref 98–107)
CREAT SERPL-MCNC: 0.58 MG/DL (ref 0.51–0.95)
EGFRCR SERPLBLD CKD-EPI 2021: >90 ML/MIN/1.73M2
EOSINOPHIL # BLD MANUAL: 0 10E3/UL (ref 0–0.7)
EOSINOPHIL NFR BLD MANUAL: 0 %
ERYTHROCYTE [DISTWIDTH] IN BLOOD BY AUTOMATED COUNT: 19 % (ref 10–15)
GLUCOSE SERPL-MCNC: 89 MG/DL (ref 70–99)
HCO3 SERPL-SCNC: 24 MMOL/L (ref 22–29)
HCT VFR BLD AUTO: 35.7 % (ref 35–47)
HGB BLD-MCNC: 11.9 G/DL (ref 11.7–15.7)
LDH SERPL L TO P-CCNC: 242 U/L (ref 0–250)
LYMPHOCYTES # BLD MANUAL: 1.4 10E3/UL (ref 0.8–5.3)
LYMPHOCYTES NFR BLD MANUAL: 16 %
MCH RBC QN AUTO: 31.4 PG (ref 26.5–33)
MCHC RBC AUTO-ENTMCNC: 33.3 G/DL (ref 31.5–36.5)
MCV RBC AUTO: 94 FL (ref 78–100)
METAMYELOCYTES # BLD MANUAL: 0.1 10E3/UL
METAMYELOCYTES NFR BLD MANUAL: 1 %
MONOCYTES # BLD MANUAL: 2 10E3/UL (ref 0–1.3)
MONOCYTES NFR BLD MANUAL: 23 %
MYELOCYTES # BLD MANUAL: 0.4 10E3/UL
MYELOCYTES NFR BLD MANUAL: 5 %
NEUTROPHILS # BLD MANUAL: 4.7 10E3/UL (ref 1.6–8.3)
NEUTROPHILS NFR BLD MANUAL: 55 %
NRBC # BLD AUTO: 0.1 10E3/UL
NRBC BLD MANUAL-RTO: 1 %
PLAT MORPH BLD: ABNORMAL
PLATELET # BLD AUTO: 246 10E3/UL (ref 150–450)
POTASSIUM SERPL-SCNC: 3.3 MMOL/L (ref 3.4–5.3)
PROT SERPL-MCNC: 6.4 G/DL (ref 6.4–8.3)
RBC # BLD AUTO: 3.79 10E6/UL (ref 3.8–5.2)
RBC MORPH BLD: ABNORMAL
SODIUM SERPL-SCNC: 140 MMOL/L (ref 135–145)
VARIANT LYMPHS BLD QL SMEAR: PRESENT
WBC # BLD AUTO: 8.6 10E3/UL (ref 4–11)

## 2024-10-08 PROCEDURE — T1013 SIGN LANG/ORAL INTERPRETER: HCPCS | Mod: GT

## 2024-10-08 PROCEDURE — 258N000003 HC RX IP 258 OP 636

## 2024-10-08 PROCEDURE — 83615 LACTATE (LD) (LDH) ENZYME: CPT

## 2024-10-08 PROCEDURE — 99215 OFFICE O/P EST HI 40 MIN: CPT

## 2024-10-08 PROCEDURE — 85027 COMPLETE CBC AUTOMATED: CPT

## 2024-10-08 PROCEDURE — G0463 HOSPITAL OUTPT CLINIC VISIT: HCPCS

## 2024-10-08 PROCEDURE — 96375 TX/PRO/DX INJ NEW DRUG ADDON: CPT

## 2024-10-08 PROCEDURE — 85007 BL SMEAR W/DIFF WBC COUNT: CPT

## 2024-10-08 PROCEDURE — 96413 CHEMO IV INFUSION 1 HR: CPT

## 2024-10-08 PROCEDURE — T1013 SIGN LANG/ORAL INTERPRETER: HCPCS | Mod: GT | Performed by: INTERPRETER

## 2024-10-08 PROCEDURE — 250N000011 HC RX IP 250 OP 636

## 2024-10-08 PROCEDURE — G2211 COMPLEX E/M VISIT ADD ON: HCPCS

## 2024-10-08 PROCEDURE — 36591 DRAW BLOOD OFF VENOUS DEVICE: CPT

## 2024-10-08 PROCEDURE — 82040 ASSAY OF SERUM ALBUMIN: CPT

## 2024-10-08 RX ORDER — MEPERIDINE HYDROCHLORIDE 50 MG/ML
25 INJECTION INTRAMUSCULAR; INTRAVENOUS; SUBCUTANEOUS EVERY 30 MIN PRN
Status: CANCELLED | OUTPATIENT
Start: 2024-10-15

## 2024-10-08 RX ORDER — METHYLPREDNISOLONE SODIUM SUCCINATE 125 MG/2ML
125 INJECTION INTRAMUSCULAR; INTRAVENOUS
Status: CANCELLED
Start: 2024-10-15

## 2024-10-08 RX ORDER — DIPHENHYDRAMINE HCL 50 MG
50 CAPSULE ORAL
Status: CANCELLED
Start: 2024-10-08

## 2024-10-08 RX ORDER — HEPARIN SODIUM,PORCINE 10 UNIT/ML
5-20 VIAL (ML) INTRAVENOUS DAILY PRN
Status: DISCONTINUED | OUTPATIENT
Start: 2024-10-08 | End: 2024-10-08 | Stop reason: HOSPADM

## 2024-10-08 RX ORDER — ALBUTEROL SULFATE 90 UG/1
1-2 INHALANT RESPIRATORY (INHALATION)
Status: DISCONTINUED | OUTPATIENT
Start: 2024-10-08 | End: 2024-10-08 | Stop reason: HOSPADM

## 2024-10-08 RX ORDER — LORAZEPAM 2 MG/ML
0.5 INJECTION INTRAMUSCULAR EVERY 4 HOURS PRN
Status: CANCELLED | OUTPATIENT
Start: 2024-10-08

## 2024-10-08 RX ORDER — ONDANSETRON 2 MG/ML
8 INJECTION INTRAMUSCULAR; INTRAVENOUS ONCE
Status: CANCELLED | OUTPATIENT
Start: 2024-10-08

## 2024-10-08 RX ORDER — METHYLPREDNISOLONE SODIUM SUCCINATE 125 MG/2ML
125 INJECTION INTRAMUSCULAR; INTRAVENOUS
Status: DISCONTINUED | OUTPATIENT
Start: 2024-10-08 | End: 2024-10-08 | Stop reason: HOSPADM

## 2024-10-08 RX ORDER — METHYLPREDNISOLONE SODIUM SUCCINATE 125 MG/2ML
125 INJECTION INTRAMUSCULAR; INTRAVENOUS
Status: CANCELLED
Start: 2024-10-08

## 2024-10-08 RX ORDER — CODEINE PHOSPHATE AND GUAIFENESIN 10; 100 MG/5ML; MG/5ML
1-2 SOLUTION ORAL EVERY 4 HOURS PRN
Qty: 237 ML | Refills: 0 | Status: SHIPPED | OUTPATIENT
Start: 2024-10-08

## 2024-10-08 RX ORDER — ALBUTEROL SULFATE 0.83 MG/ML
2.5 SOLUTION RESPIRATORY (INHALATION)
Status: CANCELLED | OUTPATIENT
Start: 2024-10-08

## 2024-10-08 RX ORDER — HEPARIN SODIUM,PORCINE 10 UNIT/ML
5-20 VIAL (ML) INTRAVENOUS DAILY PRN
Status: CANCELLED | OUTPATIENT
Start: 2024-10-08

## 2024-10-08 RX ORDER — EPINEPHRINE 1 MG/ML
0.3 INJECTION, SOLUTION INTRAMUSCULAR; SUBCUTANEOUS EVERY 5 MIN PRN
Status: CANCELLED | OUTPATIENT
Start: 2024-10-15

## 2024-10-08 RX ORDER — ONDANSETRON 2 MG/ML
8 INJECTION INTRAMUSCULAR; INTRAVENOUS ONCE
Status: CANCELLED | OUTPATIENT
Start: 2024-10-15

## 2024-10-08 RX ORDER — ALBUTEROL SULFATE 90 UG/1
1-2 INHALANT RESPIRATORY (INHALATION)
Status: CANCELLED
Start: 2024-10-08

## 2024-10-08 RX ORDER — DIPHENHYDRAMINE HCL 50 MG
50 CAPSULE ORAL
Status: CANCELLED
Start: 2024-10-15

## 2024-10-08 RX ORDER — ONDANSETRON 2 MG/ML
8 INJECTION INTRAMUSCULAR; INTRAVENOUS ONCE
Status: COMPLETED | OUTPATIENT
Start: 2024-10-08 | End: 2024-10-08

## 2024-10-08 RX ORDER — ALBUTEROL SULFATE 0.83 MG/ML
2.5 SOLUTION RESPIRATORY (INHALATION)
Status: DISCONTINUED | OUTPATIENT
Start: 2024-10-08 | End: 2024-10-08 | Stop reason: HOSPADM

## 2024-10-08 RX ORDER — LORAZEPAM 2 MG/ML
0.5 INJECTION INTRAMUSCULAR EVERY 4 HOURS PRN
Status: CANCELLED | OUTPATIENT
Start: 2024-10-15

## 2024-10-08 RX ORDER — DIPHENHYDRAMINE HYDROCHLORIDE 50 MG/ML
50 INJECTION INTRAMUSCULAR; INTRAVENOUS
Status: DISCONTINUED | OUTPATIENT
Start: 2024-10-08 | End: 2024-10-08 | Stop reason: HOSPADM

## 2024-10-08 RX ORDER — HEPARIN SODIUM (PORCINE) LOCK FLUSH IV SOLN 100 UNIT/ML 100 UNIT/ML
5 SOLUTION INTRAVENOUS
Status: CANCELLED | OUTPATIENT
Start: 2024-10-15

## 2024-10-08 RX ORDER — HEPARIN SODIUM (PORCINE) LOCK FLUSH IV SOLN 100 UNIT/ML 100 UNIT/ML
5 SOLUTION INTRAVENOUS
Status: CANCELLED | OUTPATIENT
Start: 2024-10-08

## 2024-10-08 RX ORDER — MEPERIDINE HYDROCHLORIDE 50 MG/ML
25 INJECTION INTRAMUSCULAR; INTRAVENOUS; SUBCUTANEOUS EVERY 30 MIN PRN
Status: CANCELLED | OUTPATIENT
Start: 2024-10-08

## 2024-10-08 RX ORDER — DIPHENHYDRAMINE HYDROCHLORIDE 50 MG/ML
50 INJECTION INTRAMUSCULAR; INTRAVENOUS
Status: CANCELLED
Start: 2024-10-15

## 2024-10-08 RX ORDER — HEPARIN SODIUM (PORCINE) LOCK FLUSH IV SOLN 100 UNIT/ML 100 UNIT/ML
5 SOLUTION INTRAVENOUS
Status: DISCONTINUED | OUTPATIENT
Start: 2024-10-08 | End: 2024-10-08 | Stop reason: HOSPADM

## 2024-10-08 RX ORDER — DIPHENHYDRAMINE HYDROCHLORIDE 50 MG/ML
50 INJECTION INTRAMUSCULAR; INTRAVENOUS
Status: CANCELLED
Start: 2024-10-08

## 2024-10-08 RX ORDER — MEPERIDINE HYDROCHLORIDE 50 MG/ML
25 INJECTION INTRAMUSCULAR; INTRAVENOUS; SUBCUTANEOUS EVERY 30 MIN PRN
Status: DISCONTINUED | OUTPATIENT
Start: 2024-10-08 | End: 2024-10-08 | Stop reason: HOSPADM

## 2024-10-08 RX ORDER — EPINEPHRINE 1 MG/ML
0.3 INJECTION, SOLUTION INTRAMUSCULAR; SUBCUTANEOUS EVERY 5 MIN PRN
Status: DISCONTINUED | OUTPATIENT
Start: 2024-10-08 | End: 2024-10-08 | Stop reason: HOSPADM

## 2024-10-08 RX ORDER — HEPARIN SODIUM,PORCINE 10 UNIT/ML
5-20 VIAL (ML) INTRAVENOUS DAILY PRN
Status: CANCELLED | OUTPATIENT
Start: 2024-10-15

## 2024-10-08 RX ORDER — ALBUTEROL SULFATE 0.83 MG/ML
2.5 SOLUTION RESPIRATORY (INHALATION)
Status: CANCELLED | OUTPATIENT
Start: 2024-10-15

## 2024-10-08 RX ORDER — EPINEPHRINE 1 MG/ML
0.3 INJECTION, SOLUTION INTRAMUSCULAR; SUBCUTANEOUS EVERY 5 MIN PRN
Status: CANCELLED | OUTPATIENT
Start: 2024-10-08

## 2024-10-08 RX ORDER — ALBUTEROL SULFATE 90 UG/1
1-2 INHALANT RESPIRATORY (INHALATION)
Status: CANCELLED
Start: 2024-10-15

## 2024-10-08 RX ADMIN — PACLITAXEL 134 MG: 6 INJECTION, SOLUTION INTRAVENOUS at 12:37

## 2024-10-08 RX ADMIN — HEPARIN 5 ML: 100 SYRINGE at 13:48

## 2024-10-08 RX ADMIN — ONDANSETRON 8 MG: 2 INJECTION INTRAMUSCULAR; INTRAVENOUS at 12:31

## 2024-10-08 ASSESSMENT — PAIN SCALES - GENERAL: PAINLEVEL: SEVERE PAIN (7)

## 2024-10-08 NOTE — PROGRESS NOTES
"Oncology Rooming Note    October 8, 2024 11:04 AM   Kerry Mills is a 59 year old female who presents for:    Chief Complaint   Patient presents with    Oncology Clinic Visit       Invasive ductal carcinoma of breast, female, left        Initial Vitals: /79   Pulse 83   Temp 97.2  F (36.2  C)   Resp 16   Ht 1.499 m (4' 11\")   Wt 62.4 kg (137 lb 8 oz)   SpO2 96%   BMI 27.77 kg/m   Estimated body mass index is 27.77 kg/m  as calculated from the following:    Height as of this encounter: 1.499 m (4' 11\").    Weight as of this encounter: 62.4 kg (137 lb 8 oz). Body surface area is 1.61 meters squared.  Severe Pain (7) Comment: Data Unavailable   No LMP recorded. Patient is postmenopausal.  Allergies reviewed: Yes  Medications reviewed: Yes    Medications: Medication refills not needed today.  Pharmacy name entered into EPIC:    [facility] - [place], [place] - [address] [place] [address] DRUG STORE #[id number] [place] [id number] [facility] AT [facility] OF [address]    Frailty Screening:   Is the patient here for a new oncology consult visit in cancer care? 2. No      Clinical concerns:  labs and treatment      Anahi Irwin              "

## 2024-10-08 NOTE — LETTER
"10/8/2024      Kerry Mills  [address] [place] [address]      Dear Colleague,    Thank you for referring your patient, Kerry Mills, to the [facility]. Please see a copy of my visit note below.    Oncology Rooming Note    October 8, 2024 11:04 AM   Kerry Mills is a 59 year old female who presents for:    Chief Complaint   Patient presents with     Oncology Clinic Visit       Invasive ductal carcinoma of breast, female, left        Initial Vitals: /79   Pulse 83   Temp 97.2  F (36.2  C)   Resp 16   Ht 1.499 m (4' 11\")   Wt 62.4 kg (137 lb 8 oz)   SpO2 96%   BMI 27.77 kg/m   Estimated body mass index is 27.77 kg/m  as calculated from the following:    Height as of this encounter: 1.499 m (4' 11\").    Weight as of this encounter: 62.4 kg (137 lb 8 oz). Body surface area is 1.61 meters squared.  Severe Pain (7) Comment: Data Unavailable   No LMP recorded. Patient is postmenopausal.  Allergies reviewed: Yes  Medications reviewed: Yes    Medications: Medication refills not needed today.  Pharmacy name entered into [employer]:    [facility] - [place], [place] - [address] [place] [address] [id number] - [place], [place] - [address] [facility] [address] [facility] [address]    Frailty Screening:   Is the patient here for a new oncology consult visit in cancer care? 2. No      Clinical concerns:  labs and treatment      Anahi Irwin                [facility] Hematology and Oncology Outpatient Progress Note    Patient: Kerry Mills  MRN: [id number]  Date of Service: Oct 8, 2024          Reason for Visit    Chief Complaint   Patient presents with     Oncology Clinic Visit       Invasive ductal carcinoma of breast, female, left          Primary Hematologist/Oncologist: Dr. Adamson        Assessment/Plan  #. Invasive ductal carcinoma of breast, female, left (triple negative breast cancer)  #. Neoplastic malignant related fatigue  #. Chemotherapy-induced fatigue  #. Cough, " persistent since ~early August, 2024  Clinically stable. Recovering well from recent hospitalization d/t covid pneumonia. She has a bit of a lingering cough, but overall is improving. No further fevers. We reviewed labs including CMP and CBC. K+ is 3.3 today. CBC is adequate with normal WBC, Plt and Hgb counts. Will refill guaifenesin with codeine as this is helpful for her cough.   Proceed with paclitaxel today and weekly.   Follow-up with Dr. Adamson in 2 weeks for labs, and assessment prior to paclitaxel.   Encouraged increased protein intake for weight management.   Encouraged increased intake of potassium rich foods.   We will also continue to monitor the breast mass clinically on each visit.    Current plan is to continue weekly paclitaxel as neoadjuvant chemotherapy after which patient will be referred to surgery.  Will follow back up with Dr. Choe in December to further discuss lumpectomy vs mastectomy. Pt and daughter understand that she would proceed with radiation after the surgery.     ______________________________________________________________________________    History of Present Illness/ Interval History    Ms. Kerry Mills  is a 58 year old female patient returns in follow-up with her daughter for consideration of next cycle of chemotherapy.  She completed DDAC on 9/3/2024, and has been receiving weekly paclitaxel with good toleration. Unfortunately, Kerry was hospitalized on 9/29/24 - 9/30/24 for COVID, pneumonia. Since that time she has been recovering nicely. Feels better. Breathing is improved. Still has a cough, but not a frequent. No fevers. No significant pain today. Except for covid hospitalization, she feels she was tolerating the weekly taxol infusions. Denies nausea or diarrhea. Denies numbness or tingling. She reports that she has been eating and drinking ok. She is able to eat but notes. Weight is going down a bit.       ECOG performance status   0- Fully active, without restriction   "    Pain  Pain Score: Severe Pain (7)  Pain Loc: Other - see comment (lung and chest)    ROS  A 14 point review of systems was obtained.  Positive findings noted in the history.  The remainder of the review of system is otherwise negative.      Oncology History/Treatment  Oncologic history  1) invasive ductal carcinoma of the breast ER negative [place] negative HER2/kobe 2+, negative by FISH.  3.9 cm in maximum dimension on MRI.  Diagnosed July 2024    Treatment  1) neoadjuvant AC started on 7/22/2024.  CarboTaxol and Pembro denied by insurance and   2)Plan to give AC followed by paclitaxel before referring patient for surgery    Physical Exam    /79   Pulse 83   Temp 97.2  F (36.2  C)   Resp 16   Ht 1.499 m (4' 11\")   Wt 62.4 kg (137 lb 8 oz)   SpO2 96%   BMI 27.77 kg/m      General: Well-appearing female in no acute distress. Cooperative in conversation.  Eyes: EOMI, PERRL. No scleral icterus.  ENT: Oral mucosa is moist without thrush.  No significant erythema present  Lymphatic: Neck is supple without cervical, axillary, or supraclavicular lymphadenopathy.   Cardiovascular: RRR No murmurs, gallops, or rubs. No peripheral edema.  Breasts: Left breast mass palpable, nontender  Respiratory: CTA bilaterally. No wheezes or crackles.  Gastrointestinal: BS +. Abdomen soft, non-tender. No palpable hepatosplenomegaly or masses.   Neurologic: Alert and oriented x3. Cranial nerves II through XII are grossly intact.  Skin: No rashes, petechiae, or bruising noted. Warm, dry, intact.      Lab Results    Recent Results (from the past 168 hour(s))   Comprehensive metabolic panel (BMP + Alb, Alk Phos, ALT, AST, Total. Bili, TP)   Result Value Ref Range    Sodium 140 135 - 145 mmol/L    Potassium 3.5 3.4 - 5.3 mmol/L    Carbon Dioxide (CO2) 28 22 - 29 mmol/L    Anion Gap 7 7 - 15 mmol/L    Urea Nitrogen 10.7 6.0 - 20.0 mg/dL    Creatinine 0.68 0.51 - 0.95 mg/dL    GFR Estimate >90 >60 mL/min/1.73m2    Calcium 9.2 8.8 - 10.4 " mg/dL    Chloride 105 98 - 107 mmol/L    Glucose 86 70 - 99 mg/dL    Alkaline Phosphatase 82 40 - 150 U/L    AST 43 0 - 45 U/L    ALT 43 0 - 50 U/L    Protein Total 6.7 6.4 - 8.3 g/dL    Albumin 3.9 3.5 - 5.2 g/dL    Bilirubin Total 0.4 <=1.2 mg/dL   CBC with platelets and differential   Result Value Ref Range    WBC Count 6.7 4.0 - 11.0 10e3/uL    RBC Count 3.56 (L) 3.80 - 5.20 10e6/uL    Hemoglobin 11.1 (L) 11.7 - 15.7 g/dL    Hematocrit 33.5 (L) 35.0 - 47.0 %    MCV 94 78 - 100 fL    MCH 31.2 26.5 - 33.0 pg    MCHC 33.1 31.5 - 36.5 g/dL    RDW 19.2 (H) 10.0 - 15.0 %    Platelet Count 290 150 - 450 10e3/uL    NRBCs per 100 WBC 1 (H) <1 /100    Absolute NRBCs 0.1 10e3/uL   Manual Differential   Result Value Ref Range    % Neutrophils 49 %    % Lymphocytes 26 %    % Monocytes 15 %    % Eosinophils 0 %    % Basophils 0 %    % Myelocytes 10 %    Absolute Neutrophils 3.3 1.6 - 8.3 10e3/uL    Absolute Lymphocytes 1.7 0.8 - 5.3 10e3/uL    Absolute Monocytes 1.0 0.0 - 1.3 10e3/uL    Absolute Eosinophils 0.0 0.0 - 0.7 10e3/uL    Absolute Basophils 0.0 0.0 - 0.2 10e3/uL    Absolute Myelocytes 0.7 (H) <=0.0 10e3/uL    RBC Morphology Confirmed RBC Indices     Platelet Assessment  Automated Count Confirmed. Platelet morphology is normal.     Automated Count Confirmed. Platelet morphology is normal.    Reactive Lymphocytes Present (A) None Seen    NRBCs per 100 WBC 1 %    Absolute NRBCs 0.1 10e3/uL   Comprehensive metabolic panel   Result Value Ref Range    Sodium 140 135 - 145 mmol/L    Potassium 3.3 (L) 3.4 - 5.3 mmol/L    Carbon Dioxide (CO2) 24 22 - 29 mmol/L    Anion Gap 11 7 - 15 mmol/L    Urea Nitrogen 7.5 6.0 - 20.0 mg/dL    Creatinine 0.58 0.51 - 0.95 mg/dL    GFR Estimate >90 >60 mL/min/1.73m2    Calcium 8.4 (L) 8.8 - 10.4 mg/dL    Chloride 105 98 - 107 mmol/L    Glucose 89 70 - 99 mg/dL    Alkaline Phosphatase 97 40 - 150 U/L    AST 37 0 - 45 U/L    ALT 44 0 - 50 U/L    Protein Total 6.4 6.4 - 8.3 g/dL    Albumin 3.9  3.5 - 5.2 g/dL    Bilirubin Total 0.4 <=1.2 mg/dL   Lactate Dehydrogenase   Result Value Ref Range    Lactate Dehydrogenase 242 0 - 250 U/L   CBC with platelets and differential   Result Value Ref Range    WBC Count 8.6 4.0 - 11.0 10e3/uL    RBC Count 3.79 (L) 3.80 - 5.20 10e6/uL    Hemoglobin 11.9 11.7 - 15.7 g/dL    Hematocrit 35.7 35.0 - 47.0 %    MCV 94 78 - 100 fL    MCH 31.4 26.5 - 33.0 pg    MCHC 33.3 31.5 - 36.5 g/dL    RDW 19.0 (H) 10.0 - 15.0 %    Platelet Count 246 150 - 450 10e3/uL   Manual Differential   Result Value Ref Range    % Neutrophils 55 %    % Lymphocytes 16 %    % Monocytes 23 %    % Eosinophils 0 %    % Basophils 0 %    % Metamyelocytes 1 %    % Myelocytes 5 %    Absolute Neutrophils 4.7 1.6 - 8.3 10e3/uL    Absolute Lymphocytes 1.4 0.8 - 5.3 10e3/uL    Absolute Monocytes 2.0 (H) 0.0 - 1.3 10e3/uL    Absolute Eosinophils 0.0 0.0 - 0.7 10e3/uL    Absolute Basophils 0.0 0.0 - 0.2 10e3/uL    Absolute Metamyelocytes 0.1 (H) <=0.0 10e3/uL    Absolute Myelocytes 0.4 (H) <=0.0 10e3/uL    RBC Morphology Confirmed RBC Indices     Platelet Assessment  Automated Count Confirmed. Platelet morphology is normal.     Automated Count Confirmed. Platelet morphology is normal.    Reactive Lymphocytes Present (A) None Seen    NRBCs per 100 WBC 1 %    Absolute NRBCs 0.1 10e3/uL       I reviewed the above labs today.  Imaging    CT Chest Pulmonary Embolism w Contrast    Result Date: 9/29/2024  EXAM: CT CHEST PULMONARY EMBOLISM W CONTRAST LOCATION: [facility] DATE: 9/29/2024 INDICATION: invasive ductal carcinoma left breast, cough, fever, short of breath, upper back pain COMPARISON: None. TECHNIQUE: CT chest pulmonary angiogram during arterial phase injection of IV contrast. Multiplanar reformats and MIP reconstructions were performed. Dose reduction techniques were used. CONTRAST: 75ml Isovue 370 FINDINGS: ANGIOGRAM CHEST: Pulmonary arteries are normal caliber and negative for pulmonary  emboli. Thoracic aorta is negative for dissection. LUNGS AND PLEURA: Mild mosaic attenuation of the lungs likely related to the exam being obtained in the expiratory phase of respiration. Mild bronchial wall thickening. There are a few scattered tree-in-bud opacities most notably in the anterior right upper lobe. There is a predominantly groundglass nodular opacity in the left upper lobe measuring 0.8 x 0.6 cm. Left lower lobe 0.5 x 0.4 cm nodule on series 7 image 176. Right lower lobe nodule on series 7 image 165 measuring 0.5 cm. MEDIASTINUM/AXILLAE: Heart size is normal. No adenopathy. Right IJ port terminates in the mid SVC. CORONARY ARTERY CALCIFICATION: None. UPPER ABDOMEN: Hepatic steatosis. MUSCULOSKELETAL: Degenerative changes of the spine. No suspicious osseous lesions. Left breast mass measures 2.3 x 2.1 cm compatible with known malignancy.     IMPRESSION: 1.  No pulmonary embolus. 2.  There are a few scattered tree-in-bud opacities most notably in the right upper lobe suggestive of infectious/inflammatory bronchiolitis. 3.  There is a groundglass nodular opacity in the left upper lobe measuring 0.8 x 0.6 cm. While this could represent infectious or inflammatory change is technically indeterminant and continued attention on follow-up is recommended. 4.  Otherwise, there are also a few scattered sub-6 mm pulmonary nodules. Continued attention on follow-up. 5.  Left breast mass compatible with known malignancy.    US Breast Left Limited 1-3 Quadrants    Result Date: 9/10/2024  EXAM: US BREAST LEFT LIMITED 1-3 QUADRANTS, 9/10/2024 1:03 PM HISTORY: 58-year-old woman with known left breast invasive ductal carcinoma on neoadjuvant chemotherapy COMPARISONS: 6/13/2024 FINDINGS: ULTRASOUND: Ultrasound targeted to the left 10:00 1 cm from the nipple reveals decreased size of the irregular, hypoechoic solid mass, now measuring 1.9 x 1.5 x 1.2 cm, previously 3.1 x 3.0 x 2.3 cm.     IMPRESSION: BI-RADS CATEGORY: 6 -  Known Biopsy-Proven Malignancy. Treatment effect with decreased size of known left breast malignancy. RECOMMENDED FOLLOW-UP: Clinical Follow-up. Results given to the patient. BRIDGETTE TERAN MD   SYSTEM ID:  [id number]       Billing  Total time 50 minutes, to include face to face visit, review of EMR, ordering, documentation and coordination of care on date of service. The longitudinal plan of care for the diagnosis(es)/condition(s) as documented were addressed during this visit. Due to the added complexity in care, I will continue to support Kerry in the subsequent management and with ongoing continuity of care.    Signed by: NEW Yost CNP         Chart documentation with Dragon Voice recognition Software. Although reviewed after completion, some words and grammatical errors may remain.      Again, thank you for allowing me to participate in the care of your patient.        Sincerely,        NEW Yost CNP

## 2024-10-08 NOTE — PROGRESS NOTES
Infusion Nursing Note:  Kerry Mills presents today for D1C7 Taxol.    Patient seen by provider today: Yes: Brianna Mancilla   present during visit today: Yes, Language: Hmong and pt daughter also.     Note: Pt given zofran as pre med.      Intravenous Access:  Implanted Port.    Treatment Conditions:  Lab Results   Component Value Date    HGB 11.9 10/08/2024    WBC 8.6 10/08/2024    ANEU 4.7 10/08/2024    ANEUTAUTO 3.1 09/29/2024     10/08/2024        Lab Results   Component Value Date     10/08/2024    POTASSIUM 3.3 (L) 10/08/2024    MAG 2.0 09/30/2024    CR 0.58 10/08/2024    DONNA 8.4 (L) 10/08/2024    BILITOTAL 0.4 10/08/2024    ALBUMIN 3.9 10/08/2024    ALT 44 10/08/2024    AST 37 10/08/2024       Results reviewed, labs MET treatment parameters, ok to proceed with treatment.      Post Infusion Assessment:  Patient tolerated infusion without incident.  Blood return noted pre and post infusion.  Site patent and intact, free from redness, edema or discomfort.  Access discontinued per protocol.   Pt daughter updated on next weeks appt for chemo.    Discharge Plan:   Patient and/or family verbalized understanding of discharge instructions and all questions answered.      Edith White RN

## 2024-10-15 ENCOUNTER — LAB (OUTPATIENT)
Dept: INFUSION THERAPY | Facility: HOSPITAL | Age: 59
End: 2024-10-15
Attending: INTERNAL MEDICINE
Payer: COMMERCIAL

## 2024-10-15 DIAGNOSIS — C50.912 INVASIVE DUCTAL CARCINOMA OF BREAST, FEMALE, LEFT (H): Primary | ICD-10-CM

## 2024-10-15 LAB
BASOPHILS # BLD AUTO: 0 10E3/UL (ref 0–0.2)
BASOPHILS NFR BLD AUTO: 0 %
EOSINOPHIL # BLD AUTO: 0.2 10E3/UL (ref 0–0.7)
EOSINOPHIL NFR BLD AUTO: 3 %
ERYTHROCYTE [DISTWIDTH] IN BLOOD BY AUTOMATED COUNT: 17.7 % (ref 10–15)
HCT VFR BLD AUTO: 30.4 % (ref 35–47)
HGB BLD-MCNC: 10 G/DL (ref 11.7–15.7)
IMM GRANULOCYTES # BLD: 0.1 10E3/UL
IMM GRANULOCYTES NFR BLD: 1 %
LYMPHOCYTES # BLD AUTO: 1 10E3/UL (ref 0.8–5.3)
LYMPHOCYTES NFR BLD AUTO: 20 %
MCH RBC QN AUTO: 31.3 PG (ref 26.5–33)
MCHC RBC AUTO-ENTMCNC: 32.9 G/DL (ref 31.5–36.5)
MCV RBC AUTO: 95 FL (ref 78–100)
MONOCYTES # BLD AUTO: 0.5 10E3/UL (ref 0–1.3)
MONOCYTES NFR BLD AUTO: 9 %
NEUTROPHILS # BLD AUTO: 3.3 10E3/UL (ref 1.6–8.3)
NEUTROPHILS NFR BLD AUTO: 67 %
NRBC # BLD AUTO: 0 10E3/UL
NRBC BLD AUTO-RTO: 0 /100
PLATELET # BLD AUTO: 147 10E3/UL (ref 150–450)
RBC # BLD AUTO: 3.2 10E6/UL (ref 3.8–5.2)
WBC # BLD AUTO: 5 10E3/UL (ref 4–11)

## 2024-10-15 PROCEDURE — 96375 TX/PRO/DX INJ NEW DRUG ADDON: CPT

## 2024-10-15 PROCEDURE — 258N000003 HC RX IP 258 OP 636

## 2024-10-15 PROCEDURE — 96413 CHEMO IV INFUSION 1 HR: CPT

## 2024-10-15 PROCEDURE — 85025 COMPLETE CBC W/AUTO DIFF WBC: CPT

## 2024-10-15 PROCEDURE — 250N000011 HC RX IP 250 OP 636

## 2024-10-15 PROCEDURE — 36591 DRAW BLOOD OFF VENOUS DEVICE: CPT

## 2024-10-15 RX ORDER — HEPARIN SODIUM (PORCINE) LOCK FLUSH IV SOLN 100 UNIT/ML 100 UNIT/ML
5 SOLUTION INTRAVENOUS
Status: DISCONTINUED | OUTPATIENT
Start: 2024-10-15 | End: 2024-10-15 | Stop reason: HOSPADM

## 2024-10-15 RX ORDER — MEPERIDINE HYDROCHLORIDE 50 MG/ML
25 INJECTION INTRAMUSCULAR; INTRAVENOUS; SUBCUTANEOUS EVERY 30 MIN PRN
Status: DISCONTINUED | OUTPATIENT
Start: 2024-10-15 | End: 2024-10-15 | Stop reason: HOSPADM

## 2024-10-15 RX ORDER — ONDANSETRON 2 MG/ML
8 INJECTION INTRAMUSCULAR; INTRAVENOUS ONCE
Status: COMPLETED | OUTPATIENT
Start: 2024-10-15 | End: 2024-10-15

## 2024-10-15 RX ORDER — METHYLPREDNISOLONE SODIUM SUCCINATE 125 MG/2ML
125 INJECTION INTRAMUSCULAR; INTRAVENOUS
Status: DISCONTINUED | OUTPATIENT
Start: 2024-10-15 | End: 2024-10-15 | Stop reason: HOSPADM

## 2024-10-15 RX ORDER — ALBUTEROL SULFATE 90 UG/1
1-2 INHALANT RESPIRATORY (INHALATION)
Status: DISCONTINUED | OUTPATIENT
Start: 2024-10-15 | End: 2024-10-15 | Stop reason: HOSPADM

## 2024-10-15 RX ORDER — EPINEPHRINE 1 MG/ML
0.3 INJECTION, SOLUTION INTRAMUSCULAR; SUBCUTANEOUS EVERY 5 MIN PRN
Status: DISCONTINUED | OUTPATIENT
Start: 2024-10-15 | End: 2024-10-15 | Stop reason: HOSPADM

## 2024-10-15 RX ORDER — ALBUTEROL SULFATE 0.83 MG/ML
2.5 SOLUTION RESPIRATORY (INHALATION)
Status: DISCONTINUED | OUTPATIENT
Start: 2024-10-15 | End: 2024-10-15 | Stop reason: HOSPADM

## 2024-10-15 RX ORDER — DIPHENHYDRAMINE HYDROCHLORIDE 50 MG/ML
50 INJECTION INTRAMUSCULAR; INTRAVENOUS
Status: DISCONTINUED | OUTPATIENT
Start: 2024-10-15 | End: 2024-10-15 | Stop reason: HOSPADM

## 2024-10-15 RX ADMIN — PACLITAXEL 134 MG: 6 INJECTION, SOLUTION INTRAVENOUS at 09:58

## 2024-10-15 RX ADMIN — HEPARIN 5 ML: 100 SYRINGE at 11:04

## 2024-10-15 RX ADMIN — ONDANSETRON 8 MG: 2 INJECTION INTRAMUSCULAR; INTRAVENOUS at 09:38

## 2024-10-22 ENCOUNTER — ONCOLOGY VISIT (OUTPATIENT)
Dept: ONCOLOGY | Facility: HOSPITAL | Age: 59
End: 2024-10-22
Attending: INTERNAL MEDICINE
Payer: COMMERCIAL

## 2024-10-22 ENCOUNTER — LAB (OUTPATIENT)
Dept: INFUSION THERAPY | Facility: HOSPITAL | Age: 59
End: 2024-10-22
Attending: INTERNAL MEDICINE
Payer: COMMERCIAL

## 2024-10-22 VITALS
BODY MASS INDEX: 28.08 KG/M2 | HEIGHT: 59 IN | OXYGEN SATURATION: 96 % | SYSTOLIC BLOOD PRESSURE: 107 MMHG | DIASTOLIC BLOOD PRESSURE: 59 MMHG | RESPIRATION RATE: 16 BRPM | TEMPERATURE: 97.7 F | HEART RATE: 108 BPM | WEIGHT: 139.3 LBS

## 2024-10-22 DIAGNOSIS — C50.912 INVASIVE DUCTAL CARCINOMA OF BREAST, FEMALE, LEFT (H): Primary | ICD-10-CM

## 2024-10-22 DIAGNOSIS — R53.83 CHEMOTHERAPY-INDUCED FATIGUE: ICD-10-CM

## 2024-10-22 DIAGNOSIS — T45.1X5A CHEMOTHERAPY-INDUCED FATIGUE: ICD-10-CM

## 2024-10-22 LAB
ALBUMIN SERPL BCG-MCNC: 3.9 G/DL (ref 3.5–5.2)
ALP SERPL-CCNC: 121 U/L (ref 40–150)
ALT SERPL W P-5'-P-CCNC: 29 U/L (ref 0–50)
ANION GAP SERPL CALCULATED.3IONS-SCNC: 13 MMOL/L (ref 7–15)
AST SERPL W P-5'-P-CCNC: 45 U/L (ref 0–45)
BASOPHILS # BLD AUTO: 0 10E3/UL (ref 0–0.2)
BASOPHILS NFR BLD AUTO: 0 %
BILIRUB DIRECT SERPL-MCNC: <0.2 MG/DL (ref 0–0.3)
BILIRUB SERPL-MCNC: 0.7 MG/DL
BUN SERPL-MCNC: 3.8 MG/DL (ref 6–20)
CALCIUM SERPL-MCNC: 8.8 MG/DL (ref 8.8–10.4)
CHLORIDE SERPL-SCNC: 104 MMOL/L (ref 98–107)
CREAT SERPL-MCNC: 0.52 MG/DL (ref 0.51–0.95)
EGFRCR SERPLBLD CKD-EPI 2021: >90 ML/MIN/1.73M2
EOSINOPHIL # BLD AUTO: 0.2 10E3/UL (ref 0–0.7)
EOSINOPHIL NFR BLD AUTO: 7 %
ERYTHROCYTE [DISTWIDTH] IN BLOOD BY AUTOMATED COUNT: 17.7 % (ref 10–15)
GLUCOSE SERPL-MCNC: 155 MG/DL (ref 70–99)
HCO3 SERPL-SCNC: 23 MMOL/L (ref 22–29)
HCT VFR BLD AUTO: 30.7 % (ref 35–47)
HGB BLD-MCNC: 10.2 G/DL (ref 11.7–15.7)
IMM GRANULOCYTES # BLD: 0 10E3/UL
IMM GRANULOCYTES NFR BLD: 1 %
LYMPHOCYTES # BLD AUTO: 0.9 10E3/UL (ref 0.8–5.3)
LYMPHOCYTES NFR BLD AUTO: 31 %
MCH RBC QN AUTO: 31.9 PG (ref 26.5–33)
MCHC RBC AUTO-ENTMCNC: 33.2 G/DL (ref 31.5–36.5)
MCV RBC AUTO: 96 FL (ref 78–100)
MONOCYTES # BLD AUTO: 0.3 10E3/UL (ref 0–1.3)
MONOCYTES NFR BLD AUTO: 12 %
NEUTROPHILS # BLD AUTO: 1.4 10E3/UL (ref 1.6–8.3)
NEUTROPHILS NFR BLD AUTO: 48 %
NRBC # BLD AUTO: 0 10E3/UL
NRBC BLD AUTO-RTO: 0 /100
PLATELET # BLD AUTO: 178 10E3/UL (ref 150–450)
POTASSIUM SERPL-SCNC: 3.6 MMOL/L (ref 3.4–5.3)
PROT SERPL-MCNC: 6.5 G/DL (ref 6.4–8.3)
RBC # BLD AUTO: 3.2 10E6/UL (ref 3.8–5.2)
SODIUM SERPL-SCNC: 140 MMOL/L (ref 135–145)
WBC # BLD AUTO: 2.8 10E3/UL (ref 4–11)

## 2024-10-22 PROCEDURE — T1013 SIGN LANG/ORAL INTERPRETER: HCPCS | Mod: U4

## 2024-10-22 PROCEDURE — 82248 BILIRUBIN DIRECT: CPT

## 2024-10-22 PROCEDURE — 85025 COMPLETE CBC W/AUTO DIFF WBC: CPT

## 2024-10-22 PROCEDURE — 82435 ASSAY OF BLOOD CHLORIDE: CPT

## 2024-10-22 PROCEDURE — G0463 HOSPITAL OUTPT CLINIC VISIT: HCPCS | Performed by: INTERNAL MEDICINE

## 2024-10-22 PROCEDURE — 36591 DRAW BLOOD OFF VENOUS DEVICE: CPT

## 2024-10-22 PROCEDURE — 99215 OFFICE O/P EST HI 40 MIN: CPT | Performed by: INTERNAL MEDICINE

## 2024-10-22 PROCEDURE — 250N000011 HC RX IP 250 OP 636: Performed by: INTERNAL MEDICINE

## 2024-10-22 PROCEDURE — 96413 CHEMO IV INFUSION 1 HR: CPT

## 2024-10-22 PROCEDURE — G2211 COMPLEX E/M VISIT ADD ON: HCPCS | Performed by: INTERNAL MEDICINE

## 2024-10-22 PROCEDURE — 258N000003 HC RX IP 258 OP 636: Performed by: INTERNAL MEDICINE

## 2024-10-22 PROCEDURE — 96375 TX/PRO/DX INJ NEW DRUG ADDON: CPT

## 2024-10-22 RX ORDER — HEPARIN SODIUM (PORCINE) LOCK FLUSH IV SOLN 100 UNIT/ML 100 UNIT/ML
5 SOLUTION INTRAVENOUS
Status: CANCELLED | OUTPATIENT
Start: 2024-10-29

## 2024-10-22 RX ORDER — DIPHENHYDRAMINE HYDROCHLORIDE 50 MG/ML
50 INJECTION INTRAMUSCULAR; INTRAVENOUS
Status: CANCELLED
Start: 2024-10-22

## 2024-10-22 RX ORDER — ONDANSETRON 2 MG/ML
8 INJECTION INTRAMUSCULAR; INTRAVENOUS ONCE
Status: CANCELLED | OUTPATIENT
Start: 2024-10-29

## 2024-10-22 RX ORDER — ALBUTEROL SULFATE 90 UG/1
1-2 INHALANT RESPIRATORY (INHALATION)
Status: DISCONTINUED | OUTPATIENT
Start: 2024-10-22 | End: 2024-10-22 | Stop reason: HOSPADM

## 2024-10-22 RX ORDER — MEPERIDINE HYDROCHLORIDE 50 MG/ML
25 INJECTION INTRAMUSCULAR; INTRAVENOUS; SUBCUTANEOUS EVERY 30 MIN PRN
Status: CANCELLED | OUTPATIENT
Start: 2024-10-22

## 2024-10-22 RX ORDER — HEPARIN SODIUM (PORCINE) LOCK FLUSH IV SOLN 100 UNIT/ML 100 UNIT/ML
5 SOLUTION INTRAVENOUS
Status: CANCELLED | OUTPATIENT
Start: 2024-10-22

## 2024-10-22 RX ORDER — EPINEPHRINE 1 MG/ML
0.3 INJECTION, SOLUTION INTRAMUSCULAR; SUBCUTANEOUS EVERY 5 MIN PRN
Status: DISCONTINUED | OUTPATIENT
Start: 2024-10-22 | End: 2024-10-22 | Stop reason: HOSPADM

## 2024-10-22 RX ORDER — HEPARIN SODIUM (PORCINE) LOCK FLUSH IV SOLN 100 UNIT/ML 100 UNIT/ML
5 SOLUTION INTRAVENOUS
Status: DISCONTINUED | OUTPATIENT
Start: 2024-10-22 | End: 2024-10-22 | Stop reason: HOSPADM

## 2024-10-22 RX ORDER — METHYLPREDNISOLONE SODIUM SUCCINATE 125 MG/2ML
125 INJECTION INTRAMUSCULAR; INTRAVENOUS
Status: CANCELLED
Start: 2024-10-22

## 2024-10-22 RX ORDER — ALBUTEROL SULFATE 0.83 MG/ML
2.5 SOLUTION RESPIRATORY (INHALATION)
Status: CANCELLED | OUTPATIENT
Start: 2024-10-29

## 2024-10-22 RX ORDER — EPINEPHRINE 1 MG/ML
0.3 INJECTION, SOLUTION INTRAMUSCULAR; SUBCUTANEOUS EVERY 5 MIN PRN
Status: CANCELLED | OUTPATIENT
Start: 2024-10-29

## 2024-10-22 RX ORDER — DIPHENHYDRAMINE HCL 50 MG
50 CAPSULE ORAL
Status: CANCELLED
Start: 2024-10-29

## 2024-10-22 RX ORDER — MEPERIDINE HYDROCHLORIDE 50 MG/ML
25 INJECTION INTRAMUSCULAR; INTRAVENOUS; SUBCUTANEOUS EVERY 30 MIN PRN
Status: CANCELLED | OUTPATIENT
Start: 2024-10-29

## 2024-10-22 RX ORDER — METHYLPREDNISOLONE SODIUM SUCCINATE 125 MG/2ML
125 INJECTION INTRAMUSCULAR; INTRAVENOUS
Status: CANCELLED
Start: 2024-10-29

## 2024-10-22 RX ORDER — MEPERIDINE HYDROCHLORIDE 50 MG/ML
25 INJECTION INTRAMUSCULAR; INTRAVENOUS; SUBCUTANEOUS EVERY 30 MIN PRN
Status: DISCONTINUED | OUTPATIENT
Start: 2024-10-22 | End: 2024-10-22 | Stop reason: HOSPADM

## 2024-10-22 RX ORDER — METHYLPREDNISOLONE SODIUM SUCCINATE 125 MG/2ML
125 INJECTION INTRAMUSCULAR; INTRAVENOUS
Status: DISCONTINUED | OUTPATIENT
Start: 2024-10-22 | End: 2024-10-22 | Stop reason: HOSPADM

## 2024-10-22 RX ORDER — EPINEPHRINE 1 MG/ML
0.3 INJECTION, SOLUTION INTRAMUSCULAR; SUBCUTANEOUS EVERY 5 MIN PRN
Status: CANCELLED | OUTPATIENT
Start: 2024-10-22

## 2024-10-22 RX ORDER — DIPHENHYDRAMINE HYDROCHLORIDE 50 MG/ML
50 INJECTION INTRAMUSCULAR; INTRAVENOUS
Status: CANCELLED
Start: 2024-10-29

## 2024-10-22 RX ORDER — ALBUTEROL SULFATE 0.83 MG/ML
2.5 SOLUTION RESPIRATORY (INHALATION)
Status: DISCONTINUED | OUTPATIENT
Start: 2024-10-22 | End: 2024-10-22 | Stop reason: HOSPADM

## 2024-10-22 RX ORDER — ALBUTEROL SULFATE 0.83 MG/ML
2.5 SOLUTION RESPIRATORY (INHALATION)
Status: CANCELLED | OUTPATIENT
Start: 2024-10-22

## 2024-10-22 RX ORDER — ONDANSETRON 2 MG/ML
8 INJECTION INTRAMUSCULAR; INTRAVENOUS ONCE
Status: CANCELLED | OUTPATIENT
Start: 2024-10-22

## 2024-10-22 RX ORDER — ALBUTEROL SULFATE 90 UG/1
1-2 INHALANT RESPIRATORY (INHALATION)
Status: CANCELLED
Start: 2024-10-22

## 2024-10-22 RX ORDER — DIPHENHYDRAMINE HCL 50 MG
50 CAPSULE ORAL
Status: CANCELLED
Start: 2024-10-22

## 2024-10-22 RX ORDER — LORAZEPAM 2 MG/ML
0.5 INJECTION INTRAMUSCULAR EVERY 4 HOURS PRN
Status: CANCELLED | OUTPATIENT
Start: 2024-10-22

## 2024-10-22 RX ORDER — HEPARIN SODIUM,PORCINE 10 UNIT/ML
5-20 VIAL (ML) INTRAVENOUS DAILY PRN
Status: CANCELLED | OUTPATIENT
Start: 2024-10-22

## 2024-10-22 RX ORDER — HEPARIN SODIUM,PORCINE 10 UNIT/ML
5-20 VIAL (ML) INTRAVENOUS DAILY PRN
Status: CANCELLED | OUTPATIENT
Start: 2024-10-29

## 2024-10-22 RX ORDER — ONDANSETRON 2 MG/ML
8 INJECTION INTRAMUSCULAR; INTRAVENOUS ONCE
Status: COMPLETED | OUTPATIENT
Start: 2024-10-22 | End: 2024-10-22

## 2024-10-22 RX ORDER — LORAZEPAM 2 MG/ML
0.5 INJECTION INTRAMUSCULAR EVERY 4 HOURS PRN
Status: CANCELLED | OUTPATIENT
Start: 2024-10-29

## 2024-10-22 RX ORDER — ALBUTEROL SULFATE 90 UG/1
1-2 INHALANT RESPIRATORY (INHALATION)
Status: CANCELLED
Start: 2024-10-29

## 2024-10-22 RX ORDER — DIPHENHYDRAMINE HYDROCHLORIDE 50 MG/ML
50 INJECTION INTRAMUSCULAR; INTRAVENOUS
Status: DISCONTINUED | OUTPATIENT
Start: 2024-10-22 | End: 2024-10-22 | Stop reason: HOSPADM

## 2024-10-22 RX ADMIN — PACLITAXEL 134 MG: 6 INJECTION, SOLUTION INTRAVENOUS at 11:03

## 2024-10-22 RX ADMIN — HEPARIN 5 ML: 100 SYRINGE at 12:06

## 2024-10-22 RX ADMIN — ONDANSETRON 8 MG: 2 INJECTION INTRAMUSCULAR; INTRAVENOUS at 10:10

## 2024-10-22 RX ADMIN — SODIUM CHLORIDE 1000 ML: 9 INJECTION, SOLUTION INTRAVENOUS at 10:00

## 2024-10-22 ASSESSMENT — PAIN SCALES - GENERAL: PAINLEVEL: NO PAIN (0)

## 2024-10-22 NOTE — PROGRESS NOTES
[facility] Hematology and Oncology Progress Note    Patient: Kerry Mills  MRN: [id number]  Date of Service: Oct 22, 2024             ECOG Performance    0 - Independent          ______________________________________________________________________________  Oncologic history  1) invasive ductal carcinoma of the breast ER negative [place] negative HER2/kobe 2+, negative by FISH.  3.9 cm in maximum dimension on MRI.  Diagnosed July 2024    Treatment  1) neoadjuvant AC started on 7/22/2024.  CarboTaxol and Pembro denied by insurance   2) weekly paclitaxel started on 9/17/2024      History of Present Illness  Patient is here in follow-up.  She feels fine.  She has no new concerns.  She is due for her fifth weekly dose of paclitaxel.  She does report of some slight numbness in her thumbs.  It is not affecting her fine motor skills and she is not having difficulty doing any of her usual activities.  She also complains of some change in her taste.  All history was taken through an .    12 point review of system was negative    Past History    Past Medical History:   Diagnosis Date    Asthma     Chronic constipation        Past Surgical History:   Procedure Laterality Date    INCISION AND DRAINAGE OF WOUND N/A 11/27/2020    Procedure: INCISION AND DRAINAGE, NECK;  Surgeon: Arnel James MD;  Location: [facility] [place];  Service: ENT    INSERT PORT VASCULAR ACCESS Right 7/9/2024    Procedure: INSERTION,  RIGHT VASCULAR ACCESS PORT;  Surgeon: Iwona Choe DO;  Location: [facility]    PICC INSERTION - TRIPLE LUMEN  11/26/2020         TRACHEOSTOMY N/A 11/26/2020    Procedure: EMERGENT INTUBATION IN OR WITH CREATION, TRACHEOSTOMY;  Surgeon: Dennise Justice MD;  Location: [facility] [place];  Service: General       Physical Exam    /59 (BP Location: Left arm, Patient Position: Sitting, Cuff Size: Adult Regular)   Pulse 108   Temp 97.7  F (36.5  C) (Tympanic)   Resp 16   Ht 1.499 m  "(4' 11\")   Wt 63.2 kg (139 lb 4.8 oz)   SpO2 96%   BMI 28.14 kg/m      General: alert, awake, not in acute distress  HEENT: Head: Normal, normocephalic, atraumatic.  Eye: Normal external eye, conjunctiva, lids cornea, MIKAYLA.  Nose: Normal external nose, mucus membranes and septum.  Pharynx: Normal buccal mucosa. Normal pharynx.  Neck / Thyroid: Supple, no masses, nodes, nodules or enlargement.  Lymphatics: No abnormally enlarged lymph nodes.  Chest: Normal chest wall and respirations. Clear to auscultation.  No crackles or rhonchi's  Heart: S1 S2 RRR, no murmur.   Abdomen: abdomen is soft without significant tenderness, masses, organomegaly or guarding  Extremities: normal strength, tone, and muscle mass  Skin: normal. no rash or abnormalities  CNS: non focal.    Lab Results    Recent Results (from the past 240 hour(s))   CBC with platelets and differential    Collection Time: 10/15/24  9:04 AM   Result Value Ref Range    WBC Count 5.0 4.0 - 11.0 10e3/uL    RBC Count 3.20 (L) 3.80 - 5.20 10e6/uL    Hemoglobin 10.0 (L) 11.7 - 15.7 g/dL    Hematocrit 30.4 (L) 35.0 - 47.0 %    MCV 95 78 - 100 fL    MCH 31.3 26.5 - 33.0 pg    MCHC 32.9 31.5 - 36.5 g/dL    RDW 17.7 (H) 10.0 - 15.0 %    Platelet Count 147 (L) 150 - 450 10e3/uL    % Neutrophils 67 %    % Lymphocytes 20 %    % Monocytes 9 %    % Eosinophils 3 %    % Basophils 0 %    % Immature Granulocytes 1 %    NRBCs per 100 WBC 0 <1 /100    Absolute Neutrophils 3.3 1.6 - 8.3 10e3/uL    Absolute Lymphocytes 1.0 0.8 - 5.3 10e3/uL    Absolute Monocytes 0.5 0.0 - 1.3 10e3/uL    Absolute Eosinophils 0.2 0.0 - 0.7 10e3/uL    Absolute Basophils 0.0 0.0 - 0.2 10e3/uL    Absolute Immature Granulocytes 0.1 <=0.4 10e3/uL    Absolute NRBCs 0.0 10e3/uL   Hepatic panel    Collection Time: 10/22/24  8:50 AM   Result Value Ref Range    Protein Total 6.5 6.4 - 8.3 g/dL    Albumin 3.9 3.5 - 5.2 g/dL    Bilirubin Total 0.7 <=1.2 mg/dL    Alkaline Phosphatase 121 40 - 150 U/L    AST 45 0 " - 45 U/L    ALT 29 0 - 50 U/L    Bilirubin Direct <0.20 0.00 - 0.30 mg/dL   CBC with platelets and differential    Collection Time: 10/22/24  8:50 AM   Result Value Ref Range    WBC Count 2.8 (L) 4.0 - 11.0 10e3/uL    RBC Count 3.20 (L) 3.80 - 5.20 10e6/uL    Hemoglobin 10.2 (L) 11.7 - 15.7 g/dL    Hematocrit 30.7 (L) 35.0 - 47.0 %    MCV 96 78 - 100 fL    MCH 31.9 26.5 - 33.0 pg    MCHC 33.2 31.5 - 36.5 g/dL    RDW 17.7 (H) 10.0 - 15.0 %    Platelet Count 178 150 - 450 10e3/uL    % Neutrophils 48 %    % Lymphocytes 31 %    % Monocytes 12 %    % Eosinophils 7 %    % Basophils 0 %    % Immature Granulocytes 1 %    NRBCs per 100 WBC 0 <1 /100    Absolute Neutrophils 1.4 (L) 1.6 - 8.3 10e3/uL    Absolute Lymphocytes 0.9 0.8 - 5.3 10e3/uL    Absolute Monocytes 0.3 0.0 - 1.3 10e3/uL    Absolute Eosinophils 0.2 0.0 - 0.7 10e3/uL    Absolute Basophils 0.0 0.0 - 0.2 10e3/uL    Absolute Immature Granulocytes 0.0 <=0.4 10e3/uL    Absolute NRBCs 0.0 10e3/uL         Imaging    CT Chest Pulmonary Embolism w Contrast    Result Date: 9/29/2024  EXAM: CT CHEST PULMONARY EMBOLISM W CONTRAST LOCATION: [facility] DATE: 9/29/2024 INDICATION: invasive ductal carcinoma left breast, cough, fever, short of breath, upper back pain COMPARISON: None. TECHNIQUE: CT chest pulmonary angiogram during arterial phase injection of IV contrast. Multiplanar reformats and MIP reconstructions were performed. Dose reduction techniques were used. CONTRAST: 75ml Isovue 370 FINDINGS: ANGIOGRAM CHEST: Pulmonary arteries are normal caliber and negative for pulmonary emboli. Thoracic aorta is negative for dissection. LUNGS AND PLEURA: Mild mosaic attenuation of the lungs likely related to the exam being obtained in the expiratory phase of respiration. Mild bronchial wall thickening. There are a few scattered tree-in-bud opacities most notably in the anterior right upper lobe. There is a predominantly groundglass nodular opacity in the  left upper lobe measuring 0.8 x 0.6 cm. Left lower lobe 0.5 x 0.4 cm nodule on series 7 image 176. Right lower lobe nodule on series 7 image 165 measuring 0.5 cm. MEDIASTINUM/AXILLAE: Heart size is normal. No adenopathy. Right IJ port terminates in the mid SVC. CORONARY ARTERY CALCIFICATION: None. UPPER ABDOMEN: Hepatic steatosis. MUSCULOSKELETAL: Degenerative changes of the spine. No suspicious osseous lesions. Left breast mass measures 2.3 x 2.1 cm compatible with known malignancy.     IMPRESSION: 1.  No pulmonary embolus. 2.  There are a few scattered tree-in-bud opacities most notably in the right upper lobe suggestive of infectious/inflammatory bronchiolitis. 3.  There is a groundglass nodular opacity in the left upper lobe measuring 0.8 x 0.6 cm. While this could represent infectious or inflammatory change is technically indeterminant and continued attention on follow-up is recommended. 4.  Otherwise, there are also a few scattered sub-6 mm pulmonary nodules. Continued attention on follow-up. 5.  Left breast mass compatible with known malignancy.         Assessment and Plan      Breast cancer, triple negative patient receiving neoadjuvant chemotherapy   Patient is here in follow-up.  She is due for her fifth weekly dose of paclitaxel.  She did had an interim ultrasound which showed significant decrease in the size of the mass.  She is having slight neuropathy but it is not affecting her daily activities.  I will proceed with the fifth dose today and continue weekly paclitaxel.    Neutropenia  Her neutrophil count is decreasing but is still more than 1000 and I will continue treatment as long as the ANC is greater than 1000 and if it falls below thousand we will consider adding Neupogen once a week  Neuropathy   Patient complains of numbness on the thumbs.  It is not affecting any of her fine motor movements or ability to do things.  Will continue to follow the symptoms if they get worse we will consider  decreasing the dose of paclitaxel and  Lightheadedness  Patient does complains of some lightheadedness.  Pulse is elevated from her baseline.  I will plan to give her a liter of fluid.  I will also check a basic metabolic panel and plan to check it weekly    Varsha Adamson MD            CC: HIRAL CHAVEZ MD

## 2024-10-22 NOTE — LETTER
10/22/2024      Kerry Mills  [address] [place] [address]      Dear Colleague,    Thank you for referring your patient, Kerry Mills, to the [facility]. Please see a copy of my visit note below.            [facility] Hematology and Oncology Progress Note    Patient: Kerry Mills  MRN: [id number]  Date of Service: Oct 22, 2024             ECOG Performance    0 - Independent          ______________________________________________________________________________  Oncologic history  1) invasive ductal carcinoma of the breast ER negative IL negative HER2/kobe 2+, negative by FISH.  3.9 cm in maximum dimension on MRI.  Diagnosed July 2024    Treatment  1) neoadjuvant AC started on 7/22/2024.  CarboTaxol and Pembro denied by insurance   2) weekly paclitaxel started on 9/17/2024      History of Present Illness  Patient is here in follow-up.  She feels fine.  She has no new concerns.  She is due for her fifth weekly dose of paclitaxel.  She does report of some slight numbness in her thumbs.  It is not affecting her fine motor skills and she is not having difficulty doing any of her usual activities.  She also complains of some change in her taste.  All history was taken through an .    12 point review of system was negative    Past History    Past Medical History:   Diagnosis Date     Asthma      Chronic constipation        Past Surgical History:   Procedure Laterality Date     INCISION AND DRAINAGE OF WOUND N/A 11/27/2020    Procedure: INCISION AND DRAINAGE, NECK;  Surgeon: Arnel James MD;  Location: [facility] OR;  Service: ENT     INSERT PORT VASCULAR ACCESS Right 7/9/2024    Procedure: INSERTION,  RIGHT VASCULAR ACCESS PORT;  Surgeon: Iwona Choe DO;  Location: [facility]     PICC INSERTION - TRIPLE LUMEN  11/26/2020          TRACHEOSTOMY N/A 11/26/2020    Procedure: EMERGENT INTUBATION IN OR WITH CREATION, TRACHEOSTOMY;  Surgeon: Dennise Justice,  "MD;  Location: [facility] OR;  Service: General       Physical Exam    /59 (BP Location: Left arm, Patient Position: Sitting, Cuff Size: Adult Regular)   Pulse 108   Temp 97.7  F (36.5  C) (Tympanic)   Resp 16   Ht 1.499 m (4' 11\")   Wt 63.2 kg (139 lb 4.8 oz)   SpO2 96%   BMI 28.14 kg/m      General: alert, awake, not in acute distress  HEENT: Head: Normal, normocephalic, atraumatic.  Eye: Normal external eye, conjunctiva, lids cornea, MIKAYLA.  Nose: Normal external nose, mucus membranes and septum.  Pharynx: Normal buccal mucosa. Normal pharynx.  Neck / Thyroid: Supple, no masses, nodes, nodules or enlargement.  Lymphatics: No abnormally enlarged lymph nodes.  Chest: Normal chest wall and respirations. Clear to auscultation.  No crackles or rhonchi's  Heart: S1 S2 RRR, no murmur.   Abdomen: abdomen is soft without significant tenderness, masses, organomegaly or guarding  Extremities: normal strength, tone, and muscle mass  Skin: normal. no rash or abnormalities  CNS: non focal.    Lab Results    Recent Results (from the past 240 hour(s))   CBC with platelets and differential    Collection Time: 10/15/24  9:04 AM   Result Value Ref Range    WBC Count 5.0 4.0 - 11.0 10e3/uL    RBC Count 3.20 (L) 3.80 - 5.20 10e6/uL    Hemoglobin 10.0 (L) 11.7 - 15.7 g/dL    Hematocrit 30.4 (L) 35.0 - 47.0 %    MCV 95 78 - 100 fL    MCH 31.3 26.5 - 33.0 pg    MCHC 32.9 31.5 - 36.5 g/dL    RDW 17.7 (H) 10.0 - 15.0 %    Platelet Count 147 (L) 150 - 450 10e3/uL    % Neutrophils 67 %    % Lymphocytes 20 %    % Monocytes 9 %    % Eosinophils 3 %    % Basophils 0 %    % Immature Granulocytes 1 %    NRBCs per 100 WBC 0 <1 /100    Absolute Neutrophils 3.3 1.6 - 8.3 10e3/uL    Absolute Lymphocytes 1.0 0.8 - 5.3 10e3/uL    Absolute Monocytes 0.5 0.0 - 1.3 10e3/uL    Absolute Eosinophils 0.2 0.0 - 0.7 10e3/uL    Absolute Basophils 0.0 0.0 - 0.2 10e3/uL    Absolute Immature Granulocytes 0.1 <=0.4 10e3/uL    Absolute NRBCs 0.0 " 10e3/uL   Hepatic panel    Collection Time: 10/22/24  8:50 AM   Result Value Ref Range    Protein Total 6.5 6.4 - 8.3 g/dL    Albumin 3.9 3.5 - 5.2 g/dL    Bilirubin Total 0.7 <=1.2 mg/dL    Alkaline Phosphatase 121 40 - 150 U/L    AST 45 0 - 45 U/L    ALT 29 0 - 50 U/L    Bilirubin Direct <0.20 0.00 - 0.30 mg/dL   CBC with platelets and differential    Collection Time: 10/22/24  8:50 AM   Result Value Ref Range    WBC Count 2.8 (L) 4.0 - 11.0 10e3/uL    RBC Count 3.20 (L) 3.80 - 5.20 10e6/uL    Hemoglobin 10.2 (L) 11.7 - 15.7 g/dL    Hematocrit 30.7 (L) 35.0 - 47.0 %    MCV 96 78 - 100 fL    MCH 31.9 26.5 - 33.0 pg    MCHC 33.2 31.5 - 36.5 g/dL    RDW 17.7 (H) 10.0 - 15.0 %    Platelet Count 178 150 - 450 10e3/uL    % Neutrophils 48 %    % Lymphocytes 31 %    % Monocytes 12 %    % Eosinophils 7 %    % Basophils 0 %    % Immature Granulocytes 1 %    NRBCs per 100 WBC 0 <1 /100    Absolute Neutrophils 1.4 (L) 1.6 - 8.3 10e3/uL    Absolute Lymphocytes 0.9 0.8 - 5.3 10e3/uL    Absolute Monocytes 0.3 0.0 - 1.3 10e3/uL    Absolute Eosinophils 0.2 0.0 - 0.7 10e3/uL    Absolute Basophils 0.0 0.0 - 0.2 10e3/uL    Absolute Immature Granulocytes 0.0 <=0.4 10e3/uL    Absolute NRBCs 0.0 10e3/uL         Imaging    CT Chest Pulmonary Embolism w Contrast    Result Date: 9/29/2024  EXAM: CT CHEST PULMONARY EMBOLISM W CONTRAST LOCATION: [facility] DATE: 9/29/2024 INDICATION: invasive ductal carcinoma left breast, cough, fever, short of breath, upper back pain COMPARISON: None. TECHNIQUE: CT chest pulmonary angiogram during arterial phase injection of IV contrast. Multiplanar reformats and MIP reconstructions were performed. Dose reduction techniques were used. CONTRAST: 75ml Isovue 370 FINDINGS: ANGIOGRAM CHEST: Pulmonary arteries are normal caliber and negative for pulmonary emboli. Thoracic aorta is negative for dissection. LUNGS AND PLEURA: Mild mosaic attenuation of the lungs likely related to the exam  being obtained in the expiratory phase of respiration. Mild bronchial wall thickening. There are a few scattered tree-in-bud opacities most notably in the anterior right upper lobe. There is a predominantly groundglass nodular opacity in the left upper lobe measuring 0.8 x 0.6 cm. Left lower lobe 0.5 x 0.4 cm nodule on series 7 image 176. Right lower lobe nodule on series 7 image 165 measuring 0.5 cm. MEDIASTINUM/AXILLAE: Heart size is normal. No adenopathy. Right IJ port terminates in the mid SVC. CORONARY ARTERY CALCIFICATION: None. UPPER ABDOMEN: Hepatic steatosis. MUSCULOSKELETAL: Degenerative changes of the spine. No suspicious osseous lesions. Left breast mass measures 2.3 x 2.1 cm compatible with known malignancy.     IMPRESSION: 1.  No pulmonary embolus. 2.  There are a few scattered tree-in-bud opacities most notably in the right upper lobe suggestive of infectious/inflammatory bronchiolitis. 3.  There is a groundglass nodular opacity in the left upper lobe measuring 0.8 x 0.6 cm. While this could represent infectious or inflammatory change is technically indeterminant and continued attention on follow-up is recommended. 4.  Otherwise, there are also a few scattered sub-6 mm pulmonary nodules. Continued attention on follow-up. 5.  Left breast mass compatible with known malignancy.         Assessment and Plan      Breast cancer, triple negative patient receiving neoadjuvant chemotherapy   Patient is here in follow-up.  She is due for her fifth weekly dose of paclitaxel.  She did had an interim ultrasound which showed significant decrease in the size of the mass.  She is having slight neuropathy but it is not affecting her daily activities.  I will proceed with the fifth dose today and continue weekly paclitaxel.    Neutropenia  Her neutrophil count is decreasing but is still more than 1000 and I will continue treatment as long as the ANC is greater than 1000 and if it falls below thousand we will consider  adding Neupogen once a week  Neuropathy   Patient complains of numbness on the thumbs.  It is not affecting any of her fine motor movements or ability to do things.  Will continue to follow the symptoms if they get worse we will consider decreasing the dose of paclitaxel and  Lightheadedness  Patient does complains of some lightheadedness.  Pulse is elevated from her baseline.  I will plan to give her a liter of fluid.  I will also check a basic metabolic panel and plan to check it weekly    Varsha Adamson MD            CC: HIRAL CHAVEZ MD       Again, thank you for allowing me to participate in the care of your patient.        Sincerely,        Varsha Adamson MD

## 2024-10-22 NOTE — PROGRESS NOTES
"Oncology Rooming Note    October 22, 2024 9:26 AM   Kerry Mills is a 59 year old female who presents for:    Chief Complaint   Patient presents with    Oncology Clinic Visit     Invasive ductal carcinoma of breast, female, left     Initial Vitals: /59 (BP Location: Left arm, Patient Position: Sitting, Cuff Size: Adult Regular)   Pulse 108   Temp 97.7  F (36.5  C) (Tympanic)   Resp 16   Ht 1.499 m (4' 11\")   Wt 63.2 kg (139 lb 4.8 oz)   SpO2 96%   BMI 28.14 kg/m   Estimated body mass index is 28.14 kg/m  as calculated from the following:    Height as of this encounter: 1.499 m (4' 11\").    Weight as of this encounter: 63.2 kg (139 lb 4.8 oz). Body surface area is 1.62 meters squared.  No Pain (0) Comment: Data Unavailable   No LMP recorded. Patient is postmenopausal.  Allergies reviewed: Yes  Medications reviewed: Yes    Medications: MEDICATION REFILLS NEEDED TODAY. Provider was notified.  Pharmacy name entered into [facility]:    [facility] - [place], [place] - [address] [place] [address] DRUG STORE #[id number] [place], [place] - [address] [place] [address] AT [facility] [address]    Frailty Screening:   Is the patient here for a new oncology consult visit in cancer care? 2. No      Clinical concerns:   Pt is in clinic with her daughter. Reported occasional bilateral hands numbness x1 week.   Pt requested refill on \"oral tablet rx for asthma\", pt does not remember name of rx.         Pebbles Dumont MA                    "

## 2024-10-22 NOTE — PROGRESS NOTES
Infusion Nursing Note:  Kerry Mills presents today for D1C9 Taxol.    Patient seen by provider today: Yes: Dr Adamson   present during visit today: Not Applicable.    Note: Pt is to get 1 liter NS today per Dr Adamson, pt co dizziness and pulse is elevated. Encouraged pt to also drink more fluids at home. Pt given zofran as a pre med.      Intravenous Access:  Implanted Port.    Treatment Conditions:  Lab Results   Component Value Date    HGB 10.2 (L) 10/22/2024    WBC 2.8 (L) 10/22/2024    ANEU 4.7 10/08/2024    ANEUTAUTO 1.4 (L) 10/22/2024     10/22/2024        Lab Results   Component Value Date     10/22/2024    POTASSIUM 3.6 10/22/2024    MAG 2.0 09/30/2024    CR 0.52 10/22/2024    DONNA 8.8 10/22/2024    BILITOTAL 0.7 10/22/2024    ALBUMIN 3.9 10/22/2024    ALT 29 10/22/2024    AST 45 10/22/2024       Results reviewed, labs MET treatment parameters, ok to proceed with treatment.      Post Infusion Assessment:  Patient tolerated infusion without incident.  Blood return noted pre and post infusion.  Site patent and intact, free from redness, edema or discomfort.  Access discontinued per protocol.       Discharge Plan:   Patient and/or family verbalized understanding of discharge instructions and all questions answered.      Edith White RN

## 2024-10-24 RX ORDER — ALBUTEROL SULFATE 90 UG/1
1-2 INHALANT RESPIRATORY (INHALATION)
Status: CANCELLED
Start: 2024-10-29

## 2024-10-24 RX ORDER — DIPHENHYDRAMINE HYDROCHLORIDE 50 MG/ML
50 INJECTION INTRAMUSCULAR; INTRAVENOUS
Status: CANCELLED
Start: 2024-10-29

## 2024-10-24 RX ORDER — ALBUTEROL SULFATE 0.83 MG/ML
2.5 SOLUTION RESPIRATORY (INHALATION)
Status: CANCELLED | OUTPATIENT
Start: 2024-10-29

## 2024-10-24 RX ORDER — MEPERIDINE HYDROCHLORIDE 25 MG/ML
25 INJECTION INTRAMUSCULAR; INTRAVENOUS; SUBCUTANEOUS
Status: CANCELLED | OUTPATIENT
Start: 2024-10-29

## 2024-10-24 RX ORDER — EPINEPHRINE 1 MG/ML
0.3 INJECTION, SOLUTION, CONCENTRATE INTRAVENOUS EVERY 5 MIN PRN
Status: CANCELLED | OUTPATIENT
Start: 2024-10-29

## 2024-10-24 RX ORDER — METHYLPREDNISOLONE SODIUM SUCCINATE 40 MG/ML
40 INJECTION INTRAMUSCULAR; INTRAVENOUS
Status: CANCELLED
Start: 2024-10-29

## 2024-10-24 RX ORDER — DIPHENHYDRAMINE HYDROCHLORIDE 50 MG/ML
25 INJECTION INTRAMUSCULAR; INTRAVENOUS
Status: CANCELLED
Start: 2024-10-29

## 2024-10-29 ENCOUNTER — LAB (OUTPATIENT)
Dept: INFUSION THERAPY | Facility: HOSPITAL | Age: 59
End: 2024-10-29
Attending: INTERNAL MEDICINE
Payer: COMMERCIAL

## 2024-10-29 VITALS
DIASTOLIC BLOOD PRESSURE: 74 MMHG | SYSTOLIC BLOOD PRESSURE: 107 MMHG | OXYGEN SATURATION: 95 % | HEART RATE: 95 BPM | RESPIRATION RATE: 16 BRPM | TEMPERATURE: 98.6 F

## 2024-10-29 DIAGNOSIS — L03.115 CELLULITIS OF RIGHT LOWER EXTREMITY: ICD-10-CM

## 2024-10-29 DIAGNOSIS — C50.912 INVASIVE DUCTAL CARCINOMA OF BREAST, FEMALE, LEFT (H): Primary | ICD-10-CM

## 2024-10-29 LAB
ANION GAP SERPL CALCULATED.3IONS-SCNC: 11 MMOL/L (ref 7–15)
BASOPHILS # BLD AUTO: 0 10E3/UL (ref 0–0.2)
BASOPHILS NFR BLD AUTO: 0 %
BUN SERPL-MCNC: 3.5 MG/DL (ref 6–20)
CALCIUM SERPL-MCNC: 8.9 MG/DL (ref 8.8–10.4)
CHLORIDE SERPL-SCNC: 102 MMOL/L (ref 98–107)
CREAT SERPL-MCNC: 0.55 MG/DL (ref 0.51–0.95)
EGFRCR SERPLBLD CKD-EPI 2021: >90 ML/MIN/1.73M2
EOSINOPHIL # BLD AUTO: 0.1 10E3/UL (ref 0–0.7)
EOSINOPHIL NFR BLD AUTO: 3 %
ERYTHROCYTE [DISTWIDTH] IN BLOOD BY AUTOMATED COUNT: 16.6 % (ref 10–15)
GLUCOSE SERPL-MCNC: 184 MG/DL (ref 70–99)
HCO3 SERPL-SCNC: 25 MMOL/L (ref 22–29)
HCT VFR BLD AUTO: 29.7 % (ref 35–47)
HGB BLD-MCNC: 10.1 G/DL (ref 11.7–15.7)
IMM GRANULOCYTES # BLD: 0.1 10E3/UL
IMM GRANULOCYTES NFR BLD: 2 %
LYMPHOCYTES # BLD AUTO: 1 10E3/UL (ref 0.8–5.3)
LYMPHOCYTES NFR BLD AUTO: 36 %
MCH RBC QN AUTO: 32.4 PG (ref 26.5–33)
MCHC RBC AUTO-ENTMCNC: 34 G/DL (ref 31.5–36.5)
MCV RBC AUTO: 95 FL (ref 78–100)
MONOCYTES # BLD AUTO: 0.2 10E3/UL (ref 0–1.3)
MONOCYTES NFR BLD AUTO: 9 %
NEUTROPHILS # BLD AUTO: 1.4 10E3/UL (ref 1.6–8.3)
NEUTROPHILS NFR BLD AUTO: 50 %
NRBC # BLD AUTO: 0 10E3/UL
NRBC BLD AUTO-RTO: 1 /100
PLATELET # BLD AUTO: 217 10E3/UL (ref 150–450)
POTASSIUM SERPL-SCNC: 3.3 MMOL/L (ref 3.4–5.3)
RBC # BLD AUTO: 3.12 10E6/UL (ref 3.8–5.2)
SODIUM SERPL-SCNC: 138 MMOL/L (ref 135–145)
WBC # BLD AUTO: 2.8 10E3/UL (ref 4–11)

## 2024-10-29 PROCEDURE — 96413 CHEMO IV INFUSION 1 HR: CPT

## 2024-10-29 PROCEDURE — 85004 AUTOMATED DIFF WBC COUNT: CPT | Performed by: INTERNAL MEDICINE

## 2024-10-29 PROCEDURE — 36591 DRAW BLOOD OFF VENOUS DEVICE: CPT | Performed by: INTERNAL MEDICINE

## 2024-10-29 PROCEDURE — 85018 HEMOGLOBIN: CPT | Performed by: INTERNAL MEDICINE

## 2024-10-29 PROCEDURE — 96375 TX/PRO/DX INJ NEW DRUG ADDON: CPT

## 2024-10-29 PROCEDURE — 258N000003 HC RX IP 258 OP 636: Performed by: INTERNAL MEDICINE

## 2024-10-29 PROCEDURE — 80048 BASIC METABOLIC PNL TOTAL CA: CPT

## 2024-10-29 PROCEDURE — G0463 HOSPITAL OUTPT CLINIC VISIT: HCPCS

## 2024-10-29 PROCEDURE — 250N000011 HC RX IP 250 OP 636: Performed by: INTERNAL MEDICINE

## 2024-10-29 RX ORDER — MEPERIDINE HYDROCHLORIDE 50 MG/ML
25 INJECTION INTRAMUSCULAR; INTRAVENOUS; SUBCUTANEOUS
Status: DISCONTINUED | OUTPATIENT
Start: 2024-10-29 | End: 2024-10-29 | Stop reason: HOSPADM

## 2024-10-29 RX ORDER — ALBUTEROL SULFATE 90 UG/1
1-2 INHALANT RESPIRATORY (INHALATION)
Status: DISCONTINUED | OUTPATIENT
Start: 2024-10-29 | End: 2024-10-29 | Stop reason: HOSPADM

## 2024-10-29 RX ORDER — ALBUTEROL SULFATE 0.83 MG/ML
2.5 SOLUTION RESPIRATORY (INHALATION)
Status: DISCONTINUED | OUTPATIENT
Start: 2024-10-29 | End: 2024-10-29 | Stop reason: HOSPADM

## 2024-10-29 RX ORDER — CEPHALEXIN 500 MG/1
500 CAPSULE ORAL 4 TIMES DAILY
Qty: 20 CAPSULE | Refills: 0 | Status: SHIPPED | OUTPATIENT
Start: 2024-10-29 | End: 2024-11-03

## 2024-10-29 RX ORDER — ONDANSETRON 2 MG/ML
8 INJECTION INTRAMUSCULAR; INTRAVENOUS ONCE
Status: COMPLETED | OUTPATIENT
Start: 2024-10-29 | End: 2024-10-29

## 2024-10-29 RX ORDER — DIPHENHYDRAMINE HYDROCHLORIDE 50 MG/ML
25 INJECTION INTRAMUSCULAR; INTRAVENOUS
Status: DISCONTINUED | OUTPATIENT
Start: 2024-10-29 | End: 2024-10-29 | Stop reason: HOSPADM

## 2024-10-29 RX ORDER — METHYLPREDNISOLONE SODIUM SUCCINATE 40 MG/ML
40 INJECTION INTRAMUSCULAR; INTRAVENOUS
Status: DISCONTINUED | OUTPATIENT
Start: 2024-10-29 | End: 2024-10-29 | Stop reason: HOSPADM

## 2024-10-29 RX ORDER — DIPHENHYDRAMINE HYDROCHLORIDE 50 MG/ML
50 INJECTION INTRAMUSCULAR; INTRAVENOUS
Status: DISCONTINUED | OUTPATIENT
Start: 2024-10-29 | End: 2024-10-29 | Stop reason: HOSPADM

## 2024-10-29 RX ORDER — EPINEPHRINE 1 MG/ML
0.3 INJECTION, SOLUTION INTRAMUSCULAR; SUBCUTANEOUS EVERY 5 MIN PRN
Status: DISCONTINUED | OUTPATIENT
Start: 2024-10-29 | End: 2024-10-29 | Stop reason: HOSPADM

## 2024-10-29 RX ORDER — HEPARIN SODIUM (PORCINE) LOCK FLUSH IV SOLN 100 UNIT/ML 100 UNIT/ML
5 SOLUTION INTRAVENOUS
Status: DISCONTINUED | OUTPATIENT
Start: 2024-10-29 | End: 2024-10-29 | Stop reason: HOSPADM

## 2024-10-29 RX ADMIN — PACLITAXEL 132 MG: 6 INJECTION, SOLUTION INTRAVENOUS at 10:28

## 2024-10-29 RX ADMIN — HEPARIN 5 ML: 100 SYRINGE at 11:33

## 2024-10-29 RX ADMIN — ONDANSETRON 8 MG: 2 INJECTION INTRAMUSCULAR; INTRAVENOUS at 09:54

## 2024-10-29 NOTE — PROGRESS NOTES
SUBJECTIVE:  Met with patient in the infusion bay at the request of the nurses. Pt has an area on her right ankle that is red, and swollen.     OBJECTIVE:  Spoke with patient and her daughter. Daughter states that the patient hit her foot on something and since then it was swollen, and red. The skin does appear to have a healing appearance though is still red, and swollen. She is using a band aid to cover the area. Mild pain is improving, though it was difficult to walk a few days ago because of it.     ASSESSMENT/PLAN:  #. Cellulitis of the right anterior ankle  No fevers, no other site of infection. WBC is 2.8 today with an anc of 1.4. Recommend a short course of antibiotics to ensure the area will heal up. OK to proceed with treatment today.   Start Keflex, 500 mg QID X 5 days.   Keep the area covered.    Monitor for fever and notify this clinic with any questions or concerns or with worsening of symptoms.       Brianna Mancilla, APRN CNP on 10/29/2024 at 9:44 AM

## 2024-10-29 NOTE — PROGRESS NOTES
Infusion Nursing Note:  Kerry Mills presents today for D1C10 paclitaxel.    Patient seen by provider today: Yes: Brianna Mancilla NP saw patient chairside upon RN request   present during visit today: Not Applicable.    Note: Brianna OH saw patient chairside to evaluate what appears to be a cellulitis from patient scratching her R ant distal LE. Wound dressed with Aquaphor on Telfa pad; supplies given to patient's daughter. Per Brianna, ok to treat today. Patient will need to complete a 5 day course of po antibiotics; pt states understanding.     Reviewed plan of care. Patient premedicated with IVP Zofran. Per Brianna OH, patient given a list of potassium-rich foods for use at home; discussed purpose of K+, reason for replacing.       Intravenous Access:  Labs drawn and Implanted Port accessed by Domonique GONZALEZ    Treatment Conditions:  Lab Results   Component Value Date    HGB 10.1 (L) 10/29/2024    WBC 2.8 (L) 10/29/2024    ANEU 4.7 10/08/2024    ANEUTAUTO 1.4 (L) 10/29/2024     10/29/2024        Lab Results   Component Value Date     10/29/2024    POTASSIUM 3.3 (L) 10/29/2024    MAG 2.0 09/30/2024    CR 0.55 10/29/2024    DONNA 8.9 10/29/2024    BILITOTAL 0.7 10/22/2024    ALBUMIN 3.9 10/22/2024    ALT 29 10/22/2024    AST 45 10/22/2024       Results reviewed, labs MET treatment parameters, ok to proceed with treatment.      Post Infusion Assessment:  Patient tolerated infusion without incident.  Patient observed for several minutes at start of paclitaxel.  Blood return noted pre and post infusion.  Site patent and intact, free from redness, edema or discomfort.  Access discontinued per protocol.       Discharge Plan:   AVS to patient via MYCHART.  Patient will return 11/5 for next appointment.   Patient discharged in stable condition accompanied by: daughter, Joslyn.  Departure Mode: Ambulatory.      Maranda Weston RN

## 2024-11-04 NOTE — PROGRESS NOTES
[facility] Hematology and Oncology Outpatient Progress Note    Patient: Kerry Mills  MRN: [id number]  Date of Service: Nov 5, 2024          Reason for Visit    Chief Complaint   Patient presents with    Oncology Clinic Visit     Invasive ductal carcinoma of breast, female, left       Primary Hematologist/Oncologist: Dr. Varsha Adamson        Assessment/Plan  #.  Breast cancer, triple negative patient receiving neoadjuvant chemotherapy   Patient is here in follow-up.  She is due for her 7th weekly dose of paclitaxel.  She is tolerating treatment well. She used Keflex for a few day for a leg wound which has now healed. She has persistent fatigue and a poor appetite, but is still eating and drinking adequately.    Recommend to proceed with next dose of chemotherapy today and weekly.   Follow-up in 2 week with Dr. Adamson for review of labs and for assessment and close monitoring during chemotherapy.     #.  Neutropenia  Her neutrophil count is decreasing but is still more than 1000 and I will continue treatment as long as the ANC is greater than 1000 and if it falls below thousand we will consider adding Neupogen once a week. ANC is 1.8 today.     #.  Neuropathy   Patient complains of numbness on the thumbs.  It is not affecting any of her fine motor movements or ability to do things.  Will continue to follow the symptoms if they get worse we will consider decreasing the dose of paclitaxel if needed.     #.  Itching, face rash  Pt has a face cream she uses occasionally, otherwise no new lotions soaps or detergents. Face is red, not itchy. She also has itching to her hands and wrists, where the skin is very dry.   OTC hydrocortisone cream for the face, avoid the eyes.  Use thick cream or lotion to hands  Push oral hydration  ______________________________________________________________________________    History of Present Illness/ Interval History    Ms. Kerry Mills  is a 59 year old patient who is seen today in  "follow up. She is doing well. She notices a bit more fatigue and she has less energy. She is eating less, but still eating several small meals per day with rice, meat and veggies. Denies any nausea, diarrhea, or fevers. No new pain. Had itchy hands and arms, that are dry and without rash. Her face is reddened today, and it feels hot. No new soaps, detergents or lotions. She uses a face cream sometimes. She still has occasional numbness to bilateral thumbs, not interfering with fine motor skills. Potassium has been low, trying to eat bananas every day. Her sore on her left ankle is healing, and has a scab now.     ECOG performance status   0- Fully active, without restriction        Pain  Pain Score: No Pain (0)    ROS  A 14 point review of systems was obtained.  Positive findings noted in the history.  The remainder of the review of system is otherwise negative.      Oncology History/Treatment  Oncologic history  1) invasive ductal carcinoma of the breast ER negative IL negative HER2/kobe 2+, negative by FISH.  3.9 cm in maximum dimension on MRI.  Diagnosed July 2024    Treatment  1) neoadjuvant AC started on 7/22/2024.  CarboTaxol and Pembro denied by insurance and   2)Plan to give AC followed by paclitaxel before referring patient for surgery      Physical Exam    /76   Pulse 100   Resp 16   Ht 1.499 m (4' 11\")   Wt 62.6 kg (138 lb)   SpO2 99%   BMI 27.87 kg/m      GENERAL: no acute distress. Cooperative in conversation.   HEENT: pupils are equal, round and reactive. Oral mucosa is moist and intact.  RESP:Chest symmetric. Regular respiratory rate. No stridor.  ABD: Nondistended, soft.  EXTREMITIES: No lower extremity edema.   NEURO: non focal. Alert and oriented x3.   PSYCH: within normal limits. No depression or anxiety.  SKIN: warm dry intact. Face is reddened, and warm. Hands and wrists are dry.     Lab Results    Recent Results (from the past week)   Basic metabolic panel   Result Value Ref Range    " Sodium 139 135 - 145 mmol/L    Potassium 3.1 (L) 3.4 - 5.3 mmol/L    Chloride 103 98 - 107 mmol/L    Carbon Dioxide (CO2) 25 22 - 29 mmol/L    Anion Gap 11 7 - 15 mmol/L    Urea Nitrogen 2.1 (L) 6.0 - 20.0 mg/dL    Creatinine 0.49 (L) 0.51 - 0.95 mg/dL    GFR Estimate >90 >60 mL/min/1.73m2    Calcium 8.8 8.8 - 10.4 mg/dL    Glucose 156 (H) 70 - 99 mg/dL   CBC with platelets and differential   Result Value Ref Range    WBC Count 3.4 (L) 4.0 - 11.0 10e3/uL    RBC Count 2.95 (L) 3.80 - 5.20 10e6/uL    Hemoglobin 9.5 (L) 11.7 - 15.7 g/dL    Hematocrit 28.2 (L) 35.0 - 47.0 %    MCV 96 78 - 100 fL    MCH 32.2 26.5 - 33.0 pg    MCHC 33.7 31.5 - 36.5 g/dL    RDW 15.8 (H) 10.0 - 15.0 %    Platelet Count 260 150 - 450 10e3/uL    % Neutrophils 52 %    % Lymphocytes 31 %    % Monocytes 10 %    % Eosinophils 3 %    % Basophils 1 %    % Immature Granulocytes 2 %    NRBCs per 100 WBC 2 (H) <1 /100    Absolute Neutrophils 1.8 1.6 - 8.3 10e3/uL    Absolute Lymphocytes 1.0 0.8 - 5.3 10e3/uL    Absolute Monocytes 0.4 0.0 - 1.3 10e3/uL    Absolute Eosinophils 0.1 0.0 - 0.7 10e3/uL    Absolute Basophils 0.0 0.0 - 0.2 10e3/uL    Absolute Immature Granulocytes 0.1 <=0.4 10e3/uL    Absolute NRBCs 0.1 10e3/uL     I reviewed the above labs today.  Imaging    No results found.      Billing  Total time 55 minutes, to include face to face visit, review of EMR, ordering, documentation and coordination of care on date of service. The longitudinal plan of care for the diagnosis(es)/condition(s) as documented were addressed during this visit. Due to the added complexity in care, I will continue to support Kerry in the subsequent management and with ongoing continuity of care.    Signed by: NEW Yost CNP        Chart documentation with Dragon Voice recognition Software. Although reviewed after completion, some words and grammatical errors may remain.

## 2024-11-05 ENCOUNTER — INFUSION THERAPY VISIT (OUTPATIENT)
Dept: INFUSION THERAPY | Facility: HOSPITAL | Age: 59
End: 2024-11-05
Attending: INTERNAL MEDICINE
Payer: COMMERCIAL

## 2024-11-05 ENCOUNTER — ONCOLOGY VISIT (OUTPATIENT)
Dept: ONCOLOGY | Facility: HOSPITAL | Age: 59
End: 2024-11-05
Attending: INTERNAL MEDICINE
Payer: COMMERCIAL

## 2024-11-05 VITALS
BODY MASS INDEX: 27.82 KG/M2 | SYSTOLIC BLOOD PRESSURE: 119 MMHG | WEIGHT: 138 LBS | RESPIRATION RATE: 16 BRPM | DIASTOLIC BLOOD PRESSURE: 76 MMHG | HEIGHT: 59 IN | HEART RATE: 100 BPM | OXYGEN SATURATION: 99 %

## 2024-11-05 VITALS — TEMPERATURE: 98.3 F

## 2024-11-05 DIAGNOSIS — C50.912 INVASIVE DUCTAL CARCINOMA OF BREAST, FEMALE, LEFT (H): Primary | ICD-10-CM

## 2024-11-05 DIAGNOSIS — C50.912 INVASIVE DUCTAL CARCINOMA OF BREAST, FEMALE, LEFT (H): ICD-10-CM

## 2024-11-05 DIAGNOSIS — E87.6 HYPOKALEMIA: ICD-10-CM

## 2024-11-05 DIAGNOSIS — T45.1X5A CHEMOTHERAPY-INDUCED FATIGUE: ICD-10-CM

## 2024-11-05 DIAGNOSIS — R53.83 CHEMOTHERAPY-INDUCED FATIGUE: ICD-10-CM

## 2024-11-05 LAB
ANION GAP SERPL CALCULATED.3IONS-SCNC: 11 MMOL/L (ref 7–15)
BASOPHILS # BLD AUTO: 0 10E3/UL (ref 0–0.2)
BASOPHILS NFR BLD AUTO: 1 %
BUN SERPL-MCNC: 2.1 MG/DL (ref 6–20)
CALCIUM SERPL-MCNC: 8.8 MG/DL (ref 8.8–10.4)
CHLORIDE SERPL-SCNC: 103 MMOL/L (ref 98–107)
CREAT SERPL-MCNC: 0.49 MG/DL (ref 0.51–0.95)
EGFRCR SERPLBLD CKD-EPI 2021: >90 ML/MIN/1.73M2
EOSINOPHIL # BLD AUTO: 0.1 10E3/UL (ref 0–0.7)
EOSINOPHIL NFR BLD AUTO: 3 %
ERYTHROCYTE [DISTWIDTH] IN BLOOD BY AUTOMATED COUNT: 15.8 % (ref 10–15)
GLUCOSE SERPL-MCNC: 156 MG/DL (ref 70–99)
HCO3 SERPL-SCNC: 25 MMOL/L (ref 22–29)
HCT VFR BLD AUTO: 28.2 % (ref 35–47)
HGB BLD-MCNC: 9.5 G/DL (ref 11.7–15.7)
IMM GRANULOCYTES # BLD: 0.1 10E3/UL
IMM GRANULOCYTES NFR BLD: 2 %
LYMPHOCYTES # BLD AUTO: 1 10E3/UL (ref 0.8–5.3)
LYMPHOCYTES NFR BLD AUTO: 31 %
MCH RBC QN AUTO: 32.2 PG (ref 26.5–33)
MCHC RBC AUTO-ENTMCNC: 33.7 G/DL (ref 31.5–36.5)
MCV RBC AUTO: 96 FL (ref 78–100)
MONOCYTES # BLD AUTO: 0.4 10E3/UL (ref 0–1.3)
MONOCYTES NFR BLD AUTO: 10 %
NEUTROPHILS # BLD AUTO: 1.8 10E3/UL (ref 1.6–8.3)
NEUTROPHILS NFR BLD AUTO: 52 %
NRBC # BLD AUTO: 0.1 10E3/UL
NRBC BLD AUTO-RTO: 2 /100
PLATELET # BLD AUTO: 260 10E3/UL (ref 150–450)
POTASSIUM SERPL-SCNC: 3.1 MMOL/L (ref 3.4–5.3)
RBC # BLD AUTO: 2.95 10E6/UL (ref 3.8–5.2)
SODIUM SERPL-SCNC: 139 MMOL/L (ref 135–145)
WBC # BLD AUTO: 3.4 10E3/UL (ref 4–11)

## 2024-11-05 PROCEDURE — 258N000003 HC RX IP 258 OP 636

## 2024-11-05 PROCEDURE — 99417 PROLNG OP E/M EACH 15 MIN: CPT

## 2024-11-05 PROCEDURE — 36591 DRAW BLOOD OFF VENOUS DEVICE: CPT

## 2024-11-05 PROCEDURE — 96375 TX/PRO/DX INJ NEW DRUG ADDON: CPT

## 2024-11-05 PROCEDURE — 250N000011 HC RX IP 250 OP 636

## 2024-11-05 PROCEDURE — G2211 COMPLEX E/M VISIT ADD ON: HCPCS

## 2024-11-05 PROCEDURE — G0463 HOSPITAL OUTPT CLINIC VISIT: HCPCS

## 2024-11-05 PROCEDURE — 85025 COMPLETE CBC W/AUTO DIFF WBC: CPT

## 2024-11-05 PROCEDURE — 99215 OFFICE O/P EST HI 40 MIN: CPT

## 2024-11-05 PROCEDURE — 80048 BASIC METABOLIC PNL TOTAL CA: CPT

## 2024-11-05 PROCEDURE — 96413 CHEMO IV INFUSION 1 HR: CPT

## 2024-11-05 PROCEDURE — 250N000013 HC RX MED GY IP 250 OP 250 PS 637: Performed by: INTERNAL MEDICINE

## 2024-11-05 RX ORDER — ONDANSETRON 2 MG/ML
8 INJECTION INTRAMUSCULAR; INTRAVENOUS ONCE
Status: CANCELLED | OUTPATIENT
Start: 2024-11-05

## 2024-11-05 RX ORDER — POTASSIUM CHLORIDE 1500 MG/1
40 TABLET, EXTENDED RELEASE ORAL ONCE
Status: DISCONTINUED | OUTPATIENT
Start: 2024-11-05 | End: 2024-11-05

## 2024-11-05 RX ORDER — DIPHENHYDRAMINE HYDROCHLORIDE 50 MG/ML
50 INJECTION INTRAMUSCULAR; INTRAVENOUS
Status: DISCONTINUED | OUTPATIENT
Start: 2024-11-05 | End: 2024-11-05 | Stop reason: HOSPADM

## 2024-11-05 RX ORDER — ONDANSETRON 2 MG/ML
8 INJECTION INTRAMUSCULAR; INTRAVENOUS ONCE
Status: COMPLETED | OUTPATIENT
Start: 2024-11-05 | End: 2024-11-05

## 2024-11-05 RX ORDER — EPINEPHRINE 1 MG/ML
0.3 INJECTION, SOLUTION INTRAMUSCULAR; SUBCUTANEOUS EVERY 5 MIN PRN
Status: CANCELLED | OUTPATIENT
Start: 2024-11-05

## 2024-11-05 RX ORDER — LORAZEPAM 2 MG/ML
0.5 INJECTION INTRAMUSCULAR EVERY 4 HOURS PRN
Status: CANCELLED | OUTPATIENT
Start: 2024-11-05

## 2024-11-05 RX ORDER — METHYLPREDNISOLONE SODIUM SUCCINATE 40 MG/ML
40 INJECTION INTRAMUSCULAR; INTRAVENOUS
Status: CANCELLED
Start: 2024-11-05

## 2024-11-05 RX ORDER — METHYLPREDNISOLONE SODIUM SUCCINATE 40 MG/ML
40 INJECTION INTRAMUSCULAR; INTRAVENOUS
Status: DISCONTINUED | OUTPATIENT
Start: 2024-11-05 | End: 2024-11-05 | Stop reason: HOSPADM

## 2024-11-05 RX ORDER — ALBUTEROL SULFATE 0.83 MG/ML
2.5 SOLUTION RESPIRATORY (INHALATION)
Status: DISCONTINUED | OUTPATIENT
Start: 2024-11-05 | End: 2024-11-05 | Stop reason: HOSPADM

## 2024-11-05 RX ORDER — HEPARIN SODIUM (PORCINE) LOCK FLUSH IV SOLN 100 UNIT/ML 100 UNIT/ML
5 SOLUTION INTRAVENOUS
Status: DISCONTINUED | OUTPATIENT
Start: 2024-11-05 | End: 2024-11-05 | Stop reason: HOSPADM

## 2024-11-05 RX ORDER — DIPHENHYDRAMINE HYDROCHLORIDE 50 MG/ML
25 INJECTION INTRAMUSCULAR; INTRAVENOUS
Status: CANCELLED
Start: 2024-11-05

## 2024-11-05 RX ORDER — POTASSIUM CHLORIDE 1500 MG/1
40 TABLET, EXTENDED RELEASE ORAL ONCE
Status: COMPLETED | OUTPATIENT
Start: 2024-11-05 | End: 2024-11-05

## 2024-11-05 RX ORDER — ALBUTEROL SULFATE 0.83 MG/ML
2.5 SOLUTION RESPIRATORY (INHALATION)
Status: CANCELLED | OUTPATIENT
Start: 2024-11-05

## 2024-11-05 RX ORDER — HEPARIN SODIUM,PORCINE 10 UNIT/ML
5-20 VIAL (ML) INTRAVENOUS DAILY PRN
Status: CANCELLED | OUTPATIENT
Start: 2024-11-05

## 2024-11-05 RX ORDER — HEPARIN SODIUM (PORCINE) LOCK FLUSH IV SOLN 100 UNIT/ML 100 UNIT/ML
5 SOLUTION INTRAVENOUS
Status: CANCELLED | OUTPATIENT
Start: 2024-11-05

## 2024-11-05 RX ORDER — DIPHENHYDRAMINE HCL 50 MG
50 CAPSULE ORAL
Status: CANCELLED
Start: 2024-11-05

## 2024-11-05 RX ORDER — ALBUTEROL SULFATE 90 UG/1
1-2 INHALANT RESPIRATORY (INHALATION)
Status: DISCONTINUED | OUTPATIENT
Start: 2024-11-05 | End: 2024-11-05 | Stop reason: HOSPADM

## 2024-11-05 RX ORDER — MEPERIDINE HYDROCHLORIDE 50 MG/ML
25 INJECTION INTRAMUSCULAR; INTRAVENOUS; SUBCUTANEOUS
Status: CANCELLED | OUTPATIENT
Start: 2024-11-05

## 2024-11-05 RX ORDER — EPINEPHRINE 1 MG/ML
0.3 INJECTION, SOLUTION INTRAMUSCULAR; SUBCUTANEOUS EVERY 5 MIN PRN
Status: DISCONTINUED | OUTPATIENT
Start: 2024-11-05 | End: 2024-11-05 | Stop reason: HOSPADM

## 2024-11-05 RX ORDER — ALBUTEROL SULFATE 90 UG/1
1-2 INHALANT RESPIRATORY (INHALATION)
Status: CANCELLED
Start: 2024-11-05

## 2024-11-05 RX ORDER — MEPERIDINE HYDROCHLORIDE 50 MG/ML
25 INJECTION INTRAMUSCULAR; INTRAVENOUS; SUBCUTANEOUS
Status: DISCONTINUED | OUTPATIENT
Start: 2024-11-05 | End: 2024-11-05 | Stop reason: HOSPADM

## 2024-11-05 RX ORDER — DIPHENHYDRAMINE HYDROCHLORIDE 50 MG/ML
50 INJECTION INTRAMUSCULAR; INTRAVENOUS
Status: CANCELLED
Start: 2024-11-05

## 2024-11-05 RX ORDER — DIPHENHYDRAMINE HYDROCHLORIDE 50 MG/ML
25 INJECTION INTRAMUSCULAR; INTRAVENOUS
Status: DISCONTINUED | OUTPATIENT
Start: 2024-11-05 | End: 2024-11-05 | Stop reason: HOSPADM

## 2024-11-05 RX ADMIN — PACLITAXEL 134 MG: 6 INJECTION, SOLUTION INTRAVENOUS at 11:48

## 2024-11-05 RX ADMIN — ONDANSETRON 8 MG: 2 INJECTION, SOLUTION INTRAMUSCULAR; INTRAVENOUS at 11:14

## 2024-11-05 RX ADMIN — POTASSIUM CHLORIDE 40 MEQ: 1500 TABLET, EXTENDED RELEASE ORAL at 11:12

## 2024-11-05 RX ADMIN — HEPARIN 5 ML: 100 SYRINGE at 12:50

## 2024-11-05 ASSESSMENT — PAIN SCALES - GENERAL: PAINLEVEL_OUTOF10: NO PAIN (0)

## 2024-11-05 NOTE — PROGRESS NOTES
"Oncology Rooming Note    November 5, 2024 9:37 AM   Kerry Mills is a 59 year old female who presents for:    Chief Complaint   Patient presents with    Oncology Clinic Visit     Invasive ductal carcinoma of breast, female, left     Initial Vitals: /76   Pulse 100   Resp 16   Ht 1.499 m (4' 11\")   Wt 62.6 kg (138 lb)   SpO2 99%   BMI 27.87 kg/m   Estimated body mass index is 27.87 kg/m  as calculated from the following:    Height as of this encounter: 1.499 m (4' 11\").    Weight as of this encounter: 62.6 kg (138 lb). Body surface area is 1.61 meters squared.  No Pain (0) Comment: Data Unavailable   No LMP recorded. Patient is postmenopausal.  Allergies reviewed: Yes  Medications reviewed: Yes    Medications: Medication refills not needed today.  Pharmacy name entered into [facility]:    [facility] - [place], [place] - [address] [place] [address] DRUG STORE #[id number] AT [facility] OF [address]    Frailty Screening:   Is the patient here for a new oncology consult visit in cancer care? 2. No      Clinical concerns:   Insomnia. Pt states she feels fatigue throughout day and require 1 hr nap. Frequent night wake ups, out of bed by 6am and states very fatigue 1-3 hrs after waking up.       Pebbles Dumont MA              "

## 2024-11-05 NOTE — PROGRESS NOTES
Infusion Nursing Note:  Kerry Mills presents today for Cycle 11, Day 1 of her chemotherapy plan.    Patient seen by provider today: Yes: HIRAM Reed.   present during visit today: Patient refused  services and a waiver form has been signed. Daughter present throughout appointment.    Note: Kerry arrived ambulatory accompanied by her daughter for treatment following her office visit with HIRAM Reed. Plan of care reviewed and they have no questions.  Zofran administered 30 minutes prior to treatment. Taxol infused as ordered.    Intravenous Access:  Implanted Port accessed and labs drawn by MASHA Rawls. Excellent blood return noted pre and post infusion.    Treatment Conditions:  Lab Results   Component Value Date    HGB 9.5 (L) 11/05/2024    WBC 3.4 (L) 11/05/2024    ANEU 4.7 10/08/2024    ANEUTAUTO 1.8 11/05/2024     11/05/2024        Lab Results   Component Value Date     11/05/2024    POTASSIUM 3.1 (L) 11/05/2024    MAG 2.0 09/30/2024    CR 0.49 (L) 11/05/2024    DONNA 8.8 11/05/2024    BILITOTAL 0.7 10/22/2024    ALBUMIN 3.9 10/22/2024    ALT 29 10/22/2024    AST 45 10/22/2024       Results reviewed, labs MET treatment parameters, ok to proceed with treatment.  Potassium 3.1. 40 mEq Klor-Con administered as ordered by HIRAM Reed.    Post Infusion Assessment:  Patient tolerated infusion without incident.  Blood return noted pre and post infusion.  Site patent and intact, free from redness, edema or discomfort.  No evidence of extravasations.  Access discontinued per protocol.     Discharge Plan:   Patient will return Nov 12th for next appointment.   Patient discharged in stable condition accompanied by: daughter.  Departure Mode: Ambulatory.      Silvana Lombardo RN

## 2024-11-05 NOTE — LETTER
11/5/2024      Kerry Mills  [address] [place] [address]      Dear Colleague,    Thank you for referring your patient, Kerry Mills, to the [facility] [place]. Please see a copy of my visit note below.    [facility] Outpatient Progress Note    Patient: Krery Mills  MRN: [id number]  Date of Service: Nov 5, 2024          Reason for Visit    Chief Complaint   Patient presents with     Oncology Clinic Visit     Invasive ductal carcinoma of breast, female, left       Primary Hematologist/Oncologist: Dr. Varsha Adamson        Assessment/Plan  #.  Breast cancer, triple negative patient receiving neoadjuvant chemotherapy   Patient is here in follow-up.  She is due for her 7th weekly dose of paclitaxel.  She is tolerating treatment well. She used Keflex for a few day for a leg wound which has now healed. She has persistent fatigue and a poor appetite, but is still eating and drinking adequately.    Recommend to proceed with next dose of chemotherapy today and weekly.   Follow-up in 2 week with Dr. Adamson for review of labs and for assessment and close monitoring during chemotherapy.     #.  Neutropenia  Her neutrophil count is decreasing but is still more than 1000 and I will continue treatment as long as the ANC is greater than 1000 and if it falls below thousand we will consider adding Neupogen once a week. ANC is 1.8 today.     #.  Neuropathy   Patient complains of numbness on the thumbs.  It is not affecting any of her fine motor movements or ability to do things.  Will continue to follow the symptoms if they get worse we will consider decreasing the dose of paclitaxel if needed.     #.  Itching, face rash  Pt has a face cream she uses occasionally, otherwise no new lotions soaps or detergents. Face is red, not itchy. She also has itching to her hands and wrists, where the skin is very dry.   OTC hydrocortisone cream for the face, avoid the eyes.  Use thick cream or  "lotion to hands  Push oral hydration  ______________________________________________________________________________    History of Present Illness/ Interval History    Ms. Kerry Mills  is a 59 year old patient who is seen today in follow up. She is doing well. She notices a bit more fatigue and she has less energy. She is eating less, but still eating several small meals per day with rice, meat and veggies. Denies any nausea, diarrhea, or fevers. No new pain. Had itchy hands and arms, that are dry and without rash. Her face is reddened today, and it feels hot. No new soaps, detergents or lotions. She uses a face cream sometimes. She still has occasional numbness to bilateral thumbs, not interfering with fine motor skills. Potassium has been low, trying to eat bananas every day. Her sore on her left ankle is healing, and has a scab now.     ECOG performance status   0- Fully active, without restriction        Pain  Pain Score: No Pain (0)    ROS  A 14 point review of systems was obtained.  Positive findings noted in the history.  The remainder of the review of system is otherwise negative.      Oncology History/Treatment  Oncologic history  1) invasive ductal carcinoma of the breast ER negative IL negative HER2/kobe 2+, negative by FISH.  3.9 cm in maximum dimension on MRI.  Diagnosed July 2024    Treatment  1) neoadjuvant AC started on 7/22/2024.  CarboTaxol and Pembro denied by insurance and   2)Plan to give AC followed by paclitaxel before referring patient for surgery      Physical Exam    /76   Pulse 100   Resp 16   Ht 1.499 m (4' 11\")   Wt 62.6 kg (138 lb)   SpO2 99%   BMI 27.87 kg/m      GENERAL: no acute distress. Cooperative in conversation.   HEENT: pupils are equal, round and reactive. Oral mucosa is moist and intact.  RESP:Chest symmetric. Regular respiratory rate. No stridor.  ABD: Nondistended, soft.  EXTREMITIES: No lower extremity edema.   NEURO: non focal. Alert and oriented x3.   PSYCH: " within normal limits. No depression or anxiety.  SKIN: warm dry intact. Face is reddened, and warm. Hands and wrists are dry.     Lab Results    Recent Results (from the past week)   Basic metabolic panel   Result Value Ref Range    Sodium 139 135 - 145 mmol/L    Potassium 3.1 (L) 3.4 - 5.3 mmol/L    Chloride 103 98 - 107 mmol/L    Carbon Dioxide (CO2) 25 22 - 29 mmol/L    Anion Gap 11 7 - 15 mmol/L    Urea Nitrogen 2.1 (L) 6.0 - 20.0 mg/dL    Creatinine 0.49 (L) 0.51 - 0.95 mg/dL    GFR Estimate >90 >60 mL/min/1.73m2    Calcium 8.8 8.8 - 10.4 mg/dL    Glucose 156 (H) 70 - 99 mg/dL   CBC with platelets and differential   Result Value Ref Range    WBC Count 3.4 (L) 4.0 - 11.0 10e3/uL    RBC Count 2.95 (L) 3.80 - 5.20 10e6/uL    Hemoglobin 9.5 (L) 11.7 - 15.7 g/dL    Hematocrit 28.2 (L) 35.0 - 47.0 %    MCV 96 78 - 100 fL    MCH 32.2 26.5 - 33.0 pg    MCHC 33.7 31.5 - 36.5 g/dL    RDW 15.8 (H) 10.0 - 15.0 %    Platelet Count 260 150 - 450 10e3/uL    % Neutrophils 52 %    % Lymphocytes 31 %    % Monocytes 10 %    % Eosinophils 3 %    % Basophils 1 %    % Immature Granulocytes 2 %    NRBCs per 100 WBC 2 (H) <1 /100    Absolute Neutrophils 1.8 1.6 - 8.3 10e3/uL    Absolute Lymphocytes 1.0 0.8 - 5.3 10e3/uL    Absolute Monocytes 0.4 0.0 - 1.3 10e3/uL    Absolute Eosinophils 0.1 0.0 - 0.7 10e3/uL    Absolute Basophils 0.0 0.0 - 0.2 10e3/uL    Absolute Immature Granulocytes 0.1 <=0.4 10e3/uL    Absolute NRBCs 0.1 10e3/uL     I reviewed the above labs today.  Imaging    No results found.      Billing  Total time 55 minutes, to include face to face visit, review of EMR, ordering, documentation and coordination of care on date of service. The longitudinal plan of care for the diagnosis(es)/condition(s) as documented were addressed during this visit. Due to the added complexity in care, I will continue to support Kerry in the subsequent management and with ongoing continuity of care.    Signed by: NEW Yost  "CNP        Chart documentation with Dragon Voice recognition Software. Although reviewed after completion, some words and grammatical errors may remain.    Oncology Rooming Note    November 5, 2024 9:37 AM   Kerry Mills is a 59 year old female who presents for:    Chief Complaint   Patient presents with     Oncology Clinic Visit     Invasive ductal carcinoma of breast, female, left     Initial Vitals: /76   Pulse 100   Resp 16   Ht 1.499 m (4' 11\")   Wt 62.6 kg (138 lb)   SpO2 99%   BMI 27.87 kg/m   Estimated body mass index is 27.87 kg/m  as calculated from the following:    Height as of this encounter: 1.499 m (4' 11\").    Weight as of this encounter: 62.6 kg (138 lb). Body surface area is 1.61 meters squared.  No Pain (0) Comment: Data Unavailable   No LMP recorded. Patient is postmenopausal.  Allergies reviewed: Yes  Medications reviewed: Yes    Medications: Medication refills not needed today.  Pharmacy name entered into [employer]:    [facility] - [place], [place] - [address] [place] [address] DRUG STORE #[id number] [address] AT [facility] [address]    Frailty Screening:   Is the patient here for a new oncology consult visit in cancer care? 2. No      Clinical concerns:   Insomnia. Pt states she feels fatigue throughout day and require 1 hr nap. Frequent night wake ups, out of bed by 6am and states very fatigue 1-3 hrs after waking up.       Pebbles Dumont MA                Again, thank you for allowing me to participate in the care of your patient.        Sincerely,        NEW Yost CNP  "

## 2024-11-06 ENCOUNTER — APPOINTMENT (OUTPATIENT)
Dept: RADIOLOGY | Facility: CLINIC | Age: 59
End: 2024-11-06
Attending: EMERGENCY MEDICINE
Payer: COMMERCIAL

## 2024-11-06 ENCOUNTER — HOSPITAL ENCOUNTER (EMERGENCY)
Facility: CLINIC | Age: 59
Discharge: HOME | End: 2024-11-06
Attending: EMERGENCY MEDICINE | Admitting: EMERGENCY MEDICINE
Payer: COMMERCIAL

## 2024-11-06 ENCOUNTER — TELEPHONE (OUTPATIENT)
Dept: ONCOLOGY | Facility: HOSPITAL | Age: 59
End: 2024-11-06
Payer: COMMERCIAL

## 2024-11-06 VITALS
SYSTOLIC BLOOD PRESSURE: 110 MMHG | OXYGEN SATURATION: 96 % | WEIGHT: 138 LBS | RESPIRATION RATE: 20 BRPM | TEMPERATURE: 98.9 F | DIASTOLIC BLOOD PRESSURE: 70 MMHG | BODY MASS INDEX: 27.87 KG/M2 | HEART RATE: 99 BPM

## 2024-11-06 DIAGNOSIS — T45.1X5A SKIN CHANGES RELATED TO CHEMOTHERAPY: ICD-10-CM

## 2024-11-06 DIAGNOSIS — R50.81 FEVER IN OTHER DISEASES: ICD-10-CM

## 2024-11-06 DIAGNOSIS — R23.9 SKIN CHANGES RELATED TO CHEMOTHERAPY: ICD-10-CM

## 2024-11-06 DIAGNOSIS — R73.9 ELEVATED BLOOD SUGAR: ICD-10-CM

## 2024-11-06 LAB
ALBUMIN SERPL BCG-MCNC: 3.9 G/DL (ref 3.5–5.2)
ALBUMIN UR-MCNC: NEGATIVE MG/DL
ALP SERPL-CCNC: 103 U/L (ref 40–150)
ALT SERPL W P-5'-P-CCNC: 24 U/L (ref 0–50)
ANION GAP SERPL CALCULATED.3IONS-SCNC: 13 MMOL/L (ref 7–15)
APPEARANCE UR: ABNORMAL
AST SERPL W P-5'-P-CCNC: 45 U/L (ref 0–45)
ATRIAL RATE - MUSE: 107 BPM
BACTERIA #/AREA URNS HPF: ABNORMAL /HPF
BASOPHILS # BLD AUTO: 0 10E3/UL (ref 0–0.2)
BASOPHILS NFR BLD AUTO: 0 %
BILIRUB SERPL-MCNC: 1.3 MG/DL
BILIRUB UR QL STRIP: NEGATIVE
BUN SERPL-MCNC: 5.4 MG/DL (ref 6–20)
CALCIUM SERPL-MCNC: 8.1 MG/DL (ref 8.8–10.4)
CHLORIDE SERPL-SCNC: 101 MMOL/L (ref 98–107)
COLOR UR AUTO: YELLOW
CREAT SERPL-MCNC: 0.56 MG/DL (ref 0.51–0.95)
DIASTOLIC BLOOD PRESSURE - MUSE: NORMAL MMHG
EGFRCR SERPLBLD CKD-EPI 2021: >90 ML/MIN/1.73M2
EOSINOPHIL # BLD AUTO: 0 10E3/UL (ref 0–0.7)
EOSINOPHIL NFR BLD AUTO: 1 %
ERYTHROCYTE [DISTWIDTH] IN BLOOD BY AUTOMATED COUNT: 16 % (ref 10–15)
EST. AVERAGE GLUCOSE BLD GHB EST-MCNC: 123 MG/DL
FLUAV RNA SPEC QL NAA+PROBE: NEGATIVE
FLUBV RNA RESP QL NAA+PROBE: NEGATIVE
GLUCOSE SERPL-MCNC: 230 MG/DL (ref 70–99)
GLUCOSE UR STRIP-MCNC: NEGATIVE MG/DL
HBA1C MFR BLD: 5.9 %
HCO3 SERPL-SCNC: 22 MMOL/L (ref 22–29)
HCT VFR BLD AUTO: 28.9 % (ref 35–47)
HGB BLD-MCNC: 9.7 G/DL (ref 11.7–15.7)
HGB UR QL STRIP: NEGATIVE
IMM GRANULOCYTES # BLD: 0.1 10E3/UL
IMM GRANULOCYTES NFR BLD: 2 %
INTERPRETATION ECG - MUSE: NORMAL
KETONES UR STRIP-MCNC: NEGATIVE MG/DL
LACTATE SERPL-SCNC: 1.2 MMOL/L (ref 0.7–2)
LACTATE SERPL-SCNC: 2.2 MMOL/L (ref 0.7–2)
LEUKOCYTE ESTERASE UR QL STRIP: NEGATIVE
LIPASE SERPL-CCNC: 23 U/L (ref 13–60)
LYMPHOCYTES # BLD AUTO: 0.6 10E3/UL (ref 0.8–5.3)
LYMPHOCYTES NFR BLD AUTO: 15 %
MCH RBC QN AUTO: 32.4 PG (ref 26.5–33)
MCHC RBC AUTO-ENTMCNC: 33.6 G/DL (ref 31.5–36.5)
MCV RBC AUTO: 97 FL (ref 78–100)
MONOCYTES # BLD AUTO: 0.3 10E3/UL (ref 0–1.3)
MONOCYTES NFR BLD AUTO: 6 %
MUCOUS THREADS #/AREA URNS LPF: PRESENT /LPF
NEUTROPHILS # BLD AUTO: 3.1 10E3/UL (ref 1.6–8.3)
NEUTROPHILS NFR BLD AUTO: 76 %
NITRATE UR QL: NEGATIVE
NRBC # BLD AUTO: 0 10E3/UL
NRBC BLD AUTO-RTO: 0 /100
P AXIS - MUSE: 60 DEGREES
PH UR STRIP: 6 [PH] (ref 5–7)
PLATELET # BLD AUTO: 262 10E3/UL (ref 150–450)
POTASSIUM SERPL-SCNC: 3.6 MMOL/L (ref 3.4–5.3)
PR INTERVAL - MUSE: 156 MS
PROCALCITONIN SERPL IA-MCNC: 0.27 NG/ML
PROT SERPL-MCNC: 6.6 G/DL (ref 6.4–8.3)
QRS DURATION - MUSE: 92 MS
QT - MUSE: 374 MS
QTC - MUSE: 499 MS
R AXIS - MUSE: -36 DEGREES
RBC # BLD AUTO: 2.99 10E6/UL (ref 3.8–5.2)
RBC URINE: 1 /HPF
RSV RNA SPEC NAA+PROBE: NEGATIVE
SARS-COV-2 RNA RESP QL NAA+PROBE: NEGATIVE
SODIUM SERPL-SCNC: 136 MMOL/L (ref 135–145)
SP GR UR STRIP: 1.02 (ref 1–1.03)
SQUAMOUS EPITHELIAL: 10 /HPF
SYSTOLIC BLOOD PRESSURE - MUSE: NORMAL MMHG
T AXIS - MUSE: 13 DEGREES
UROBILINOGEN UR STRIP-MCNC: <2 MG/DL
VENTRICULAR RATE- MUSE: 107 BPM
WBC # BLD AUTO: 4.1 10E3/UL (ref 4–11)
WBC URINE: 5 /HPF

## 2024-11-06 PROCEDURE — 96365 THER/PROPH/DIAG IV INF INIT: CPT

## 2024-11-06 PROCEDURE — 87637 SARSCOV2&INF A&B&RSV AMP PRB: CPT | Performed by: EMERGENCY MEDICINE

## 2024-11-06 PROCEDURE — 250N000011 HC RX IP 250 OP 636: Performed by: EMERGENCY MEDICINE

## 2024-11-06 PROCEDURE — 84145 PROCALCITONIN (PCT): CPT | Performed by: EMERGENCY MEDICINE

## 2024-11-06 PROCEDURE — 36415 COLL VENOUS BLD VENIPUNCTURE: CPT | Performed by: EMERGENCY MEDICINE

## 2024-11-06 PROCEDURE — 81001 URINALYSIS AUTO W/SCOPE: CPT | Performed by: EMERGENCY MEDICINE

## 2024-11-06 PROCEDURE — 258N000003 HC RX IP 258 OP 636: Performed by: EMERGENCY MEDICINE

## 2024-11-06 PROCEDURE — 99285 EMERGENCY DEPT VISIT HI MDM: CPT | Mod: 25

## 2024-11-06 PROCEDURE — 83036 HEMOGLOBIN GLYCOSYLATED A1C: CPT | Performed by: EMERGENCY MEDICINE

## 2024-11-06 PROCEDURE — 83690 ASSAY OF LIPASE: CPT | Performed by: EMERGENCY MEDICINE

## 2024-11-06 PROCEDURE — 83605 ASSAY OF LACTIC ACID: CPT | Performed by: EMERGENCY MEDICINE

## 2024-11-06 PROCEDURE — 96361 HYDRATE IV INFUSION ADD-ON: CPT

## 2024-11-06 PROCEDURE — 80053 COMPREHEN METABOLIC PANEL: CPT | Performed by: EMERGENCY MEDICINE

## 2024-11-06 PROCEDURE — 87040 BLOOD CULTURE FOR BACTERIA: CPT | Performed by: EMERGENCY MEDICINE

## 2024-11-06 PROCEDURE — 71046 X-RAY EXAM CHEST 2 VIEWS: CPT

## 2024-11-06 PROCEDURE — 85025 COMPLETE CBC W/AUTO DIFF WBC: CPT | Performed by: EMERGENCY MEDICINE

## 2024-11-06 PROCEDURE — 93005 ELECTROCARDIOGRAM TRACING: CPT | Performed by: EMERGENCY MEDICINE

## 2024-11-06 RX ORDER — LEVOFLOXACIN 5 MG/ML
500 INJECTION, SOLUTION INTRAVENOUS ONCE
Status: COMPLETED | OUTPATIENT
Start: 2024-11-06 | End: 2024-11-06

## 2024-11-06 RX ORDER — LEVOFLOXACIN 500 MG/1
500 TABLET, FILM COATED ORAL DAILY
Qty: 7 TABLET | Refills: 0 | Status: SHIPPED | OUTPATIENT
Start: 2024-11-06 | End: 2024-11-13

## 2024-11-06 RX ADMIN — LEVOFLOXACIN 500 MG: 5 INJECTION, SOLUTION INTRAVENOUS at 12:58

## 2024-11-06 RX ADMIN — SODIUM CHLORIDE 500 ML: 9 INJECTION, SOLUTION INTRAVENOUS at 10:09

## 2024-11-06 ASSESSMENT — COLUMBIA-SUICIDE SEVERITY RATING SCALE - C-SSRS
1. IN THE PAST MONTH, HAVE YOU WISHED YOU WERE DEAD OR WISHED YOU COULD GO TO SLEEP AND NOT WAKE UP?: NO
2. HAVE YOU ACTUALLY HAD ANY THOUGHTS OF KILLING YOURSELF IN THE PAST MONTH?: NO
6. HAVE YOU EVER DONE ANYTHING, STARTED TO DO ANYTHING, OR PREPARED TO DO ANYTHING TO END YOUR LIFE?: NO

## 2024-11-06 NOTE — TELEPHONE ENCOUNTER
Patient's daughter in law Ginny calls with concerns regarding a fever patient has. Patient reports having chills during the night but did not check a temperature. Today temperature is running 103-104. Patient denies any upper respiratory symptoms, denies urinary or bowel concerns. Ginny says that otherwise there are no accompanying symptoms with the fever. Patient did have a recent leg wound but this is healing and patient denies any concerns with it.     Reviewed with Brianna Mancilla CNP, she advises that patient be seen in the ED.    Return call placed to Ginny. She is instructed to take patient to the ED. Ginny verbalizes understanding and denies further questions at this time.    Mis Finn RN

## 2024-11-06 NOTE — ED NOTES
Controlled fever at this time. Pt with family aware of plan to go home and follow up with onocologist

## 2024-11-06 NOTE — ED TRIAGE NOTES
Pt reports feeling intermittent chills/fever x 1-2 weeks. Pt is on chemo for breast CA, last chemo session was yesterday. Temp at home was 103, Tylenol at 8am PTA. Temp in triage 99.5. Pt also reports bilat leg weakness and back pain 7/10. A/O in triage. Pt's is diaphoretic and cheeks are flushed. . Dr Painter in triage for eval.      Triage Assessment (Adult)       Row Name 11/06/24 0994          Triage Assessment    Airway WDL WDL        Respiratory WDL    Respiratory WDL WDL        Skin Circulation/Temperature WDL    Skin Circulation/Temperature WDL --  diaphoretic, cheeks are flushed        Cardiac WDL    Cardiac Rhythm ST        Peripheral/Neurovascular WDL    Peripheral Neurovascular WDL WDL        Cognitive/Neuro/Behavioral WDL    Cognitive/Neuro/Behavioral WDL WDL

## 2024-11-06 NOTE — DISCHARGE INSTRUCTIONS
Your next dose of the antibiotic should be tomorrow either with breakfast or lunch.    Return here if the fevers continue past a few days or if you feel worse as needed for repeat evaluation.    Your blood sugar was higher than it had been and this can be in part due to meds with chemotherapy so talk with your oncologist about that when you go in next week as well as talking about the fever.    You were a bit dehydrated so continue working on drinking more fluid at home.

## 2024-11-06 NOTE — ED PROVIDER NOTES
Emergency Department Encounter     Evaluation Date & Time:   No admission date for patient encounter.    CHIEF COMPLAINT:  Fever      Triage Note:  Pt reports feeling intermittent chills/fever x 1-2 weeks. Pt is on chemo for breast CA, last chemo session was yesterday. Temp at home was 103, Tylenol at 8am PTA. Temp in triage 99.5. Pt also reports bilat leg weakness and back pain /10. A/O in triage. Pt's is diaphoretic and cheeks are flushed. . Dr Painter in triage for eval.      Triage Assessment (Adult)       Row Name 24 0948          Triage Assessment    Airway WDL WDL        Respiratory WDL    Respiratory WDL WDL        Skin Circulation/Temperature WDL    Skin Circulation/Temperature WDL --  diaphoretic, cheeks are flushed        Cardiac WDL    Cardiac Rhythm ST        Peripheral/Neurovascular WDL    Peripheral Neurovascular WDL WDL        Cognitive/Neuro/Behavioral WDL    Cognitive/Neuro/Behavioral WDL WDL                         FINAL IMPRESSION:    ICD-10-CM    1. Skin changes related to chemotherapy  R23.9     T45.1X5A       2. Fever in other diseases  R50.81     related to chemotherapy      3. Elevated blood sugar  R73.9           Impression and Plan     ED COURSE & MEDICAL DECISION MAKIN:43 AM I met with the patient and obtained initial history.  ED Course as of 24 1418      Susana Payne has a nonspecific hot/cold chills for a couple of weeks, and this am first measured her temperature and found it quite elevated so came in.  I reviewed her last chemotherapy appointment on 10/22/24 for the provider, but she continues with chemotherapy infusions having received 5 so far.  Fevers could be in response to her chemotherapy, would consider also to the steroid given during infusions but she is a bit out now from the last infusion so I think that is less likely.  Will check labs to see if this is neutropenia related or not.  Otherwise no focal infectious symptoms so will  need broad approach.  Has dry cracked skin but no open wounds.  Breast exam not done in triage room but asked patient if she has any wounds or pain there and she denied any.  Family member is here with her and rn  used for history taking.   1059 Lactic acid elevation may be sepsis or may be due to underlying cancer, dehydration, and chemotherapy.  Procal is not significantly elevated to suggest bacterial infection, but blood cultures sent due to being on chemo with fever.  Wbc adequate though borderline neutropenic.  Viral respiratory screen for influ, rsv, covid is negative.   1100 Cxr image and radiology result reviewed.  Unremarkable for cause of fever or fluid buildup.  Heart size normal.  R hilar prominence with    1104 Last few notes from oncology reviewed and hr 100-110s.  Discussed considering neupogen if here anc continues to fall lower than 1000 and it is today so will call her oncologist for further.  Recently on keflex but  for ankle/skin wound which looks great today.  However, it is her lymphocyte number that is lower, and her neutrophils are actually good   1117 Spoke with Dr. Adamson about her.  He would recommend a course of antibiotics for the fever in a chemotherapy patient, but from that standpoint does not necessarily need to stay in hospital for that as could do oral.  However, we do need to re-evaluate her hr and condition after iv fluids here.  EKG is also still pending.  He requests levofloxacin.   1207 Hr improved after resting here to her baseline.  EKG not concerning for ischemia or arrhythmia today, and low voltage EKG limits somewhat the interpretation but now meets criteria for previous inferior infarct which was borderline before.  No cp or sob today but has been sob for sometime so cannot rule out previous heart attack during chemotehrapy   1414 Message sent to her oncologist about her blood sugar and EKG findings.       At the conclusion of the encounter I discussed the  results of all the tests and the disposition. The questions were answered. The patient or family acknowledged understanding and was agreeable with the care plan.          0 minutes of critical care time        MEDICATIONS GIVEN IN THE EMERGENCY DEPARTMENT:  Medications   sodium chloride (PF) 0.9% PF flush 3 mL (has no administration in time range)   sodium chloride (PF) 0.9% PF flush 3 mL (3 mLs Intracatheter $Given 11/6/24 1009)   sodium chloride 0.9% BOLUS 500 mL (0 mLs Intravenous Stopped 11/6/24 1301)   levofloxacin (LEVAQUIN) infusion 500 mg (0 mg Intravenous Stopped 11/6/24 1417)       NEW PRESCRIPTIONS STARTED AT TODAY'S ED VISIT:  New Prescriptions    No medications on file       HPI     HPI     Kerry Mills is a 59 year old female with a pertinent history of breast cancer, asthma, hypoalbuminemia, SVT who presents to this ED via private car for evaluation of fever.    Patient states she is having chemotherapy treatments for breast cancer. She is getting carbotaxal and pembro.  patient states she has been having 1-2 weeks of chills and had a 103 degree oral temperature taken at home. Patient states she took Tylenol at home. Patient states she has intermittent leg weakness and back pain. Patient states her face feels hot and flushed. Of note, patient states she has decreased appetite, but states this may be due to her chemoradiation treatments.    Patient does not endorse urinary symptoms, cough, throat pain, or any other concerns at this time.  Has chronically dry skin with wounds from scratching on anterior r ankle and her lower back but they have not recently worsened.  Denies any wounds or new pain in breasts.    REVIEW OF SYSTEMS:  Review of Systems  remainder of systems are all otherwise negative.        Medical History     Past Medical History:   Diagnosis Date    Asthma     Chronic constipation        Past Surgical History:   Procedure Laterality Date    INCISION AND DRAINAGE OF WOUND N/A 11/27/2020     Procedure: INCISION AND DRAINAGE, NECK;  Surgeon: Arnel James MD;  Location: [facility];  Service: ENT    INSERT PORT VASCULAR ACCESS Right 2024    Procedure: INSERTION,  RIGHT VASCULAR ACCESS PORT;  Surgeon: Iwona Choe DO;  Location: [facility]    PICC INSERTION - TRIPLE LUMEN  2020         TRACHEOSTOMY N/A 2020    Procedure: EMERGENT INTUBATION IN OR WITH CREATION, TRACHEOSTOMY;  Surgeon: Dennise Justice MD;  Location: [facility];  Service: General       Family History   Problem Relation Age of Onset    Kidney failure Mother         on Dialysis    Hypertension Mother     Gout Mother     No Known Problems Father          age 60 in [place]    Kidney failure Brother     Asthma Son        Social History     Tobacco Use    Smoking status: Never    Smokeless tobacco: Never   Substance Use Topics    Alcohol use: Never    Drug use: Never       acetaminophen (TYLENOL) 325 MG tablet  albuterol (PROAIR HFA;PROVENTIL HFA;VENTOLIN HFA) 90 mcg/actuation inhaler  benzocaine-menthol (CEPACOL) 15-3.6 MG lozenge  budesonide-formoterol (SYMBICORT) 160-4.5 mcg/actuation inhaler  cefpodoxime (VANTIN) 200 MG tablet  guaiFENesin-codeine (ROBITUSSIN AC) 100-10 MG/5ML solution  lidocaine-prilocaine (EMLA) 2.5-2.5 % external cream  ondansetron (ZOFRAN) 8 MG tablet  prochlorperazine (COMPAZINE) 10 MG tablet  traMADol (ULTRAM) 50 MG tablet  vitamin D3 (CHOLECALCIFEROL) 50 mcg (2000 units) tablet        Physical Exam     First Vitals:  Patient Vitals for the past 24 hrs:   BP Temp Temp src Pulse Resp SpO2 Weight   24 1121 106/68 98.9  F (37.2  C) Oral 104 -- 96 % --   24 0942 122/69 99.5  F (37.5  C) Oral (!) 130 20 95 % 62.6 kg (138 lb)       PHYSICAL EXAM:   Constitutional:   sitting up in chair, in nad   HENT:  Normocephalic, posterior pharynx wnl, mucous membranes moist and dark pink   Eyes:  PERRL, EOMI, Conjunctiva normal, No discharge, no scleral  icterus.  Respiratory:  Breathing easily, Clear to auscultation.  Cardiovascular:  Slightly Tachycardic RR nl s1s2 0 murmurs, rubs, or gallops.  Peripheral pulses dp, pt, and radial are wnl.  rrr peripheral edema   GI:  Bowel sounds normal, Soft, No tenderness, No flank tenderness, nondistended.  :No CVA tenderness.   Musculoskeletal:  Moves all extremities.  No erythematous or swollen major joints,   Integument:  Rash on face, flushing and broken vessels, dry skin, dry scabbed over wound on right anterior ankle and lower back with no erythema. No edema   Neurologic:  Alert & oriented x 3, Normal motor function, Normal sensory function, No focal deficits noted. Normal speech.  Psychiatric:  Affect normal, Judgment normal, Mood normal.     Results     LAB AND RADIOLOGY:  All pertinent labs reviewed and interpreted  Results for orders placed or performed during the hospital encounter of 11/06/24   Chest XR,  PA & LAT     Status: None    Narrative    EXAM: XR CHEST 2 VIEWS  LOCATION: [facility]  DATE: 11/6/2024    INDICATION: Nonspecific fever, breast cancer chemotherapy patient.  COMPARISON: 9.3.2024.      Impression    IMPRESSION: Mild streaky bibasilar atelectasis or scarring. Remaining lungs are clear. No pleural effusion. Stable cardiomediastinal silhouette and portacatheter.      Comprehensive metabolic panel     Status: Abnormal   Result Value Ref Range    Sodium 136 135 - 145 mmol/L    Potassium 3.6 3.4 - 5.3 mmol/L    Carbon Dioxide (CO2) 22 22 - 29 mmol/L    Anion Gap 13 7 - 15 mmol/L    Urea Nitrogen 5.4 (L) 6.0 - 20.0 mg/dL    Creatinine 0.56 0.51 - 0.95 mg/dL    GFR Estimate >90 >60 mL/min/1.73m2    Calcium 8.1 (L) 8.8 - 10.4 mg/dL    Chloride 101 98 - 107 mmol/L    Glucose 230 (H) 70 - 99 mg/dL    Alkaline Phosphatase 103 40 - 150 U/L    AST 45 0 - 45 U/L    ALT 24 0 - 50 U/L    Protein Total 6.6 6.4 - 8.3 g/dL    Albumin 3.9 3.5 - 5.2 g/dL    Bilirubin Total 1.3 (H) <=1.2  mg/dL   Lactic Acid Whole Blood with 1X Repeat in 2 HR when >2     Status: Abnormal   Result Value Ref Range    Lactic Acid, Initial 2.2 (H) 0.7 - 2.0 mmol/L   Lipase     Status: Normal   Result Value Ref Range    Lipase 23 13 - 60 U/L   Procalcitonin     Status: Normal   Result Value Ref Range    Procalcitonin 0.27 <0.50 ng/mL   UA with Microscopic reflex to Culture     Status: Abnormal    Specimen: Urine, Clean Catch   Result Value Ref Range    Color Urine Yellow Colorless, Straw, Light Yellow, Yellow    Appearance Urine Turbid (A) Clear    Glucose Urine Negative Negative mg/dL    Bilirubin Urine Negative Negative    Ketones Urine Negative Negative mg/dL    Specific Gravity Urine 1.017 1.001 - 1.030    Blood Urine Negative Negative    pH Urine 6.0 5.0 - 7.0    Protein Albumin Urine Negative Negative mg/dL    Urobilinogen Urine <2.0 <2.0 mg/dL    Nitrite Urine Negative Negative    Leukocyte Esterase Urine Negative Negative    Bacteria Urine Few (A) None Seen /HPF    Mucus Urine Present (A) None Seen /LPF    RBC Urine 1 <=2 /HPF    WBC Urine 5 <=5 /HPF    Squamous Epithelials Urine 10 (H) <=1 /HPF    Narrative    Urine Culture not indicated   Symptomatic Influenza A/B, RSV, & SARS-CoV2 PCR (COVID-19) Nasopharyngeal     Status: Normal    Specimen: Nasopharyngeal; Swab   Result Value Ref Range    Influenza A PCR Negative Negative    Influenza B PCR Negative Negative    RSV PCR Negative Negative    SARS CoV2 PCR Negative Negative    Narrative    Testing was performed using the Xpert Xpress CoV2/Flu/RSV Assay on the [employer] GeneXpert Instrument. This test should be ordered for the detection of SARS-CoV2, influenza, and RSV viruses in individuals with signs and symptoms of respiratory tract infection. This test is for in vitro diagnostic use under the [place] FDA for laboratories certified under CLIA to perform high or moderate complexity testing. This test has been [place] FDA cleared. A negative result does not rule out the  presence of PCR inhibitors in the specimen or target RNA in concentration below the limit of detection for the assay. If only one viral target is positive but coinfection with multiple targets is suspected, the sample should be re-tested with another FDA cleared, approved, or authorized test, if coninfection would change clinical management. This test was validated by the [employer]. These laboratories are certified under the Clinical Laboratory Improvement Amendments of 1988 (CLIA-88) as qualified to perfom high complexity laboratory testing.   CBC with platelets and differential     Status: Abnormal   Result Value Ref Range    WBC Count 4.1 4.0 - 11.0 10e3/uL    RBC Count 2.99 (L) 3.80 - 5.20 10e6/uL    Hemoglobin 9.7 (L) 11.7 - 15.7 g/dL    Hematocrit 28.9 (L) 35.0 - 47.0 %    MCV 97 78 - 100 fL    MCH 32.4 26.5 - 33.0 pg    MCHC 33.6 31.5 - 36.5 g/dL    RDW 16.0 (H) 10.0 - 15.0 %    Platelet Count 262 150 - 450 10e3/uL    % Neutrophils 76 %    % Lymphocytes 15 %    % Monocytes 6 %    % Eosinophils 1 %    % Basophils 0 %    % Immature Granulocytes 2 %    NRBCs per 100 WBC 0 <1 /100    Absolute Neutrophils 3.1 1.6 - 8.3 10e3/uL    Absolute Lymphocytes 0.6 (L) 0.8 - 5.3 10e3/uL    Absolute Monocytes 0.3 0.0 - 1.3 10e3/uL    Absolute Eosinophils 0.0 0.0 - 0.7 10e3/uL    Absolute Basophils 0.0 0.0 - 0.2 10e3/uL    Absolute Immature Granulocytes 0.1 <=0.4 10e3/uL    Absolute NRBCs 0.0 10e3/uL   Lactic acid whole blood     Status: Normal   Result Value Ref Range    Lactic Acid 1.2 0.7 - 2.0 mmol/L   Hemoglobin A1c     Status: Abnormal   Result Value Ref Range    Estimated Average Glucose 123 (H) <117 mg/dL    Hemoglobin A1C 5.9 (H) <5.7 %   ECG 12-LEAD WITH MUSE (L[facility])     Status: None   Result Value Ref Range    Systolic Blood Pressure  mmHg    Diastolic Blood Pressure  mmHg    Ventricular Rate 107 BPM    Atrial Rate 107 BPM    NM Interval 156 ms    QRS Duration 92 ms     ms    QTc 499 ms    P  Axis 60 degrees    R AXIS -36 degrees    T Axis 13 degrees    Interpretation ECG       Sinus tachycardia  Left axis deviation  Inferior infarct , age undetermined  Abnormal ECG  When compared with ECG of 29-Sep-2024 08:05,  Inferior infarct is now Present  Confirmed by SEE ED PROVIDER NOTE FOR, ECG INTERPRETATION ([id number]),  Ester Dumont ([id number]) on 11/6/2024 1:00:37 PM     CBC with platelets differential     Status: Abnormal    Narrative    The following orders were created for panel order CBC with platelets differential.  Procedure                               Abnormality         Status                     ---------                               -----------         ------                     CBC with platelets and d...[[id number]]  Abnormal            Final result                 Please view results for these tests on the individual orders.         ECG:    Performed at: 1124    Impression: nst rate of 107, lad, inferior infarct pattern age indeterminate, low voltage EKG in limb leads.  Compared to previous EKG dated 9/29/24 the voltage is decreased in limb leads inferiorly but meeting criteria now for inferior infarct pattern is now present also.    Pr 156ms, qrs 92ms, qtc 499ms, prt axes 60 -36 13.    I have independently reviewed and interpreted the EKS(s) documented above    PROCEDURES:  Procedures:      [facility] Documentation     Medical Decision Making  Obtained supplemental history:Supplemental history obtained?: No  Reviewed external records: External records reviewed?: No  Care impacted by chronic illness:Documented in Chart  Did you consider but not order tests?: Work up considered but not performed and documented in chart, if applicable  Did you interpret images independently?: Independent interpretation of ECG and images noted in documentation, when applicable.  Consultation discussion with other provider:Did you involve another provider (consultant, , pharmacy, etc.)?: I discussed the  care with another health care provider, see documentation for details.  Discharge. I prescribed additional prescription strength medication(s) as charted. See documentation for any additional details.    MIPS: Not Applicable      CMS Diagnoses: sepsis not felt to be present.  Lactic acid elevation due to chemotherapy and dehydration.  Improved after fluids.       The creation of this record is based on the scribe s observations of the work being performed by Jacob Ty and the provider s statements to them. This document has been checked and approved by MD Karla Estevez MD  Emergency Medicine  [facility] EMERGENCY ROOM         Karla Painter MD  11/06/24 3036

## 2024-11-11 LAB — BACTERIA BLD CULT: NO GROWTH

## 2024-11-12 ENCOUNTER — INFUSION THERAPY VISIT (OUTPATIENT)
Dept: INFUSION THERAPY | Facility: HOSPITAL | Age: 59
End: 2024-11-12
Attending: INTERNAL MEDICINE
Payer: COMMERCIAL

## 2024-11-12 VITALS
WEIGHT: 137.5 LBS | HEART RATE: 97 BPM | SYSTOLIC BLOOD PRESSURE: 110 MMHG | TEMPERATURE: 98.2 F | DIASTOLIC BLOOD PRESSURE: 77 MMHG | BODY MASS INDEX: 27.77 KG/M2 | OXYGEN SATURATION: 96 % | RESPIRATION RATE: 18 BRPM

## 2024-11-12 DIAGNOSIS — C50.912 INVASIVE DUCTAL CARCINOMA OF BREAST, FEMALE, LEFT (H): Primary | ICD-10-CM

## 2024-11-12 LAB
ANION GAP SERPL CALCULATED.3IONS-SCNC: 12 MMOL/L (ref 7–15)
BASOPHILS # BLD AUTO: 0 10E3/UL (ref 0–0.2)
BASOPHILS NFR BLD AUTO: 1 %
BUN SERPL-MCNC: 3.6 MG/DL (ref 6–20)
CALCIUM SERPL-MCNC: 9.2 MG/DL (ref 8.8–10.4)
CHLORIDE SERPL-SCNC: 103 MMOL/L (ref 98–107)
CREAT SERPL-MCNC: 0.47 MG/DL (ref 0.51–0.95)
EGFRCR SERPLBLD CKD-EPI 2021: >90 ML/MIN/1.73M2
EOSINOPHIL # BLD AUTO: 0.1 10E3/UL (ref 0–0.7)
EOSINOPHIL NFR BLD AUTO: 2 %
ERYTHROCYTE [DISTWIDTH] IN BLOOD BY AUTOMATED COUNT: 15.3 % (ref 10–15)
GLUCOSE SERPL-MCNC: 131 MG/DL (ref 70–99)
HCO3 SERPL-SCNC: 25 MMOL/L (ref 22–29)
HCT VFR BLD AUTO: 28.4 % (ref 35–47)
HGB BLD-MCNC: 9.5 G/DL (ref 11.7–15.7)
IMM GRANULOCYTES # BLD: 0.1 10E3/UL
IMM GRANULOCYTES NFR BLD: 3 %
LYMPHOCYTES # BLD AUTO: 1.1 10E3/UL (ref 0.8–5.3)
LYMPHOCYTES NFR BLD AUTO: 27 %
MCH RBC QN AUTO: 32.5 PG (ref 26.5–33)
MCHC RBC AUTO-ENTMCNC: 33.5 G/DL (ref 31.5–36.5)
MCV RBC AUTO: 97 FL (ref 78–100)
MONOCYTES # BLD AUTO: 0.4 10E3/UL (ref 0–1.3)
MONOCYTES NFR BLD AUTO: 9 %
NEUTROPHILS # BLD AUTO: 2.4 10E3/UL (ref 1.6–8.3)
NEUTROPHILS NFR BLD AUTO: 58 %
NRBC # BLD AUTO: 0 10E3/UL
NRBC BLD AUTO-RTO: 1 /100
PLATELET # BLD AUTO: 273 10E3/UL (ref 150–450)
POTASSIUM SERPL-SCNC: 3.5 MMOL/L (ref 3.4–5.3)
RBC # BLD AUTO: 2.92 10E6/UL (ref 3.8–5.2)
SODIUM SERPL-SCNC: 140 MMOL/L (ref 135–145)
WBC # BLD AUTO: 4.1 10E3/UL (ref 4–11)

## 2024-11-12 PROCEDURE — 258N000003 HC RX IP 258 OP 636

## 2024-11-12 PROCEDURE — 36591 DRAW BLOOD OFF VENOUS DEVICE: CPT

## 2024-11-12 PROCEDURE — 250N000011 HC RX IP 250 OP 636

## 2024-11-12 PROCEDURE — 96413 CHEMO IV INFUSION 1 HR: CPT

## 2024-11-12 PROCEDURE — 96375 TX/PRO/DX INJ NEW DRUG ADDON: CPT

## 2024-11-12 PROCEDURE — 85004 AUTOMATED DIFF WBC COUNT: CPT

## 2024-11-12 PROCEDURE — 82947 ASSAY GLUCOSE BLOOD QUANT: CPT

## 2024-11-12 PROCEDURE — 80048 BASIC METABOLIC PNL TOTAL CA: CPT

## 2024-11-12 RX ORDER — HEPARIN SODIUM (PORCINE) LOCK FLUSH IV SOLN 100 UNIT/ML 100 UNIT/ML
5 SOLUTION INTRAVENOUS
Status: DISCONTINUED | OUTPATIENT
Start: 2024-11-12 | End: 2024-11-12 | Stop reason: HOSPADM

## 2024-11-12 RX ORDER — HEPARIN SODIUM (PORCINE) LOCK FLUSH IV SOLN 100 UNIT/ML 100 UNIT/ML
5 SOLUTION INTRAVENOUS
Status: CANCELLED | OUTPATIENT
Start: 2024-11-12

## 2024-11-12 RX ORDER — EPINEPHRINE 1 MG/ML
0.3 INJECTION, SOLUTION INTRAMUSCULAR; SUBCUTANEOUS EVERY 5 MIN PRN
Status: DISCONTINUED | OUTPATIENT
Start: 2024-11-12 | End: 2024-11-12 | Stop reason: HOSPADM

## 2024-11-12 RX ORDER — DIPHENHYDRAMINE HCL 50 MG
50 CAPSULE ORAL
Status: CANCELLED
Start: 2024-11-12

## 2024-11-12 RX ORDER — ALBUTEROL SULFATE 90 UG/1
1-2 INHALANT RESPIRATORY (INHALATION)
Status: CANCELLED
Start: 2024-11-12

## 2024-11-12 RX ORDER — METHYLPREDNISOLONE SODIUM SUCCINATE 40 MG/ML
40 INJECTION INTRAMUSCULAR; INTRAVENOUS
Status: DISCONTINUED | OUTPATIENT
Start: 2024-11-12 | End: 2024-11-12 | Stop reason: HOSPADM

## 2024-11-12 RX ORDER — METHYLPREDNISOLONE SODIUM SUCCINATE 40 MG/ML
40 INJECTION INTRAMUSCULAR; INTRAVENOUS
Status: CANCELLED
Start: 2024-11-12

## 2024-11-12 RX ORDER — ALBUTEROL SULFATE 90 UG/1
1-2 INHALANT RESPIRATORY (INHALATION)
Status: DISCONTINUED | OUTPATIENT
Start: 2024-11-12 | End: 2024-11-12 | Stop reason: HOSPADM

## 2024-11-12 RX ORDER — DIPHENHYDRAMINE HYDROCHLORIDE 50 MG/ML
25 INJECTION INTRAMUSCULAR; INTRAVENOUS
Status: CANCELLED
Start: 2024-11-12

## 2024-11-12 RX ORDER — DIPHENHYDRAMINE HYDROCHLORIDE 50 MG/ML
25 INJECTION INTRAMUSCULAR; INTRAVENOUS
Status: DISCONTINUED | OUTPATIENT
Start: 2024-11-12 | End: 2024-11-12 | Stop reason: HOSPADM

## 2024-11-12 RX ORDER — LORAZEPAM 2 MG/ML
0.5 INJECTION INTRAMUSCULAR EVERY 4 HOURS PRN
Status: CANCELLED | OUTPATIENT
Start: 2024-11-12

## 2024-11-12 RX ORDER — MEPERIDINE HYDROCHLORIDE 50 MG/ML
25 INJECTION INTRAMUSCULAR; INTRAVENOUS; SUBCUTANEOUS
Status: CANCELLED | OUTPATIENT
Start: 2024-11-12

## 2024-11-12 RX ORDER — DIPHENHYDRAMINE HYDROCHLORIDE 50 MG/ML
50 INJECTION INTRAMUSCULAR; INTRAVENOUS
Status: CANCELLED
Start: 2024-11-12

## 2024-11-12 RX ORDER — ONDANSETRON 2 MG/ML
8 INJECTION INTRAMUSCULAR; INTRAVENOUS ONCE
Status: CANCELLED | OUTPATIENT
Start: 2024-11-12

## 2024-11-12 RX ORDER — MEPERIDINE HYDROCHLORIDE 50 MG/ML
25 INJECTION INTRAMUSCULAR; INTRAVENOUS; SUBCUTANEOUS
Status: DISCONTINUED | OUTPATIENT
Start: 2024-11-12 | End: 2024-11-12 | Stop reason: HOSPADM

## 2024-11-12 RX ORDER — DIPHENHYDRAMINE HYDROCHLORIDE 50 MG/ML
50 INJECTION INTRAMUSCULAR; INTRAVENOUS
Status: DISCONTINUED | OUTPATIENT
Start: 2024-11-12 | End: 2024-11-12 | Stop reason: HOSPADM

## 2024-11-12 RX ORDER — ALBUTEROL SULFATE 0.83 MG/ML
2.5 SOLUTION RESPIRATORY (INHALATION)
Status: CANCELLED | OUTPATIENT
Start: 2024-11-12

## 2024-11-12 RX ORDER — EPINEPHRINE 1 MG/ML
0.3 INJECTION, SOLUTION INTRAMUSCULAR; SUBCUTANEOUS EVERY 5 MIN PRN
Status: CANCELLED | OUTPATIENT
Start: 2024-11-12

## 2024-11-12 RX ORDER — ALBUTEROL SULFATE 0.83 MG/ML
2.5 SOLUTION RESPIRATORY (INHALATION)
Status: DISCONTINUED | OUTPATIENT
Start: 2024-11-12 | End: 2024-11-12 | Stop reason: HOSPADM

## 2024-11-12 RX ORDER — ONDANSETRON 2 MG/ML
8 INJECTION INTRAMUSCULAR; INTRAVENOUS ONCE
Status: COMPLETED | OUTPATIENT
Start: 2024-11-12 | End: 2024-11-12

## 2024-11-12 RX ORDER — HEPARIN SODIUM,PORCINE 10 UNIT/ML
5-20 VIAL (ML) INTRAVENOUS DAILY PRN
Status: CANCELLED | OUTPATIENT
Start: 2024-11-12

## 2024-11-12 RX ADMIN — ONDANSETRON 8 MG: 2 INJECTION, SOLUTION INTRAMUSCULAR; INTRAVENOUS at 13:01

## 2024-11-12 RX ADMIN — PACLITAXEL 134 MG: 6 INJECTION, SOLUTION INTRAVENOUS at 13:57

## 2024-11-12 RX ADMIN — HEPARIN 5 ML: 100 SYRINGE at 15:01

## 2024-11-12 NOTE — PROGRESS NOTES
Infusion Nursing Note:  Kerry Mills presents today for Cycle 12, Day 1 of her chemotherapy plan.    Patient seen by provider today: No   present during visit today: Patient refused  services and a waiver form has been signed.     Note: Kerry arrived ambulatory accompanied by her daughter in law, Ginny, for treatment. Kerry presented to the ER on Nov 6th with fevers. She was prescribed Levaquin and she takes her final dose tomorrow. Her last fever was on Nov 6th. She denies cough or urinary symptoms. Right ankle cellulitis continues to improve. A scab is present and there is no redness or drainage. Patient stated it was itching previously but the itching resolved with Keflex. She reports ongoing fatigue with chemo. She denies any new concerns today. Writer spoke with HIRAM Reed, and provided the above status update. Labs were reviewed. She approved treatment today and plan was signed.  Zofran administered 30 minutes prior to treatment.    Intravenous Access:  Labs drawn without difficulty.  Implanted Port.    Treatment Conditions:  Lab Results   Component Value Date    HGB 9.5 (L) 11/12/2024    WBC 4.1 11/12/2024    ANEU 4.7 10/08/2024    ANEUTAUTO 2.4 11/12/2024     11/12/2024        Lab Results   Component Value Date     11/12/2024    POTASSIUM 3.5 11/12/2024    MAG 2.0 09/30/2024    CR 0.47 (L) 11/12/2024    DONNA 9.2 11/12/2024    BILITOTAL 1.3 (H) 11/06/2024    ALBUMIN 3.9 11/06/2024    ALT 24 11/06/2024    AST 45 11/06/2024       Results reviewed, labs MET treatment parameters, ok to proceed with treatment.      Post Infusion Assessment:  Patient tolerated infusion without incident.  Blood return noted pre and post infusion.  Site patent and intact, free from redness, edema or discomfort.  No evidence of extravasations.  Access discontinued per protocol.     Discharge Plan:   Patient will return Nov 19th for next appointment.  Patient discharged in stable condition accompanied by:  daughter-in-law.  Departure Mode: Ambulatory.      Silvana Lombardo RN

## 2024-11-19 ENCOUNTER — INFUSION THERAPY VISIT (OUTPATIENT)
Dept: INFUSION THERAPY | Facility: HOSPITAL | Age: 59
End: 2024-11-19
Attending: INTERNAL MEDICINE
Payer: COMMERCIAL

## 2024-11-19 ENCOUNTER — ONCOLOGY VISIT (OUTPATIENT)
Dept: ONCOLOGY | Facility: HOSPITAL | Age: 59
End: 2024-11-19
Attending: INTERNAL MEDICINE
Payer: COMMERCIAL

## 2024-11-19 ENCOUNTER — PATIENT OUTREACH (OUTPATIENT)
Dept: ONCOLOGY | Facility: HOSPITAL | Age: 59
End: 2024-11-19

## 2024-11-19 VITALS
RESPIRATION RATE: 16 BRPM | TEMPERATURE: 97.8 F | HEART RATE: 98 BPM | WEIGHT: 137 LBS | OXYGEN SATURATION: 97 % | HEIGHT: 59 IN | BODY MASS INDEX: 27.62 KG/M2 | SYSTOLIC BLOOD PRESSURE: 121 MMHG | DIASTOLIC BLOOD PRESSURE: 69 MMHG

## 2024-11-19 DIAGNOSIS — R53.83 CHEMOTHERAPY-INDUCED FATIGUE: Primary | ICD-10-CM

## 2024-11-19 DIAGNOSIS — C50.912 INVASIVE DUCTAL CARCINOMA OF BREAST, FEMALE, LEFT (H): Primary | ICD-10-CM

## 2024-11-19 DIAGNOSIS — T45.1X5A CHEMOTHERAPY-INDUCED FATIGUE: Primary | ICD-10-CM

## 2024-11-19 DIAGNOSIS — C50.912 INVASIVE DUCTAL CARCINOMA OF BREAST, FEMALE, LEFT (H): ICD-10-CM

## 2024-11-19 LAB
ALBUMIN SERPL BCG-MCNC: 3.9 G/DL (ref 3.5–5.2)
ALP SERPL-CCNC: 87 U/L (ref 40–150)
ALT SERPL W P-5'-P-CCNC: 19 U/L (ref 0–50)
AST SERPL W P-5'-P-CCNC: 38 U/L (ref 0–45)
BASOPHILS # BLD AUTO: 0 10E3/UL (ref 0–0.2)
BASOPHILS NFR BLD AUTO: 1 %
BILIRUB DIRECT SERPL-MCNC: <0.2 MG/DL (ref 0–0.3)
BILIRUB SERPL-MCNC: 0.7 MG/DL
EOSINOPHIL # BLD AUTO: 0.2 10E3/UL (ref 0–0.7)
EOSINOPHIL NFR BLD AUTO: 5 %
ERYTHROCYTE [DISTWIDTH] IN BLOOD BY AUTOMATED COUNT: 14.9 % (ref 10–15)
HCT VFR BLD AUTO: 28.1 % (ref 35–47)
HGB BLD-MCNC: 9.6 G/DL (ref 11.7–15.7)
IMM GRANULOCYTES # BLD: 0 10E3/UL
IMM GRANULOCYTES NFR BLD: 1 %
LYMPHOCYTES # BLD AUTO: 1.1 10E3/UL (ref 0.8–5.3)
LYMPHOCYTES NFR BLD AUTO: 39 %
MCH RBC QN AUTO: 32.9 PG (ref 26.5–33)
MCHC RBC AUTO-ENTMCNC: 34.2 G/DL (ref 31.5–36.5)
MCV RBC AUTO: 96 FL (ref 78–100)
MONOCYTES # BLD AUTO: 0.4 10E3/UL (ref 0–1.3)
MONOCYTES NFR BLD AUTO: 13 %
NEUTROPHILS # BLD AUTO: 1.2 10E3/UL (ref 1.6–8.3)
NEUTROPHILS NFR BLD AUTO: 41 %
NRBC # BLD AUTO: 0 10E3/UL
NRBC BLD AUTO-RTO: 1 /100
PLATELET # BLD AUTO: 285 10E3/UL (ref 150–450)
PROT SERPL-MCNC: 6.6 G/DL (ref 6.4–8.3)
RBC # BLD AUTO: 2.92 10E6/UL (ref 3.8–5.2)
WBC # BLD AUTO: 3 10E3/UL (ref 4–11)

## 2024-11-19 PROCEDURE — 250N000011 HC RX IP 250 OP 636: Performed by: INTERNAL MEDICINE

## 2024-11-19 PROCEDURE — 85014 HEMATOCRIT: CPT

## 2024-11-19 PROCEDURE — 250N000011 HC RX IP 250 OP 636

## 2024-11-19 PROCEDURE — G2211 COMPLEX E/M VISIT ADD ON: HCPCS | Performed by: INTERNAL MEDICINE

## 2024-11-19 PROCEDURE — 258N000003 HC RX IP 258 OP 636: Performed by: INTERNAL MEDICINE

## 2024-11-19 PROCEDURE — 85004 AUTOMATED DIFF WBC COUNT: CPT

## 2024-11-19 PROCEDURE — 99214 OFFICE O/P EST MOD 30 MIN: CPT | Performed by: INTERNAL MEDICINE

## 2024-11-19 PROCEDURE — 36591 DRAW BLOOD OFF VENOUS DEVICE: CPT

## 2024-11-19 PROCEDURE — 96413 CHEMO IV INFUSION 1 HR: CPT

## 2024-11-19 PROCEDURE — 82247 BILIRUBIN TOTAL: CPT

## 2024-11-19 PROCEDURE — 96375 TX/PRO/DX INJ NEW DRUG ADDON: CPT

## 2024-11-19 PROCEDURE — 82040 ASSAY OF SERUM ALBUMIN: CPT

## 2024-11-19 PROCEDURE — 258N000003 HC RX IP 258 OP 636

## 2024-11-19 PROCEDURE — G0463 HOSPITAL OUTPT CLINIC VISIT: HCPCS | Performed by: INTERNAL MEDICINE

## 2024-11-19 RX ORDER — ONDANSETRON 2 MG/ML
8 INJECTION INTRAMUSCULAR; INTRAVENOUS ONCE
Status: CANCELLED | OUTPATIENT
Start: 2024-11-26

## 2024-11-19 RX ORDER — LORAZEPAM 2 MG/ML
0.5 INJECTION INTRAMUSCULAR EVERY 4 HOURS PRN
Status: CANCELLED | OUTPATIENT
Start: 2024-11-19

## 2024-11-19 RX ORDER — HEPARIN SODIUM (PORCINE) LOCK FLUSH IV SOLN 100 UNIT/ML 100 UNIT/ML
5 SOLUTION INTRAVENOUS
Status: CANCELLED | OUTPATIENT
Start: 2024-11-26

## 2024-11-19 RX ORDER — DIPHENHYDRAMINE HCL 50 MG
50 CAPSULE ORAL
Status: CANCELLED
Start: 2024-11-19

## 2024-11-19 RX ORDER — ALBUTEROL SULFATE 0.83 MG/ML
2.5 SOLUTION RESPIRATORY (INHALATION)
Status: CANCELLED | OUTPATIENT
Start: 2024-11-26

## 2024-11-19 RX ORDER — METHYLPREDNISOLONE SODIUM SUCCINATE 40 MG/ML
40 INJECTION INTRAMUSCULAR; INTRAVENOUS
Status: CANCELLED
Start: 2024-11-26

## 2024-11-19 RX ORDER — DIPHENHYDRAMINE HCL 50 MG
50 CAPSULE ORAL
Status: CANCELLED
Start: 2024-11-26

## 2024-11-19 RX ORDER — METHYLPREDNISOLONE SODIUM SUCCINATE 40 MG/ML
40 INJECTION INTRAMUSCULAR; INTRAVENOUS
Status: DISCONTINUED | OUTPATIENT
Start: 2024-11-19 | End: 2024-11-25 | Stop reason: HOSPADM

## 2024-11-19 RX ORDER — DIPHENHYDRAMINE HYDROCHLORIDE 50 MG/ML
50 INJECTION INTRAMUSCULAR; INTRAVENOUS
Status: DISCONTINUED | OUTPATIENT
Start: 2024-11-19 | End: 2024-11-25 | Stop reason: HOSPADM

## 2024-11-19 RX ORDER — HEPARIN SODIUM,PORCINE 10 UNIT/ML
5-20 VIAL (ML) INTRAVENOUS DAILY PRN
Status: CANCELLED | OUTPATIENT
Start: 2024-11-19

## 2024-11-19 RX ORDER — MEPERIDINE HYDROCHLORIDE 50 MG/ML
25 INJECTION INTRAMUSCULAR; INTRAVENOUS; SUBCUTANEOUS
Status: CANCELLED | OUTPATIENT
Start: 2024-11-26

## 2024-11-19 RX ORDER — EPINEPHRINE 1 MG/ML
0.3 INJECTION, SOLUTION INTRAMUSCULAR; SUBCUTANEOUS EVERY 5 MIN PRN
Status: CANCELLED | OUTPATIENT
Start: 2024-11-26

## 2024-11-19 RX ORDER — EPINEPHRINE 1 MG/ML
0.3 INJECTION, SOLUTION INTRAMUSCULAR; SUBCUTANEOUS EVERY 5 MIN PRN
Status: DISCONTINUED | OUTPATIENT
Start: 2024-11-19 | End: 2024-11-25 | Stop reason: HOSPADM

## 2024-11-19 RX ORDER — HEPARIN SODIUM (PORCINE) LOCK FLUSH IV SOLN 100 UNIT/ML 100 UNIT/ML
5 SOLUTION INTRAVENOUS
Status: DISCONTINUED | OUTPATIENT
Start: 2024-11-19 | End: 2024-11-25 | Stop reason: HOSPADM

## 2024-11-19 RX ORDER — MEPERIDINE HYDROCHLORIDE 50 MG/ML
25 INJECTION INTRAMUSCULAR; INTRAVENOUS; SUBCUTANEOUS
Status: CANCELLED | OUTPATIENT
Start: 2024-11-19

## 2024-11-19 RX ORDER — ALBUTEROL SULFATE 0.83 MG/ML
2.5 SOLUTION RESPIRATORY (INHALATION)
Status: DISCONTINUED | OUTPATIENT
Start: 2024-11-19 | End: 2024-11-25 | Stop reason: HOSPADM

## 2024-11-19 RX ORDER — LORAZEPAM 2 MG/ML
0.5 INJECTION INTRAMUSCULAR EVERY 4 HOURS PRN
Status: CANCELLED | OUTPATIENT
Start: 2024-11-26

## 2024-11-19 RX ORDER — ALBUTEROL SULFATE 90 UG/1
1-2 INHALANT RESPIRATORY (INHALATION)
Status: CANCELLED
Start: 2024-11-26

## 2024-11-19 RX ORDER — HEPARIN SODIUM,PORCINE 10 UNIT/ML
5-20 VIAL (ML) INTRAVENOUS DAILY PRN
Status: CANCELLED | OUTPATIENT
Start: 2024-11-26

## 2024-11-19 RX ORDER — DIPHENHYDRAMINE HYDROCHLORIDE 50 MG/ML
25 INJECTION INTRAMUSCULAR; INTRAVENOUS
Status: CANCELLED
Start: 2024-11-26

## 2024-11-19 RX ORDER — DIPHENHYDRAMINE HYDROCHLORIDE 50 MG/ML
25 INJECTION INTRAMUSCULAR; INTRAVENOUS
Status: DISCONTINUED | OUTPATIENT
Start: 2024-11-19 | End: 2024-11-25 | Stop reason: HOSPADM

## 2024-11-19 RX ORDER — ALBUTEROL SULFATE 90 UG/1
1-2 INHALANT RESPIRATORY (INHALATION)
Status: DISCONTINUED | OUTPATIENT
Start: 2024-11-19 | End: 2024-11-25 | Stop reason: HOSPADM

## 2024-11-19 RX ORDER — ONDANSETRON 2 MG/ML
8 INJECTION INTRAMUSCULAR; INTRAVENOUS ONCE
Status: COMPLETED | OUTPATIENT
Start: 2024-11-19 | End: 2024-11-19

## 2024-11-19 RX ORDER — DIPHENHYDRAMINE HYDROCHLORIDE 50 MG/ML
50 INJECTION INTRAMUSCULAR; INTRAVENOUS
Status: CANCELLED
Start: 2024-11-26

## 2024-11-19 RX ADMIN — HEPARIN 5 ML: 100 SYRINGE at 13:47

## 2024-11-19 RX ADMIN — ONDANSETRON 8 MG: 2 INJECTION, SOLUTION INTRAMUSCULAR; INTRAVENOUS at 12:15

## 2024-11-19 RX ADMIN — PACLITAXEL 134 MG: 6 INJECTION, SOLUTION INTRAVENOUS at 12:43

## 2024-11-19 RX ADMIN — SODIUM CHLORIDE 100 ML: 9 INJECTION, SOLUTION INTRAVENOUS at 12:14

## 2024-11-19 ASSESSMENT — PAIN SCALES - GENERAL: PAINLEVEL_OUTOF10: NO PAIN (0)

## 2024-11-19 NOTE — PROGRESS NOTES
Infusion Nursing Note:  Kerry Mills presents today for Taxol D1C13.    Patient seen by provider today: Yes: Dr. Adamson   present during visit today: Not Applicable.    Note: Patient tolerating Taxol Infusions well. Denies any new concerns or changes.      Intravenous Access:  Implanted Port.    Treatment Conditions:  Results reviewed, labs MET treatment parameters, ok to proceed with treatment.      Post Infusion Assessment:  Patient tolerated infusion without incident.  Site patent and intact, free from redness, edema or discomfort.  No evidence of extravasations.  Access discontinued per protocol.       Discharge Plan:   Patient and/or family verbalized understanding of discharge instructions and all questions answered.      Katerin Obregon RN

## 2024-11-19 NOTE — PROGRESS NOTES
"[facility] Hematology and Oncology Progress Note    Patient: Kerry Mills  MRN: [id number]  Date of Service: Nov 19, 2024             ECOG Performance    0 - Independent          ______________________________________________________________________________  Oncologic history  1) invasive ductal carcinoma of the breast ER negative [place] negative HER2/kobe 2+, negative by FISH.  3.9 cm in maximum dimension on MRI.  Diagnosed July 2024    Treatment  1) neoadjuvant AC started on 7/22/2024.  CarboTaxol and Pembro denied by insurance   2) neoadjuvant weekly paclitaxel started on 9/17/2024      History of Present Illness  Patient is here in follow-up.  She is due for her ninth weekly dose of paclitaxel today.  She continues to complain of fatigue.  All history was taken through an .    12 point review of system was negative    Past History    Past Medical History:   Diagnosis Date    Asthma     Chronic constipation        Past Surgical History:   Procedure Laterality Date    INCISION AND DRAINAGE OF WOUND N/A 11/27/2020    Procedure: INCISION AND DRAINAGE, NECK;  Surgeon: Arnel James MD;  Location: [facility];  Service: ENT    INSERT PORT VASCULAR ACCESS Right 7/9/2024    Procedure: INSERTION,  RIGHT VASCULAR ACCESS PORT;  Surgeon: Iwona Choe DO;  Location: [facility]    PICC INSERTION - TRIPLE LUMEN  11/26/2020         TRACHEOSTOMY N/A 11/26/2020    Procedure: EMERGENT INTUBATION IN OR WITH CREATION, TRACHEOSTOMY;  Surgeon: Dennise Justice MD;  Location: [facility];  Service: General       Physical Exam    /69 (BP Location: Left arm, Patient Position: Sitting, Cuff Size: Adult Regular)   Pulse 98   Temp 97.8  F (36.6  C) (Tympanic)   Resp 16   Ht 1.499 m (4' 11\")   Wt 62.1 kg (137 lb)   SpO2 97%   BMI 27.67 kg/m      General: alert, awake, not in acute distress  HEENT: Head: Normal, normocephalic, atraumatic.  Eye: Normal external eye, conjunctiva, " lids cornea, MIKAYLA.  Nose: Normal external nose, mucus membranes and septum.  Pharynx: Normal buccal mucosa. Normal pharynx.  Neck / Thyroid: Supple, no masses, nodes, nodules or enlargement.  Lymphatics: No abnormally enlarged lymph nodes.  Chest: Normal chest wall and respirations. Clear to auscultation.  No crackles or rhonchi's  Heart: S1 S2 RRR, no murmur.   Abdomen: abdomen is soft without significant tenderness, masses, organomegaly or guarding  Extremities: normal strength, tone, and muscle mass  Skin: normal. no rash or abnormalities  CNS: non focal.    Lab Results    Recent Results (from the past 240 hours)   Basic metabolic panel    Collection Time: 11/12/24 12:11 PM   Result Value Ref Range    Sodium 140 135 - 145 mmol/L    Potassium 3.5 3.4 - 5.3 mmol/L    Chloride 103 98 - 107 mmol/L    Carbon Dioxide (CO2) 25 22 - 29 mmol/L    Anion Gap 12 7 - 15 mmol/L    Urea Nitrogen 3.6 (L) 6.0 - 20.0 mg/dL    Creatinine 0.47 (L) 0.51 - 0.95 mg/dL    GFR Estimate >90 >60 mL/min/1.73m2    Calcium 9.2 8.8 - 10.4 mg/dL    Glucose 131 (H) 70 - 99 mg/dL   CBC with platelets and differential    Collection Time: 11/12/24 12:11 PM   Result Value Ref Range    WBC Count 4.1 4.0 - 11.0 10e3/uL    RBC Count 2.92 (L) 3.80 - 5.20 10e6/uL    Hemoglobin 9.5 (L) 11.7 - 15.7 g/dL    Hematocrit 28.4 (L) 35.0 - 47.0 %    MCV 97 78 - 100 fL    MCH 32.5 26.5 - 33.0 pg    MCHC 33.5 31.5 - 36.5 g/dL    RDW 15.3 (H) 10.0 - 15.0 %    Platelet Count 273 150 - 450 10e3/uL    % Neutrophils 58 %    % Lymphocytes 27 %    % Monocytes 9 %    % Eosinophils 2 %    % Basophils 1 %    % Immature Granulocytes 3 %    NRBCs per 100 WBC 1 (H) <1 /100    Absolute Neutrophils 2.4 1.6 - 8.3 10e3/uL    Absolute Lymphocytes 1.1 0.8 - 5.3 10e3/uL    Absolute Monocytes 0.4 0.0 - 1.3 10e3/uL    Absolute Eosinophils 0.1 0.0 - 0.7 10e3/uL    Absolute Basophils 0.0 0.0 - 0.2 10e3/uL    Absolute Immature Granulocytes 0.1 <=0.4 10e3/uL    Absolute NRBCs 0.0 10e3/uL    CBC with platelets and differential    Collection Time: 11/19/24 11:03 AM   Result Value Ref Range    WBC Count 3.0 (L) 4.0 - 11.0 10e3/uL    RBC Count 2.92 (L) 3.80 - 5.20 10e6/uL    Hemoglobin 9.6 (L) 11.7 - 15.7 g/dL    Hematocrit 28.1 (L) 35.0 - 47.0 %    MCV 96 78 - 100 fL    MCH 32.9 26.5 - 33.0 pg    MCHC 34.2 31.5 - 36.5 g/dL    RDW 14.9 10.0 - 15.0 %    Platelet Count 285 150 - 450 10e3/uL    % Neutrophils 41 %    % Lymphocytes 39 %    % Monocytes 13 %    % Eosinophils 5 %    % Basophils 1 %    % Immature Granulocytes 1 %    NRBCs per 100 WBC 1 (H) <1 /100    Absolute Neutrophils 1.2 (L) 1.6 - 8.3 10e3/uL    Absolute Lymphocytes 1.1 0.8 - 5.3 10e3/uL    Absolute Monocytes 0.4 0.0 - 1.3 10e3/uL    Absolute Eosinophils 0.2 0.0 - 0.7 10e3/uL    Absolute Basophils 0.0 0.0 - 0.2 10e3/uL    Absolute Immature Granulocytes 0.0 <=0.4 10e3/uL    Absolute NRBCs 0.0 10e3/uL         Imaging    Chest XR,  PA & LAT    Result Date: 11/6/2024  EXAM: XR CHEST 2 VIEWS LOCATION: [facility] DATE: 11/6/2024 INDICATION: Nonspecific fever, breast cancer chemotherapy patient. COMPARISON: 9.3.2024.     IMPRESSION: Mild streaky bibasilar atelectasis or scarring. Remaining lungs are clear. No pleural effusion. Stable cardiomediastinal silhouette and portacatheter.           Assessment and Plan      Breast cancer, triple negative patient receiving neoadjuvant chemotherapy   Patient is here in follow-up.  She is due for her ninth weekly dose of paclitaxel.  She is tolerating it well with some neuropathy her fatigue is manageable.  Will proceed with injection today and she will continue weekly injection I will see her in 2 weeks.  We will refer her to surgery after the 12th dose of paclitaxel.  Further treatment after surgery will be decided based on the pathologic response    Neutropenia  Her white count has been on the low side but her ANC continues to be higher than thousand will continue treatment for  now    Neuropathy   Patient does complains of neuropathy especially in her hands.  I spent significant time looking into that but it does not look like it is affecting any of her daily activities considering that we will continue the paclitaxel without any changes      Varsha Adamson MD            CC: HIRAL CHAVEZ MD

## 2024-11-19 NOTE — LETTER
11/19/2024      Kerry Mills  [address] [place] [address]      Dear Colleague,    Thank you for referring your patient, Kerry Mills, to the [facility] [place]. Please see a copy of my visit note below.            [facility] Hematology and Oncology Progress Note    Patient: Kerry Mills  MRN: [id number]  Date of Service: Nov 19, 2024             ECOG Performance    0 - Independent          ______________________________________________________________________________  Oncologic history  1) invasive ductal carcinoma of the breast ER negative [place] negative HER2/kobe 2+, negative by FISH.  3.9 cm in maximum dimension on MRI.  Diagnosed July 2024    Treatment  1) neoadjuvant AC started on 7/22/2024.  CarboTaxol and Pembro denied by insurance   2) neoadjuvant weekly paclitaxel started on 9/17/2024      History of Present Illness  Patient is here in follow-up.  She is due for her ninth weekly dose of paclitaxel today.  She continues to complain of fatigue.  All history was taken through an .    12 point review of system was negative    Past History    Past Medical History:   Diagnosis Date     Asthma      Chronic constipation        Past Surgical History:   Procedure Laterality Date     INCISION AND DRAINAGE OF WOUND N/A 11/27/2020    Procedure: INCISION AND DRAINAGE, NECK;  Surgeon: Arnel James MD;  Location: [facility];  Service: ENT     INSERT PORT VASCULAR ACCESS Right 7/9/2024    Procedure: INSERTION,  RIGHT VASCULAR ACCESS PORT;  Surgeon: Iwona Choe DO;  Location: [facility]     PICC INSERTION - TRIPLE LUMEN  11/26/2020          TRACHEOSTOMY N/A 11/26/2020    Procedure: EMERGENT INTUBATION IN OR WITH CREATION, TRACHEOSTOMY;  Surgeon: Dennise Justice MD;  Location: [facility];  Service: General       Physical Exam    /69 (BP Location: Left arm, Patient Position: Sitting, Cuff Size: Adult Regular)   Pulse 98   Temp 97.8  F (36.6  C)  "(Tympanic)   Resp 16   Ht 1.499 m (4' 11\")   Wt 62.1 kg (137 lb)   SpO2 97%   BMI 27.67 kg/m      General: alert, awake, not in acute distress  HEENT: Head: Normal, normocephalic, atraumatic.  Eye: Normal external eye, conjunctiva, lids cornea, MIKAYLA.  Nose: Normal external nose, mucus membranes and septum.  Pharynx: Normal buccal mucosa. Normal pharynx.  Neck / Thyroid: Supple, no masses, nodes, nodules or enlargement.  Lymphatics: No abnormally enlarged lymph nodes.  Chest: Normal chest wall and respirations. Clear to auscultation.  No crackles or rhonchi's  Heart: S1 S2 RRR, no murmur.   Abdomen: abdomen is soft without significant tenderness, masses, organomegaly or guarding  Extremities: normal strength, tone, and muscle mass  Skin: normal. no rash or abnormalities  CNS: non focal.    Lab Results    Recent Results (from the past 240 hours)   Basic metabolic panel    Collection Time: 11/12/24 12:11 PM   Result Value Ref Range    Sodium 140 135 - 145 mmol/L    Potassium 3.5 3.4 - 5.3 mmol/L    Chloride 103 98 - 107 mmol/L    Carbon Dioxide (CO2) 25 22 - 29 mmol/L    Anion Gap 12 7 - 15 mmol/L    Urea Nitrogen 3.6 (L) 6.0 - 20.0 mg/dL    Creatinine 0.47 (L) 0.51 - 0.95 mg/dL    GFR Estimate >90 >60 mL/min/1.73m2    Calcium 9.2 8.8 - 10.4 mg/dL    Glucose 131 (H) 70 - 99 mg/dL   CBC with platelets and differential    Collection Time: 11/12/24 12:11 PM   Result Value Ref Range    WBC Count 4.1 4.0 - 11.0 10e3/uL    RBC Count 2.92 (L) 3.80 - 5.20 10e6/uL    Hemoglobin 9.5 (L) 11.7 - 15.7 g/dL    Hematocrit 28.4 (L) 35.0 - 47.0 %    MCV 97 78 - 100 fL    MCH 32.5 26.5 - 33.0 pg    MCHC 33.5 31.5 - 36.5 g/dL    RDW 15.3 (H) 10.0 - 15.0 %    Platelet Count 273 150 - 450 10e3/uL    % Neutrophils 58 %    % Lymphocytes 27 %    % Monocytes 9 %    % Eosinophils 2 %    % Basophils 1 %    % Immature Granulocytes 3 %    NRBCs per 100 WBC 1 (H) <1 /100    Absolute Neutrophils 2.4 1.6 - 8.3 10e3/uL    Absolute Lymphocytes 1.1 " 0.8 - 5.3 10e3/uL    Absolute Monocytes 0.4 0.0 - 1.3 10e3/uL    Absolute Eosinophils 0.1 0.0 - 0.7 10e3/uL    Absolute Basophils 0.0 0.0 - 0.2 10e3/uL    Absolute Immature Granulocytes 0.1 <=0.4 10e3/uL    Absolute NRBCs 0.0 10e3/uL   CBC with platelets and differential    Collection Time: 11/19/24 11:03 AM   Result Value Ref Range    WBC Count 3.0 (L) 4.0 - 11.0 10e3/uL    RBC Count 2.92 (L) 3.80 - 5.20 10e6/uL    Hemoglobin 9.6 (L) 11.7 - 15.7 g/dL    Hematocrit 28.1 (L) 35.0 - 47.0 %    MCV 96 78 - 100 fL    MCH 32.9 26.5 - 33.0 pg    MCHC 34.2 31.5 - 36.5 g/dL    RDW 14.9 10.0 - 15.0 %    Platelet Count 285 150 - 450 10e3/uL    % Neutrophils 41 %    % Lymphocytes 39 %    % Monocytes 13 %    % Eosinophils 5 %    % Basophils 1 %    % Immature Granulocytes 1 %    NRBCs per 100 WBC 1 (H) <1 /100    Absolute Neutrophils 1.2 (L) 1.6 - 8.3 10e3/uL    Absolute Lymphocytes 1.1 0.8 - 5.3 10e3/uL    Absolute Monocytes 0.4 0.0 - 1.3 10e3/uL    Absolute Eosinophils 0.2 0.0 - 0.7 10e3/uL    Absolute Basophils 0.0 0.0 - 0.2 10e3/uL    Absolute Immature Granulocytes 0.0 <=0.4 10e3/uL    Absolute NRBCs 0.0 10e3/uL         Imaging    Chest XR,  PA & LAT    Result Date: 11/6/2024  EXAM: XR CHEST 2 VIEWS LOCATION: [facility] DATE: 11/6/2024 INDICATION: Nonspecific fever, breast cancer chemotherapy patient. COMPARISON: 9.3.2024.     IMPRESSION: Mild streaky bibasilar atelectasis or scarring. Remaining lungs are clear. No pleural effusion. Stable cardiomediastinal silhouette and portacatheter.           Assessment and Plan      Breast cancer, triple negative patient receiving neoadjuvant chemotherapy   Patient is here in follow-up.  She is due for her ninth weekly dose of paclitaxel.  She is tolerating it well with some neuropathy her fatigue is manageable.  Will proceed with injection today and she will continue weekly injection I will see her in 2 weeks.  We will refer her to surgery after the 12th dose  "of paclitaxel.  Further treatment after surgery will be decided based on the pathologic response    Neutropenia  Her white count has been on the low side but her ANC continues to be higher than thousand will continue treatment for now    Neuropathy   Patient does complains of neuropathy especially in her hands.  I spent significant time looking into that but it does not look like it is affecting any of her daily activities considering that we will continue the paclitaxel without any changes      Varsha Adamson MD            CC: HIRAL CHAVEZ MD     Oncology Rooming Note    November 19, 2024 11:14 AM   Kerry Mills is a 59 year old female who presents for:    Chief Complaint   Patient presents with     Oncology Clinic Visit     Invasive ductal carcinoma of breast, female, left     Initial Vitals: /69 (BP Location: Left arm, Patient Position: Sitting, Cuff Size: Adult Regular)   Pulse 98   Temp 97.8  F (36.6  C) (Tympanic)   Resp 16   Ht 1.499 m (4' 11\")   Wt 62.1 kg (137 lb)   SpO2 97%   BMI 27.67 kg/m   Estimated body mass index is 27.67 kg/m  as calculated from the following:    Height as of this encounter: 1.499 m (4' 11\").    Weight as of this encounter: 62.1 kg (137 lb). Body surface area is 1.61 meters squared.  No Pain (0) Comment: Data Unavailable   No LMP recorded. Patient is postmenopausal.  Allergies reviewed: Yes  Medications reviewed: Yes    Medications: MEDICATION REFILLS NEEDED TODAY. Provider was notified.  Pharmacy name entered into EPIC:    [facility] - [place], [place] - [address] [place] [address] DRUG STORE #[id number] [address] AT [facility] [address]    Frailty Screening:   Is the patient here for a new oncology consult visit in cancer care? 2. No      Clinical concerns:   Need refill on Albuterol and Symbicort.   Pt reported - both hand numbness and tingling has worsened since last visit. Bilateral legs numbness, tingling, pain, soreness off/on. Pt is " "taking Tylenol. Unsure of when symptoms started but it has been \"months\" per pt.       Pebbles Dumont MA            Again, thank you for allowing me to participate in the care of your patient.        Sincerely,        Varsha Adamson MD  "

## 2024-11-19 NOTE — PROGRESS NOTES
"Oncology Rooming Note    November 19, 2024 11:14 AM   Kerry Mills is a 59 year old female who presents for:    Chief Complaint   Patient presents with    Oncology Clinic Visit     Invasive ductal carcinoma of breast, female, left     Initial Vitals: /69 (BP Location: Left arm, Patient Position: Sitting, Cuff Size: Adult Regular)   Pulse 98   Temp 97.8  F (36.6  C) (Tympanic)   Resp 16   Ht 1.499 m (4' 11\")   Wt 62.1 kg (137 lb)   SpO2 97%   BMI 27.67 kg/m   Estimated body mass index is 27.67 kg/m  as calculated from the following:    Height as of this encounter: 1.499 m (4' 11\").    Weight as of this encounter: 62.1 kg (137 lb). Body surface area is 1.61 meters squared.  No Pain (0) Comment: Data Unavailable   No LMP recorded. Patient is postmenopausal.  Allergies reviewed: Yes  Medications reviewed: Yes    Medications: MEDICATION REFILLS NEEDED TODAY. Provider was notified.  Pharmacy name entered into [facility]:    [facility] - [place], [place] - [address] [place] [address] DRUG STORE #[id number] [place] [id number] [facility] AT [facility] [address]    Frailty Screening:   Is the patient here for a new oncology consult visit in cancer care? 2. No      Clinical concerns:   Need refill on Albuterol and Symbicort.   Pt reported - both hand numbness and tingling has worsened since last visit. Bilateral legs numbness, tingling, pain, soreness off/on. Pt is taking Tylenol. Unsure of when symptoms started but it has been \"months\" per pt.       Pebbles Dumont MA            "

## 2024-11-19 NOTE — PROGRESS NOTES
[facility]: Cancer Care Follow-Up Note                                    Discussion with Patient:                                                      Met with Kerry and her daughter while she was in the infusion area getting  weeklytaxol. Her daughter helped translate. Kerry shared that she is counting down the weeks until she is done with chemo and is looking forward to moving forward with surgery. She feels that she is tolerating treatment well but has had some mild fatigue. She is eating and drinking well and engaged in activity. No needs for writer today.,Support and encouragement provided. She has writer's contact information if any questions or concerns arise.    Dates of Treatment:                                                      Infusion given in last 28 days       Administered MAR Action Medication Dose Rate Visit    10/22/2024 11:03 New Bag PACLitaxel (TAXOL) 134 mg in sodium chloride 0.9% in non-PVC container 297.33 mL infusion 134 mg 297.3 mL/hr Infusion Therapy Visit on 10/22/2024 in [facility]    10/29/2024 10:28 New Bag PACLitaxel (TAXOL) 132 mg in sodium chloride 0.9% in non-PVC container 297 mL infusion 132 mg 297 mL/hr Infusion Therapy Visit on 10/29/2024 in [facility]    11/05/2024 11:48 New Bag PACLitaxel (TAXOL) 134 mg in sodium chloride 0.9% in non-PVC container 297.33 mL infusion 134 mg 297.3 mL/hr Infusion Therapy Visit on 11/05/2024 in [facility]    11/12/2024 13:57 New Bag PACLitaxel (TAXOL) 134 mg in sodium chloride 0.9% in non-PVC container 297.33 mL infusion 134 mg 297.3 mL/hr Infusion Therapy Visit on 11/12/2024 in [facility]    11/19/2024 12:43 New Bag PACLitaxel (TAXOL) 134 mg in sodium chloride 0.9% in non-PVC container 297.33 mL infusion 134 mg 297.3 mL/hr Infusion Therapy Visit on 11/19/2024 in [facility]             Assessment:                                                      Patient receiving weekly paclitaxel        Intervention/Education provided during outreach:                                                       See documentation above    Confirmed patient has clinic and triage numbers and encouraged her to call if any questions or concerns arise.    Signature:  Marycruz Tate RN

## 2024-11-26 ENCOUNTER — INFUSION THERAPY VISIT (OUTPATIENT)
Dept: INFUSION THERAPY | Facility: HOSPITAL | Age: 59
End: 2024-11-26
Attending: INTERNAL MEDICINE
Payer: COMMERCIAL

## 2024-11-26 VITALS
OXYGEN SATURATION: 97 % | WEIGHT: 136 LBS | SYSTOLIC BLOOD PRESSURE: 123 MMHG | BODY MASS INDEX: 27.47 KG/M2 | HEART RATE: 94 BPM | TEMPERATURE: 98.2 F | RESPIRATION RATE: 16 BRPM | DIASTOLIC BLOOD PRESSURE: 81 MMHG

## 2024-11-26 DIAGNOSIS — C50.912 INVASIVE DUCTAL CARCINOMA OF BREAST, FEMALE, LEFT (H): Primary | ICD-10-CM

## 2024-11-26 LAB
ALBUMIN SERPL BCG-MCNC: 4.1 G/DL (ref 3.5–5.2)
ALP SERPL-CCNC: 79 U/L (ref 40–150)
ALT SERPL W P-5'-P-CCNC: 17 U/L (ref 0–50)
ANION GAP SERPL CALCULATED.3IONS-SCNC: 8 MMOL/L (ref 7–15)
AST SERPL W P-5'-P-CCNC: 39 U/L (ref 0–45)
BASOPHILS # BLD AUTO: 0 10E3/UL (ref 0–0.2)
BASOPHILS NFR BLD AUTO: 1 %
BILIRUB SERPL-MCNC: 0.7 MG/DL
BUN SERPL-MCNC: 2.2 MG/DL (ref 6–20)
CALCIUM SERPL-MCNC: 9 MG/DL (ref 8.8–10.4)
CHLORIDE SERPL-SCNC: 106 MMOL/L (ref 98–107)
CREAT SERPL-MCNC: 0.43 MG/DL (ref 0.51–0.95)
EGFRCR SERPLBLD CKD-EPI 2021: >90 ML/MIN/1.73M2
EOSINOPHIL # BLD AUTO: 0.1 10E3/UL (ref 0–0.7)
EOSINOPHIL NFR BLD AUTO: 5 %
ERYTHROCYTE [DISTWIDTH] IN BLOOD BY AUTOMATED COUNT: 15 % (ref 10–15)
GLUCOSE SERPL-MCNC: 115 MG/DL (ref 70–99)
HCO3 SERPL-SCNC: 25 MMOL/L (ref 22–29)
HCT VFR BLD AUTO: 28 % (ref 35–47)
HGB BLD-MCNC: 9.3 G/DL (ref 11.7–15.7)
IMM GRANULOCYTES # BLD: 0.1 10E3/UL
IMM GRANULOCYTES NFR BLD: 3 %
LYMPHOCYTES # BLD AUTO: 1 10E3/UL (ref 0.8–5.3)
LYMPHOCYTES NFR BLD AUTO: 38 %
MCH RBC QN AUTO: 32.5 PG (ref 26.5–33)
MCHC RBC AUTO-ENTMCNC: 33.2 G/DL (ref 31.5–36.5)
MCV RBC AUTO: 98 FL (ref 78–100)
MONOCYTES # BLD AUTO: 0.3 10E3/UL (ref 0–1.3)
MONOCYTES NFR BLD AUTO: 11 %
NEUTROPHILS # BLD AUTO: 1.2 10E3/UL (ref 1.6–8.3)
NEUTROPHILS NFR BLD AUTO: 44 %
NRBC # BLD AUTO: 0.1 10E3/UL
NRBC BLD AUTO-RTO: 2 /100
PLATELET # BLD AUTO: 270 10E3/UL (ref 150–450)
POTASSIUM SERPL-SCNC: 3.3 MMOL/L (ref 3.4–5.3)
PROT SERPL-MCNC: 6.6 G/DL (ref 6.4–8.3)
RBC # BLD AUTO: 2.86 10E6/UL (ref 3.8–5.2)
SODIUM SERPL-SCNC: 139 MMOL/L (ref 135–145)
WBC # BLD AUTO: 2.7 10E3/UL (ref 4–11)

## 2024-11-26 PROCEDURE — 82247 BILIRUBIN TOTAL: CPT

## 2024-11-26 PROCEDURE — 250N000011 HC RX IP 250 OP 636: Performed by: INTERNAL MEDICINE

## 2024-11-26 PROCEDURE — 96413 CHEMO IV INFUSION 1 HR: CPT

## 2024-11-26 PROCEDURE — 82310 ASSAY OF CALCIUM: CPT

## 2024-11-26 PROCEDURE — 36591 DRAW BLOOD OFF VENOUS DEVICE: CPT

## 2024-11-26 PROCEDURE — 96375 TX/PRO/DX INJ NEW DRUG ADDON: CPT

## 2024-11-26 PROCEDURE — 85004 AUTOMATED DIFF WBC COUNT: CPT

## 2024-11-26 PROCEDURE — 85041 AUTOMATED RBC COUNT: CPT

## 2024-11-26 PROCEDURE — 82040 ASSAY OF SERUM ALBUMIN: CPT

## 2024-11-26 PROCEDURE — 85014 HEMATOCRIT: CPT

## 2024-11-26 PROCEDURE — 258N000003 HC RX IP 258 OP 636: Performed by: INTERNAL MEDICINE

## 2024-11-26 RX ORDER — EPINEPHRINE 1 MG/ML
0.3 INJECTION, SOLUTION INTRAMUSCULAR; SUBCUTANEOUS EVERY 5 MIN PRN
Status: DISCONTINUED | OUTPATIENT
Start: 2024-11-26 | End: 2024-11-26 | Stop reason: HOSPADM

## 2024-11-26 RX ORDER — ONDANSETRON 2 MG/ML
8 INJECTION INTRAMUSCULAR; INTRAVENOUS ONCE
Status: COMPLETED | OUTPATIENT
Start: 2024-11-26 | End: 2024-11-26

## 2024-11-26 RX ORDER — ALBUTEROL SULFATE 0.83 MG/ML
2.5 SOLUTION RESPIRATORY (INHALATION)
Status: DISCONTINUED | OUTPATIENT
Start: 2024-11-26 | End: 2024-11-26 | Stop reason: HOSPADM

## 2024-11-26 RX ORDER — HEPARIN SODIUM (PORCINE) LOCK FLUSH IV SOLN 100 UNIT/ML 100 UNIT/ML
5 SOLUTION INTRAVENOUS
Status: DISCONTINUED | OUTPATIENT
Start: 2024-11-26 | End: 2024-11-26 | Stop reason: HOSPADM

## 2024-11-26 RX ORDER — ALBUTEROL SULFATE 90 UG/1
1-2 INHALANT RESPIRATORY (INHALATION)
Status: DISCONTINUED | OUTPATIENT
Start: 2024-11-26 | End: 2024-11-26 | Stop reason: HOSPADM

## 2024-11-26 RX ORDER — DIPHENHYDRAMINE HYDROCHLORIDE 50 MG/ML
25 INJECTION INTRAMUSCULAR; INTRAVENOUS
Status: DISCONTINUED | OUTPATIENT
Start: 2024-11-26 | End: 2024-11-26 | Stop reason: HOSPADM

## 2024-11-26 RX ORDER — METHYLPREDNISOLONE SODIUM SUCCINATE 40 MG/ML
40 INJECTION INTRAMUSCULAR; INTRAVENOUS
Status: DISCONTINUED | OUTPATIENT
Start: 2024-11-26 | End: 2024-11-26 | Stop reason: HOSPADM

## 2024-11-26 RX ORDER — MEPERIDINE HYDROCHLORIDE 50 MG/ML
25 INJECTION INTRAMUSCULAR; INTRAVENOUS; SUBCUTANEOUS
Status: DISCONTINUED | OUTPATIENT
Start: 2024-11-26 | End: 2024-11-26 | Stop reason: HOSPADM

## 2024-11-26 RX ORDER — DIPHENHYDRAMINE HYDROCHLORIDE 50 MG/ML
50 INJECTION INTRAMUSCULAR; INTRAVENOUS
Status: DISCONTINUED | OUTPATIENT
Start: 2024-11-26 | End: 2024-11-26 | Stop reason: HOSPADM

## 2024-11-26 RX ADMIN — HEPARIN 5 ML: 100 SYRINGE at 12:34

## 2024-11-26 RX ADMIN — PACLITAXEL 134 MG: 6 INJECTION, SOLUTION INTRAVENOUS at 11:30

## 2024-11-26 RX ADMIN — ONDANSETRON 8 MG: 2 INJECTION, SOLUTION INTRAMUSCULAR; INTRAVENOUS at 10:45

## 2024-11-26 NOTE — PROGRESS NOTES
Infusion Nursing Note:  Kerry Mills presents today for D1C14 Taxol.    Patient seen by provider today: No   present during visit today: pt daughter interpreting today.     Note: pt given zofran as pre med..  Pt daughter says her mother has 2 more infusions of Taxol. Pt has some tingling and numbness in bilateral fingers and toes.    Intravenous Access:  Implanted Port.    Treatment Conditions:  Lab Results   Component Value Date    HGB 9.3 (L) 11/26/2024    WBC 2.7 (L) 11/26/2024    ANEU 4.7 10/08/2024    ANEUTAUTO 1.2 (L) 11/26/2024     11/26/2024        Lab Results   Component Value Date     11/26/2024    POTASSIUM 3.3 (L) 11/26/2024    MAG 2.0 09/30/2024    CR 0.43 (L) 11/26/2024    DONNA 9.0 11/26/2024    BILITOTAL 0.7 11/26/2024    ALBUMIN 4.1 11/26/2024    ALT 17 11/26/2024    AST 39 11/26/2024       Results reviewed, labs MET treatment parameters, ok to proceed with treatment.      Post Infusion Assessment:  Patient tolerated infusion without incident.  Blood return noted pre and post infusion.  Site patent and intact, free from redness, edema or discomfort.  Access discontinued per protocol.       Discharge Plan:   Patient and/or family verbalized understanding of discharge instructions and all questions answered.      Edith White RN

## 2024-12-03 ENCOUNTER — ONCOLOGY VISIT (OUTPATIENT)
Dept: ONCOLOGY | Facility: HOSPITAL | Age: 59
End: 2024-12-03
Attending: INTERNAL MEDICINE
Payer: COMMERCIAL

## 2024-12-03 ENCOUNTER — INFUSION THERAPY VISIT (OUTPATIENT)
Dept: INFUSION THERAPY | Facility: HOSPITAL | Age: 59
End: 2024-12-03
Attending: INTERNAL MEDICINE
Payer: COMMERCIAL

## 2024-12-03 VITALS
SYSTOLIC BLOOD PRESSURE: 144 MMHG | TEMPERATURE: 97.9 F | DIASTOLIC BLOOD PRESSURE: 81 MMHG | HEIGHT: 59 IN | WEIGHT: 136.1 LBS | RESPIRATION RATE: 18 BRPM | HEART RATE: 92 BPM | OXYGEN SATURATION: 97 % | BODY MASS INDEX: 27.44 KG/M2

## 2024-12-03 DIAGNOSIS — C50.912 INVASIVE DUCTAL CARCINOMA OF BREAST, FEMALE, LEFT (H): Primary | ICD-10-CM

## 2024-12-03 DIAGNOSIS — R53.83 CHEMOTHERAPY-INDUCED FATIGUE: ICD-10-CM

## 2024-12-03 DIAGNOSIS — G62.0 CHEMOTHERAPY-INDUCED PERIPHERAL NEUROPATHY (H): ICD-10-CM

## 2024-12-03 DIAGNOSIS — T45.1X5A CHEMOTHERAPY-INDUCED FATIGUE: ICD-10-CM

## 2024-12-03 DIAGNOSIS — T45.1X5A CHEMOTHERAPY-INDUCED PERIPHERAL NEUROPATHY (H): ICD-10-CM

## 2024-12-03 LAB
ALBUMIN SERPL BCG-MCNC: 4.1 G/DL (ref 3.5–5.2)
ALP SERPL-CCNC: 80 U/L (ref 40–150)
ALT SERPL W P-5'-P-CCNC: 16 U/L (ref 0–50)
ANION GAP SERPL CALCULATED.3IONS-SCNC: 8 MMOL/L (ref 7–15)
AST SERPL W P-5'-P-CCNC: 27 U/L (ref 0–45)
BASOPHILS # BLD AUTO: 0 10E3/UL (ref 0–0.2)
BASOPHILS NFR BLD AUTO: 0 %
BILIRUB SERPL-MCNC: 0.7 MG/DL
BUN SERPL-MCNC: 3 MG/DL (ref 6–20)
CALCIUM SERPL-MCNC: 9 MG/DL (ref 8.8–10.4)
CHLORIDE SERPL-SCNC: 107 MMOL/L (ref 98–107)
CREAT SERPL-MCNC: 0.47 MG/DL (ref 0.51–0.95)
EGFRCR SERPLBLD CKD-EPI 2021: >90 ML/MIN/1.73M2
EOSINOPHIL # BLD AUTO: 0 10E3/UL (ref 0–0.7)
EOSINOPHIL NFR BLD AUTO: 0 %
ERYTHROCYTE [DISTWIDTH] IN BLOOD BY AUTOMATED COUNT: 15.2 % (ref 10–15)
GLUCOSE SERPL-MCNC: 130 MG/DL (ref 70–99)
HCO3 SERPL-SCNC: 24 MMOL/L (ref 22–29)
HCT VFR BLD AUTO: 26.4 % (ref 35–47)
HGB BLD-MCNC: 8.9 G/DL (ref 11.7–15.7)
IMM GRANULOCYTES # BLD: 0 10E3/UL
IMM GRANULOCYTES NFR BLD: 1 %
LYMPHOCYTES # BLD AUTO: 0.9 10E3/UL (ref 0.8–5.3)
LYMPHOCYTES NFR BLD AUTO: 30 %
MCH RBC QN AUTO: 32.8 PG (ref 26.5–33)
MCHC RBC AUTO-ENTMCNC: 33.7 G/DL (ref 31.5–36.5)
MCV RBC AUTO: 97 FL (ref 78–100)
MONOCYTES # BLD AUTO: 0.3 10E3/UL (ref 0–1.3)
MONOCYTES NFR BLD AUTO: 10 %
NEUTROPHILS # BLD AUTO: 1.7 10E3/UL (ref 1.6–8.3)
NEUTROPHILS NFR BLD AUTO: 59 %
NRBC # BLD AUTO: 0 10E3/UL
NRBC BLD AUTO-RTO: 1 /100
PLATELET # BLD AUTO: 261 10E3/UL (ref 150–450)
POTASSIUM SERPL-SCNC: 3.4 MMOL/L (ref 3.4–5.3)
PROT SERPL-MCNC: 6.7 G/DL (ref 6.4–8.3)
RBC # BLD AUTO: 2.71 10E6/UL (ref 3.8–5.2)
SODIUM SERPL-SCNC: 139 MMOL/L (ref 135–145)
WBC # BLD AUTO: 2.9 10E3/UL (ref 4–11)

## 2024-12-03 PROCEDURE — 99417 PROLNG OP E/M EACH 15 MIN: CPT

## 2024-12-03 PROCEDURE — 250N000011 HC RX IP 250 OP 636

## 2024-12-03 PROCEDURE — G2211 COMPLEX E/M VISIT ADD ON: HCPCS

## 2024-12-03 PROCEDURE — 85004 AUTOMATED DIFF WBC COUNT: CPT | Performed by: INTERNAL MEDICINE

## 2024-12-03 PROCEDURE — 99215 OFFICE O/P EST HI 40 MIN: CPT

## 2024-12-03 PROCEDURE — 82247 BILIRUBIN TOTAL: CPT

## 2024-12-03 PROCEDURE — G0463 HOSPITAL OUTPT CLINIC VISIT: HCPCS

## 2024-12-03 PROCEDURE — 36591 DRAW BLOOD OFF VENOUS DEVICE: CPT | Performed by: INTERNAL MEDICINE

## 2024-12-03 PROCEDURE — 82040 ASSAY OF SERUM ALBUMIN: CPT

## 2024-12-03 RX ORDER — HEPARIN SODIUM (PORCINE) LOCK FLUSH IV SOLN 100 UNIT/ML 100 UNIT/ML
SOLUTION INTRAVENOUS
Status: COMPLETED
Start: 2024-12-03 | End: 2024-12-03

## 2024-12-03 RX ADMIN — HEPARIN 500 UNITS: 100 SYRINGE at 11:06

## 2024-12-03 ASSESSMENT — PAIN SCALES - GENERAL: PAINLEVEL_OUTOF10: NO PAIN (0)

## 2024-12-03 NOTE — LETTER
12/3/2024      Kerry Mills  [address] [place] [address]      Dear Colleague,    Thank you for referring your patient, Kerry Mills, to the [facility] [place]. Please see a copy of my visit note below.    [facility] Hematology and Oncology Outpatient Progress Note    Patient: Kerry Mills  MRN: [id number]  Date of Service: Dec 3, 2024          Reason for Visit    Chief Complaint   Patient presents with     Oncology Clinic Visit     Invasive ductal carcinoma of breast, female, left (H)        Cancer Staging   No matching staging information was found for the patient.      Primary Hematologist/Oncologist: Dr. Varsha Adamson        Assessment/Plan  #.  Breast cancer, triple negative patient receiving neoadjuvant chemotherapy   Clinically stable, due for C#11 of 12 of weekly paclitaxel. She has tolerated the therapy well, however she has been struggling with worsening peripheral neuropathy symptoms. Discussed with Dr. Adamson and decided to stop treatment and eliminate the last 2 rounds of taxol to help prevent further worsening of neuropathy. Pt is planning for surgery so we will get an MR done and have her return to see Dr. hCoe in about 4 weeks. Pt will return following surgery/radiation for ongoing surveillance and for any further necessary treatment. Pt and daughter agreeable to this plan.     #.  Neutropenia  WBC today is 2.9, fairly stable for where she has been. ANC is adequate at 1.7 today.     #.  Neuropathy   Worsening neuropathy to hands, and feet. Hands are worsening to the point where she is dropping things inadvertently. Her legs feel mostly numb, but she is not having balance trouble and hasn't been falling. We will stop weekly chemotherapy to prevent further toxicity.       ______________________________________________________________________________    History of Present Illness/ Interval History    Ms. Kerry Mills  is a 59 year old patient who is seen today with denzel  " in follow up to breast cancer, and for consideration of next cycle of weekly taxol. She is feeling pretty good overall, though is struggling with eating due to poor appetite and taste changes. She is eating, but has to force herself. She is drinking plenty of water. She doesn't have much pain, but notes that the peripheral neuropathy symptoms are worsening. She has numbness to bilateral hands and is now starting to drop things inadvertently. She notes that when she stands too long, her legs feel heavy and as if there are things crawling on them. She denies trouble with balance and hasn't had any falls.       ECOG performance status   0- Fully active, without restriction        Pain  Pain Score: No Pain (0)    ROS  A 14 point review of systems was obtained.  Positive findings noted in the history.  The remainder of the review of system is otherwise negative.      Oncology History/Treatment  Diagnosis/Stage:   1) invasive ductal carcinoma of the breast ER negative NV negative HER2/kobe 2+, negative by FISH.  3.9 cm in maximum dimension on MRI.  Diagnosed July 2024    Treatment:  1) neoadjuvant AC started on 7/22/2024.  CarboTaxol and Pembro denied by insurance   2) neoadjuvant weekly paclitaxel started on 9/17/2024      Physical Exam    BP (!) 144/81   Pulse 92   Temp 97.9  F (36.6  C)   Resp 18   Ht 1.499 m (4' 11\")   Wt 61.7 kg (136 lb 1.6 oz)   SpO2 97%   BMI 27.49 kg/m      GENERAL: no acute distress. Cooperative in conversation.   HEENT: pupils are equal, round and reactive. Oral mucosa is moist and intact.  RESP:Chest symmetric. Regular respiratory rate. No stridor.  ABD: Nondistended, soft.  EXTREMITIES: No lower extremity edema.   NEURO: non focal. Alert and oriented x3.   PSYCH: within normal limits. No depression or anxiety.  SKIN: warm dry intact      Lab Results    Recent Results (from the past week)   Comprehensive metabolic panel   Result Value Ref Range    Sodium 139 135 - 145 mmol/L "    Potassium 3.4 3.4 - 5.3 mmol/L    Carbon Dioxide (CO2) 24 22 - 29 mmol/L    Anion Gap 8 7 - 15 mmol/L    Urea Nitrogen 3.0 (L) 6.0 - 20.0 mg/dL    Creatinine 0.47 (L) 0.51 - 0.95 mg/dL    GFR Estimate >90 >60 mL/min/1.73m2    Calcium 9.0 8.8 - 10.4 mg/dL    Chloride 107 98 - 107 mmol/L    Glucose 130 (H) 70 - 99 mg/dL    Alkaline Phosphatase 80 40 - 150 U/L    AST 27 0 - 45 U/L    ALT 16 0 - 50 U/L    Protein Total 6.7 6.4 - 8.3 g/dL    Albumin 4.1 3.5 - 5.2 g/dL    Bilirubin Total 0.7 <=1.2 mg/dL   CBC with platelets and differential   Result Value Ref Range    WBC Count 2.9 (L) 4.0 - 11.0 10e3/uL    RBC Count 2.71 (L) 3.80 - 5.20 10e6/uL    Hemoglobin 8.9 (L) 11.7 - 15.7 g/dL    Hematocrit 26.4 (L) 35.0 - 47.0 %    MCV 97 78 - 100 fL    MCH 32.8 26.5 - 33.0 pg    MCHC 33.7 31.5 - 36.5 g/dL    RDW 15.2 (H) 10.0 - 15.0 %    Platelet Count 261 150 - 450 10e3/uL    % Neutrophils 59 %    % Lymphocytes 30 %    % Monocytes 10 %    % Eosinophils 0 %    % Basophils 0 %    % Immature Granulocytes 1 %    NRBCs per 100 WBC 1 (H) <1 /100    Absolute Neutrophils 1.7 1.6 - 8.3 10e3/uL    Absolute Lymphocytes 0.9 0.8 - 5.3 10e3/uL    Absolute Monocytes 0.3 0.0 - 1.3 10e3/uL    Absolute Eosinophils 0.0 0.0 - 0.7 10e3/uL    Absolute Basophils 0.0 0.0 - 0.2 10e3/uL    Absolute Immature Granulocytes 0.0 <=0.4 10e3/uL    Absolute NRBCs 0.0 10e3/uL     I reviewed the above labs today.  Imaging    Chest XR,  PA & LAT    Result Date: 11/6/2024  EXAM: XR CHEST 2 VIEWS LOCATION: [facility] DATE: 11/6/2024 INDICATION: Nonspecific fever, breast cancer chemotherapy patient. COMPARISON: 9.3.2024.     IMPRESSION: Mild streaky bibasilar atelectasis or scarring. Remaining lungs are clear. No pleural effusion. Stable cardiomediastinal silhouette and portacatheter.         Billing  Total time 55 minutes, to include face to face visit, review of EMR, ordering, documentation and coordination of care on date of service.  "The longitudinal plan of care for the diagnosis(es)/condition(s) as documented were addressed during this visit. Due to the added complexity in care, I will continue to support Kerry in the subsequent management and with ongoing continuity of care.    Signed by: NEW Yost CNP        Chart documentation with Dragon Voice recognition Software. Although reviewed after completion, some words and grammatical errors may remain.    Oncology Rooming Note    December 3, 2024 10:14 AM   Kerry Mills is a 59 year old female who presents for:    Chief Complaint   Patient presents with     Oncology Clinic Visit     Invasive ductal carcinoma of breast, female, left (H)     Initial Vitals: BP (!) 144/81   Pulse 92   Temp 97.9  F (36.6  C)   Resp 18   Ht 1.499 m (4' 11\")   Wt 61.7 kg (136 lb 1.6 oz)   SpO2 97%   BMI 27.49 kg/m   Estimated body mass index is 27.49 kg/m  as calculated from the following:    Height as of this encounter: 1.499 m (4' 11\").    Weight as of this encounter: 61.7 kg (136 lb 1.6 oz). Body surface area is 1.6 meters squared.  No Pain (0) Comment: Data Unavailable   No LMP recorded. Patient is postmenopausal.  Allergies reviewed: Yes  Medications reviewed: Yes    Medications: Medication refills not needed today.  Pharmacy name entered into [facility]:    [facility] - [place], [place] - [address] [id number] [address] AT [facility] [address]    Frailty Screening:   Is the patient here for a new oncology consult visit in cancer care? 2. No      Clinical concerns: None      Malu Cuellar LPN                 Again, thank you for allowing me to participate in the care of your patient.        Sincerely,        NEW Yost CNP  "

## 2024-12-03 NOTE — PROGRESS NOTES
"Oncology Rooming Note    December 3, 2024 10:14 AM   Kerry Mills is a 59 year old female who presents for:    Chief Complaint   Patient presents with    Oncology Clinic Visit     Invasive ductal carcinoma of breast, female, left (H)     Initial Vitals: BP (!) 144/81   Pulse 92   Temp 97.9  F (36.6  C)   Resp 18   Ht 1.499 m (4' 11\")   Wt 61.7 kg (136 lb 1.6 oz)   SpO2 97%   BMI 27.49 kg/m   Estimated body mass index is 27.49 kg/m  as calculated from the following:    Height as of this encounter: 1.499 m (4' 11\").    Weight as of this encounter: 61.7 kg (136 lb 1.6 oz). Body surface area is 1.6 meters squared.  No Pain (0) Comment: Data Unavailable   No LMP recorded. Patient is postmenopausal.  Allergies reviewed: Yes  Medications reviewed: Yes    Medications: Medication refills not needed today.  Pharmacy name entered into [facility]:    [facility] - [place], [place] - [address] [place] [address] [id number] [address] AT [facility] [address]    Frailty Screening:   Is the patient here for a new oncology consult visit in cancer care? 2. No      Clinical concerns: None      Malu Cuellar LPN             "

## 2024-12-03 NOTE — PROGRESS NOTES
[facility] Hematology and Oncology Outpatient Progress Note    Patient: Kerry Mills  MRN: [id number]  Date of Service: Dec 3, 2024          Reason for Visit    Chief Complaint   Patient presents with    Oncology Clinic Visit     Invasive ductal carcinoma of breast, female, left (H)        Cancer Staging   No matching staging information was found for the patient.      Primary Hematologist/Oncologist: Dr. Varsha Adamson        Assessment/Plan  #.  Breast cancer, triple negative patient receiving neoadjuvant chemotherapy   Clinically stable, due for C#11 of 12 of weekly paclitaxel. She has tolerated the therapy well, however she has been struggling with worsening peripheral neuropathy symptoms. Discussed with Dr. Adamson and decided to stop treatment and eliminate the last 2 rounds of taxol to help prevent further worsening of neuropathy. Pt is planning for surgery so we will get an MR done and have her return to see Dr. Choe in about 4 weeks. Pt will return following surgery/radiation for ongoing surveillance and for any further necessary treatment. Pt and daughter agreeable to this plan.     #.  Neutropenia  WBC today is 2.9, fairly stable for where she has been. ANC is adequate at 1.7 today.     #.  Neuropathy   Worsening neuropathy to hands, and feet. Hands are worsening to the point where she is dropping things inadvertently. Her legs feel mostly numb, but she is not having balance trouble and hasn't been falling. We will stop weekly chemotherapy to prevent further toxicity.       ______________________________________________________________________________    History of Present Illness/ Interval History    Ms. Kerry Mills  is a 59 year old patient who [place] [facility]  [place] to breast cancer, and for consideration of next cycle of weekly taxol. She is feeling pretty good overall, though is struggling with eating due to poor appetite and taste changes. She is eating, but has to  "force herself. She is drinking plenty of water. She doesn't have much pain, but notes that the peripheral neuropathy symptoms are worsening. She has numbness to bilateral hands and is now starting to drop things inadvertently. She notes that when she stands too long, her legs feel heavy and as if there are things crawling on them. She denies trouble with balance and hasn't had any falls.       ECOG performance status   0- Fully active, without restriction        Pain  Pain Score: No Pain (0)    ROS  A 14 point review of systems was obtained.  Positive findings noted in the history.  The remainder of the review of system is otherwise negative.      Oncology History/Treatment  Diagnosis/Stage:   1) invasive ductal carcinoma of the breast ER negative [place] negative HER2/kobe 2+, negative by FISH.  3.9 cm in maximum dimension on MRI.  Diagnosed July 2024    Treatment:  1) neoadjuvant AC started on 7/22/2024.  CarboTaxol and Pembro denied by insurance   2) neoadjuvant weekly paclitaxel started on 9/17/2024      Physical Exam    BP (!) 144/81   Pulse 92   Temp 97.9  F (36.6  C)   Resp 18   Ht 1.499 m (4' 11\")   Wt 61.7 kg (136 lb 1.6 oz)   SpO2 97%   BMI 27.49 kg/m      GENERAL: no acute distress. Cooperative in conversation.   HEENT: pupils are equal, round and reactive. Oral mucosa is moist and intact.  RESP:Chest symmetric. Regular respiratory rate. No stridor.  ABD: Nondistended, soft.  EXTREMITIES: No lower extremity edema.   NEURO: non focal. Alert and oriented x3.   PSYCH: within normal limits. No depression or anxiety.  SKIN: warm dry intact      Lab Results    Recent Results (from the past week)   Comprehensive metabolic panel   Result Value Ref Range    Sodium 139 135 - 145 mmol/L    Potassium 3.4 3.4 - 5.3 mmol/L    Carbon Dioxide (CO2) 24 22 - 29 mmol/L    Anion Gap 8 7 - 15 mmol/L    Urea Nitrogen 3.0 (L) 6.0 - 20.0 mg/dL    Creatinine 0.47 (L) 0.51 - 0.95 mg/dL    GFR Estimate >90 >60 mL/min/1.73m2    " Calcium 9.0 8.8 - 10.4 mg/dL    Chloride 107 98 - 107 mmol/L    Glucose 130 (H) 70 - 99 mg/dL    Alkaline Phosphatase 80 40 - 150 U/L    AST 27 0 - 45 U/L    ALT 16 0 - 50 U/L    Protein Total 6.7 6.4 - 8.3 g/dL    Albumin 4.1 3.5 - 5.2 g/dL    Bilirubin Total 0.7 <=1.2 mg/dL   CBC with platelets and differential   Result Value Ref Range    WBC Count 2.9 (L) 4.0 - 11.0 10e3/uL    RBC Count 2.71 (L) 3.80 - 5.20 10e6/uL    Hemoglobin 8.9 (L) 11.7 - 15.7 g/dL    Hematocrit 26.4 (L) 35.0 - 47.0 %    MCV 97 78 - 100 fL    MCH 32.8 26.5 - 33.0 pg    MCHC 33.7 31.5 - 36.5 g/dL    RDW 15.2 (H) 10.0 - 15.0 %    Platelet Count 261 150 - 450 10e3/uL    % Neutrophils 59 %    % Lymphocytes 30 %    % Monocytes 10 %    % Eosinophils 0 %    % Basophils 0 %    % Immature Granulocytes 1 %    NRBCs per 100 WBC 1 (H) <1 /100    Absolute Neutrophils 1.7 1.6 - 8.3 10e3/uL    Absolute Lymphocytes 0.9 0.8 - 5.3 10e3/uL    Absolute Monocytes 0.3 0.0 - 1.3 10e3/uL    Absolute Eosinophils 0.0 0.0 - 0.7 10e3/uL    Absolute Basophils 0.0 0.0 - 0.2 10e3/uL    Absolute Immature Granulocytes 0.0 <=0.4 10e3/uL    Absolute NRBCs 0.0 10e3/uL     I reviewed the above labs today.  Imaging    Chest XR,  PA & LAT    Result Date: 11/6/2024  EXAM: XR CHEST 2 VIEWS LOCATION: [facility] DATE: 11/6/2024 INDICATION: Nonspecific fever, breast cancer chemotherapy patient. COMPARISON: 9.3.2024.     IMPRESSION: Mild streaky bibasilar atelectasis or scarring. Remaining lungs are clear. No pleural effusion. Stable cardiomediastinal silhouette and portacatheter.         Billing  Total time 55 minutes, to include face to face visit, review of EMR, ordering, documentation and coordination of care on date of service. The longitudinal plan of care for the diagnosis(es)/condition(s) as documented were addressed during this visit. Due to the added complexity in care, I will continue to support Kerry in the subsequent management and with ongoing  continuity of care.    Signed by: NEW Yost CNP        Chart documentation with Dragon Voice recognition Software. Although reviewed after completion, some words and grammatical errors may remain.

## 2024-12-05 ENCOUNTER — PATIENT OUTREACH (OUTPATIENT)
Dept: ONCOLOGY | Facility: HOSPITAL | Age: 59
End: 2024-12-05
Payer: COMMERCIAL

## 2024-12-05 NOTE — PROGRESS NOTES
[facility]: Cancer Care                                                                                        Late Entry,  Met with Kerry and her daughter, Ginny, when she was at clinic for follow up with Brianna Mancilla CNP. Her next two cycles of chemo have been cancelled due to increased neuropathy. She will  proceed with Breast MRI on 12/18 and will have surgical follow up with Dr. Winters on 12/31. Will watch for the outcome of these apts and facilitate her future follow up with Dr. Adamson afterward. She shared that she is very happy to be done with chemo at this time and is ready to move forward with surgery. Support and encouragement provided.    Signature:  Marycruz Tate RN

## 2024-12-18 ENCOUNTER — HOSPITAL ENCOUNTER (OUTPATIENT)
Dept: MRI IMAGING | Facility: HOSPITAL | Age: 59
Discharge: HOME | End: 2024-12-18
Payer: COMMERCIAL

## 2024-12-18 DIAGNOSIS — C50.912 INVASIVE DUCTAL CARCINOMA OF BREAST, FEMALE, LEFT (H): ICD-10-CM

## 2024-12-18 PROCEDURE — 77049 MRI BREAST C-+ W/CAD BI: CPT

## 2024-12-18 PROCEDURE — 255N000002 HC RX 255 OP 636

## 2024-12-18 PROCEDURE — T1013 SIGN LANG/ORAL INTERPRETER: HCPCS | Performed by: INTERPRETER

## 2024-12-18 PROCEDURE — A9585 GADOBUTROL INJECTION: HCPCS

## 2024-12-18 RX ORDER — GADOBUTROL 604.72 MG/ML
6 INJECTION INTRAVENOUS ONCE
Status: COMPLETED | OUTPATIENT
Start: 2024-12-18 | End: 2024-12-18

## 2024-12-18 RX ADMIN — GADOBUTROL 6 ML: 604.72 INJECTION INTRAVENOUS at 16:13

## 2024-12-19 ENCOUNTER — PATIENT OUTREACH (OUTPATIENT)
Dept: ONCOLOGY | Facility: HOSPITAL | Age: 59
End: 2024-12-19
Payer: COMMERCIAL

## 2024-12-19 NOTE — PROGRESS NOTES
[facility]/Voicemail    Clinical Data: Care Coordinator Outreach    Outreach attempted x 1.  Left message on patient's voicemail with call back information and requested return call.    Message to Marycruz Tate RNCC to follow up    Of note: Brianna Mancilla CNP, able to review MRI. Brianna notes noting complete treatment response. We will proceed with surgery consult as planned. Brianna asks that patient be updated of imaging results.    Olivia Woo RN  12/19/24  5:17 PM

## 2024-12-30 ENCOUNTER — VIRTUAL VISIT (OUTPATIENT)
Dept: INTERPRETER SERVICES | Facility: CLINIC | Age: 59
End: 2024-12-30

## 2024-12-30 ENCOUNTER — ANESTHESIA EVENT (OUTPATIENT)
Dept: SURGERY | Facility: CLINIC | Age: 59
End: 2024-12-30
Payer: COMMERCIAL

## 2024-12-30 ENCOUNTER — NURSE TRIAGE (OUTPATIENT)
Dept: ONCOLOGY | Facility: HOSPITAL | Age: 59
End: 2024-12-30
Payer: COMMERCIAL

## 2024-12-30 ENCOUNTER — APPOINTMENT (OUTPATIENT)
Dept: CT IMAGING | Facility: CLINIC | Age: 59
End: 2024-12-30
Attending: EMERGENCY MEDICINE
Payer: COMMERCIAL

## 2024-12-30 ENCOUNTER — HOSPITAL ENCOUNTER (INPATIENT)
Facility: CLINIC | Age: 59
LOS: 1 days | Discharge: HOME | End: 2024-12-31
Attending: EMERGENCY MEDICINE | Admitting: HOSPITALIST
Payer: COMMERCIAL

## 2024-12-30 DIAGNOSIS — K62.5 BRIGHT RED BLOOD PER RECTUM: Primary | ICD-10-CM

## 2024-12-30 DIAGNOSIS — E87.6 HYPOKALEMIA: ICD-10-CM

## 2024-12-30 PROBLEM — M62.81 GENERALIZED MUSCLE WEAKNESS: Status: ACTIVE | Noted: 2024-12-30

## 2024-12-30 LAB
ABO + RH BLD: NORMAL
ADV 40+41 DNA STL QL NAA+NON-PROBE: NEGATIVE
ALBUMIN SERPL BCG-MCNC: 3.9 G/DL (ref 3.5–5.2)
ALP SERPL-CCNC: 89 U/L (ref 40–150)
ALT SERPL W P-5'-P-CCNC: 22 U/L (ref 0–50)
ANION GAP SERPL CALCULATED.3IONS-SCNC: 11 MMOL/L (ref 7–15)
APTT PPP: 30 SECONDS (ref 22–38)
AST SERPL W P-5'-P-CCNC: 41 U/L (ref 0–45)
ASTRO TYP 1-8 RNA STL QL NAA+NON-PROBE: NEGATIVE
BASOPHILS # BLD AUTO: 0 10E3/UL (ref 0–0.2)
BASOPHILS NFR BLD AUTO: 0 %
BILIRUB SERPL-MCNC: 0.4 MG/DL
BLD GP AB SCN SERPL QL: NEGATIVE
BUN SERPL-MCNC: 6.2 MG/DL (ref 6–20)
C CAYETANENSIS DNA STL QL NAA+NON-PROBE: NEGATIVE
CALCIUM SERPL-MCNC: 9.3 MG/DL (ref 8.8–10.4)
CAMPYLOBACTER DNA SPEC NAA+PROBE: NEGATIVE
CHLORIDE SERPL-SCNC: 104 MMOL/L (ref 98–107)
CREAT SERPL-MCNC: 0.5 MG/DL (ref 0.51–0.95)
CRYPTOSP DNA STL QL NAA+NON-PROBE: NEGATIVE
E COLI O157 DNA STL QL NAA+NON-PROBE: ABNORMAL
E HISTOLYT DNA STL QL NAA+NON-PROBE: NEGATIVE
EAEC ASTA GENE ISLT QL NAA+PROBE: NEGATIVE
EC STX1+STX2 GENES STL QL NAA+NON-PROBE: NEGATIVE
EGFRCR SERPLBLD CKD-EPI 2021: >90 ML/MIN/1.73M2
EOSINOPHIL # BLD AUTO: 0.6 10E3/UL (ref 0–0.7)
EOSINOPHIL NFR BLD AUTO: 6 %
EPEC EAE GENE STL QL NAA+NON-PROBE: NEGATIVE
ERYTHROCYTE [DISTWIDTH] IN BLOOD BY AUTOMATED COUNT: 14.6 % (ref 10–15)
ETEC LTA+ST1A+ST1B TOX ST NAA+NON-PROBE: NEGATIVE
G LAMBLIA DNA STL QL NAA+NON-PROBE: NEGATIVE
GLUCOSE SERPL-MCNC: 142 MG/DL (ref 70–99)
HCO3 SERPL-SCNC: 25 MMOL/L (ref 22–29)
HCT VFR BLD AUTO: 32.4 % (ref 35–47)
HGB BLD-MCNC: 10.6 G/DL (ref 11.7–15.7)
HGB BLD-MCNC: 10.9 G/DL (ref 11.7–15.7)
IMM GRANULOCYTES # BLD: 0.1 10E3/UL
IMM GRANULOCYTES NFR BLD: 1 %
INR PPP: 1.01 (ref 0.85–1.15)
LYMPHOCYTES # BLD AUTO: 1.7 10E3/UL (ref 0.8–5.3)
LYMPHOCYTES NFR BLD AUTO: 18 %
MAGNESIUM SERPL-MCNC: 1.8 MG/DL (ref 1.7–2.3)
MCH RBC QN AUTO: 31.3 PG (ref 26.5–33)
MCHC RBC AUTO-ENTMCNC: 33.6 G/DL (ref 31.5–36.5)
MCV RBC AUTO: 93 FL (ref 78–100)
MONOCYTES # BLD AUTO: 1.1 10E3/UL (ref 0–1.3)
MONOCYTES NFR BLD AUTO: 11 %
NEUTROPHILS # BLD AUTO: 6.1 10E3/UL (ref 1.6–8.3)
NEUTROPHILS NFR BLD AUTO: 64 %
NOROVIRUS GI+II RNA STL QL NAA+NON-PROBE: POSITIVE
NRBC # BLD AUTO: 0 10E3/UL
NRBC BLD AUTO-RTO: 0 /100
P SHIGELLOIDES DNA STL QL NAA+NON-PROBE: NEGATIVE
PLATELET # BLD AUTO: 182 10E3/UL (ref 150–450)
POTASSIUM SERPL-SCNC: 3 MMOL/L (ref 3.4–5.3)
POTASSIUM SERPL-SCNC: 3.9 MMOL/L (ref 3.4–5.3)
PROT SERPL-MCNC: 6.7 G/DL (ref 6.4–8.3)
RBC # BLD AUTO: 3.48 10E6/UL (ref 3.8–5.2)
RVA RNA STL QL NAA+NON-PROBE: NEGATIVE
SALMONELLA SP RPOD STL QL NAA+PROBE: NEGATIVE
SAPO I+II+IV+V RNA STL QL NAA+NON-PROBE: NEGATIVE
SHIGELLA SP+EIEC IPAH ST NAA+NON-PROBE: NEGATIVE
SODIUM SERPL-SCNC: 140 MMOL/L (ref 135–145)
SPECIMEN EXP DATE BLD: NORMAL
V CHOLERAE DNA SPEC QL NAA+PROBE: NEGATIVE
VIBRIO DNA SPEC NAA+PROBE: NEGATIVE
WBC # BLD AUTO: 9.4 10E3/UL (ref 4–11)
Y ENTEROCOL DNA STL QL NAA+PROBE: NEGATIVE

## 2024-12-30 PROCEDURE — 85610 PROTHROMBIN TIME: CPT | Performed by: EMERGENCY MEDICINE

## 2024-12-30 PROCEDURE — 86900 BLOOD TYPING SEROLOGIC ABO: CPT | Performed by: EMERGENCY MEDICINE

## 2024-12-30 PROCEDURE — 250N000013 HC RX MED GY IP 250 OP 250 PS 637: Performed by: EMERGENCY MEDICINE

## 2024-12-30 PROCEDURE — 80053 COMPREHEN METABOLIC PANEL: CPT | Performed by: EMERGENCY MEDICINE

## 2024-12-30 PROCEDURE — 99223 1ST HOSP IP/OBS HIGH 75: CPT | Mod: FS | Performed by: HOSPITALIST

## 2024-12-30 PROCEDURE — 83735 ASSAY OF MAGNESIUM: CPT

## 2024-12-30 PROCEDURE — 36415 COLL VENOUS BLD VENIPUNCTURE: CPT

## 2024-12-30 PROCEDURE — 86850 RBC ANTIBODY SCREEN: CPT | Performed by: EMERGENCY MEDICINE

## 2024-12-30 PROCEDURE — 258N000003 HC RX IP 258 OP 636

## 2024-12-30 PROCEDURE — 85730 THROMBOPLASTIN TIME PARTIAL: CPT | Performed by: EMERGENCY MEDICINE

## 2024-12-30 PROCEDURE — 87507 IADNA-DNA/RNA PROBE TQ 12-25: CPT | Performed by: EMERGENCY MEDICINE

## 2024-12-30 PROCEDURE — 84132 ASSAY OF SERUM POTASSIUM: CPT | Performed by: HOSPITALIST

## 2024-12-30 PROCEDURE — 85018 HEMOGLOBIN: CPT

## 2024-12-30 PROCEDURE — 36415 COLL VENOUS BLD VENIPUNCTURE: CPT | Performed by: EMERGENCY MEDICINE

## 2024-12-30 PROCEDURE — 36415 COLL VENOUS BLD VENIPUNCTURE: CPT | Performed by: HOSPITALIST

## 2024-12-30 PROCEDURE — 74177 CT ABD & PELVIS W/CONTRAST: CPT

## 2024-12-30 PROCEDURE — 99285 EMERGENCY DEPT VISIT HI MDM: CPT | Mod: 25

## 2024-12-30 PROCEDURE — 85025 COMPLETE CBC W/AUTO DIFF WBC: CPT | Performed by: EMERGENCY MEDICINE

## 2024-12-30 PROCEDURE — 250N000011 HC RX IP 250 OP 636: Performed by: EMERGENCY MEDICINE

## 2024-12-30 PROCEDURE — 120N000001 HC R&B MED SURG/OB

## 2024-12-30 PROCEDURE — 99207 PR APP CREDIT; MD BILLING SHARED VISIT: CPT

## 2024-12-30 PROCEDURE — 250N000013 HC RX MED GY IP 250 OP 250 PS 637: Performed by: PHYSICIAN ASSISTANT

## 2024-12-30 RX ORDER — MAGNESIUM CARB/ALUMINUM HYDROX 105-160MG
296 TABLET,CHEWABLE ORAL ONCE
Status: COMPLETED | OUTPATIENT
Start: 2024-12-31 | End: 2024-12-31

## 2024-12-30 RX ORDER — LIDOCAINE 40 MG/G
CREAM TOPICAL
Status: CANCELLED | OUTPATIENT
Start: 2024-12-30

## 2024-12-30 RX ORDER — SODIUM CHLORIDE 9 MG/ML
INJECTION, SOLUTION INTRAVENOUS CONTINUOUS
Status: ACTIVE | OUTPATIENT
Start: 2024-12-30 | End: 2024-12-31

## 2024-12-30 RX ORDER — BISACODYL 5 MG
10 TABLET, DELAYED RELEASE (ENTERIC COATED) ORAL ONCE
Status: COMPLETED | OUTPATIENT
Start: 2024-12-30 | End: 2024-12-30

## 2024-12-30 RX ORDER — ONDANSETRON 2 MG/ML
4 INJECTION INTRAMUSCULAR; INTRAVENOUS
Status: CANCELLED | OUTPATIENT
Start: 2024-12-30

## 2024-12-30 RX ORDER — LIDOCAINE 40 MG/G
CREAM TOPICAL
Status: DISCONTINUED | OUTPATIENT
Start: 2024-12-30 | End: 2024-12-31 | Stop reason: HOSPADM

## 2024-12-30 RX ORDER — POLYETHYLENE GLYCOL 3350 17 G/17G
238 POWDER, FOR SOLUTION ORAL ONCE
Status: COMPLETED | OUTPATIENT
Start: 2024-12-30 | End: 2024-12-30

## 2024-12-30 RX ORDER — ACETAMINOPHEN 650 MG/1
650 SUPPOSITORY RECTAL EVERY 4 HOURS PRN
Status: DISCONTINUED | OUTPATIENT
Start: 2024-12-30 | End: 2024-12-31 | Stop reason: HOSPADM

## 2024-12-30 RX ORDER — CALCIUM CARBONATE 500 MG/1
1000 TABLET, CHEWABLE ORAL 4 TIMES DAILY PRN
Status: DISCONTINUED | OUTPATIENT
Start: 2024-12-30 | End: 2024-12-31 | Stop reason: HOSPADM

## 2024-12-30 RX ORDER — IOPAMIDOL 755 MG/ML
90 INJECTION, SOLUTION INTRAVASCULAR ONCE
Status: COMPLETED | OUTPATIENT
Start: 2024-12-30 | End: 2024-12-30

## 2024-12-30 RX ORDER — PROCHLORPERAZINE MALEATE 10 MG
10 TABLET ORAL EVERY 6 HOURS PRN
Status: DISCONTINUED | OUTPATIENT
Start: 2024-12-30 | End: 2024-12-31 | Stop reason: HOSPADM

## 2024-12-30 RX ORDER — POTASSIUM CHLORIDE 1500 MG/1
40 TABLET, EXTENDED RELEASE ORAL ONCE
Status: COMPLETED | OUTPATIENT
Start: 2024-12-30 | End: 2024-12-30

## 2024-12-30 RX ORDER — ONDANSETRON 2 MG/ML
4 INJECTION INTRAMUSCULAR; INTRAVENOUS EVERY 6 HOURS PRN
Status: DISCONTINUED | OUTPATIENT
Start: 2024-12-30 | End: 2024-12-31 | Stop reason: HOSPADM

## 2024-12-30 RX ORDER — ALBUTEROL SULFATE 90 UG/1
2 INHALANT RESPIRATORY (INHALATION) EVERY 6 HOURS PRN
Status: DISCONTINUED | OUTPATIENT
Start: 2024-12-30 | End: 2024-12-31 | Stop reason: HOSPADM

## 2024-12-30 RX ORDER — ACETAMINOPHEN 325 MG/1
650 TABLET ORAL EVERY 4 HOURS PRN
Status: DISCONTINUED | OUTPATIENT
Start: 2024-12-30 | End: 2024-12-31 | Stop reason: HOSPADM

## 2024-12-30 RX ORDER — ONDANSETRON 4 MG/1
4 TABLET, ORALLY DISINTEGRATING ORAL EVERY 6 HOURS PRN
Status: DISCONTINUED | OUTPATIENT
Start: 2024-12-30 | End: 2024-12-31 | Stop reason: HOSPADM

## 2024-12-30 RX ADMIN — POTASSIUM CHLORIDE 40 MEQ: 1500 TABLET, EXTENDED RELEASE ORAL at 12:33

## 2024-12-30 RX ADMIN — POLYETHYLENE GLYCOL 3350 238 G: 17 POWDER, FOR SOLUTION ORAL at 18:33

## 2024-12-30 RX ADMIN — BISACODYL 10 MG: 5 TABLET, COATED ORAL at 17:04

## 2024-12-30 RX ADMIN — IOPAMIDOL 90 ML: 755 INJECTION, SOLUTION INTRAVENOUS at 11:56

## 2024-12-30 RX ADMIN — SODIUM CHLORIDE: 9 INJECTION, SOLUTION INTRAVENOUS at 17:02

## 2024-12-30 ASSESSMENT — ACTIVITIES OF DAILY LIVING (ADL)
ADLS_ACUITY_SCORE: 50

## 2024-12-30 ASSESSMENT — COLUMBIA-SUICIDE SEVERITY RATING SCALE - C-SSRS
6. HAVE YOU EVER DONE ANYTHING, STARTED TO DO ANYTHING, OR PREPARED TO DO ANYTHING TO END YOUR LIFE?: NO
2. HAVE YOU ACTUALLY HAD ANY THOUGHTS OF KILLING YOURSELF IN THE PAST MONTH?: NO
1. IN THE PAST MONTH, HAVE YOU WISHED YOU WERE DEAD OR WISHED YOU COULD GO TO SLEEP AND NOT WAKE UP?: NO

## 2024-12-30 NOTE — H&P
"[facility]    History and Physical - Hospitalist Service       Date of Admission:  12/30/2024    Assessment & Plan      Kerry Mills is a 59 year old female with PMHx significant for left breast cancer (diagnosed 7/2024) s/p neoadjuvant chemotherapy and pending surgery/radiation, hyperlipidemia, asthma, chronic polyneuropathy, chronic constipation who was  admitted on 12/30/2024 for further evaluation of acute lower GI bleeding with GI consultation and plan for colonoscopy tomorrow.    ED Course: Mildly tachycardic with HR 100s, otherwise hemodynamically stable, afebrile. Lab workup is remarkable for K 3.0, Cr 0.50, Hgb 10.9. LFT unremarkable. INR 1.01. PTT 30. Enteric panel in process. CT A/P with contrast demonstrates \"Heterogeneous hyperdensity versus hypervascularity along the distal rectum, indeterminate for mass versus stool versus retained blood products. Remaining bowel grossly unremarkable. No obstruction or acute inflammation. Right adnexal cystic structure. Adnexal or ovarian cyst is favored.\" Given 40 mEq PO potassium replacement. ED Provider discussed with [facility] who recommended NPO with bowel prep this evening for colonoscopy tomorrow. [facility] provider also recommended CTA A/P if patient is passing large volumes of BRBPR.      Acute lower GI bleed with anemia  Presents with bright red blood per rectum since evening of 12/28/2024 with associated anorectal pain with BMs. Noted to have BM with ~400 mL bright red blood with minimal stool present in ED. Hgb 10.9 on arrival, previous baseline around 9s. Patient is mildly tachycardic but otherwise hemodynamically stable at this time.  Of note, patient was previously seen by [facility] in 1/2024 for painless mild rectal bleeding x 1 month, evaluated with colonoscopy in 4/2024 which showed colon polyps and hemorrhoids per report.  - [facility] consulted, appreciate recommendations - ordered bowel prep for tonight with plan for colonoscopy tomorrow  - " "Continuous IV NS at 75 mL/hr overnight  - Recheck Hgb q8h  - Transfuse if Hgb <7 or if symptomatic  - Follow stool studies - in process  - Obtain CTA A/P if patient passes significant volumes of BRBPR this evening  - AM labs: CBC, BMP    Acute hypokalemia  K 3.0 on arrival. Given 40 mEq PO potassium in ED.  - Potassium replacement protocol    Generalized weakness  Peripheral neuropathy  Numbness present in bilateral hands and feet secondary to recent chemotherapy. Patient also reports increased weakness and some difficulty walking due to foot numbness.  - PT evaluation and treatment    Invasive ductal carcinoma of left breast, triple negative, stage II - Diagnosed 7/2024. Follows with [facility] [place]. Received neoadjuvant chemotherapy x 10 cycles, which she elected to stop after last dose in late 11/2024 due to worsening peripheral neuropathy symptoms. Planning for surgery/radiation.    Moderate persistent asthma  Not in acute exacerbation, breathing comfortably on room air.  - Resume PRN albuterol inhaler        Diet: Clear Liquid Diet    DVT Prophylaxis: Pneumatic Compression Devices  Mcintosh Catheter: Not present  Lines: PRESENT             Cardiac Monitoring: None  Code Status: No CPR- Do NOT Intubate    Clinically Significant Risk Factors Present on Admission        # Hypokalemia: Lowest K = 3 mmol/L in last 2 days, will replace as needed                   # Anemia: based on hgb <11  # Anemia: based on hgb <11       # Overweight: Estimated body mass index is 26.86 kg/m  as calculated from the following:    Height as of this encounter: 1.499 m (4' 11\").    Weight as of this encounter: 60.3 kg (133 lb).       # Asthma: noted on problem list        Disposition Plan     Medically Ready for Discharge: Anticipated in 2-4 Days         The patient's care was discussed with the Attending Physician, Dr. Serge Yan .    Ludmila Montoya PA-C  Hospitalist Service  [facility]  Securely " message with [employer] (more info)  Text page via [facility] Paging/Directory     ______________________________________________________________________    Chief Complaint   Bright red blood per rectum    History is obtained from the patient, patient's daughter    Hmsatish video  utilized during visit    History of Present Illness   Kerry Mills is a 59 year old female with PMHx significant for left breast cancer (diagnosed 7/2024) s/p neoadjuvant chemotherapy and pending surgery/radiation, hyperlipidemia, asthma, chronic polyneuropathy, chronic constipation who presented to the ED for evaluation of bright red blood per rectum.    Patient reports onset of passage of bright red blood per rectum on Saturday evening. Bowel movements did have some soft stools but were mostly large volumes of blood. She also reports associated anal/rectal pain with BM and has had some abdominal tightness and discomfort for the past week. She does report feeling weak with poor appetite due to the abdominal tightness. She denies nausea or vomiting, fever/chills, lightheadedness, dizziness. She denies alcohol or drug use. She denies personal or family history of GI cancers or bowel diseases.    Patient reports mild GI bleeding in 1/2024 and was evaluated with a colonoscopy in 4/2024 with [facility], which showed some polyps and hemorrhoids. The bleeding went away a couple of days following the colonoscopy. However, this past bleeding was nowhere near as much as her current episode.     Patient lives with two of her children. She does not use any assistive devices for ambulation. She endorses generalized weakness and neuropathy in her hands and feet from chemo, which she last received in the end of 11/2024.    Of note, patient was recently admitted to [facility] from 9/29-9/30/2024 for treatment of sepsis in the setting of COVID-19 infection with pneumonia and asthma exacerbation, managed with steroids, IV antibiotics, Paxlovid, and DuoNeb.  Discharged home with Paxlovid, cefpodoxime, and azithromycin.      Past Medical History    Past Medical History:   Diagnosis Date    Asthma     Chronic constipation        Past Surgical History   Past Surgical History:   Procedure Laterality Date    INCISION AND DRAINAGE OF WOUND N/A 11/27/2020    Procedure: INCISION AND DRAINAGE, NECK;  Surgeon: Arnel James MD;  [place] [facility] [place]    INSERT PORT VASCULAR ACCESS Right 7/9/2024    Procedure: INSERTION,  RIGHT VASCULAR ACCESS PORT;  Surgeon: Iwona Choe DO;  [facility]: [facility]    PICC INSERTION - TRIPLE LUMEN  11/26/2020         TRACHEOSTOMY N/A 11/26/2020    Procedure: EMERGENT INTUBATION IN OR WITH CREATION, TRACHEOSTOMY;  Surgeon: Dennise Justice MD;  [place] [facility]  Service: General       Prior to Admission Medications   Prior to Admission Medications   Prescriptions Last Dose Informant Patient Reported? Taking?   acetaminophen (TYLENOL) 325 MG tablet 12/29/2024 at  6:00 PM  No Yes   Sig: [ACETAMINOPHEN (TYLENOL) 325 MG TABLET] Take 2 tablets (650 mg total) by mouth every 4 (four) hours as needed for pain or fever.   albuterol (PROAIR HFA;PROVENTIL HFA;VENTOLIN HFA) 90 mcg/actuation inhaler Past Week  Yes Yes   Sig: [ALBUTEROL (PROAIR HFA;PROVENTIL HFA;VENTOLIN HFA) 90 MCG/ACTUATION INHALER] Inhale 2 puffs every 6 (six) hours as needed for wheezing.   budesonide-formoterol (SYMBICORT) 160-4.5 mcg/actuation inhaler Not Taking  No No   Sig: [BUDESONIDE-FORMOTEROL (SYMBICORT) 160-4.5 MCG/ACTUATION INHALER] Inhale 2 puffs 2 (two) times a day.   Patient not taking: Reported on 12/30/2024      Facility-Administered Medications: None        Review of Systems    The 10 point Review of Systems is negative other than noted in the HPI or here.     Social History   I have reviewed this patient's social history and updated it with pertinent information if needed.  Social History     Tobacco Use    Smoking status:  Never    Smokeless tobacco: Never   Substance Use Topics    Alcohol use: Never    Drug use: Never         Allergies   No Known Allergies     Physical Exam   Vital Signs: Temp: 98.8  F (37.1  C) Temp src: Temporal BP: 116/72 Pulse: 101   Resp: 17 SpO2: 98 % O2 Device: None (Room air)    Weight: 133 lbs 0 oz    General Appearance: Awake, alert, in no acute distress.  Respiratory: Clear to auscultation bilaterally. Normal respiratory effort on room air.  Cardiovascular: Mildly tachycardic rate, regular rhythm, no murmur. Extremities are warm and well-perfused.  GI: Soft, non-tender, mildly distended. No rebound or guarding. Normal bowel sounds.  Skin: No obvious rashes or lesions on observed skin.  Musculoskeletal: Able to move all extremities freely. No lower extremity edema.  Neurologic: Alert and oriented x 3. Strength and sensation are grossly equal and intact in bilateral upper and lower extremities.  Psychiatric: Calm, pleasant, cooperative with exam.      Medical Decision Making       90 MINUTES SPENT BY ME on the date of service doing chart review, history, exam, documentation & further activities per the note.      Data     I have personally reviewed the following data over the past 24 hrs:    9.4  \   10.9 (L)   / 182     140 104 6.2 /  142 (H)   3.0 (L) 25 0.50 (L) \     ALT: 22 AST: 41 AP: 89 TBILI: 0.4   ALB: 3.9 TOT PROTEIN: 6.7 LIPASE: N/A     INR:  1.01 PTT:  30   D-dimer:  N/A Fibrinogen:  N/A       Imaging results reviewed over the past 24 hrs:   Recent Results (from the past 24 hours)   CT Abdomen Pelvis w IV Contrast    Narrative    EXAM: CT ABDOMEN PELVIS W CONTRAST  LOCATION: [facility]  DATE: 12/30/2024    INDICATION: Bright red blood per rectum.  COMPARISON: None.  TECHNIQUE: CT scan of the abdomen and pelvis was performed following injection of IV contrast. Multiplanar reformats were obtained. Dose reduction techniques were used.  CONTRAST: Isovue 370 90ml    FINDINGS:    LOWER CHEST: Mild atelectasis.    HEPATOBILIARY: Hepatic steatosis. No opaque gallstones.    PANCREAS: Normal.    SPLEEN: Normal.    ADRENAL GLANDS: 9 mm left adrenal nodule or adenoma. Negative right adrenal.    KIDNEYS/BLADDER: Normal.    BOWEL: Along the distal rectum, heterogeneous area of hyperdensity or hypervascularity (series 3, images 189-192). Remaining bowel negative.    LYMPH NODES: No adenopathy.    VASCULATURE: Nonaneurysmal aorta.    PELVIC ORGANS: 2.8 x 3.2 cm posterior right adnexal cyst with peripheral focus of calcification (series 3, image 157).     MUSCULOSKELETAL: Nothing acute.      Impression    IMPRESSION:     1.  Heterogeneous hyperdensity versus hypervascularity along the distal rectum, indeterminate for mass versus stool versus retained blood products on this single phase enhanced exam. Multiphase GI bleed protocol CT could be considered and GI follow-up   for direct visualization and exclusion of a mass.    2.  Remaining bowel grossly unremarkable. No obstruction or acute inflammation.    3.  Right adnexal cystic structure. Adnexal or ovarian cyst is favored. This abuts the urinary bladder, though urinary bladder diverticulum less favored. Recommend pelvic ultrasound.    4.  No free fluid or air. No abscess.    5.  Hepatic steatosis.

## 2024-12-30 NOTE — ED PROVIDER NOTES
EMERGENCY DEPARTMENT ENCOUNTER      NAME: Kerry Mills  AGE: 59 year old female  YOB: 1965  MRN: [id number]  EVALUATION DATE & TIME: No admission date for patient encounter.    PCP: Ceasar Zuñiga    ED PROVIDER: Wilda Toribio MD    Chief Complaint   Patient presents with    Rectal Bleeding         FINAL IMPRESSION:  1. Bright red blood per rectum    2. Hypokalemia          ED COURSE & MEDICAL DECISION MAKING:    Pertinent Labs & Imaging studies reviewed. (See chart for details)  59 year old female with history of breast cancer undergoing chemotherapy who presents to the Emergency Department for evaluation of bright red blood per rectum times approximately 36 hours.  Patient shows me a photo.  This is small-volume rectal bleeding.  No stools associated with this.  Favor hemorrhoid, polyp, AVM or mass lesion, diverticular bleed.  Patient's never had a colonoscopy only fecal occult blood testing    Patient initially seen evaluate myself in triage area due to boarding crisis.  Notable she is mildly tachycardic at 108.  IV established, blood obtained.  CBC, CMP, coags, type and screen mild hypokalemia replaced orally.  Hemoglobin is actually up from previous.  CTA abdomen pelvis cutaneous hyperdensity versus hypervascularity along the distal rectum indeterminate for mass versus stool versus retained blood products.  Case discussed with [facility].  Patient did have a 400 ml bright red stool here.  Plan for n.p.o., prep for colonoscopy tomorrow.  Should she be passing more significant volumes they recommend adding on a CTA.  Admitted to medicine for further evaluation.        ED Course as of 12/30/24 1350   Mon Dec 30, 2024   1103 I met with the patient for an initial encounter and evaluation.   1145 Hemoglobin(!): 10.9  8.9, 3 wk ago   1207 Potassium(!): 3.0   1250 400ml brbpr   1312 Spoke w Dr. Byrd, admit, npo, prep for full colonoscopy. If large brbpr then send back for cta abd/pelv  "  9923 Admitted to Dr. Yan       Medical Decision Making  Obtained supplemental history:Supplemental history obtained?: Family Member/Significant Other  Reviewed external records: External records reviewed?: Outpatient Record: Telephone encounter today referring patient to the ER  Care impacted by chronic illness:Cancer/Chemotherapy  Did you consider but not order tests?: Work up considered but not performed and documented in chart, if applicable  Did you interpret images independently?: Independent interpretation of ECG and images noted in documentation, when applicable.  Consultation discussion with other provider:Did you involve another provider (consultant, , pharmacy, etc.)?: I discussed the care with another health care provider, see documentation for details.  Admit.    MIPS: Not Applicable      At the conclusion of the encounter I discussed the results of all of the tests and the disposition. The questions were answered. The patient or family acknowledged understanding and was agreeable with the care plan.      MEDICATIONS GIVEN IN THE EMERGENCY:  Medications   iopamidol (ISOVUE-370) solution 90 mL (90 mLs Intravenous $Given 12/30/24 1156)   potassium chloride brody ER (KLOR-CON M20) CR tablet 40 mEq (40 mEq Oral $Given 12/30/24 1233)       NEW PRESCRIPTIONS STARTED AT TODAY'S ER VISIT  New Prescriptions    No medications on file          =================================================================    HPI    Patient information was obtained from: Patient's Daughter    Use of Intrepreter: N/A      Kerry Mills is a 59 year old female with pertinent medical history of hyperlipidemia, asthma, chronic constipation, breast cancer/chemotherapy, chronic polyneuropathy, who presents to the ED by private car with a concern of \"bright red\" stool on 12/28/24 (~2 days ago). However, she started to release blood instead of stools on 12/29/24 (~1 days ago). She endorses abdominal pain, specifically between bowel " movements. She denies a history of a colonoscopy, but recently had chemotherapy in early December (~1.5-2 weeks ago) and developed abdominal distension since. She has a surgery consultation tomorrow. Patient denies recent traveling, illness exposures, or anticoagulation use. She also denies other bleeding, nausea, vomiting, hematemesis, lightheadedness, headaches, and dizziness. No other medical concerns are expressed at this time.         PAST MEDICAL HISTORY:  Past Medical History:   Diagnosis Date    Asthma     Chronic constipation        PAST SURGICAL HISTORY:  Past Surgical History:   Procedure Laterality Date    INCISION AND DRAINAGE OF WOUND N/A 11/27/2020    Procedure: INCISION AND DRAINAGE, NECK;  Surgeon: Arnel James MD;  Location: [facility];  Service: ENT    INSERT PORT VASCULAR ACCESS Right 7/9/2024    Procedure: INSERTION,  RIGHT VASCULAR ACCESS PORT;  Surgeon: Iwona Choe DO;  Location: [facility]    PICC INSERTION - TRIPLE LUMEN  11/26/2020         TRACHEOSTOMY N/A 11/26/2020    Procedure: EMERGENT INTUBATION IN OR WITH CREATION, TRACHEOSTOMY;  Surgeon: Dennise Justice MD;  Location: [facility];  Service: General       CURRENT MEDICATIONS:    Prior to Admission Medications   Prescriptions Last Dose Informant Patient Reported? Taking?   acetaminophen (TYLENOL) 325 MG tablet   No No   Sig: [ACETAMINOPHEN (TYLENOL) 325 MG TABLET] Take 2 tablets (650 mg total) by mouth every 4 (four) hours as needed for pain or fever.   albuterol (PROAIR HFA;PROVENTIL HFA;VENTOLIN HFA) 90 mcg/actuation inhaler   Yes No   Sig: [ALBUTEROL (PROAIR HFA;PROVENTIL HFA;VENTOLIN HFA) 90 MCG/ACTUATION INHALER] Inhale 2 puffs every 6 (six) hours as needed for wheezing.   benzocaine-menthol (CEPACOL) 15-3.6 MG lozenge   No No   Sig: Place 1 lozenge inside cheek every hour as needed for sore throat (without fever).   budesonide-formoterol (SYMBICORT) 160-4.5 mcg/actuation inhaler   No No   Sig:  "[BUDESONIDE-FORMOTEROL (SYMBICORT) 160-4.5 MCG/ACTUATION INHALER] Inhale 2 puffs 2 (two) times a day.   cefpodoxime (VANTIN) 200 MG tablet   No No   Sig: Take 1 tablet (200 mg) by mouth 2 times daily.   Patient not taking: Reported on 12/3/2024   guaiFENesin-codeine (ROBITUSSIN AC) 100-10 MG/5ML solution   No No   Sig: Take 5-10 mLs by mouth every 4 hours as needed for cough.   Patient not taking: Reported on 12/3/2024   lidocaine-prilocaine (EMLA) 2.5-2.5 % external cream   No No   Sig: Apply topically as needed for moderate pain or other (Apply to port site prior to access to minimize discomfort) Apply a small amount to port site 30 -60 minutes  prior to access to minimize discomfort   ondansetron (ZOFRAN) 8 MG tablet   No No   Sig: Take 1 tablet (8 mg) by mouth every 8 hours as needed for nausea (vomiting)   prochlorperazine (COMPAZINE) 10 MG tablet   No No   Sig: Take 1 tablet (10 mg) by mouth every 6 hours as needed for nausea or vomiting   traMADol (ULTRAM) 50 MG tablet   Yes No   Sig: Take 50 mg by mouth every 6 hours as needed for severe pain.   vitamin D3 (CHOLECALCIFEROL) 50 mcg (2000 units) tablet   Yes No   Sig: Take 50 mcg by mouth every evening.      Facility-Administered Medications: None       ALLERGIES:  No Known Allergies    FAMILY HISTORY:  Family History   Problem Relation Age of Onset    Kidney failure Mother         on Dialysis    Hypertension Mother     Gout Mother     No Known Problems Father          age 60 in [place]    Kidney failure Brother     Asthma Son        SOCIAL HISTORY:  Social History     Tobacco Use    Smoking status: Never    Smokeless tobacco: Never   Substance Use Topics    Alcohol use: Never    Drug use: Never        VITALS:  Patient Vitals for the past 24 hrs:   BP Temp Temp src Pulse Resp SpO2 Height Weight   24 1300 124/85 -- -- 102 -- 98 % -- --   24 1103 119/78 98.8  F (37.1  C) Temporal 108 17 97 % 1.499 m (4' 11\") 60.3 kg (133 lb)       PHYSICAL EXAM  "   General Appearance: Well-appearing, well-nourished, no acute distress. Slightly pale appearing.  Head:  Normocephalic  Cardio: Mildly tachycardic 100s, regular rhythm  Pulm:  No respiratory distress, clear to auscultation bilaterally  Abdomen:  Soft, non-tender, non distended,no rebound or guarding.  Extremities: Normal gait  Skin:  Skin warm, dry, no rashes  Neuro:  Alert and oriented ×3     RADIOLOGY/LABS:  Reviewed all pertinent imaging. Please see official radiology report. All pertinent labs reviewed and interpreted.    Results for orders placed or performed during the hospital encounter of 12/30/24   CT Abdomen Pelvis w IV Contrast    Impression    IMPRESSION:     1.  Heterogeneous hyperdensity versus hypervascularity along the distal rectum, indeterminate for mass versus stool versus retained blood products on this single phase enhanced exam. Multiphase GI bleed protocol CT could be considered and GI follow-up   for direct visualization and exclusion of a mass.    2.  Remaining bowel grossly unremarkable. No obstruction or acute inflammation.    3.  Right adnexal cystic structure. Adnexal or ovarian cyst is favored. This abuts the urinary bladder, though urinary bladder diverticulum less favored. Recommend pelvic ultrasound.    4.  No free fluid or air. No abscess.    5.  Hepatic steatosis.     Comprehensive metabolic panel   Result Value Ref Range    Sodium 140 135 - 145 mmol/L    Potassium 3.0 (L) 3.4 - 5.3 mmol/L    Carbon Dioxide (CO2) 25 22 - 29 mmol/L    Anion Gap 11 7 - 15 mmol/L    Urea Nitrogen 6.2 6.0 - 20.0 mg/dL    Creatinine 0.50 (L) 0.51 - 0.95 mg/dL    GFR Estimate >90 >60 mL/min/1.73m2    Calcium 9.3 8.8 - 10.4 mg/dL    Chloride 104 98 - 107 mmol/L    Glucose 142 (H) 70 - 99 mg/dL    Alkaline Phosphatase 89 40 - 150 U/L    AST 41 0 - 45 U/L    ALT 22 0 - 50 U/L    Protein Total 6.7 6.4 - 8.3 g/dL    Albumin 3.9 3.5 - 5.2 g/dL    Bilirubin Total 0.4 <=1.2 mg/dL   Result Value Ref Range    INR  1.01 0.85 - 1.15   Partial thromboplastin time   Result Value Ref Range    aPTT 30 22 - 38 Seconds   CBC with platelets and differential   Result Value Ref Range    WBC Count 9.4 4.0 - 11.0 10e3/uL    RBC Count 3.48 (L) 3.80 - 5.20 10e6/uL    Hemoglobin 10.9 (L) 11.7 - 15.7 g/dL    Hematocrit 32.4 (L) 35.0 - 47.0 %    MCV 93 78 - 100 fL    MCH 31.3 26.5 - 33.0 pg    MCHC 33.6 31.5 - 36.5 g/dL    RDW 14.6 10.0 - 15.0 %    Platelet Count 182 150 - 450 10e3/uL    % Neutrophils 64 %    % Lymphocytes 18 %    % Monocytes 11 %    % Eosinophils 6 %    % Basophils 0 %    % Immature Granulocytes 1 %    NRBCs per 100 WBC 0 <1 /100    Absolute Neutrophils 6.1 1.6 - 8.3 10e3/uL    Absolute Lymphocytes 1.7 0.8 - 5.3 10e3/uL    Absolute Monocytes 1.1 0.0 - 1.3 10e3/uL    Absolute Eosinophils 0.6 0.0 - 0.7 10e3/uL    Absolute Basophils 0.0 0.0 - 0.2 10e3/uL    Absolute Immature Granulocytes 0.1 <=0.4 10e3/uL    Absolute NRBCs 0.0 10e3/uL   Adult Type and Screen   Result Value Ref Range    ABO/RH(D) O POS     Antibody Screen Negative Negative    SPECIMEN EXPIRATION DATE [id number]          The creation of this record is based on the scribe s observations of the work being performed by Wilda Toribio MD and the provider s statements to them. It was created on her behalf by Calvin Zuñiga, a trained medical scribe. This document has been checked and approved by the attending provider.    Wilda Toribio MD  Emergency Medicine  [facility] EMERGENCY ROOM  [address] [facility] [address] [phone]  Dept: [phone]     Wilda Toribio MD  12/30/24 2201

## 2024-12-30 NOTE — CONSULTS
"[facility] Consultation     Kerry Mills  [address] [place] [place] [address]  59 year old female     Admission Date/Time: 12/30/2024  Primary Care Provider: Ceasar Zuñiga  Referring / Attending Physician:  Yuriy     We were asked to see the patient in consultation by Ludmila Montoya PA-C, for evaluation of bright red blood per rectum since Saturday 12/28.\"     CC: rectal bleeding     Patient seen with ong  via [employer] line with family member who speaks English.     HPI:  Kerry Mills is a 59 year old female with PMH breast cancer on chemotherapy who presented to ER earlier this morning for rectal bleeding since 12/28. She reports onset of bleeding 12/28 and had multiple episodes yesterday with bright red blood passed, associated rectal pain due to frequency of stools, some lower abdominal cramping just before bowel movements, and abdominal bloating.    She was seen at [facility] 1/2024 for one month of painless rectal bleeding and then had a colonoscopy at [facility] 4/2024, which showed internal hemorrhoids and one 5 mm tubular adenoma in the ascending colon. Repeat recommended in 7 years. She was to consider hemorrhoid banding but was not seen by us again.      ROS: A comprehensive ten point review of systems was negative aside from those in mentioned in the HPI.      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 II      Sinus tachycardia 12/03/2020     Priority: Medium    SVT (supraventricular tachycardia) (H)      Priority: " Medium    Airway compromise      Priority: Medium    Gram-positive cocci bacteremia      Priority: Medium    Slow transit constipation      Priority: Medium    Submental abscess      Priority: Medium    Moustapha's angina 2020     Priority: Medium    Acute respiratory failure with hypoxia (H)      Priority: Medium    Hypoalbuminemia      Priority: Medium    Leukocytosis, unspecified type      Priority: Medium    Moderate persistent asthma without complication 2019     Priority: Medium     SOCIAL HISTORY:  Social History     Tobacco Use    Smoking status: Never    Smokeless tobacco: Never   Substance Use Topics    Alcohol use: Never    Drug use: Never   No tobacco or etoh    FAMILY HISTORY:  Family History   Problem Relation Age of Onset    Kidney failure Mother         on Dialysis    Hypertension Mother     Gout Mother     No Known Problems Father          age 60 in [place]    Kidney failure Brother     Asthma Son    No pertinent family history    ALLERGIES: No Known Allergies  MEDICATIONS:   No current facility-administered medications for this encounter.     Current Outpatient Medications   Medication Sig Dispense Refill    acetaminophen (TYLENOL) 325 MG tablet [ACETAMINOPHEN (TYLENOL) 325 MG TABLET] Take 2 tablets (650 mg total) by mouth every 4 (four) hours as needed for pain or fever.  0    albuterol (PROAIR HFA;PROVENTIL HFA;VENTOLIN HFA) 90 mcg/actuation inhaler [ALBUTEROL (PROAIR HFA;PROVENTIL HFA;VENTOLIN HFA) 90 MCG/ACTUATION INHALER] Inhale 2 puffs every 6 (six) hours as needed for wheezing.      benzocaine-menthol (CEPACOL) 15-3.6 MG lozenge Place 1 lozenge inside cheek every hour as needed for sore throat (without fever). 30 lozenge 0    budesonide-formoterol (SYMBICORT) 160-4.5 mcg/actuation inhaler [BUDESONIDE-FORMOTEROL (SYMBICORT) 160-4.5 MCG/ACTUATION INHALER] Inhale 2 puffs 2 (two) times a day. 1 Inhaler 11    cefpodoxime (VANTIN) 200 MG tablet Take 1 tablet (200 mg) by mouth 2 times  "daily. (Patient not taking: Reported on 12/3/2024) 8 tablet 0    guaiFENesin-codeine (ROBITUSSIN AC) 100-10 MG/5ML solution Take 5-10 mLs by mouth every 4 hours as needed for cough. (Patient not taking: Reported on 12/3/2024) 237 mL 0    lidocaine-prilocaine (EMLA) 2.5-2.5 % external cream Apply topically as needed for moderate pain or other (Apply to port site prior to access to minimize discomfort) Apply a small amount to port site 30 -60 minutes  prior to access to minimize discomfort 30 g 2    ondansetron (ZOFRAN) 8 MG tablet Take 1 tablet (8 mg) by mouth every 8 hours as needed for nausea (vomiting) 30 tablet 2    prochlorperazine (COMPAZINE) 10 MG tablet Take 1 tablet (10 mg) by mouth every 6 hours as needed for nausea or vomiting 30 tablet 2    traMADol (ULTRAM) 50 MG tablet Take 50 mg by mouth every 6 hours as needed for severe pain.      vitamin D3 (CHOLECALCIFEROL) 50 mcg (2000 units) tablet Take 50 mcg by mouth every evening.       PHYSICAL EXAM:   /85   Pulse 102   Temp 98.8  F (37.1  C) (Temporal)   Resp 17   Ht 1.499 m (4' 11\")   Wt 60.3 kg (133 lb)   SpO2 98%   BMI 26.86 kg/m     GEN: NAD, female appears stated age sitting up in bed  HEENT: No icterus, no lymphadenopathy  HRT: RRR  LUNGS: CTA  ABD: +BS, soft, nontender  SKIN: No rash, jaundice  MSKL: no LE edema, strength 5/5 all 4 extrems  NEURO: Alert and oriented, appropriate mood and affect     ADDITIONAL DATA:   I reviewed the patient's new clinical lab test results.   Recent Labs   Lab Test 12/30/24  1136 12/03/24  0946 11/26/24  0941 11/26/20  1334 11/26/20  1324   WBC 9.4 2.9* 2.7*   < >  --    HGB 10.9* 8.9* 9.3*   < >  --    MCV 93 97 98   < >  --     261 270   < >  --    INR 1.01  --   --   --  1.04    < > = values in this interval not displayed.     Recent Labs   Lab Test 12/30/24  1136 12/03/24  0946 11/26/24  0941   POTASSIUM 3.0* 3.4 3.3*   CHLORIDE 104 107 106   CO2 25 24 25   BUN 6.2 3.0* 2.2*   ANIONGAP 11 8 8 "     Recent Labs   Lab Test 12/30/24  1136 12/03/24  0946 11/26/24  0941 11/19/24  1103 11/06/24  1017 11/06/24  1010 11/28/20  0357 11/26/20  1512 11/26/20  1324   ALBUMIN 3.9 4.1 4.1   < >  --  3.9   < >  --  3.4*   BILITOTAL 0.4 0.7 0.7   < >  --  1.3*   < >  --  1.2*   ALT 22 16 17   < >  --  24   < >  --  34   AST 41 27 39   < >  --  45   < >  --  39   PROTEIN  --   --   --   --  Negative  --   --  100 mg/dL*  --    LIPASE  --   --   --   --   --  23  --   --  15    < > = values in this interval not displayed.     Enteric bacteria/virus stool: pending    Imaging results:  CT abdomen/pelvis 12/30/24:  FINDINGS:   LOWER CHEST: Mild atelectasis.  HEPATOBILIARY: Hepatic steatosis. No opaque gallstones.  PANCREAS: Normal.  SPLEEN: Normal.  ADRENAL GLANDS: 9 mm left adrenal nodule or adenoma. Negative right adrenal.  KIDNEYS/BLADDER: Normal  BOWEL: Along the distal rectum, heterogeneous area of hyperdensity or hypervascularity (series 3, images 189-192). Remaining bowel negative.  LYMPH NODES: No adenopathy.  VASCULATURE: Nonaneurysmal aorta.  PELVIC ORGANS: 2.8 x 3.2 cm posterior right adnexal cyst with peripheral focus of calcification (series 3, image 157).   MUSCULOSKELETAL: Nothing acute.                                                                   IMPRESSION:   1.  Heterogeneous hyperdensity versus hypervascularity along the distal rectum, indeterminate for mass versus stool versus retained blood products on this single phase enhanced exam. Multiphase GI bleed protocol CT could be considered and GI follow-up for direct visualization and exclusion of a mass.  2.  Remaining bowel grossly unremarkable. No obstruction or acute inflammation.  3.  Right adnexal cystic structure. Adnexal or ovarian cyst is favored. This abuts the urinary bladder, though urinary bladder diverticulum less favored. Recommend pelvic ultrasound.  4.  No free fluid or air. No abscess.  5.  Hepatic steatosis.        ASSESSMENT:     BRBPR  Anemia  This is a 58 y/o female with PMH breast cancer on chemotherapy admitted today for BRBPR with anemia after presenting with rectal bleeding since 12/28. She was seen at [facility] for similar bleeding with colonoscopy 4/2024 showing internal hemorrhoids but otherwise negative. She has had persistent bleeding since 12/28 but hgb actually higher than recent checks so no concern for rapid upper GI bleed. Differential diagnosis includes hemorrhoidal bleeding, diverticular bleed, proctitis, rectal intussusception, large polyp or mass given CT findings with hyperdensity vs hypervascularity along the distal rectum. Recommend colonoscopy tomorrow for evaluation and patient is agreeable. She is aware that if only internal hemorrhoids noted then outpatient hemorrhoid banding may again be recommended but not an option to do here tomorrow during procedure.     PLAN:  - Clear liquid diet tonight  - Prep this evening for colonoscopy with Dr. Byrd 12/31  - Hemoglobin monitoring per medicine      Katlyn Weston PA-C  [facility] Dig[facility]ive [facility]  12/30/2024 1:42 PM  [phone] (office)    This case was discussed with Dr. Byrd who agrees to the above assessment and plan.    49 minutes of total time was spent today providing patient care, including patient evaluation, reviewing documentation/test result, and .   ________________________________________________________________________        Addendum:  I have met and examined the patient and agree with the history and findings as per Katlyn MCCULLOUGH, KEATON above.  This is a  This is a 59-year-old female with breast cancer on chemotherapy, recurrent issues with rectal bleeding previously felt to be due to internal hemorrhoids which were found on colonoscopy in April 2024.  Has had 2 to 3 days of persistent rectal bleeding with about 500 cc of bright red blood per rectum passed while she is in the ER.  Hemoglobin has been stable and she is hemodynamically  stable.  CT (not CTA) with hyperdensity in the distal rectum with differential including mass versus stool versus retained blood products.  Suspect bleeding from internal hemorrhoids vs diverticular bleed. Less likely rectal polyp/mass  given recent colonoscopy in April 2024.  Other possibilities include proctitis, solitary rectal ulcer syndrome, AVM, etc..  Examination: Abdomen soft, nontender and nondistended    Recommendations:  -supportive care  - colonoscopy tomorrow  - if ongoing significant GI Bleeding, please obtain CTA and IR consult if CTA positive for active bleeding       A total of 30 minutes was spent reviewing the record, interacting with and evaluating the patient, and generating note.    Ayla Byrd MD  [facility]

## 2024-12-30 NOTE — ED TRIAGE NOTES
Reports rectal bleeding since Saturday night. Showed an image of the sample in triage and it is bright red blood with no stool present. Denies pain, dizziness, and nausea.     No GI hx.     PT recently received a chemo treatment a few weeks ago.     Triage Assessment (Adult)       Row Name 12/30/24 1320          Triage Assessment    Airway WDL WDL        Respiratory WDL    Respiratory WDL WDL        Cardiac WDL    Cardiac WDL WDL        Peripheral/Neurovascular WDL    Peripheral Neurovascular WDL WDL        Cognitive/Neuro/Behavioral WDL    Cognitive/Neuro/Behavioral WDL WDL

## 2024-12-30 NOTE — TELEPHONE ENCOUNTER
Patient's daughter Ginny, calls with concerns regarding rectal bleeding. Patient has been having bleeding every time she goes to the bathroom since Saturday night. She is passing blood without having a stool and the toilet water will be red. Patient does have some mild abdominal and lower back pain that started last night. She denies passing any blood clots. Patient was having regular bowel movements prior, denies any constipation. No fever, patient is not on blood thinners.    Instructed on ER per protocol. Ginny verbalizes understanding and will get patient in to be evaluated. She denies further questions at this time.    Mis Finn RN      Reason for Disposition   MODERATE rectal bleeding (small blood clots, passing blood without stool, or toilet water turns red) more than once a day    Additional Information   Negative: Passed out (e.g., fainted, lost consciousness, blacked out and was not responding)   Negative: Shock suspected (e.g., cold/pale/clammy skin, too weak to stand, low BP, rapid pulse)   Negative: Vomiting red blood or black (coffee ground) material   Negative: Sounds like a life-threatening emergency to the triager   Negative: Diarrhea is main symptom   Negative: Rectal symptoms   Negative: SEVERE rectal bleeding (large blood clots; constant or on and off bleeding)   Negative: SEVERE dizziness (e.g., unable to stand, requires support to walk, feels like passing out now)    Protocols used: Rectal Bleeding-A-[place]

## 2024-12-30 NOTE — MEDICATION SCRIBE - ADMISSION MEDICATION HISTORY
Medication Scribe Admission Medication History    Admission medication history is complete. The information provided in this note is only as accurate as the sources available at the time of the update.    Information Source(s): Patient, Family member, Clinic records, and CareEverywhere/SureScripts via in-person    Pertinent Information: Spoke to the patient via her daughter who interpreted for a medication history. Reported no Rx or OTC medication today, but took acetaminophen yesterday PM. Used albuterol inhaler the day before.     Not currently using budesonide-formoterol (Symbicort) inhaler. Confirmed this by name and by asking how many inhalers she uses at home. Using only albuterol HFA at this time. Marked as not taking as it has been recently filled on 11/19/2024.     Previously on montelukast 10 mg once daily, but ran out about one month ago and has not had this refilled.     Last chemotherapy infusion was at the end of November 2024. Port still in place, not currently in use.     Changes made to PTA medication list:  Added: None  Deleted:   Benzocaine-menthol 15-3.6 mg lozenge  Cefpodoxime 200 mg  Guaifenesin-codeine 100-10 mg/5 mL solution  Lidocaine-prilocaine 2.5-2.5% cream  Ondansetron 8 mg  Prochlorperazine 10 mg  Tramadol 50 mg  Vit D 2,000 unit(s)   Changed: None    Allergies reviewed with patient and updates made in EHR: yes    Medication History Completed By: Gerry Mueller 12/30/2024 3:06 PM    PTA Med List   Medication Sig Last Dose/Taking    acetaminophen (TYLENOL) 325 MG tablet [ACETAMINOPHEN (TYLENOL) 325 MG TABLET] Take 2 tablets (650 mg total) by mouth every 4 (four) hours as needed for pain or fever. 12/29/2024 at  6:00 PM    albuterol (PROAIR HFA;PROVENTIL HFA;VENTOLIN HFA) 90 mcg/actuation inhaler [ALBUTEROL (PROAIR HFA;PROVENTIL HFA;VENTOLIN HFA) 90 MCG/ACTUATION INHALER] Inhale 2 puffs every 6 (six) hours as needed for wheezing. Past Week

## 2024-12-31 ENCOUNTER — ANESTHESIA (OUTPATIENT)
Dept: SURGERY | Facility: CLINIC | Age: 59
End: 2024-12-31
Payer: COMMERCIAL

## 2024-12-31 VITALS
RESPIRATION RATE: 18 BRPM | HEART RATE: 89 BPM | TEMPERATURE: 98.6 F | WEIGHT: 133 LBS | DIASTOLIC BLOOD PRESSURE: 65 MMHG | BODY MASS INDEX: 26.81 KG/M2 | OXYGEN SATURATION: 98 % | SYSTOLIC BLOOD PRESSURE: 108 MMHG | HEIGHT: 59 IN

## 2024-12-31 LAB
ANION GAP SERPL CALCULATED.3IONS-SCNC: 10 MMOL/L (ref 7–15)
BUN SERPL-MCNC: 5.6 MG/DL (ref 6–20)
CALCIUM SERPL-MCNC: 8.8 MG/DL (ref 8.8–10.4)
CHLORIDE SERPL-SCNC: 104 MMOL/L (ref 98–107)
COLONOSCOPY: NORMAL
CREAT SERPL-MCNC: 0.58 MG/DL (ref 0.51–0.95)
EGFRCR SERPLBLD CKD-EPI 2021: >90 ML/MIN/1.73M2
ERYTHROCYTE [DISTWIDTH] IN BLOOD BY AUTOMATED COUNT: 14.6 % (ref 10–15)
GLUCOSE SERPL-MCNC: 103 MG/DL (ref 70–99)
HCO3 SERPL-SCNC: 23 MMOL/L (ref 22–29)
HCT VFR BLD AUTO: 26.6 % (ref 35–47)
HGB BLD-MCNC: 9 G/DL (ref 11.7–15.7)
MCH RBC QN AUTO: 31 PG (ref 26.5–33)
MCHC RBC AUTO-ENTMCNC: 33.8 G/DL (ref 31.5–36.5)
MCV RBC AUTO: 92 FL (ref 78–100)
PLATELET # BLD AUTO: 146 10E3/UL (ref 150–450)
POTASSIUM SERPL-SCNC: 3.6 MMOL/L (ref 3.4–5.3)
RBC # BLD AUTO: 2.9 10E6/UL (ref 3.8–5.2)
SODIUM SERPL-SCNC: 137 MMOL/L (ref 135–145)
WBC # BLD AUTO: 8.3 10E3/UL (ref 4–11)

## 2024-12-31 PROCEDURE — 82435 ASSAY OF BLOOD CHLORIDE: CPT

## 2024-12-31 PROCEDURE — 36415 COLL VENOUS BLD VENIPUNCTURE: CPT

## 2024-12-31 PROCEDURE — 272N000001 HC OR GENERAL SUPPLY STERILE: Performed by: INTERNAL MEDICINE

## 2024-12-31 PROCEDURE — 258N000003 HC RX IP 258 OP 636: Performed by: STUDENT IN AN ORGANIZED HEALTH CARE EDUCATION/TRAINING PROGRAM

## 2024-12-31 PROCEDURE — 99238 HOSP IP/OBS DSCHRG MGMT 30/<: CPT | Performed by: HOSPITALIST

## 2024-12-31 PROCEDURE — 0DJD8ZZ INSPECTION OF LOWER INTESTINAL TRACT, VIA NATURAL OR ARTIFICIAL OPENING ENDOSCOPIC: ICD-10-PCS | Performed by: INTERNAL MEDICINE

## 2024-12-31 PROCEDURE — 258N000003 HC RX IP 258 OP 636

## 2024-12-31 PROCEDURE — 250N000013 HC RX MED GY IP 250 OP 250 PS 637: Performed by: PHYSICIAN ASSISTANT

## 2024-12-31 PROCEDURE — 370N000017 HC ANESTHESIA TECHNICAL FEE, PER MIN: Performed by: INTERNAL MEDICINE

## 2024-12-31 PROCEDURE — 85014 HEMATOCRIT: CPT

## 2024-12-31 PROCEDURE — 250N000011 HC RX IP 250 OP 636: Performed by: STUDENT IN AN ORGANIZED HEALTH CARE EDUCATION/TRAINING PROGRAM

## 2024-12-31 PROCEDURE — 250N000009 HC RX 250: Performed by: STUDENT IN AN ORGANIZED HEALTH CARE EDUCATION/TRAINING PROGRAM

## 2024-12-31 PROCEDURE — 360N000075 HC SURGERY LEVEL 2, PER MIN: Performed by: INTERNAL MEDICINE

## 2024-12-31 PROCEDURE — 999N000141 HC STATISTIC PRE-PROCEDURE NURSING ASSESSMENT: Performed by: INTERNAL MEDICINE

## 2024-12-31 RX ORDER — NALOXONE HYDROCHLORIDE 0.4 MG/ML
0.1 INJECTION, SOLUTION INTRAMUSCULAR; INTRAVENOUS; SUBCUTANEOUS
Status: CANCELLED | OUTPATIENT
Start: 2024-12-31

## 2024-12-31 RX ORDER — PROPOFOL 10 MG/ML
INJECTION, EMULSION INTRAVENOUS CONTINUOUS PRN
Status: DISCONTINUED | OUTPATIENT
Start: 2024-12-31 | End: 2024-12-31

## 2024-12-31 RX ORDER — NALOXONE HYDROCHLORIDE 0.4 MG/ML
0.4 INJECTION, SOLUTION INTRAMUSCULAR; INTRAVENOUS; SUBCUTANEOUS
Status: CANCELLED | OUTPATIENT
Start: 2024-12-31

## 2024-12-31 RX ORDER — ONDANSETRON 4 MG/1
4 TABLET, ORALLY DISINTEGRATING ORAL EVERY 30 MIN PRN
Status: CANCELLED | OUTPATIENT
Start: 2024-12-31

## 2024-12-31 RX ORDER — FENTANYL CITRATE 50 UG/ML
25 INJECTION, SOLUTION INTRAMUSCULAR; INTRAVENOUS
Status: CANCELLED | OUTPATIENT
Start: 2024-12-31

## 2024-12-31 RX ORDER — NALOXONE HYDROCHLORIDE 0.4 MG/ML
0.2 INJECTION, SOLUTION INTRAMUSCULAR; INTRAVENOUS; SUBCUTANEOUS
Status: CANCELLED | OUTPATIENT
Start: 2024-12-31

## 2024-12-31 RX ORDER — PROPOFOL 10 MG/ML
INJECTION, EMULSION INTRAVENOUS PRN
Status: DISCONTINUED | OUTPATIENT
Start: 2024-12-31 | End: 2024-12-31

## 2024-12-31 RX ORDER — ONDANSETRON 2 MG/ML
4 INJECTION INTRAMUSCULAR; INTRAVENOUS EVERY 30 MIN PRN
Status: CANCELLED | OUTPATIENT
Start: 2024-12-31

## 2024-12-31 RX ORDER — DEXAMETHASONE SODIUM PHOSPHATE 10 MG/ML
4 INJECTION, SOLUTION INTRAMUSCULAR; INTRAVENOUS
Status: CANCELLED | OUTPATIENT
Start: 2024-12-31

## 2024-12-31 RX ORDER — LIDOCAINE HYDROCHLORIDE 10 MG/ML
INJECTION, SOLUTION INFILTRATION; PERINEURAL PRN
Status: DISCONTINUED | OUTPATIENT
Start: 2024-12-31 | End: 2024-12-31

## 2024-12-31 RX ORDER — FLUMAZENIL 0.1 MG/ML
0.2 INJECTION, SOLUTION INTRAVENOUS
Status: CANCELLED | OUTPATIENT
Start: 2024-12-31 | End: 2024-12-31

## 2024-12-31 RX ADMIN — PROPOFOL 175 MCG/KG/MIN: 10 INJECTION, EMULSION INTRAVENOUS at 10:48

## 2024-12-31 RX ADMIN — MAGNESIUM CITRATE 296 ML: 1.75 LIQUID ORAL at 04:29

## 2024-12-31 RX ADMIN — PHENYLEPHRINE HYDROCHLORIDE 200 MCG: 10 INJECTION INTRAVENOUS at 11:03

## 2024-12-31 RX ADMIN — PHENYLEPHRINE HYDROCHLORIDE 200 MCG: 10 INJECTION INTRAVENOUS at 10:55

## 2024-12-31 RX ADMIN — PROPOFOL 50 MG: 10 INJECTION, EMULSION INTRAVENOUS at 10:48

## 2024-12-31 RX ADMIN — SODIUM CHLORIDE: 9 INJECTION, SOLUTION INTRAVENOUS at 05:16

## 2024-12-31 RX ADMIN — PHENYLEPHRINE HYDROCHLORIDE 200 MCG: 10 INJECTION INTRAVENOUS at 10:52

## 2024-12-31 RX ADMIN — LIDOCAINE HYDROCHLORIDE 5 ML: 10 INJECTION, SOLUTION INFILTRATION; PERINEURAL at 10:48

## 2024-12-31 ASSESSMENT — ACTIVITIES OF DAILY LIVING (ADL)
ADLS_ACUITY_SCORE: 49
ADLS_ACUITY_SCORE: 49
ADLS_ACUITY_SCORE: 50
ADLS_ACUITY_SCORE: 49
ADLS_ACUITY_SCORE: 49
ADLS_ACUITY_SCORE: 50
ADLS_ACUITY_SCORE: 49
ADLS_ACUITY_SCORE: 50
ADLS_ACUITY_SCORE: 49
ADLS_ACUITY_SCORE: 50
ADLS_ACUITY_SCORE: 50
ADLS_ACUITY_SCORE: 49
ADLS_ACUITY_SCORE: 50
ADLS_ACUITY_SCORE: 50

## 2024-12-31 NOTE — ANESTHESIA CARE TRANSFER NOTE
Patient: Kerryharris Mills    Procedure: Procedure(s):  COLONOSCOPY       Diagnosis: Bright red blood per rectum [K62.5]  Diagnosis Additional Information: No value filed.    Anesthesia Type:   MAC     Note:    Oropharynx: oropharynx clear of all foreign objects  Level of Consciousness: drowsy  Oxygen Supplementation: room air    Independent Airway: airway patency satisfactory and stable    Vital Signs Stable: post-procedure vital signs reviewed and stable  Report to RN Given: handoff report given  Patient transferred to: Emergency Department    Handoff Report: Identifed the Patient, Identified the Reponsible Provider, Reviewed the pertinent medical history, Discussed the surgical course, Reviewed Intra-OP anesthesia mangement and issues during anesthesia, Set expectations for post-procedure period and Allowed opportunity for questions and acknowledgement of understanding        Electronically Signed By: NEW Urrutia CRNA  December 31, 2024  11:08 AM

## 2024-12-31 NOTE — PROGRESS NOTES
"[facility]    Medicine Progress Note - Hospitalist Service    Date of Admission:  12/30/2024    Assessment & Plan   Kerry Mills is a 59 year old female admitted with lower GI bleeding.  Colonoscopy notable for hemorrhoids.  She is clinically stable.  Hgb stable.  Per nursing, patient tolerated a diet.  Therefore, medically ready for discharge.      # Lower GI bleeding due to hemorrhoids  # Norovirus infection  - outpatient GI followup          Diet: Regular Diet Adult      Mcintosh Catheter: Not present  Lines: PRESENT             Cardiac Monitoring: None  Code Status: No CPR- Do NOT Intubate      Clinically Significant Risk Factors        # Hypokalemia: Lowest K = 3 mmol/L in last 2 days, will replace as needed                       # Overweight: Estimated body mass index is 26.86 kg/m  as calculated from the following:    Height as of this encounter: 1.499 m (4' 11\").    Weight as of this encounter: 60.3 kg (133 lb)., PRESENT ON ADMISSION     # Asthma: noted on problem list        Social Drivers of Health            Disposition Plan     Medically Ready for Discharge: Anticipated Today             Serge Yan MD  Hospitalist Service  [facility]  Securely message with [employer] (more info)  Text page via [facility] Paging/Directory   ______________________________________________________________________    Interval History     Hmong  used    Says she is doing fine.  Denies abdominal pain, nausea, or vomiting.  Reports having dyspnea associated with abdominal distension.  Distension has been present for about a month.  She had her last chemo in November.  The abdominal distension is overall better currently.  She currently denies feeling short of breath.    Physical Exam   Vital Signs: Temp: 98.6  F (37  C) Temp src: Oral BP: 108/65 Pulse: 89   Resp: 18 SpO2: 98 % O2 Device: None (Room air)    Weight: 133 lbs 0 oz    Gen:  sitting in chair in no extremis, overall " well appearing  Neuro:  alert, conversant  CV:  nl rate, regular rhythm to palpation  Pulm:  no acute resp distress  GI:  abdomen NTTP    Medical Decision Making             Data   Reviewed:    Na 137  K 3.6  BUN 5.6  Cr 0.58    WBC 8.3  Hgb 9  Plts 146

## 2024-12-31 NOTE — PLAN OF CARE
Discharge plan reviewed with patient. Daughter at bed side. Questions answered. PIV removed. Copy of AVS given. All belongings with patient. Discharged home with family provide transportation.     Problem: Adult Inpatient Plan of Care  Goal: Readiness for Transition of Care  Outcome: Adequate for Care Transition

## 2024-12-31 NOTE — DISCHARGE SUMMARY
"[facility]  Hospitalist Discharge Summary      Date of Admission:  12/30/2024  Date of Discharge:  12/31/2024  Discharging Provider: Serge Yan MD  Discharge Service: Hospitalist Service    Discharge Diagnoses   Lower GI bleeding due to hemorrhoids  Norovirus infection    Clinically Significant Risk Factors     # Overweight: Estimated body mass index is 26.86 kg/m  as calculated from the following:    Height as of this encounter: 1.499 m (4' 11\").    Weight as of this encounter: 60.3 kg (133 lb).       Follow-ups Needed After Discharge   Follow-up Appointments       Follow-up and recommended labs and tests       Follow up with primary care provider, HIRAL CHAVEZ, within 7 days for hospital follow- up.  The following labs/tests are recommended:  - outpatient GI followup re: hemorrhoid banding  - followup one month history of abdominal distension and associated dyspnea  - followup hepatic steatosis  - followup right adnexa cystic structure with pelvic ultrasound                Unresulted Labs Ordered in the Past 30 Days of this Admission       No orders found for last 31 day(s).            Discharge Disposition   Discharged to home  Condition at discharge: Stable    Hospital Course   The patient was admitted for evaluation of rectal bleeding.  GI was consulted.  A colonoscopy revealed hemorrhoids.  Stool testing was notable for norovirus. She did not require RBC transfusion.  She was discharged home.    Consultations This Hospital Stay   GASTROENTEROLOGY IP CONSULT  PHYSICAL THERAPY ADULT IP CONSULT    Code Status   No CPR- Do NOT Intubate    Time Spent on this Encounter   I, Serge Yan MD, personally saw the patient today and spent less than or equal to 30 minutes discharging this patient.       Serge Yan MD  [facility] EMERGENCY ROOM  [facility] [facility] [address]  Phone: [phone]  Fax: " [phone]  ______________________________________________________________________    Physical Exam   Vital Signs: Temp: 98.6  F (37  C) Temp src: Oral BP: 108/65 Pulse: 89   Resp: 18 SpO2: 98 % O2 Device: None (Room air)    Weight: 133 lbs 0 oz    See progress note       Primary Care Physician   HIRAL CHAVEZ    Discharge Orders      Reason for your hospital stay    You were admitted to the hospital with bleeding per rectum due to hemorrhoids.     Activity    Your activity upon discharge: activity as tolerated     When to contact your care team    Seek medical attention if you have any of the following: recurrent bleeding per rectum, black or bloody vomit, abdominal pain, chest pressure, chest pain, dizziness, or light headedness.     Follow-up and recommended labs and tests     Follow up with primary care provider, HIRAL CHAVEZ, within 7 days for hospital follow- up.  The following labs/tests are recommended:  - outpatient GI followup re: hemorrhoid banding  - followup one month history of abdominal distension and associated dyspnea  - followup hepatic steatosis  - followup right adnexa cystic structure with pelvic ultrasound     Diet    Follow this diet upon discharge: Current Diet:Orders Placed This Encounter      Regular Diet Adult       Significant Results and Procedures   Most Recent 3 CBC's:  Recent Labs   Lab Test 12/31/24  0409 12/30/24  1930 12/30/24  1136 12/03/24  0946   WBC 8.3  --  9.4 2.9*   HGB 9.0* 10.6* 10.9* 8.9*   MCV 92  --  93 97   *  --  182 261     Most Recent 3 BMP's:  Recent Labs   Lab Test 12/31/24  0409 12/30/24  1930 12/30/24  1136 12/03/24  0946     --  140 139   POTASSIUM 3.6 3.9 3.0* 3.4   CHLORIDE 104  --  104 107   CO2 23  --  25 24   BUN 5.6*  --  6.2 3.0*   CR 0.58  --  0.50* 0.47*   ANIONGAP 10  --  11 8   DONNA 8.8  --  9.3 9.0   *  --  142* 130*   ,   Results for orders placed or performed during the hospital encounter of 12/30/24   CT Abdomen Pelvis w IV  Contrast    Narrative    EXAM: CT ABDOMEN PELVIS W CONTRAST  LOCATION: [facility]  DATE: 12/30/2024    INDICATION: Bright red blood per rectum.  COMPARISON: None.  TECHNIQUE: CT scan of the abdomen and pelvis was performed following injection of IV contrast. Multiplanar reformats were obtained. Dose reduction techniques were used.  CONTRAST: Isovue 370 90ml    FINDINGS:   LOWER CHEST: Mild atelectasis.    HEPATOBILIARY: Hepatic steatosis. No opaque gallstones.    PANCREAS: Normal.    SPLEEN: Normal.    ADRENAL GLANDS: 9 mm left adrenal nodule or adenoma. Negative right adrenal.    KIDNEYS/BLADDER: Normal.    BOWEL: Along the distal rectum, heterogeneous area of hyperdensity or hypervascularity (series 3, images 189-192). Remaining bowel negative.    LYMPH NODES: No adenopathy.    VASCULATURE: Nonaneurysmal aorta.    PELVIC ORGANS: 2.8 x 3.2 cm posterior right adnexal cyst with peripheral focus of calcification (series 3, image 157).     MUSCULOSKELETAL: Nothing acute.      Impression    IMPRESSION:     1.  Heterogeneous hyperdensity versus hypervascularity along the distal rectum, indeterminate for mass versus stool versus retained blood products on this single phase enhanced exam. Multiphase GI bleed protocol CT could be considered and GI follow-up   for direct visualization and exclusion of a mass.    2.  Remaining bowel grossly unremarkable. No obstruction or acute inflammation.    3.  Right adnexal cystic structure. Adnexal or ovarian cyst is favored. This abuts the urinary bladder, though urinary bladder diverticulum less favored. Recommend pelvic ultrasound.    4.  No free fluid or air. No abscess.    5.  Hepatic steatosis.         Discharge Medications   Current Discharge Medication List        CONTINUE these medications which have NOT CHANGED    Details   acetaminophen (TYLENOL) 325 MG tablet [ACETAMINOPHEN (TYLENOL) 325 MG TABLET] Take 2 tablets (650 mg total) by mouth every 4  (four) hours as needed for pain or fever.  Refills: 0    Associated Diagnoses: Moustapha's angina      albuterol (PROAIR HFA;PROVENTIL HFA;VENTOLIN HFA) 90 mcg/actuation inhaler [ALBUTEROL (PROAIR HFA;PROVENTIL HFA;VENTOLIN HFA) 90 MCG/ACTUATION INHALER] Inhale 2 puffs every 6 (six) hours as needed for wheezing.      budesonide-formoterol (SYMBICORT) 160-4.5 mcg/actuation inhaler [BUDESONIDE-FORMOTEROL (SYMBICORT) 160-4.5 MCG/ACTUATION INHALER] Inhale 2 puffs 2 (two) times a day.  Qty: 1 Inhaler, Refills: 11    Associated Diagnoses: Moderate persistent asthma without complication           Allergies   No Known Allergies

## 2024-12-31 NOTE — PLAN OF CARE
Problem: Bowel Elimination Management  Goal: Effective Bowel Elimination/Continence  Outcome: Progressing     Problem: Fall Injury Risk  Goal: Absence of Fall and Fall-Related Injury  Outcome: Progressing   Goal Outcome Evaluation:  Pt A/O. Family in room interpreting as needed for pt, declining professional interpretor. Pt declines pain. Enteric precautions maintain for + Norovirus. Completed bowel prep. NPO at midnight for AM procedure. Ambulating SBA. Continuous IVF. Uses call light appropriately.  Nano Jarrett RN  [phone]

## 2024-12-31 NOTE — INTERVAL H&P NOTE
"I have reviewed the surgical (or preoperative) H&P that is linked to this encounter, and examined the patient. There are no significant changes    Clinical Conditions Present on Arrival:  Clinically Significant Risk Factors Present on Admission        # Hypokalemia: Lowest K = 3 mmol/L in last 2 days, will replace as needed                # Overweight: Estimated body mass index is 26.86 kg/m  as calculated from the following:    Height as of this encounter: 1.499 m (4' 11\").    Weight as of this encounter: 60.3 kg (133 lb).       "

## 2024-12-31 NOTE — ANESTHESIA POSTPROCEDURE EVALUATION
Patient: Kerry Mills    Procedure: Procedure(s):  COLONOSCOPY       Anesthesia Type:  MAC    Note:     Postop Pain Control: Uneventful            Sign Out: Well controlled pain   PONV: No   Neuro/Psych: Uneventful            Sign Out: Acceptable/Baseline neuro status   Airway/Respiratory: Uneventful            Sign Out: Acceptable/Baseline resp. status   CV/Hemodynamics: Uneventful            Sign Out: Acceptable CV status; No obvious hypovolemia; No obvious fluid overload   Other NRE: NONE   DID A NON-ROUTINE EVENT OCCUR? No           Last vitals:  Vitals:    12/31/24 0051 12/31/24 0750 12/31/24 1037   BP: 115/72 104/68 119/76   Pulse: 104 94 97   Resp: 18 20 16   Temp: 37.2  C (99  F) 37.1  C (98.7  F) 36.2  C (97.2  F)   SpO2: 95% 98% 96%       Electronically Signed By: Alonzo Linder MD  December 31, 2024  11:26 AM

## 2024-12-31 NOTE — ANESTHESIA PREPROCEDURE EVALUATION
Anesthesia Pre-Procedure Evaluation    Patient: Kerry Mills   MRN: [id number] : 1965        Procedure : Procedure(s):  COLONOSCOPY          Past Medical History:   Diagnosis Date    Asthma     Chronic constipation       Past Surgical History:   Procedure Laterality Date    INCISION AND DRAINAGE OF WOUND N/A 2020    Procedure: INCISION AND DRAINAGE, NECK;  Surgeon: Arnel James MD;  Location: [facility];  Service: ENT    INSERT PORT VASCULAR ACCESS Right 2024    Procedure: INSERTION,  RIGHT VASCULAR ACCESS PORT;  Surgeon: Iwona Choe DO;  Location: [facility]    PICC INSERTION - TRIPLE LUMEN  2020         TRACHEOSTOMY N/A 2020    Procedure: EMERGENT INTUBATION IN OR WITH CREATION, TRACHEOSTOMY;  Surgeon: Dennise Justice MD;  Location: [facility];  Service: General      No Known Allergies   Social History     Tobacco Use    Smoking status: Never    Smokeless tobacco: Never   Substance Use Topics    Alcohol use: Never      Wt Readings from Last 1 Encounters:   24 60.3 kg (133 lb)        Anesthesia Evaluation            ROS/MED HX  ENT/Pulmonary:  - neg pulmonary ROS   (+)                      asthma                  Neurologic: Comment: Poly neuropathy  - neg neurologic ROS     Cardiovascular:  - neg cardiovascular ROS     METS/Exercise Tolerance: >4 METS    Hematologic:  - neg hematologic  ROS   (+)      anemia,          Musculoskeletal:  - neg musculoskeletal ROS     GI/Hepatic: Comment: Acute GI bleed     Amita virus  - neg GI/hepatic ROS   (+)        bowel prep,            Renal/Genitourinary:  - neg Renal ROS     Endo:  - neg endo ROS     Psychiatric/Substance Use:  - neg psychiatric ROS     Infectious Disease:  - neg infectious disease ROS     Malignancy:  - neg malignancy ROS (+) Malignancy, History of Breast.Breast CA status post Chemo and Radiation.      Other:            Physical Exam    Airway  airway exam normal           Respiratory  "Devices and Support         Dental           Cardiovascular   cardiovascular exam normal          Pulmonary   pulmonary exam normal                OUTSIDE LABS:  CBC:   Lab Results   Component Value Date    WBC 8.3 12/31/2024    WBC 9.4 12/30/2024    HGB 9.0 (L) 12/31/2024    HGB 10.6 (L) 12/30/2024    HCT 26.6 (L) 12/31/2024    HCT 32.4 (L) 12/30/2024     (L) 12/31/2024     12/30/2024     BMP:   Lab Results   Component Value Date     12/31/2024     12/30/2024    POTASSIUM 3.6 12/31/2024    POTASSIUM 3.9 12/30/2024    CHLORIDE 104 12/31/2024    CHLORIDE 104 12/30/2024    CO2 23 12/31/2024    CO2 25 12/30/2024    BUN 5.6 (L) 12/31/2024    BUN 6.2 12/30/2024    CR 0.58 12/31/2024    CR 0.50 (L) 12/30/2024     (H) 12/31/2024     (H) 12/30/2024     COAGS:   Lab Results   Component Value Date    PTT 30 12/30/2024    INR 1.01 12/30/2024     POC: No results found for: \"BGM\", \"HCG\", \"HCGS\"  HEPATIC:   Lab Results   Component Value Date    ALBUMIN 3.9 12/30/2024    PROTTOTAL 6.7 12/30/2024    ALT 22 12/30/2024    AST 41 12/30/2024    ALKPHOS 89 12/30/2024    BILITOTAL 0.4 12/30/2024     OTHER:   Lab Results   Component Value Date    PH 7.40 11/26/2020    LACT 1.2 11/06/2024    A1C 5.9 (H) 11/06/2024    DONNA 8.8 12/31/2024    PHOS 3.9 12/03/2020    MAG 1.8 12/30/2024    LIPASE 23 11/06/2024    TSH 1.81 07/22/2024    CRP 1.6 (H) 12/02/2020       Anesthesia Plan    ASA Status:  3, emergent       Anesthesia Type: MAC.              Consents    Anesthesia Plan(s) and associated risks, benefits, and realistic alternatives discussed. Questions answered and patient/representative(s) expressed understanding.     - Discussed:     - Discussed with:  Patient            Postoperative Care            Comments:               Alonzo Linder MD    I have reviewed the pertinent notes and labs in the chart from the past 30 days and (re)examined the patient.  Any updates or changes from those notes are " "reflected in this note.    # Hypokalemia: Lowest K = 3 mmol/L in last 2 days, will replace as needed                 # Anemia: based on hgb <11  # Anemia: based on hgb <11       # Overweight: Estimated body mass index is 26.86 kg/m  as calculated from the following:    Height as of this encounter: 1.499 m (4' 11\").    Weight as of this encounter: 60.3 kg (133 lb).       # Asthma: noted on problem list       "

## 2024-12-31 NOTE — PLAN OF CARE
Patient alert and oriented. Minimal english Hmong speaking. Family at bedside and  was present to assist prior to colonoscopy today. VSS. LS clear. Now tolerating regular diet. No BM post colonoscopy. Per GI okay to discharge if tolerating diet. Patient willing to go home today. Attending notified. Plan likely discharge home today. Will monitor and continue plan of care     Problem: Bowel Elimination Management  Goal: Effective Bowel Elimination/Continence  Outcome: Progressing     Problem: Adult Inpatient Plan of Care  Goal: Absence of Hospital-Acquired Illness or Injury  Intervention: Identify and Manage Fall Risk  Recent Flowsheet Documentation  Taken 12/31/2024 0750 by Ele Jones, RN  Safety Promotion/Fall Prevention:   safety round/check completed   room near nurse's station   nonskid shoes/slippers when out of bed   mobility aid in reach   lighting adjusted   increase visualization of patient   increased rounding and observation   clutter free environment maintained

## 2024-12-31 NOTE — PLAN OF CARE
Problem: Adult Inpatient Plan of Care  Goal: Optimal Comfort and Wellbeing  Outcome: Progressing     Problem: Fall Injury Risk  Goal: Absence of Fall and Fall-Related Injury  Outcome: Progressing  Intervention: Identify and Manage Contributors  Recent Flowsheet Documentation  Taken 12/31/2024 0051 by Siena Godwin RN  Medication Review/Management: medications reviewed  Intervention: Promote Injury-Free Environment  Recent Flowsheet Documentation  Taken 12/31/2024 0051 by Siena Godwin RN  Safety Promotion/Fall Prevention: safety round/check completed     Problem: Skin Injury Risk Increased  Goal: Skin Health and Integrity  Outcome: Progressing  Intervention: Optimize Skin Protection  Recent Flowsheet Documentation  Taken 12/31/2024 0051 by Siena Godwin RN  Activity Management: activity adjusted per tolerance  Head of Bed (HOB) Positioning: HOB at 30 degrees     Goal Outcome Evaluation:  Pt is A&O, calls appropriately for needs and is a SBA for ambulation. Vitals signs are stable, afebrile, oxygen is on room air. Pt denies pain 0/10. Pt is NPO as of midnight. Pt is voiding spontaneously, last bowel movement 12/31/2024- multiple bloody diarrhea stools. IV is running fluids at 75/hr. Alarms in place. Enteric precautions in place for [place]ovius.      Protocols:  Potassium, 3.6 , recheck next AM     Plan:  Colonoscopy at 10:55 AM    Siena Godwin RN  December 31, 2024, 7:00 AM

## 2025-01-21 ENCOUNTER — INFUSION THERAPY VISIT (OUTPATIENT)
Dept: INFUSION THERAPY | Facility: HOSPITAL | Age: 60
End: 2025-01-21
Attending: SURGERY
Payer: COMMERCIAL

## 2025-01-21 ENCOUNTER — ONCOLOGY VISIT (OUTPATIENT)
Dept: SURGERY | Facility: HOSPITAL | Age: 60
End: 2025-01-21
Attending: SURGERY
Payer: COMMERCIAL

## 2025-01-21 VITALS
HEIGHT: 59 IN | RESPIRATION RATE: 16 BRPM | BODY MASS INDEX: 26.61 KG/M2 | WEIGHT: 132 LBS | SYSTOLIC BLOOD PRESSURE: 127 MMHG | HEART RATE: 50 BPM | OXYGEN SATURATION: 97 % | DIASTOLIC BLOOD PRESSURE: 81 MMHG

## 2025-01-21 DIAGNOSIS — R53.83 CHEMOTHERAPY-INDUCED FATIGUE: ICD-10-CM

## 2025-01-21 DIAGNOSIS — C50.912 INVASIVE DUCTAL CARCINOMA OF BREAST, FEMALE, LEFT (H): Primary | ICD-10-CM

## 2025-01-21 DIAGNOSIS — T45.1X5A CHEMOTHERAPY-INDUCED FATIGUE: ICD-10-CM

## 2025-01-21 LAB
ALBUMIN SERPL BCG-MCNC: 4 G/DL (ref 3.5–5.2)
ALP SERPL-CCNC: 103 U/L (ref 40–150)
ALT SERPL W P-5'-P-CCNC: 15 U/L (ref 0–50)
ANION GAP SERPL CALCULATED.3IONS-SCNC: 11 MMOL/L (ref 7–15)
AST SERPL W P-5'-P-CCNC: 37 U/L (ref 0–45)
BASOPHILS # BLD AUTO: 0 10E3/UL (ref 0–0.2)
BASOPHILS NFR BLD AUTO: 0 %
BILIRUB SERPL-MCNC: 0.4 MG/DL
BUN SERPL-MCNC: 8.3 MG/DL (ref 6–20)
CALCIUM SERPL-MCNC: 8.8 MG/DL (ref 8.8–10.4)
CHLORIDE SERPL-SCNC: 106 MMOL/L (ref 98–107)
CREAT SERPL-MCNC: 0.46 MG/DL (ref 0.51–0.95)
EGFRCR SERPLBLD CKD-EPI 2021: >90 ML/MIN/1.73M2
EOSINOPHIL # BLD AUTO: 0.2 10E3/UL (ref 0–0.7)
EOSINOPHIL NFR BLD AUTO: 4 %
ERYTHROCYTE [DISTWIDTH] IN BLOOD BY AUTOMATED COUNT: 14.7 % (ref 10–15)
GLUCOSE SERPL-MCNC: 155 MG/DL (ref 70–99)
HCO3 SERPL-SCNC: 25 MMOL/L (ref 22–29)
HCT VFR BLD AUTO: 31.8 % (ref 35–47)
HGB BLD-MCNC: 10.2 G/DL (ref 11.7–15.7)
IMM GRANULOCYTES # BLD: 0 10E3/UL
IMM GRANULOCYTES NFR BLD: 0 %
LYMPHOCYTES # BLD AUTO: 1.5 10E3/UL (ref 0.8–5.3)
LYMPHOCYTES NFR BLD AUTO: 26 %
MCH RBC QN AUTO: 28.3 PG (ref 26.5–33)
MCHC RBC AUTO-ENTMCNC: 32.1 G/DL (ref 31.5–36.5)
MCV RBC AUTO: 88 FL (ref 78–100)
MONOCYTES # BLD AUTO: 0.6 10E3/UL (ref 0–1.3)
MONOCYTES NFR BLD AUTO: 11 %
NEUTROPHILS # BLD AUTO: 3.2 10E3/UL (ref 1.6–8.3)
NEUTROPHILS NFR BLD AUTO: 57 %
NRBC # BLD AUTO: 0 10E3/UL
NRBC BLD AUTO-RTO: 0 /100
PLATELET # BLD AUTO: 248 10E3/UL (ref 150–450)
POTASSIUM SERPL-SCNC: 3.2 MMOL/L (ref 3.4–5.3)
PROT SERPL-MCNC: 7 G/DL (ref 6.4–8.3)
RBC # BLD AUTO: 3.6 10E6/UL (ref 3.8–5.2)
SODIUM SERPL-SCNC: 142 MMOL/L (ref 135–145)
WBC # BLD AUTO: 5.6 10E3/UL (ref 4–11)

## 2025-01-21 PROCEDURE — 82947 ASSAY GLUCOSE BLOOD QUANT: CPT

## 2025-01-21 PROCEDURE — 82374 ASSAY BLOOD CARBON DIOXIDE: CPT

## 2025-01-21 PROCEDURE — 85041 AUTOMATED RBC COUNT: CPT

## 2025-01-21 PROCEDURE — G0463 HOSPITAL OUTPT CLINIC VISIT: HCPCS | Performed by: SURGERY

## 2025-01-21 PROCEDURE — 99214 OFFICE O/P EST MOD 30 MIN: CPT | Performed by: SURGERY

## 2025-01-21 PROCEDURE — 250N000011 HC RX IP 250 OP 636: Performed by: INTERNAL MEDICINE

## 2025-01-21 PROCEDURE — 85004 AUTOMATED DIFF WBC COUNT: CPT

## 2025-01-21 PROCEDURE — 36591 DRAW BLOOD OFF VENOUS DEVICE: CPT

## 2025-01-21 RX ORDER — HEPARIN SODIUM,PORCINE 10 UNIT/ML
5-20 VIAL (ML) INTRAVENOUS DAILY PRN
OUTPATIENT
Start: 2025-01-21

## 2025-01-21 RX ORDER — CEFAZOLIN SODIUM 2 G/100ML
2 INJECTION, SOLUTION INTRAVENOUS
OUTPATIENT
Start: 2025-01-21

## 2025-01-21 RX ORDER — HEPARIN SODIUM (PORCINE) LOCK FLUSH IV SOLN 100 UNIT/ML 100 UNIT/ML
5 SOLUTION INTRAVENOUS
Status: DISCONTINUED | OUTPATIENT
Start: 2025-01-21 | End: 2025-01-21 | Stop reason: HOSPADM

## 2025-01-21 RX ORDER — HEPARIN SODIUM (PORCINE) LOCK FLUSH IV SOLN 100 UNIT/ML 100 UNIT/ML
5 SOLUTION INTRAVENOUS
OUTPATIENT
Start: 2025-01-21

## 2025-01-21 RX ORDER — HEPARIN SODIUM,PORCINE 10 UNIT/ML
5-20 VIAL (ML) INTRAVENOUS DAILY PRN
Status: CANCELLED | OUTPATIENT
Start: 2025-01-21

## 2025-01-21 RX ADMIN — HEPARIN 5 ML: 100 SYRINGE at 14:59

## 2025-01-21 ASSESSMENT — PAIN SCALES - GENERAL: PAINLEVEL_OUTOF10: NO PAIN (0)

## 2025-01-21 NOTE — PROGRESS NOTES
"Oncology Rooming Note    January 21, 2025 3:13 PM   Kerry Mills is a 59 year old female who presents for:    Chief Complaint   Patient presents with    RECHECK     Invasive ductal carcinoma of breast, female, left      Initial Vitals: /81   Pulse 50   Resp 16   Ht 1.499 m (4' 11\")   Wt 59.9 kg (132 lb)   SpO2 97%   BMI 26.66 kg/m   Estimated body mass index is 26.66 kg/m  as calculated from the following:    Height as of this encounter: 1.499 m (4' 11\").    Weight as of this encounter: 59.9 kg (132 lb). Body surface area is 1.58 meters squared.  No Pain (0) Comment: Data Unavailable   No LMP recorded. Patient is postmenopausal.  Allergies reviewed: Yes  Medications reviewed: Yes    Medications: Medication refills not needed today.  Pharmacy name entered into [facility]:    [facility] - [place], [place] - [address] [place] [address] DRUG STORE #[id number] [address] AT [facility] [address]    Frailty Screening:   Is the patient here for a new oncology consult visit in cancer care? 2. No      Clinical concerns:   Follow up.   Pt is in clinic with her daughter - Laureano.         Pebblse Dumont MA              "

## 2025-01-21 NOTE — PROGRESS NOTES
History:  Kerry Mills presents for follow-up of breast cancer.  She presents with her daughter.  A SpareTime  is on video.  Is s/p NAC for a triple negative invasive ductal carcinoma involving the left breast.  She is happy to be done with chemotherapy.  Continues to have significant neuropathy but is not have any worsening now.      Imaging:  Pertinent images personally reviewed by myself and discussed with the patient.  Radiology reports:  LOCATION: [facility]  DATE: 12/18/2024     HISTORY/INDICATION: Known left breast triple negative invasive ductal carcinoma, follow-up on neoadjuvant chemotherapy     COMPARISON: 9/10/2024, 7/18/2024, 6/15/2024, 6/13/2024     TECHNIQUE: Multiplanar, multisequence imaging performed prior to contrast administration and at multiple time points after contrast administration. Post-processing including subtractions, MIPs and color encoding of post contrast dynamic acquisitions.   IV contrast: 6 mL izabel  SEDATION: None     FINDINGS:  Right Breast:  Breast composition: Scattered fibroglandular tissue  Background parenchymal enhancement: Minimal  No concerning areas of enhancement.  There is susceptibility artifact far upper inner breast/chest from an implanted port.     Right Axilla: No lymphadenopathy.   Right Internal Mammary Chain: No lymphadenopathy.      Left Breast:   Breast composition: Scattered fibroglandular tissue  Background parenchymal enhancement: Minimal  Where there was previously a 3.9 cm enhancing, centrally necrotic mass upper inner breast consistent with patient's known malignancy , there is now only some residual architectural distortion and biopsy marker, without a measurable mass or abnormal   enhancement.     Left Axilla: No lymphadenopathy.   Left Internal Mammary Chain: No lymphadenopathy.      Evaluation of limited imaging through the chest and upper abdomen is unremarkable.                                                                    IMPRESSION:   Complete imaging treatment response.     Right Breast ACR BI-RADS Category: 1 -  NEGATIVE.   Left Breast ACR BI-RADS Category: 6 - Known Biopsy-Proven Malignancy-Appropriate Action Should Be Taken.     ASSESSMENT:  Left breast invasive ductal carcinoma    - Grade II, T2, N0, ER/FL/HER2-  -->  anatomic stage II     - s/p port placement and NAC    PLAN:  We rediscussed the surgical treatment options which are lumpectomy followed by radiation or mastectomy.  She would like to pursue breast preservation therapy.  With her good chemotherapy response, this would require preoperative wire localization.  Her case was discussed with Dr. Adamson.  Her port can be removed.  Preoperative orders have been placed for left breast wire localized lumpectomy with sentinel lymph node biopsy and Port-A-Cath removal.  The procedure will be performed under local MAC anesthesia.  She will be discharged home the same day.  We discussed the typical postoperative recovery and restrictions.  My surgery scheduler will work with her to pick a date.  She will have a preoperative physical through her primary care office.  She would then follow-up with myself 2 weeks after surgery.    36 minutes was spent in chart prep and review, discussion, coordination of care, and documentation.    Iwona Choe DO  General Surgeon  [facility]  [facility]  [address] [address]  Office: [phone]  Employed by - [facility]

## 2025-01-24 PROBLEM — C50.912 INVASIVE DUCTAL CARCINOMA OF BREAST, FEMALE, LEFT (H): Status: ACTIVE | Noted: 2024-07-02

## 2025-02-05 ENCOUNTER — ANESTHESIA EVENT (OUTPATIENT)
Dept: SURGERY | Facility: AMBULATORY SURGERY CENTER | Age: 60
End: 2025-02-05
Payer: COMMERCIAL

## 2025-02-06 ENCOUNTER — ANCILLARY PROCEDURE (OUTPATIENT)
Dept: MAMMOGRAPHY | Facility: CLINIC | Age: 60
End: 2025-02-06
Attending: SURGERY
Payer: COMMERCIAL

## 2025-02-06 ENCOUNTER — ANESTHESIA (OUTPATIENT)
Dept: SURGERY | Facility: AMBULATORY SURGERY CENTER | Age: 60
End: 2025-02-06
Payer: COMMERCIAL

## 2025-02-06 ENCOUNTER — HOSPITAL ENCOUNTER (OUTPATIENT)
Facility: AMBULATORY SURGERY CENTER | Age: 60
End: 2025-02-06
Attending: SURGERY
Payer: COMMERCIAL

## 2025-02-06 VITALS
OXYGEN SATURATION: 95 % | HEIGHT: 59 IN | TEMPERATURE: 97 F | RESPIRATION RATE: 12 BRPM | HEART RATE: 87 BPM | BODY MASS INDEX: 26.61 KG/M2 | SYSTOLIC BLOOD PRESSURE: 131 MMHG | WEIGHT: 132 LBS | DIASTOLIC BLOOD PRESSURE: 77 MMHG

## 2025-02-06 DIAGNOSIS — C50.912 INVASIVE DUCTAL CARCINOMA OF BREAST, FEMALE, LEFT (H): ICD-10-CM

## 2025-02-06 PROCEDURE — 999N000065 MA POST PROCEDURE LEFT

## 2025-02-06 PROCEDURE — 76098 X-RAY EXAM SURGICAL SPECIMEN: CPT

## 2025-02-06 RX ORDER — DEXAMETHASONE SODIUM PHOSPHATE 4 MG/ML
INJECTION, SOLUTION INTRA-ARTICULAR; INTRALESIONAL; INTRAMUSCULAR; INTRAVENOUS; SOFT TISSUE PRN
Status: DISCONTINUED | OUTPATIENT
Start: 2025-02-06 | End: 2025-02-06

## 2025-02-06 RX ORDER — TRAMADOL HYDROCHLORIDE 50 MG/1
50 TABLET ORAL EVERY 6 HOURS PRN
Qty: 10 TABLET | Refills: 0 | Status: SHIPPED | OUTPATIENT
Start: 2025-02-06 | End: 2025-02-09

## 2025-02-06 RX ORDER — NALOXONE HYDROCHLORIDE 0.4 MG/ML
0.1 INJECTION, SOLUTION INTRAMUSCULAR; INTRAVENOUS; SUBCUTANEOUS
OUTPATIENT
Start: 2025-02-06

## 2025-02-06 RX ORDER — PROPOFOL 10 MG/ML
INJECTION, EMULSION INTRAVENOUS CONTINUOUS PRN
Status: DISCONTINUED | OUTPATIENT
Start: 2025-02-06 | End: 2025-02-06

## 2025-02-06 RX ORDER — ONDANSETRON 2 MG/ML
INJECTION INTRAMUSCULAR; INTRAVENOUS PRN
Status: DISCONTINUED | OUTPATIENT
Start: 2025-02-06 | End: 2025-02-06

## 2025-02-06 RX ORDER — SODIUM CHLORIDE, SODIUM LACTATE, POTASSIUM CHLORIDE, CALCIUM CHLORIDE 600; 310; 30; 20 MG/100ML; MG/100ML; MG/100ML; MG/100ML
INJECTION, SOLUTION INTRAVENOUS CONTINUOUS
OUTPATIENT
Start: 2025-02-06

## 2025-02-06 RX ORDER — DEXAMETHASONE SODIUM PHOSPHATE 4 MG/ML
4 INJECTION, SOLUTION INTRA-ARTICULAR; INTRALESIONAL; INTRAMUSCULAR; INTRAVENOUS; SOFT TISSUE
OUTPATIENT
Start: 2025-02-06

## 2025-02-06 RX ORDER — LIDOCAINE HYDROCHLORIDE 20 MG/ML
INJECTION, SOLUTION INFILTRATION; PERINEURAL PRN
Status: DISCONTINUED | OUTPATIENT
Start: 2025-02-06 | End: 2025-02-06

## 2025-02-06 RX ORDER — LIDOCAINE 40 MG/G
CREAM TOPICAL
OUTPATIENT
Start: 2025-02-06

## 2025-02-06 RX ORDER — ONDANSETRON 4 MG/1
4 TABLET, ORALLY DISINTEGRATING ORAL EVERY 30 MIN PRN
OUTPATIENT
Start: 2025-02-06

## 2025-02-06 RX ORDER — ONDANSETRON 2 MG/ML
4 INJECTION INTRAMUSCULAR; INTRAVENOUS EVERY 30 MIN PRN
OUTPATIENT
Start: 2025-02-06

## 2025-02-06 RX ORDER — CEFAZOLIN SODIUM 2 G/100ML
2 INJECTION, SOLUTION INTRAVENOUS
Status: COMPLETED | OUTPATIENT
Start: 2025-02-06 | End: 2025-02-06

## 2025-02-06 RX ORDER — FENTANYL CITRATE 0.05 MG/ML
25 INJECTION, SOLUTION INTRAMUSCULAR; INTRAVENOUS
OUTPATIENT
Start: 2025-02-06

## 2025-02-06 RX ORDER — FENTANYL CITRATE 50 UG/ML
INJECTION, SOLUTION INTRAMUSCULAR; INTRAVENOUS PRN
Status: DISCONTINUED | OUTPATIENT
Start: 2025-02-06 | End: 2025-02-06

## 2025-02-06 RX ADMIN — SODIUM CHLORIDE, SODIUM LACTATE, POTASSIUM CHLORIDE, CALCIUM CHLORIDE: 600; 310; 30; 20 INJECTION, SOLUTION INTRAVENOUS at 09:46

## 2025-02-06 RX ADMIN — DEXAMETHASONE SODIUM PHOSPHATE 8 MG: 4 INJECTION, SOLUTION INTRA-ARTICULAR; INTRALESIONAL; INTRAMUSCULAR; INTRAVENOUS; SOFT TISSUE at 10:42

## 2025-02-06 RX ADMIN — FENTANYL CITRATE 25 MCG: 50 INJECTION, SOLUTION INTRAMUSCULAR; INTRAVENOUS at 11:25

## 2025-02-06 RX ADMIN — FENTANYL CITRATE 25 MCG: 50 INJECTION, SOLUTION INTRAMUSCULAR; INTRAVENOUS at 10:54

## 2025-02-06 RX ADMIN — CEFAZOLIN SODIUM 2 G: 2 INJECTION, SOLUTION INTRAVENOUS at 10:31

## 2025-02-06 RX ADMIN — Medication 100 MCG: at 11:20

## 2025-02-06 RX ADMIN — FENTANYL CITRATE 25 MCG: 50 INJECTION, SOLUTION INTRAMUSCULAR; INTRAVENOUS at 11:08

## 2025-02-06 RX ADMIN — ONDANSETRON 4 MG: 2 INJECTION INTRAMUSCULAR; INTRAVENOUS at 10:39

## 2025-02-06 RX ADMIN — Medication 50 MCG: at 10:52

## 2025-02-06 RX ADMIN — LIDOCAINE HYDROCHLORIDE 2.5 ML: 20 INJECTION, SOLUTION INFILTRATION; PERINEURAL at 10:36

## 2025-02-06 RX ADMIN — PROPOFOL 140 MCG/KG/MIN: 10 INJECTION, EMULSION INTRAVENOUS at 10:36

## 2025-02-06 RX ADMIN — FENTANYL CITRATE 25 MCG: 50 INJECTION, SOLUTION INTRAMUSCULAR; INTRAVENOUS at 10:48

## 2025-02-06 NOTE — ANESTHESIA PREPROCEDURE EVALUATION
Anesthesia Pre-Procedure Evaluation    Patient: Kerry Mills   MRN: [id number] : 1965        Procedure : Procedure(s):  BREAST WIRE LOCALIZED LUMPECTOMY WITH SENTINEL LYMPH NODE BIOPSY AND PORT-A-CATH REMOVAL          Past Medical History:   Diagnosis Date     Asthma      Chronic constipation      Difficulty walking      Walking troubles       Past Surgical History:   Procedure Laterality Date     COLONOSCOPY N/A 2024    Procedure: COLONOSCOPY;  Surgeon: Ayla Byrd MD;  Location: [facility]     INCISION AND DRAINAGE OF WOUND N/A 2020    Procedure: INCISION AND DRAINAGE, NECK;  Surgeon: Arnel James MD;  Location: [facility] OR;  Service: ENT     INSERT PORT VASCULAR ACCESS Right 2024    Procedure: INSERTION,  RIGHT VASCULAR ACCESS PORT;  Surgeon: Iwona Choe DO;  Location: [facility] OR     PICC INSERTION - TRIPLE LUMEN  2020          TRACHEOSTOMY N/A 2020    Procedure: EMERGENT INTUBATION IN OR WITH CREATION, TRACHEOSTOMY;  Surgeon: Dennise Justice MD;  Location: [facility] OR;  Service: General      No Known Allergies   Social History     Tobacco Use     Smoking status: Never     Smokeless tobacco: Never   Substance Use Topics     Alcohol use: Never      Wt Readings from Last 1 Encounters:   25 59.9 kg (132 lb)        Anesthesia Evaluation   Pt has had prior anesthetic. Type: General and MAC.        ROS/MED HX  ENT/Pulmonary:  - neg pulmonary ROS   (+)                      asthma                  Neurologic: Comment: Poly neuropathy  - neg neurologic ROS     Cardiovascular:  - neg cardiovascular ROS     METS/Exercise Tolerance: >4 METS    Hematologic:  - neg hematologic  ROS   (+)      anemia,          Musculoskeletal:  - neg musculoskeletal ROS     GI/Hepatic: Comment: Acute GI bleed     Amita virus  - neg GI/hepatic ROS   (+)        bowel prep,            Renal/Genitourinary:  - neg Renal ROS     Endo:  - neg endo ROS    "  Psychiatric/Substance Use:  - neg psychiatric ROS     Infectious Disease:  - neg infectious disease ROS     Malignancy:  - neg malignancy ROS (+) Malignancy, History of Breast.Breast CA status post Chemo and Radiation.      Other:            Physical Exam    Airway        Mallampati: I   TM distance: > 3 FB   Neck ROM: full   Mouth opening: > 3 cm    Respiratory Devices and Support         Dental       (+) Modest Abnormalities - crowns, retainers, 1 or 2 missing teeth      Cardiovascular          Rhythm and rate: regular     Pulmonary   pulmonary exam normal            OUTSIDE LABS:  CBC:   Lab Results   Component Value Date    WBC 5.6 01/21/2025    WBC 8.3 12/31/2024    HGB 10.2 (L) 01/21/2025    HGB 9.0 (L) 12/31/2024    HCT 31.8 (L) 01/21/2025    HCT 26.6 (L) 12/31/2024     01/21/2025     (L) 12/31/2024     BMP:   Lab Results   Component Value Date     01/21/2025     12/31/2024    POTASSIUM 3.2 (L) 01/21/2025    POTASSIUM 3.6 12/31/2024    CHLORIDE 106 01/21/2025    CHLORIDE 104 12/31/2024    CO2 25 01/21/2025    CO2 23 12/31/2024    BUN 8.3 01/21/2025    BUN 5.6 (L) 12/31/2024    CR 0.46 (L) 01/21/2025    CR 0.58 12/31/2024     (H) 01/21/2025     (H) 12/31/2024     COAGS:   Lab Results   Component Value Date    PTT 30 12/30/2024    INR 1.01 12/30/2024     POC: No results found for: \"BGM\", \"HCG\", \"HCGS\"  HEPATIC:   Lab Results   Component Value Date    ALBUMIN 4.0 01/21/2025    PROTTOTAL 7.0 01/21/2025    ALT 15 01/21/2025    AST 37 01/21/2025    ALKPHOS 103 01/21/2025    BILITOTAL 0.4 01/21/2025     OTHER:   Lab Results   Component Value Date    PH 7.40 11/26/2020    LACT 1.2 11/06/2024    A1C 5.9 (H) 11/06/2024    DONNA 8.8 01/21/2025    PHOS 3.9 12/03/2020    MAG 1.8 12/30/2024    LIPASE 23 11/06/2024    TSH 1.81 07/22/2024    CRP 1.6 (H) 12/02/2020       Anesthesia Plan    ASA Status:  3    NPO Status:  NPO Appropriate    Anesthesia Type: MAC.     - Reason for MAC: " "straight local not clinically adequate   Induction: Intravenous.   Maintenance: Balanced.        Consents    Anesthesia Plan(s) and associated risks, benefits, and realistic alternatives discussed. Questions answered and patient/representative(s) expressed understanding.     - Discussed: Risks, Benefits and Alternatives for BOTH SEDATION and the PROCEDURE were discussed     - Discussed with:  Patient            Postoperative Care    Pain management: IV analgesics, Oral pain medications.   PONV prophylaxis: Ondansetron (or other 5HT-3), Dexamethasone or Solumedrol, Background Propofol Infusion     Comments:    Other Comments: Discussed possibility of intra-op awareness or need to convert to GETA.            Leslie Goldberg, MD    I have reviewed the pertinent notes and labs in the chart from the past 30 days and (re)examined the patient.  Any updates or changes from those notes are reflected in this note.    Clinically Significant Risk Factors Present on Admission                             # Overweight: Estimated body mass index is 26.66 kg/m  as calculated from the following:    Height as of this encounter: 1.499 m (4' 11\").    Weight as of this encounter: 59.9 kg (132 lb).       # Asthma: noted on problem list          "

## 2025-02-06 NOTE — INTERVAL H&P NOTE
Patient seen in preop with her daughter.  Denies new medical history since she was last seen.  All questions answered regarding procedure.  Consent obtained.  To the OR for left breast wire localized lumpectomy with sentinel lymph node biopsy and port removal.  Once I have the surgical pathology results available, I will discuss them with the patient in person as opposed to her seeing them through MyChart.  She is in agreement.    Iwona Choe DO  General Surgeon  [facility]  Breast [address] [address]  Office: [phone]  Employed by - [facility]

## 2025-02-06 NOTE — ANESTHESIA POSTPROCEDURE EVALUATION
Patient: Kerry Mills    Procedure: Procedure(s):  BREAST WIRE LOCALIZED LUMPECTOMY WITH SENTINEL LYMPH NODE BIOPSY AND PORT-A-CATH REMOVAL       Anesthesia Type:  MAC    Note:  Disposition: Outpatient   Postop Pain Control: Uneventful            Sign Out: Well controlled pain   PONV: No   Neuro/Psych: Uneventful            Sign Out: Acceptable/Baseline neuro status   Airway/Respiratory: Uneventful            Sign Out: Acceptable/Baseline resp. status   CV/Hemodynamics: Uneventful            Sign Out: Acceptable CV status; No obvious hypovolemia; No obvious fluid overload   Other NRE: NONE   DID A NON-ROUTINE EVENT OCCUR? No           Last vitals:  Vitals Value Taken Time   /67 02/06/25 1145   Temp 97  F (36.1  C) 02/06/25 1139   Pulse 81 02/06/25 1147   Resp 12 02/06/25 1145   SpO2 100 % 02/06/25 1147   Vitals shown include unfiled device data.    Electronically Signed By: Leslie Goldberg, MD  February 6, 2025  11:51 AM

## 2025-02-06 NOTE — DISCHARGE INSTRUCTIONS
If you have any questions or concerns regarding your procedure please contact Dr. Iwona Choe, her office number is [phone]     Apply ice to the incision site as needed for pain. Twenty minutes with the ice on and then twenty minutes with the ice off.      Do not exceed 4,000 mg of acetaminophen during a 24 hour period and keep in mind that acetaminophen can also be found in many over-the-counter cold medications as well as narcotics that may be given for pain.      Do not take more than 2000mg of NSAIDS, such as Advil and Ibuprofen, in 24hrs.              Adult Discharge Orders & Instructions     For 24 hours after surgery    Get plenty of rest.  A responsible adult must stay with you for at least 24 hours after you leave the hospital.   Do not drive or use heavy equipment.  If you have weakness or tingling, don't drive or use heavy equipment until this feeling goes away.  Do not drink alcohol.  Avoid strenuous or risky activities.  Ask for help when climbing stairs.   You may feel lightheaded.  IF so, sit for a few minutes before standing.  Have someone help you get up.   If you have nausea (feel sick to your stomach): Drink only clear liquids such as apple juice, ginger ale, broth or 7-Up.  Rest may also help.  Be sure to drink enough fluids.  Move to a regular diet as you feel able.  You may have a slight fever. Call the doctor if your fever is over 100 F (37.7 C) (taken under the tongue) or lasts longer than 24 hours.  You may have a dry mouth, a sore throat, muscle aches or trouble sleeping.  These should go away after 24 hours.  Do not make important or legal decisions.     Call your doctor for any of the followin.  Signs of infection (fever, growing tenderness at the surgery site, a large amount of drainage or bleeding, severe pain, foul-smelling drainage, redness, swelling).    2. It has been over 8 to 10 hours since surgery and you are still not able to urinate (pass water).    3.  Headache  for over 24 hours.

## 2025-02-06 NOTE — OP NOTE
Name:  Kerry Mills  PCP:  Ceasar Zuñiga  Procedure Date:  2/6/2025      LEFT BREAST WIRE LOCALIZED LUMPECTOMY WITH SENTINEL LYMPH NODE BIOPSY AND PORT-A-CATH REMOVAL       Pre-Procedure Diagnosis:  Invasive ductal carcinoma of breast, female, left     Post-Procedure Diagnosis:    Same    Anesthesia Type:    MAC with local    Estimated Blood Loss:   60 mL    Specimens:    Left breast lumpectomy  Left breast sentinel lymph node    Complications:    None apparent    Findings:  Clip within lumpectomy tissue    [place] Node Biopsy for Breast Cancer - Left  Operation performed with curative intent Yes   Tracer(s) used to identify sentinel nodes in the upfront surgery (non-neoadjuvant) setting N/A   Tracer(s) used to identify sentinel nodes in the neoadjuvant setting Radioactive tracer   All nodes (colored or non-colored) present at the end of a dye-filled lymphatic channel were removed N/A   All significantly radioactive nodes were removed Yes   All palpably suspicious nodes were removed N/A   Biopsy-proven positive nodes marked with clips prior to chemotherapy were identified and removed N/A       Indication for Procedure:  This is a 59-year-old female who noticed a left breast mass last year.  She was found to have a triple negative invasive ductal carcinoma.  She has gone through neoadjuvant chemotherapy.  She had a good clinical response.  She has elected for breast preservation therapy for treatment.    Operative Report:    After informed consent was obtained, and the risks and benefits of the procedure were discussed, the patient was brought back to the operative suite and placed in the supine position.  Preoperatively, a localization wire was placed and the breast was injected with Lymphoseek.  MAC anesthesia was provided by the department of anesthesia.  Ultrasound was utilized by myself to visualize the clip in the breast parenchyma at 10:00 deep to the areolar border.  The left breast and axilla were prepped and  draped in the usual sterile fashion.  After infiltration with a combination of 1% lidocaine and quarter percent Marcaine circumareolar shaped incision was made over the lesion in the breast at 10:00.  This was carried down through the subcutaneous tissues and the wire was delivered into the incision.  I then went around the wire widely with electrocautery.  Once removed, the specimen was marked for orientation and sent to radiology, who confirmed removal of the clip.  The specimen was then sent to pathology for permanent sectioning.  A curvilinear incision was then made in the lower portion of the axilla, after local infiltration, and carried down deep into the axillary fat pad.  The clavipectoral fascia was incised and the axilla was palpated for abnormalities.  No abnormal lymph nodes were palpated.  Using the gamma probe I identified one sentinel lymph nodes, with a count of 320.  This was removed with electrocautery and sent for permanent sectioning.  There was no significant remaining gamma activity.  Both wounds are inspected for hemostasis and closed with 3-0 Vicryl to the subcutaneous tissues, followed by interrupted 4-0 Vicryl for the deep dermis, and a subcuticular stitch of 4-0 Monocryl to the skin.  Dermabond was then placed over the incisions.  Attention was then directed to the right upper chest which was anesthetized.  An elliptical incision was made around the prior port incision site.  The skin was discarded.  The port pocket was entered and the port was freed circumferentially and removed in 1 piece.  Hemostasis was assured in the wound.  The incision was closed with interrupted 3-0 Vicryl followed by running subcuticular 4-0 Monocryl and Dermabond.    Instrument, sponge, and needle counts were correct at closure and at the conclusion of the case.     Disposition:  The patient tolerated the procedure well.  They were transferred to the postanesthesia care unit in stable condition.      Iwona  DO Daija  General Surgeon  [facility]  [address] [place] [id number]  Office: [phone]  Employed by - [facility]

## 2025-02-06 NOTE — ANESTHESIA CARE TRANSFER NOTE
Patient: Kerry Mills    Procedure: Procedure(s):  BREAST WIRE LOCALIZED LUMPECTOMY WITH SENTINEL LYMPH NODE BIOPSY AND PORT-A-CATH REMOVAL       Diagnosis: Invasive ductal carcinoma of breast, female, left (H) [C50.912]  Diagnosis Additional Information: No value filed.    Anesthesia Type:   MAC     Note:    Oropharynx: oropharynx clear of all foreign objects  Level of Consciousness: drowsy  Oxygen Supplementation: face mask  Level of Supplemental Oxygen (L/min / FiO2): 6  Independent Airway: airway patency satisfactory and stable  Dentition: dentition unchanged  Vital Signs Stable: post-procedure vital signs reviewed and stable  Report to RN Given: handoff report given  Patient transferred to: Phase II    Handoff Report: Identifed the Patient, Identified the Reponsible Provider, Reviewed the pertinent medical history, Discussed the surgical course, Reviewed Intra-OP anesthesia mangement and issues during anesthesia, Set expectations for post-procedure period and Allowed opportunity for questions and acknowledgement of understanding      Vitals:  Vitals Value Taken Time   /58 02/06/25 1139   Temp 97  F (36.1  C) 02/06/25 1139   Pulse 85 02/06/25 1139   Resp 16 02/06/25 1139   SpO2 100 % 02/06/25 1139       Electronically Signed By: NEW Holder CRNA  February 6, 2025  11:41 AM

## 2025-02-19 ENCOUNTER — ONCOLOGY VISIT (OUTPATIENT)
Dept: ONCOLOGY | Facility: HOSPITAL | Age: 60
End: 2025-02-19
Attending: INTERNAL MEDICINE
Payer: COMMERCIAL

## 2025-02-19 ENCOUNTER — PATIENT OUTREACH (OUTPATIENT)
Dept: ONCOLOGY | Facility: CLINIC | Age: 60
End: 2025-02-19

## 2025-02-19 VITALS
HEIGHT: 59 IN | HEART RATE: 82 BPM | WEIGHT: 132.8 LBS | RESPIRATION RATE: 18 BRPM | BODY MASS INDEX: 26.77 KG/M2 | SYSTOLIC BLOOD PRESSURE: 116 MMHG | OXYGEN SATURATION: 97 % | DIASTOLIC BLOOD PRESSURE: 81 MMHG

## 2025-02-19 DIAGNOSIS — C50.912 INVASIVE DUCTAL CARCINOMA OF BREAST, FEMALE, LEFT (H): Primary | ICD-10-CM

## 2025-02-19 PROCEDURE — G2211 COMPLEX E/M VISIT ADD ON: HCPCS | Performed by: INTERNAL MEDICINE

## 2025-02-19 PROCEDURE — 99214 OFFICE O/P EST MOD 30 MIN: CPT | Performed by: INTERNAL MEDICINE

## 2025-02-19 PROCEDURE — G0463 HOSPITAL OUTPT CLINIC VISIT: HCPCS | Performed by: INTERNAL MEDICINE

## 2025-02-19 RX ORDER — CHOLECALCIFEROL (VITAMIN D3) 50 MCG
TABLET ORAL
COMMUNITY
Start: 2025-01-29

## 2025-02-19 NOTE — PROGRESS NOTES
New Patient Oncology Nurse Navigator Note     Referring provider: Mynor Adamson MD      Referring Clinic/Organization: [facility]     Referred to (specialty:) Radiation Oncology      Date Referral Received: February 19, 2025     Evaluation for:  C50.912 (ICD-10-CM) - Invasive ductal carcinoma of breast, female, left (H)     Clinical History (per Nurse review of records provided):      Kerry Mills presented with a palpable lump in the left breast. Diagnostic imaging was performed on 6/13.   Targeted left breast ultrasound at the area of palpable concern at the 10:00 position, 1 cm from the nipple demonstrates a 2.3 x 3.0 x 3.1 cm irregular, hypoechoic mass with lobulated margins and mild increased internal vascularity.  Sonographic evaluation of the left axilla demonstrates a few nonenlarged morphologically normal lymph nodes.  IMPRESSION: BI-RADS CATEGORY: 5 - Highly Suggestive of Malignancy.    1.  Suspicious mass within the left breast at the 10:00 position correlates with the palpable area of concern. Recommendation ultrasound-guided biopsy of this mass.  2.  No left axillary lymphadenopathy.     6/18/24 - Case: [id number]   BREAST BIOPSY WITH NEOPLASTIC LESION:  - BIOPSY TYPE: ULTRASOUND-GUIDED CORE BIOPSIES  - BIOPSY SITE: LEFT BREAST, 10:00, 1 CM FROM NIPPLE  - HISTOLOGIC TYPE: INVASIVE DUCTAL CARCINOMA  - JAN HISTOLOGIC SCORE:  - TUBULAR DIFFERENTIATION SCORE 3  - NUCLEAR PLEOMORPHISM SCORE 3  - MITOTIC RATE SCORE 1  - OVERALL GRADE 2 (TOTAL SCORE 7/9)  - EXTENT OF TUMOR: 18 MM TUMOR LENGTH  - DUCTAL CARCINOMA IN SITU (DCIS): NOT IDENTIFIED  - SMALL-VESSEL LYMPHATIC INVASION: NOT IDENTIFIED  - MICROCALCIFICATIONS: NOT IDENTIFIED  - ADDITIONAL FINDINGS: EXTENSIVE NECROSIS, PROLIFERATIVE  FIBROCYSTIC CHANGES  - ADDITIONAL STUDIES: SEE BREAST BIOMARKER SYNOPTIC REPORT BELOW  - SUMMARY OF SYNOPSIS:  ESTROGEN RECEPTORS NEGATIVE (<1% OF TUMOR NUCLEI  STAIN)  PROGESTERONE RECEPTORS NEGATIVE (<1% OF TUMOR NUCLEI STAIN)  HER2/CAPRICE RECEPTORS EQUIVOCAL (2+ MEMBRANE STAINING)  HER2 by FISH subsequently reported - Please see report (book marked) for details    Patient met with Dr. Choe and Dr. Adamson and proceeded with neoadjuvant chemotherapy.     Treatment  1) neoadjuvant AC started on 7/22/2024.  CarboTaxol and Pembro denied by insurance   2) neoadjuvant weekly paclitaxel started on 9/17/2024  3) last 2 weekly dose of Taxol held due to neuropathy     2/6/25 CASE: [id number]   A) BREAST, LEFT, WIRE LOCALIZED LUMPECTOMY:        1) NEGATIVE FOR RESIDUAL INVASIVE DUCTAL CARCINOMA IN A 19 x 11 x 6   MM TREATMENT BED        2) NEGATIVE FOR DUCTAL CARCINOMA IN SITU   3) ADDITIONAL FINDINGS: COLUMNAR CELL CHANGE, USUAL DUCTAL HYPERPLASIA,   SCLEROSING ADENOSIS, DUCTAL AND VASCULAR MICROCALCIFICATIONS        4) PREVIOUS BIOPSY SITE PRESENT        5) MARGINS NEGATIVE   B) SENTINEL LYMPH NODE, LEFT, BIOPSY:        -ONE LYMPH NODE NEGATIVE FOR METASTATIC CARCINOMA (0/1)   PATHOLOGIC STAGE: [id number](sn)     Patient speaks Hmong and her daughter in law, Ginny, has been involved with care and appointments.     Records Location: See Bookmarked material    Writer received referral, reviewed for appropriate plan, and referral transferred to New Patient Scheduling for completion.

## 2025-02-19 NOTE — TELEPHONE ENCOUNTER
MEDICAL RECORDS REQUEST   Radiation Oncology  [address] [place] [address]  Fax: [phone]          FUTURE VISIT INFORMATION                                                   IAN Cornelius: 1965 scheduled for future visit at [facility] Radiation Oncology    RECORDS REQUESTED FOR VISIT                                                     BREAST     OFFICE NOTE from medical oncologist Epic 25: Dr. Mynor Adamson   OFFICE NOTE from surgeon/plastic surgeon [facility] 25: Dr. Iwona Choe   CHEMOTHERAPY NOTES/LOG Epic Most recent 25   OPERATIVE/BREAST BIOPSY REPORTS [facility] 25: LUMPECTOMY   24: US Breast Bx    MEDICATION LIST [facility]    LABS     PATHOLOGY REPORTS Report in Epic 24: [id number]   24: [id number]    ANYTHING RELATED TO DIAGNOSIS Epic Most recent 25   IMAGING (NEED IMAGES & REPORT)     MRI PACS 24, 24: MR Breast   MAMMO/SURGICAL BREAST IMGS/SPECIMEN RADIOGRAPH PACS 25-12/3/21    ULTRASOUND PACS 25-24: US Breast   24: US Breast Bx

## 2025-02-19 NOTE — LETTER
"2/19/2025      Kerry Mills  [address] [place] [address]      Dear Colleague,    Thank you for referring your patient, Kerry Mills, to the [facility]. Please see a copy of my visit note below.     Oncology Rooming Note    February 19, 2025 1:09 PM   Kerry Mills is a 59 year old female who presents for:    Chief Complaint   Patient presents with     Oncology Clinic Visit     Follow up -   Invasive ductal carcinoma of breast, female, left      Post-op Visit     BREAST WIRE LOCALIZED LUMPECTOMY WITH SENTINEL LYMPH NODE BIOPSY  DOS - 2/6/25     Initial Vitals: /81 (BP Location: Right arm, Patient Position: Sitting, Cuff Size: Adult Regular)   Pulse 82   Resp 18   Ht 1.499 m (4' 11\")   Wt 60.2 kg (132 lb 12.8 oz)   SpO2 97%   BMI 26.82 kg/m   Estimated body mass index is 26.82 kg/m  as calculated from the following:    Height as of this encounter: 1.499 m (4' 11\").    Weight as of this encounter: 60.2 kg (132 lb 12.8 oz). Body surface area is 1.58 meters squared.  Data Unavailable Comment: Data Unavailable   No LMP recorded. Patient is postmenopausal.  Allergies reviewed: Yes  Medications reviewed: Yes    Medications: Medication refills not needed today.  Pharmacy name entered into [facility]:    [facility] - [place], [place] - [address] [place] [address] DRUG STORE #[id number] - [place], [place] - [address] [facility] [address]    Frailty Screening:   Is the patient here for a new oncology consult visit in cancer care? 2. No    PHQ9:  Did this patient require a PHQ9?: No      Clinical concerns:   Previous off/on burning sensation to whole back with fast heart beat per pt. Symptoms returned yesterday.  Pt states left side underarm and side pain, pain score of 8 off/on. Have tried topical OTC to aid with pain, not effective. Reported that she previously took rx pain medication to aid with pain but developed headaches and lack of sleep instead.       Pebbles Dumont, " MA          beat per pt. Symptoms returned yesterday.            [facility] Hematology and Oncology Progress Note    Patient: Kerry Mills  MRN: [id number]  Date of Service: Feb 19, 2025             ECOG Performance    0 - Independent          ______________________________________________________________________________  Oncologic history  1) invasive ductal carcinoma of the breast ER negative [place] negative HER2/kobe 2+, negative by FISH.  3.9 cm in maximum dimension on MRI.  Diagnosed July 2024    Treatment  1) neoadjuvant AC started on 7/22/2024.  CarboTaxol and Pembro denied by insurance   2) neoadjuvant weekly paclitaxel started on 9/17/2024  3) last 2 weekly dose of Taxol held due to neuropathy  4) patient underwent lumpectomy with sentinel lymph node sampling on 6 February 2025.  Found to have a complete pathologic response with no residual cancer      History of Present Illness  Patient is here in follow-up.  She has completed her surgical management.  She is here to discuss the results.  She otherwise feels fine.  She has no other concerns.  She does complain of pain in her axilla since the surgery and had questions about that.    Review of systems  12 point review of system was negative    Past History    Past Medical History:   Diagnosis Date     Asthma      Chronic constipation      Difficulty walking      Walking troubles        Past Surgical History:   Procedure Laterality Date     COLONOSCOPY N/A 12/31/2024    Procedure: COLONOSCOPY;  Surgeon: Ayla Byrd MD;  Location: [facility] OR     INCISION AND DRAINAGE OF WOUND N/A 11/27/2020    Procedure: INCISION AND DRAINAGE, NECK;  Surgeon: Arnel James MD;  Location: [facility] OR;  Service: ENT     INSERT PORT VASCULAR ACCESS Right 07/09/2024    Procedure: INSERTION,  RIGHT VASCULAR ACCESS PORT;  Surgeon: Iwona Choe DO;  Location: [facility] OR     LUMPECTOMY BREAST Left 2/6/2025    Procedure: BREAST WIRE LOCALIZED LUMPECTOMY  "WITH SENTINEL LYMPH NODE BIOPSY AND PORT-A-CATH REMOVAL;  Surgeon: Iwona Choe DO;  Location: [facility] OR     PICC INSERTION - TRIPLE LUMEN  11/26/2020          TRACHEOSTOMY N/A 11/26/2020    Procedure: EMERGENT INTUBATION IN OR WITH CREATION, TRACHEOSTOMY;  Surgeon: Dennise Justice MD;  Location: [facility] [place];  Service: General       Physical Exam    /81 (BP Location: Right arm, Patient Position: Sitting, Cuff Size: Adult Regular)   Pulse 82   Resp 18   Ht 1.499 m (4' 11\")   Wt 60.2 kg (132 lb 12.8 oz)   SpO2 97%   BMI 26.82 kg/m      General: alert, awake, not in acute distress  HEENT: Head: Normal, normocephalic, atraumatic.  Eye: Normal external eye, conjunctiva, lids cornea, MIKAYLA.  Nose: Normal external nose, mucus membranes and septum.  Pharynx: Normal buccal mucosa. Normal pharynx.  Neck / Thyroid: Supple, no masses, nodes, nodules or enlargement.  Lymphatics: No abnormally enlarged lymph nodes.  Chest: Normal chest wall and respirations. Clear to auscultation.  No crackles or rhonchi's  Heart: S1 S2 RRR, no murmur.   Abdomen: abdomen is soft without significant tenderness, masses, organomegaly or guarding  Extremities: normal strength, tone, and muscle mass  Skin: normal. no rash or abnormalities  CNS: non focal.    Lab Results    No results found for this or any previous visit (from the past 240 hours).        Imaging    US Breast Loc w [place] Node Injection Left    Result Date: 2/6/2025  INDICATION: Pre-operative localization of invasive ductal carcinoma at 10 o'clock, 1 cm from the nipple. PROCEDURE: Informed consent was obtained from the patient. The breast was cleansed with ChloraPrep. Lidocaine was used for local anesthesia. A -gauge needle was then advanced to the area of abnormality. A localization wire was then deployed. 490uCi 99mTc Lymphoseek was injected subdermally along the upper outer aspect of the areolar margin. Post-procedure mammograms demonstrate the " localization wire in appropriate position. The previously placed marker was visualized. The patient tolerated this well.    IMPRESSION: Sonographically guided  wire localization. A specimen was sent for radiography. Specimen radiograph demonstrates the area of abnormality and the localization wire to be included in the specimen.     MA Post Procedure Left    Result Date: 2/6/2025  INDICATION: Pre-operative localization of invasive ductal carcinoma at 10 o'clock, 1 cm from the nipple. PROCEDURE: Informed consent was obtained from the patient. The breast was cleansed with ChloraPrep. Lidocaine was used for local anesthesia. A -gauge needle was then advanced to the area of abnormality. A localization wire was then deployed. 490uCi 99mTc Lymphoseek was injected subdermally along the upper outer aspect of the areolar margin. Post-procedure mammograms demonstrate the localization wire in appropriate position. The previously placed marker was visualized. The patient tolerated this well.    IMPRESSION: Sonographically guided  wire localization. A specimen was sent for radiography. Specimen radiograph demonstrates the area of abnormality and the localization wire to be included in the specimen.     MA Breast Specimen Left    Result Date: 2/6/2025  INDICATION: Pre-operative localization of invasive ductal carcinoma at 10 o'clock, 1 cm from the nipple. PROCEDURE: Informed consent was obtained from the patient. The breast was cleansed with ChloraPrep. Lidocaine was used for local anesthesia. A -gauge needle was then advanced to the area of abnormality. A localization wire was then deployed. 490uCi 99mTc Lymphoseek was injected subdermally along the upper outer aspect of the areolar margin. Post-procedure mammograms demonstrate the localization wire in appropriate position. The previously placed marker was visualized. The patient tolerated this well.    IMPRESSION: Sonographically guided  wire localization. A specimen was sent  for radiography. Specimen radiograph demonstrates the area of abnormality and the localization wire to be included in the specimen.          Assessment and Plan      Breast cancer, triple negative patient receiving neoadjuvant chemotherapy   Patient is here in follow-up.  Patient has completed her surgical management.  She had a complete pathologic response with no residual cancer.  This was discussed with the patient and her daughter.  I explained to them that patients have a complete pathologic response have an excellent prognosis with risk of relapse being exceedingly low.  Patient was happy to hear that.  I will refer her for adjuvant radiation at this point I will see her in 3 months.  Her next mammogram is most likely going to be due in June and will be set up upon her next visit      Neuropathy   Patient did develop neuropathy from the Taxol.  She did not complain too much about that today and it seems like it is already getting better.      Varsha Adamson MD            CC: HIRAL CHAVEZ MD       Again, thank you for allowing me to participate in the care of your patient.        Sincerely,        Varsha Adamson MD    Electronically signed

## 2025-02-19 NOTE — PROGRESS NOTES
" Oncology Rooming Note    February 19, 2025 1:09 PM   Kerry Mills is a 59 year old female who presents for:    Chief Complaint   Patient presents with    Oncology Clinic Visit     Follow up -   Invasive ductal carcinoma of breast, female, left     Post-op Visit     BREAST WIRE LOCALIZED LUMPECTOMY WITH SENTINEL LYMPH NODE BIOPSY  DOS - 2/6/25     Initial Vitals: /81 (BP Location: Right arm, Patient Position: Sitting, Cuff Size: Adult Regular)   Pulse 82   Resp 18   Ht 1.499 m (4' 11\")   Wt 60.2 kg (132 lb 12.8 oz)   SpO2 97%   BMI 26.82 kg/m   Estimated body mass index is 26.82 kg/m  as calculated from the following:    Height as of this encounter: 1.499 m (4' 11\").    Weight as of this encounter: 60.2 kg (132 lb 12.8 oz). Body surface area is 1.58 meters squared.  Data Unavailable Comment: Data Unavailable   No LMP recorded. Patient is postmenopausal.  Allergies reviewed: Yes  Medications reviewed: Yes    Medications: Medication refills not needed today.  Pharmacy name entered into [facility]:    [facility] - [place], [place] - [address] [place] [address] DRUG STORE #[id number] [place], [place] - [address] [facility] [address] [facility] [address]    Frailty Screening:   Is the patient here for a new oncology consult visit in cancer care? 2. No    PHQ9:  Did this patient require a PHQ9?: No      Clinical concerns:   Previous off/on burning sensation to whole back with fast heart beat per pt. Symptoms returned yesterday.  Pt states left side underarm and side pain, pain score of 8 off/on. Have tried topical OTC to aid with pain, not effective. Reported that she previously took rx pain medication to aid with pain but developed headaches and lack of sleep instead.       Pebbles Dumont MA          beat per pt. Symptoms returned yesterday.  "

## 2025-02-24 NOTE — PROGRESS NOTES
History:  Kerry Mills is s/p left breast lumpectomy with sentinel lymph node biopsy and port removal on February 6.  She is doing well.  Feels some sharp pain in the left axilla.  She has been limiting her range of motion.  She otherwise does not have any concerns with how she is healing.    Physical exam:  BREAST: Right upper chest, left periareolar and left axillary incisions are healing wonderfully without evidence of hematoma, seroma, or infection    Pathology:  MICROSCOPIC AND DIAGNOSIS:   A) BREAST, LEFT, WIRE LOCALIZED LUMPECTOMY:        1) NEGATIVE FOR RESIDUAL INVASIVE DUCTAL CARCINOMA IN A 19 x 11 x 6   MM TREATMENT BED        2) NEGATIVE FOR DUCTAL CARCINOMA IN SITU   3) ADDITIONAL FINDINGS: COLUMNAR CELL CHANGE, USUAL DUCTAL HYPERPLASIA,   SCLEROSING ADENOSIS, DUCTAL AND VASCULAR MICROCALCIFICATIONS        4) PREVIOUS BIOPSY SITE PRESENT        5) MARGINS NEGATIVE     B) SENTINEL LYMPH NODE, LEFT, BIOPSY:        -ONE LYMPH NODE NEGATIVE FOR METASTATIC CARCINOMA (0/1)     PATHOLOGIC STAGE: [id number](sn)     ASSESSMENT:  Kerry Mills is s/p neoadjuvant chemotherapy and right breast lumpectomy with sentinel lymph node biopsy for a triple negative invasive ductal carcinoma    - had a complete pathologic response    PLAN:  Continue activities as tolerated.  Try not to baby the left upper extremity.  Pathology was discussed.  Continue with yearly mammograms.  She is due the middle of June.  Has already met with Dr. Adamson postop.  Will see Dr. Cabral later this week to discuss radiation  150 minutes of aerobic exercise/week with 2 days of strength training is recommended     - This can improve survival and decrease recurrence risk  Return to the breast clinic in 1 year, or earlier as needed    Iwona Choe DO  General Surgeon  [facility]  Breast [address] [address]  Office: [phone]  Employed by - [facility]

## 2025-02-25 ENCOUNTER — OFFICE VISIT (OUTPATIENT)
Dept: SURGERY | Facility: HOSPITAL | Age: 60
End: 2025-02-25
Attending: SURGERY
Payer: COMMERCIAL

## 2025-02-25 VITALS — HEIGHT: 59 IN | BODY MASS INDEX: 26.81 KG/M2 | WEIGHT: 133 LBS

## 2025-02-25 DIAGNOSIS — C50.912 INVASIVE DUCTAL CARCINOMA OF BREAST, FEMALE, LEFT (H): Primary | ICD-10-CM

## 2025-02-25 PROCEDURE — G0463 HOSPITAL OUTPT CLINIC VISIT: HCPCS | Performed by: SURGERY

## 2025-02-25 PROCEDURE — 1125F AMNT PAIN NOTED PAIN PRSNT: CPT | Performed by: SURGERY

## 2025-02-25 PROCEDURE — T1013 SIGN LANG/ORAL INTERPRETER: HCPCS | Mod: U4

## 2025-02-25 PROCEDURE — 99024 POSTOP FOLLOW-UP VISIT: CPT | Performed by: SURGERY

## 2025-02-25 ASSESSMENT — PAIN SCALES - GENERAL: PAINLEVEL_OUTOF10: MODERATE PAIN (4)

## 2025-02-25 NOTE — LETTER
2/25/2025      Kerry Mills  [address] [place] [address]      Dear Colleague,    Thank you for referring your patient, Kerry Mills, to the [facility]. Please see a copy of my visit note below.    History:  Kerry Mills is s/p left breast lumpectomy with sentinel lymph node biopsy and port removal on February 6.  She is doing well.  Feels some sharp pain in the left axilla.  She has been limiting her range of motion.  She otherwise does not have any concerns with how she is healing.    Physical exam:  BREAST: Right upper chest, left periareolar and left axillary incisions are healing wonderfully without evidence of hematoma, seroma, or infection    Pathology:  MICROSCOPIC AND DIAGNOSIS:   A) BREAST, LEFT, WIRE LOCALIZED LUMPECTOMY:        1) NEGATIVE FOR RESIDUAL INVASIVE DUCTAL CARCINOMA IN A 19 x 11 x 6   MM TREATMENT BED        2) NEGATIVE FOR DUCTAL CARCINOMA IN SITU   3) ADDITIONAL FINDINGS: COLUMNAR CELL CHANGE, USUAL DUCTAL HYPERPLASIA,   SCLEROSING ADENOSIS, DUCTAL AND VASCULAR MICROCALCIFICATIONS        4) PREVIOUS BIOPSY SITE PRESENT        5) MARGINS NEGATIVE     B) SENTINEL LYMPH NODE, LEFT, BIOPSY:        -ONE LYMPH NODE NEGATIVE FOR METASTATIC CARCINOMA (0/1)     PATHOLOGIC STAGE: [id number](sn)     ASSESSMENT:  Kerry Mills is s/p neoadjuvant chemotherapy and right breast lumpectomy with sentinel lymph node biopsy for a triple negative invasive ductal carcinoma    - had a complete pathologic response    PLAN:  Continue activities as tolerated.  Try not to baby the left upper extremity.  Pathology was discussed.  Continue with yearly mammograms.  She is due the middle of June.  Has already met with Dr. Adamson postop.  Will see Dr. Cabral later this week to discuss radiation  150 minutes of aerobic exercise/week with 2 days of strength training is recommended     - This can improve survival and decrease recurrence risk  Return to the breast clinic in 1 year, or earlier as  needed    Iwona Choe DO  General Surgeon  [facility]  Breast [address] [address]  Office: [phone]  Employed by - [facility]      Again, thank you for allowing me to participate in the care of your patient.        Sincerely,        Iwona Choe, DO    Electronically signed

## 2025-02-25 NOTE — NURSING NOTE
"Oncology Rooming Note    February 25, 2025 2:57 PM   Kerry Mills is a 59 year old female who presents for:    Chief Complaint   Patient presents with    Post-Op - General Surgery     2 week post op lumpectomy      Initial Vitals: Ht 1.499 m (4' 11\")   Wt 60.3 kg (133 lb)   BMI 26.86 kg/m   Estimated body mass index is 26.86 kg/m  as calculated from the following:    Height as of this encounter: 1.499 m (4' 11\").    Weight as of this encounter: 60.3 kg (133 lb). Body surface area is 1.58 meters squared.  Moderate Pain (4) Comment: Data Unavailable   No LMP recorded. Patient is postmenopausal.  Allergies reviewed: Yes  Medications reviewed: Yes    Medications: Medication refills not needed today.  Pharmacy name entered into [facility]:    [facility] - [place], [place] - [address] [place] [address] DRUG STORE #[id number] [address]    Frailty Screening:   Is the patient here for a new oncology consult visit in cancer care? 2. No    PHQ9:  Did this patient require a PHQ9?: No      Clinical concerns:  2 week post op      Anahi Irwin             "

## 2025-02-27 ENCOUNTER — OFFICE VISIT (OUTPATIENT)
Dept: RADIATION ONCOLOGY | Facility: CLINIC | Age: 60
End: 2025-02-27
Attending: INTERNAL MEDICINE
Payer: COMMERCIAL

## 2025-02-27 ENCOUNTER — PRE VISIT (OUTPATIENT)
Dept: RADIATION ONCOLOGY | Facility: CLINIC | Age: 60
End: 2025-02-27
Payer: COMMERCIAL

## 2025-02-27 VITALS
WEIGHT: 133.1 LBS | OXYGEN SATURATION: 98 % | HEART RATE: 73 BPM | SYSTOLIC BLOOD PRESSURE: 124 MMHG | TEMPERATURE: 97.9 F | BODY MASS INDEX: 26.88 KG/M2 | RESPIRATION RATE: 16 BRPM | DIASTOLIC BLOOD PRESSURE: 79 MMHG

## 2025-02-27 DIAGNOSIS — C50.912 INVASIVE DUCTAL CARCINOMA OF BREAST, FEMALE, LEFT (H): ICD-10-CM

## 2025-02-27 PROCEDURE — T1013 SIGN LANG/ORAL INTERPRETER: HCPCS | Mod: U4

## 2025-02-27 PROCEDURE — T1013 SIGN LANG/ORAL INTERPRETER: HCPCS | Mod: GT | Performed by: INTERPRETER

## 2025-02-27 PROCEDURE — G0463 HOSPITAL OUTPT CLINIC VISIT: HCPCS | Performed by: STUDENT IN AN ORGANIZED HEALTH CARE EDUCATION/TRAINING PROGRAM

## 2025-02-27 ASSESSMENT — PAIN SCALES - GENERAL: PAINLEVEL_OUTOF10: NO PAIN (0)

## 2025-02-27 NOTE — LETTER
2/27/2025      Kerry Mills  [address] [place] [address]      Dear Colleague,    Thank you for referring your patient, Kerry Mills, to the [facility] RADIATION ONCOLOGY [place]. Please see a copy of my visit note below.    [facility] Radiation Oncology Consult Note     Patient: Kerry Mills  MRN: [id number]  Date of Service: 02/27/2025          Mynor Adamson MD  [address] [place] [id number]       Dear Dr. Adamson:    Thank you very much for referring this patient for consideration of radiotherapy. As you know Ms. Mills is a 59 year old female with a diagnosis of left breast stage cT2 N0 M0 --> ypT0N0 status post neoadjuvant chemotherapy followed by lumpectomy and sentinel lymph node biopsy with pathologic complete response. She was seen today for consideration of adjuvant radiation therapy.     HISTORY OF PRESENT ILLNESS:   Ms. Mills is a 59 year old female who presented with a palpable mass in the left breast    6/13/2024 diagnostic mammogram: Irregular mass in the left breast  Ultrasound: At the 10 o'clock position 1 cm from the nipple a 2.3 x 3 x 3.1 cm irregular hypoechoic mass with lobulated margins and mild increased internal vascularity.  Left axilla with nonenlarged morphologically normal lymph nodes.    6/18/2024 ultrasound-guided core needle biopsy, pathology ([id number] ):  Invasive ductal carcinoma, grade 2.  ER negative (less than 1%), MS negative (less than 1%) HER2/kobe equivocal by IHC.  Negative on FISH.    7/18/2024 MR breast:  Left breast: Heterogeneously enhancing irregularly shaped mass with indistinct margins measuring 3.9 x 3 x 3.3 cm.  No lymphadenopathy  Right breast: No concerns, no lymphadenopathy.    7/22/2024 neoadjuvant AC (CarboTaxol and Pembro denied by insurance)    9/10/2024 left breast ultrasound: Decrease size irregular hypoechoic solid mass now measuring 1.9 x 1.5 x 1.2 cm (previous 3.1 x 3 x 2.3 cm)    9/17/2024 neoadjuvant paclitaxel-- last  2 weekly dose of Taxol held due to neuropathy     9/29/2024 CT chest: Groundglass nodular opacity left upper lobe indeterminant.  Other few scattered sub-6 mm pulmonary nodules.    12/18/2024 MRI breast:  Left breast: Some residual architectural distortion and biopsy marker without measurable mass or abnormal enhancement.  No lymphadenopathy    2/6/2025 left breast lumpectomy with sentinel lymph node biopsy, pathology ([id number]): No residual invasive ductal carcinoma and a 19 x 11 x 6 mm treatment bed.  No DCIS.  1 lymph node negative for metastatic carcinoma (0/1)  ypT0N0.    She reports she is healing very well since surgery.  She continues to have some mild pain in the breast primarily noted with discomfort.  She has not required any treatment for the discomfort.  She reports no swelling in the breast arm or hand.  Her range of motion is okay.  Her port has been removed.  No connective tissue disorders.    CHEMOTHERAPY HISTORY: Concurrent Chemotherapy: No    RADIATION THERAPY HISTORY: Prior Radiation: No    IMPLANTED CARDIAC DEVICE: none     PREGNANCY: N/A  Postmenopausal, last menstrual period at age 51  Current Outpatient Medications   Medication Sig Dispense Refill     acetaminophen (TYLENOL) 325 MG tablet [ACETAMINOPHEN (TYLENOL) 325 MG TABLET] Take 2 tablets (650 mg total) by mouth every 4 (four) hours as needed for pain or fever.  0     albuterol (PROAIR HFA;PROVENTIL HFA;VENTOLIN HFA) 90 mcg/actuation inhaler [ALBUTEROL (PROAIR HFA;PROVENTIL HFA;VENTOLIN HFA) 90 MCG/ACTUATION INHALER] Inhale 2 puffs every 6 (six) hours as needed for wheezing.       vitamin D3 (CHOLECALCIFEROL) 50 mcg (2000 units) tablet  (Patient not taking: Reported on 2/19/2025)       Past Medical History:   Diagnosis Date     Asthma      Chronic constipation      Difficulty walking      Walking troubles      Past Surgical History:   Procedure Laterality Date     COLONOSCOPY N/A 12/31/2024    Procedure: COLONOSCOPY;  Surgeon:  Ayla Byrd MD;  Location: [facility] OR     INCISION AND DRAINAGE OF WOUND N/A 2020    Procedure: INCISION AND DRAINAGE, NECK;  Surgeon: Arnel James MD;  Location: [facility] OR;  Service: ENT     INSERT PORT VASCULAR ACCESS Right 2024    Procedure: INSERTION,  RIGHT VASCULAR ACCESS PORT;  Surgeon: Iwona Choe DO;  Location: [facility] OR     LUMPECTOMY BREAST Left 2025    Procedure: BREAST WIRE LOCALIZED LUMPECTOMY WITH SENTINEL LYMPH NODE BIOPSY AND PORT-A-CATH REMOVAL;  Surgeon: Iwona Choe DO;  Location: [facility] OR     PICC INSERTION - TRIPLE LUMEN  2020          TRACHEOSTOMY N/A 2020    Procedure: EMERGENT INTUBATION IN OR WITH CREATION, TRACHEOSTOMY;  Surgeon: Dennise Justice MD;  Location: [facility] OR;  Service: General     No Known Allergies  Family History   Problem Relation Age of Onset     Kidney failure Mother         on Dialysis     Hypertension Mother      Gout Mother      No Known Problems Father          age 60 in [place]     Kidney failure Brother      Asthma Son      Social History     Socioeconomic History     Marital status: Legally      Spouse name: Not on file     Number of children: Not on file     Years of education: Not on file     Highest education level: Not on file   Occupational History     Not on file   Tobacco Use     Smoking status: Never     Smokeless tobacco: Never   Substance and Sexual Activity     Alcohol use: Never     Drug use: Never     Sexual activity: Not on file   Other Topics Concern     Not on file   Social History Narrative     Not on file     Social Drivers of Health     Financial Resource Strain: Not on file   Food Insecurity: Not on file   Transportation Needs: Not on file   Physical Activity: Not on file   Stress: Not on file   Social Connections: Not on file   Interpersonal Safety: Low Risk  (2025)    Interpersonal Safety      Do you feel physically and emotionally safe  where you currently live?: Yes      Within the past 12 months, have you been hit, slapped, kicked or otherwise physically hurt by someone?: Not on file      Within the past 12 months, have you been humiliated or emotionally abused in other ways by your partner or ex-partner?: Not on file   Housing Stability: Not on file        REVIEW OF SYMPTOMS:  A full 14-point review of systems was performed. Pertinent findings are noted in the HPI.    General  Constitutional  Constitutional (WDL): Exceptions to WDL  Fatigue: Fatigue relieved by rest  Hot Flashes: Mild symptoms OR intervention not indicated  EENT  Eye Disorders  Eye Disorder (WDL): All eye disorder elements are within defined limits  Watering Eyes: Absent or within normal limits  Ear Disorders  Ear Disorder (WDL): Exceptions to WDL (decreased hearing Right ear)  Respiratory  Respiratory  Respiratory (WDL): Exceptions to WDL  Cough: Absent or within normal limits  Dyspnea: Shortness of breath with moderate exertion  Cardiovascular  Cardiovascular  Cardiovascular (WDL): Exceptions to WDL (no pacemaker)  Palpitations: Mild symptoms OR intervention not indicated (occasionally)  Gastrointestinal  Gastrointestinal  Gastrointestinal (WDL): Exceptions to WDL  Anorexia: Absent or within normal limits (improving)  Constipation: Absent or within normal limits  Musculoskeletal  Musculoskeletal and Connective Tissue Disorders  Musculoskeletal & Connective (WDL): All musculoskeletal & connective elements are within defined limits  Arthralgia: Absent or within normal limits  Bone Pain: Absent or within normal limits  Integumentary  Integumentary  Integumentary (WDL): Exceptions to WDL (healing left breast incision)  Pruritus: Absent or within normal limits  Neurological  Neurosensory  Neurosensory (WDL): Exceptions to WDL  Peripheral Sensory Neuropathy: Absent or within normal limits  Dizziness: Mild unsteadiness or sensation of movement (with quick position  changes)  Genitourinary/Reproductive  Genitourinary  Genitourinary (WDL): All genitourinary elements are within defined limits  Lymphatic  Lymph System Disorders  Lymph (WDL): All lymph elements are within defined limits  Pain  Pain Score: No Pain (0)  AUA Assessment                                                              Accompanied by  Accompanied By: family (daughter in law, Juan Ramirez)    ECOG Status: 0 - Independent    KPS Score: 90% Can perform normal activity, minor signs of disease    Pain Management Plan:     Recent Labs: No results found for this or any previous visit (from the past week).    Imaging: Imaging results 6 weeks:US Breast Loc w [place] Node Injection Left    Result Date: 2/6/2025  INDICATION: Pre-operative localization of invasive ductal carcinoma at 10 o'clock, 1 cm from the nipple. PROCEDURE: Informed consent was obtained from the patient. The breast was cleansed with ChloraPrep. Lidocaine was used for local anesthesia. A -gauge needle was then advanced to the area of abnormality. A localization wire was then deployed. 490uCi 99mTc Lymphoseek was injected subdermally along the upper outer aspect of the areolar margin. Post-procedure mammograms demonstrate the localization wire in appropriate position. The previously placed marker was visualized. The patient tolerated this well.    IMPRESSION: Sonographically guided  wire localization. A specimen was sent for radiography. Specimen radiograph demonstrates the area of abnormality and the localization wire to be included in the specimen.     MA Post Procedure Left    Result Date: 2/6/2025  INDICATION: Pre-operative localization of invasive ductal carcinoma at 10 o'clock, 1 cm from the nipple. PROCEDURE: Informed consent was obtained from the patient. The breast was cleansed with ChloraPrep. Lidocaine was used for local anesthesia. A -gauge needle was then advanced to the area of abnormality. A localization wire was then deployed. 490uCi 99mTc  Lymphoseek was injected subdermally along the upper outer aspect of the areolar margin. Post-procedure mammograms demonstrate the localization wire in appropriate position. The previously placed marker was visualized. The patient tolerated this well.    IMPRESSION: Sonographically guided  wire localization. A specimen was sent for radiography. Specimen radiograph demonstrates the area of abnormality and the localization wire to be included in the specimen.     MA Breast Specimen Left    Result Date: 2/6/2025  INDICATION: Pre-operative localization of invasive ductal carcinoma at 10 o'clock, 1 cm from the nipple. PROCEDURE: Informed consent was obtained from the patient. The breast was cleansed with ChloraPrep. Lidocaine was used for local anesthesia. A -gauge needle was then advanced to the area of abnormality. A localization wire was then deployed. 490uCi 99mTc Lymphoseek was injected subdermally along the upper outer aspect of the areolar margin. Post-procedure mammograms demonstrate the localization wire in appropriate position. The previously placed marker was visualized. The patient tolerated this well.    IMPRESSION: Sonographically guided  wire localization. A specimen was sent for radiography. Specimen radiograph demonstrates the area of abnormality and the localization wire to be included in the specimen.      Pathology:   No results found for this or any previous visit (from the past 8760 hours).  Pathology Results           Benign - Lumpectomy, Left - 2/6/2025        Pathology Code Malignancy Type    Benign Calcifications     Benign axillary node               Additional Information              Concordance: Not Entered           Nodes Biopsied: [place]     Removed: 1     Positive: 0           Had Neoadjuvant Chemotherapy: Yes     Completely Submitted for Microscopic Analysis: Yes     External: No                    Malignant - Ultrasound Guided Biopsy, Left - 6/18/2024        Pathology Code Malignancy  Type    Invasive ductal carcinoma Invasive    Fibrocystic change               Additional Information              Concordance: Concordant Estrogen: Negative      Progesterone: Negative    Histology Grade: G2     HER2/kobe IHC: 2+     HER2/kobe FISH: Negative           External: No                             Objective:          PHYSICAL EXAMINATION:    /79 (BP Location: Right arm, Patient Position: Sitting, Cuff Size: Adult Regular)   Pulse 73   Temp 97.9  F (36.6  C) (Oral)   Resp 16   Wt 60.4 kg (133 lb 1.6 oz)   SpO2 98%   BMI 26.88 kg/m      Gen: Alert, in NAD pleasant interactive, well nourished  Eyes: EOMI, sclera anicteric  HENT     Head: NC/AT  Chest: Bilateral good breast symmetry.   Well-healed surgical scar of the left breast and axilla  consistent with a recent history of surgery.  No surrounding erythema, warmth or drainage.    Musculoskeletal: Normal muscle bulk and tone  Skin: Normal tone and turgor, warm, dry, intact  Neurologic: A/Ox3,  face symmetric, speech fluent, no focal motor deficits, gait normal and unaided  Psychiatric: Appropriate mood and affect    Intent of Therapy: Curative  Patient on concurrent Herceptin No  Adjuvant hormonal therapy: No   Chemotherapy: NACT: AC+ T   Intended fractionation schedule 4256 cGy in 16 fractions    Breast cancer risk factors:   No obstetric history on file.  LMP Dates from Last 4 Encounters:   No data found for LMP       Side effects that may occur during or within weeks after radiation therapy    Fatigue and general weakness  Darkening, irritation, itchiness, redness, dryness, erythema, peeling, scabbing, ulceration and contraction of the skin of the breast and chest  Swelling of the breast  Loss of armpit hair  Lung irritation  Decrease in appetite    Side effects that may occur months or years after radiation therapy    Development of another tumor or cancer  Thickening, telangiectasias (development of spider like blood vessels in the skin)  and ulceration of the skin of the breast and chest  Firming, fibrosis (scar tissue), fat necrosis, and distortion of the breast  Poor healing after a trauma or surgery in the irradiated area  Nerve damage resulting in loss of arm strength and sensation  Coronary artery blockage causing angina pain or a heart attack  Lung inflammation of fibrosis causing cough, fever and shortness of breath  Fracture of the ribs  Swellingof an arm and hand    The risks, benefits and alternatives to radiation therapy were outlined with the patient. All questions were answered and a consent was signed.     Impression   59-year-old with left breast invasive ductal carcinoma stage cT2 N0 M0 --> ypT0N0 status post neoadjuvant chemotherapy followed by lumpectomy and sentinel lymph node biopsy with pathologic complete response.    Visit Dx:  (C50.912) Invasive ductal carcinoma of breast, female, left (H)       Cancer Staging   Invasive ductal carcinoma of breast, female, left (H)  Staging form: Breast, AJCC 8th Edition  - Clinical stage from 6/18/2024: Stage IIB (cT2, cN0, cM0, G2, ER-, [place]-, HER2-) - Signed by Yu Cabral MD on 2/27/2025      Assessment & Plan:   Discussed adjuvant radiation therapy with patient and her daughter as well as grandson.  The indications for, process of, alternatives, potential side effects and complications of RT to the left breast were discussed.    Reviewed data on triple negative breast cancer with neoadjuvant chemotherapy and recommendations for adjuvant radiation therapy even in the presence of complete response to neoadjuvant chemotherapy  They asked multiple questions which were answered to their satisfaction and they verbalized understanding.    They wish to proceed with radiation and consent is signed today.  They will return to clinic in the next week or 2 for CT simulation for RT planning.     Entire visit done with use of .    Thank you for allowing me to participate in the care  of this patient. Feel free to contact me with all questions or concerns.      Total time of this visit, including time spent face-to-face with patient and or via video/audio, and also in preparing for today's visit for MDM and documentation. Medical decision-making included consideration and possible diagnoses, management options, complex record review, review of diagnostic tests and information, consideration and discussion of significant complications based on comorbidities, discussion with providers involved in the care of the patient.     90 Minutes spent.     This note has been dictated using voice recognition software and as a result may contain minor grammatical errors and unintended word substitutions.      Sincerely,      Yu Cabral MD  Department of Radiation Oncology   [facility] Radiation Oncology  Tel: [phone]  Page: [phone]    [place] [id number] [place] [address]    CC:  Patient Care Team:  Ceasar Zuñiga MD as PCP - General (Family Practice)  Marycruz Tate RN as Specialty Care Coordinator (Hematology & Oncology)  Vidhi Carpenter GC as Genetic Counselor (Genetic )  Iwona Choe DO as Physician (Surgery)  Mynor Adamson MD as Assigned Cancer Care Provider  Iwona Choe DO as Assigned Surgical Provider  Yu Cabral MD as MD (Radiation Oncology)        Considerations for radiation treatment   Pregnancy status: Female age 55+   Implanted Cardiac Devices: No   Any previous radiation therapy: No    Oncology Rooming Note    February 27, 2025 1:25 PM   Kerry Mills is a 59 year old female who presents for:    Chief Complaint   Patient presents with     Oncology Clinic Visit     Consult with Dr. Cabral     Breast Cancer     Initial Vitals: /79 (BP Location: Right arm, Patient Position: Sitting, Cuff Size: Adult Regular)   Pulse 73   Temp 97.9  F (36.6  C) (Oral)   Resp  "16   Wt 60.4 kg (133 lb 1.6 oz)   SpO2 98%   BMI 26.88 kg/m   Estimated body mass index is 26.88 kg/m  as calculated from the following:    Height as of 2/25/25: 1.499 m (4' 11\").    Weight as of this encounter: 60.4 kg (133 lb 1.6 oz). Body surface area is 1.59 meters squared.  No Pain (0) Comment: Data Unavailable   No LMP recorded. Patient is postmenopausal.  Allergies reviewed: Yes  Medications reviewed: Yes    Medications: Medication refills not needed today.  Pharmacy name entered into [facility]:    [facility] - [place], [place] - [address] [facility] [address][id number] [place], [place] - [address] [facility] [address] [facility] [address]    Frailty Screening:   Is the patient here for a new oncology consult visit in cancer care? 2. No    PHQ9:  Did this patient require a PHQ9?: No      Clinical concerns: Patient here ambulatory accompanied by daughter for radiation consult for her breast cancer.   used for visit communication.  20 minutes spent in review of radiation process and potential side effects.  Written information given for review.  Plan return to clinic for follow-up and/or CT simulation as directed by provider.   Dr. Cabral was notified.    Radiation Therapy Patient Education    Person involved with teaching: Patient, Daughter, and interpretor    Patient educational needs for self management of treatment-related side effects assessment completed.  [facility] Patient Ed tab contains Patient Learning Assessment    Education Materials Given  Managing Side Effects of Radiation Therapy: Care Instructions, Learning About External Beam Radiation Treatment, Dealing With Being Tired From Cancer Treatment: Care Instructions, Radiation Treatment For Cancer, Radiation Therapy to the Breast Guidelines, Exercising After a Breast Cancer Surgery: Care Instructions, Breathing Instructions for Deep Inspiration Breath Hold (DIBH), Oncology Supportive Care Services , Welcome Letter, Insurance [place] " Information, Map [facility], and Social Work Services    Educational Topics Discussed  Side effects expected, Skin care, and When to call MD/RN    Response To Teaching  More review necessary    GYN Only  Vaginal Dilator-given and educated: N/A    Referrals sent: None    Chemotherapy?  Yes: Dr. Quiñonez medical oncologist for chemotherapy, last chemotherapy given 11/26/2024.          Jamaica Solorzano RN                  Again, thank you for allowing me to participate in the care of your patient.        Sincerely,        Yu Cabral MD    Electronically signed

## 2025-02-27 NOTE — PROGRESS NOTES
"Considerations for radiation treatment   Pregnancy status: Female age 55+   Implanted Cardiac Devices: No   Any previous radiation therapy: No    Oncology Rooming Note    February 27, 2025 1:25 PM   Kerry Mills is a 59 year old female who presents for:    Chief Complaint   Patient presents with    Oncology Clinic Visit     Consult with Dr. Cabral    Breast Cancer     Initial Vitals: /79 (BP Location: Right arm, Patient Position: Sitting, Cuff Size: Adult Regular)   Pulse 73   Temp 97.9  F (36.6  C) (Oral)   Resp 16   Wt 60.4 kg (133 lb 1.6 oz)   SpO2 98%   BMI 26.88 kg/m   Estimated body mass index is 26.88 kg/m  as calculated from the following:    Height as of 2/25/25: 1.499 m (4' 11\").    Weight as of this encounter: 60.4 kg (133 lb 1.6 oz). Body surface area is 1.59 meters squared.  No Pain (0) Comment: Data Unavailable   No LMP recorded. Patient is postmenopausal.  Allergies reviewed: Yes  Medications reviewed: Yes    Medications: Medication refills not needed today.  Pharmacy name entered into [facility]:    [facility] - [place], [place] - [address] [place] [address] DRUG STORE #[id number] [address] AT [facility] [address]    Frailty Screening:   Is the patient here for a new oncology consult visit in cancer care? 2. No    PHQ9:  Did this patient require a PHQ9?: No      Clinical concerns: Patient here ambulatory accompanied by daughter for radiation consult for her breast cancer.   used for visit communication.  20 minutes spent in review of radiation process and potential side effects.  Written information given for review.  Plan return to clinic for follow-up and/or CT simulation as directed by provider.   Dr. Cabral was notified.    Radiation Therapy Patient Education    Person involved with teaching: Patient, Daughter, and interpretor    Patient educational needs for self management of treatment-related side effects assessment completed.  [facility] Patient Ed " tab contains Patient Learning Assessment    Education Materials Given  Managing Side Effects of Radiation Therapy: Care Instructions, Learning About External Beam Radiation Treatment, Dealing With Being Tired From Cancer Treatment: Care Instructions, Radiation Treatment For Cancer, Radiation Therapy to the Breast Guidelines, Exercising After a Breast Cancer Surgery: Care Instructions, Breathing Instructions for Deep Inspiration Breath Hold (DIBH), Oncology Supportive Care Services , Welcome Letter, Insurance PA Information, Map [employer], and Social Work Services    Educational Topics Discussed  Side effects expected, Skin care, and When to call MD/RN    Response To Teaching  More review necessary    GYN Only  Vaginal Dilator-given and educated: N/A    Referrals sent: None    Chemotherapy?  Yes: Dr. Quiñonez medical oncologist for chemotherapy, last chemotherapy given 11/26/2024.          Jamaica Solorzano RN

## 2025-02-27 NOTE — PROGRESS NOTES
[facility] Radiation Oncology Consult Note     Patient: Kerry Mills  MRN: [id number]  Date of Service: 02/27/2025          Mynor Adamson MD  [address] [place] [id number]       Dear Dr. Adamson:    Thank you very much for referring this patient for consideration of radiotherapy. As you know Ms. Mills is a 59 year old female with a diagnosis of left breast stage cT2 N0 M0 --> ypT0N0 status post neoadjuvant chemotherapy followed by lumpectomy and sentinel lymph node biopsy with pathologic complete response. She was seen today for consideration of adjuvant radiation therapy.     HISTORY OF PRESENT ILLNESS:   Ms. Mills is a 59 year old female who presented with a palpable mass in the left breast    6/13/2024 diagnostic mammogram: Irregular mass in the left breast  Ultrasound: At the 10 o'clock position 1 cm from the nipple a 2.3 x 3 x 3.1 cm irregular hypoechoic mass with lobulated margins and mild increased internal vascularity.  Left axilla with nonenlarged morphologically normal lymph nodes.    6/18/2024 ultrasound-guided core needle biopsy, pathology ([id number] ):  Invasive ductal carcinoma, grade 2.  ER negative (less than 1%), AL negative (less than 1%) HER2/kobe equivocal by IHC.  Negative on FISH.    7/18/2024 MR breast:  Left breast: Heterogeneously enhancing irregularly shaped mass with indistinct margins measuring 3.9 x 3 x 3.3 cm.  No lymphadenopathy  Right breast: No concerns, no lymphadenopathy.    7/22/2024 neoadjuvant AC (CarboTaxol and Pembro denied by insurance)    9/10/2024 left breast ultrasound: Decrease size irregular hypoechoic solid mass now measuring 1.9 x 1.5 x 1.2 cm (previous 3.1 x 3 x 2.3 cm)    9/17/2024 neoadjuvant paclitaxel-- last 2 weekly dose of Taxol held due to neuropathy     9/29/2024 CT chest: Groundglass nodular opacity left upper lobe indeterminant.  Other few scattered sub-6 mm pulmonary nodules.    12/18/2024 MRI breast:  Left breast: Some residual  architectural distortion and biopsy marker without measurable mass or abnormal enhancement.  No lymphadenopathy    2/6/2025 left breast lumpectomy with sentinel lymph node biopsy, pathology ([id number]): No residual invasive ductal carcinoma and a 19 x 11 x 6 mm treatment bed.  No DCIS.  1 lymph node negative for metastatic carcinoma (0/1)  ypT0N0.    She reports she is healing very well since surgery.  She continues to have some mild pain in the breast primarily noted with discomfort.  She has not required any treatment for the discomfort.  She reports no swelling in the breast arm or hand.  Her range of motion is okay.  Her port has been removed.  No connective tissue disorders.    CHEMOTHERAPY HISTORY: Concurrent Chemotherapy: No    RADIATION THERAPY HISTORY: Prior Radiation: No    IMPLANTED CARDIAC DEVICE: none     PREGNANCY: N/A  Postmenopausal, last menstrual period at age 51  Current Outpatient Medications   Medication Sig Dispense Refill    acetaminophen (TYLENOL) 325 MG tablet [ACETAMINOPHEN (TYLENOL) 325 MG TABLET] Take 2 tablets (650 mg total) by mouth every 4 (four) hours as needed for pain or fever.  0    albuterol (PROAIR HFA;PROVENTIL HFA;VENTOLIN HFA) 90 mcg/actuation inhaler [ALBUTEROL (PROAIR HFA;PROVENTIL HFA;VENTOLIN HFA) 90 MCG/ACTUATION INHALER] Inhale 2 puffs every 6 (six) hours as needed for wheezing.      vitamin D3 (CHOLECALCIFEROL) 50 mcg (2000 units) tablet  (Patient not taking: Reported on 2/19/2025)       Past Medical History:   Diagnosis Date    Asthma     Chronic constipation     Difficulty walking     Walking troubles      Past Surgical History:   Procedure Laterality Date    COLONOSCOPY N/A 12/31/2024    Procedure: COLONOSCOPY;  Surgeon: Ayla Byrd MD;  Location: [facility]    INCISION AND DRAINAGE OF WOUND N/A 11/27/2020    Procedure: INCISION AND DRAINAGE, NECK;  Surgeon: Arnel James MD;  Location: [facility] OR;  Service: ENT    INSERT PORT VASCULAR ACCESS  Right 2024    Procedure: INSERTION,  RIGHT VASCULAR ACCESS PORT;  Surgeon: Iwona Choe DO;  Location: [facility] OR    LUMPECTOMY BREAST Left 2025    Procedure: BREAST WIRE LOCALIZED LUMPECTOMY WITH SENTINEL LYMPH NODE BIOPSY AND PORT-A-CATH REMOVAL;  Surgeon: Iwona Choe DO;  Location: [facility] OR    PICC INSERTION - TRIPLE LUMEN  2020         TRACHEOSTOMY N/A 2020    Procedure: EMERGENT INTUBATION IN OR WITH CREATION, TRACHEOSTOMY;  Surgeon: Dennise Justice MD;  Location: [facility] OR;  Service: General     No Known Allergies  Family History   Problem Relation Age of Onset    Kidney failure Mother         on Dialysis    Hypertension Mother     Gout Mother     No Known Problems Father          age 60 in [place]    Kidney failure Brother     Asthma Son      Social History     Socioeconomic History    Marital status: Legally      Spouse name: Not on file    Number of children: Not on file    Years of education: Not on file    Highest education level: Not on file   Occupational History    Not on file   Tobacco Use    Smoking status: Never    Smokeless tobacco: Never   Substance and Sexual Activity    Alcohol use: Never    Drug use: Never    Sexual activity: Not on file   Other Topics Concern    Not on file   Social History Narrative    Not on file     Social Drivers of Health     Financial Resource Strain: Not on file   Food Insecurity: Not on file   Transportation Needs: Not on file   Physical Activity: Not on file   Stress: Not on file   Social Connections: Not on file   Interpersonal Safety: Low Risk  (2025)    Interpersonal Safety     Do you feel physically and emotionally safe where you currently live?: Yes     Within the past 12 months, have you been hit, slapped, kicked or otherwise physically hurt by someone?: Not on file     Within the past 12 months, have you been humiliated or emotionally abused in other ways by your partner or  ex-partner?: Not on file   Housing Stability: Not on file        REVIEW OF SYMPTOMS:  A full 14-point review of systems was performed. Pertinent findings are noted in the HPI.    General  Constitutional  Constitutional (WDL): Exceptions to WDL  Fatigue: Fatigue relieved by rest  Hot Flashes: Mild symptoms OR intervention not indicated  EENT  Eye Disorders  Eye Disorder (WDL): All eye disorder elements are within defined limits  Watering Eyes: Absent or within normal limits  Ear Disorders  Ear Disorder (WDL): Exceptions to WDL (decreased hearing Right ear)  Respiratory  Respiratory  Respiratory (WDL): Exceptions to WDL  Cough: Absent or within normal limits  Dyspnea: Shortness of breath with moderate exertion  Cardiovascular  Cardiovascular  Cardiovascular (WDL): Exceptions to WDL (no pacemaker)  Palpitations: Mild symptoms OR intervention not indicated (occasionally)  Gastrointestinal  Gastrointestinal  Gastrointestinal (WDL): Exceptions to WDL  Anorexia: Absent or within normal limits (improving)  Constipation: Absent or within normal limits  Musculoskeletal  Musculoskeletal and Connective Tissue Disorders  Musculoskeletal & Connective (WDL): All musculoskeletal & connective elements are within defined limits  Arthralgia: Absent or within normal limits  Bone Pain: Absent or within normal limits  Integumentary  Integumentary  Integumentary (WDL): Exceptions to WDL (healing left breast incision)  Pruritus: Absent or within normal limits  Neurological  Neurosensory  Neurosensory (WDL): Exceptions to WDL  Peripheral Sensory Neuropathy: Absent or within normal limits  Dizziness: Mild unsteadiness or sensation of movement (with quick position changes)  Genitourinary/Reproductive  Genitourinary  Genitourinary (WDL): All genitourinary elements are within defined limits  Lymphatic  Lymph System Disorders  Lymph (WDL): All lymph elements are within defined limits  Pain  Pain Score: No Pain (0)  AUA Assessment                                                               Accompanied by  Accompanied By: family (daughter in law, Juan Ramirez)    ECOG Status: 0 - Independent    KPS Score: 90% Can perform normal activity, minor signs of disease    Pain Management Plan:     Recent Labs: No results found for this or any previous visit (from the past week).    Imaging: Imaging results 6 weeks:US Breast Loc w [place] Node Injection Left    Result Date: 2/6/2025  INDICATION: Pre-operative localization of invasive ductal carcinoma at 10 o'clock, 1 cm from the nipple. PROCEDURE: Informed consent was obtained from the patient. The breast was cleansed with ChloraPrep. Lidocaine was used for local anesthesia. A -gauge needle was then advanced to the area of abnormality. A localization wire was then deployed. 490uCi 99mTc Lymphoseek was injected subdermally along the upper outer aspect of the areolar margin. Post-procedure mammograms demonstrate the localization wire in appropriate position. The previously placed marker was visualized. The patient tolerated this well.    IMPRESSION: Sonographically guided  wire localization. A specimen was sent for radiography. Specimen radiograph demonstrates the area of abnormality and the localization wire to be included in the specimen.     MA Post Procedure Left    Result Date: 2/6/2025  INDICATION: Pre-operative localization of invasive ductal carcinoma at 10 o'clock, 1 cm from the nipple. PROCEDURE: Informed consent was obtained from the patient. The breast was cleansed with ChloraPrep. Lidocaine was used for local anesthesia. A -gauge needle was then advanced to the area of abnormality. A localization wire was then deployed. 490uCi 99mTc Lymphoseek was injected subdermally along the upper outer aspect of the areolar margin. Post-procedure mammograms demonstrate the localization wire in appropriate position. The previously placed marker was visualized. The patient tolerated this well.    IMPRESSION: Sonographically  guided  wire localization. A specimen was sent for radiography. Specimen radiograph demonstrates the area of abnormality and the localization wire to be included in the specimen.     MA Breast Specimen Left    Result Date: 2/6/2025  INDICATION: Pre-operative localization of invasive ductal carcinoma at 10 o'clock, 1 cm from the nipple. PROCEDURE: Informed consent was obtained from the patient. The breast was cleansed with ChloraPrep. Lidocaine was used for local anesthesia. A -gauge needle was then advanced to the area of abnormality. A localization wire was then deployed. 490uCi 99mTc Lymphoseek was injected subdermally along the upper outer aspect of the areolar margin. Post-procedure mammograms demonstrate the localization wire in appropriate position. The previously placed marker was visualized. The patient tolerated this well.    IMPRESSION: Sonographically guided  wire localization. A specimen was sent for radiography. Specimen radiograph demonstrates the area of abnormality and the localization wire to be included in the specimen.      Pathology:   No results found for this or any previous visit (from the past 8760 hours).  Pathology Results           Benign - Lumpectomy, Left - 2/6/2025        Pathology Code Malignancy Type    Benign Calcifications     Benign axillary node               Additional Information              Concordance: Not Entered           Nodes Biopsied: [place]     Removed: 1     Positive: 0           Had Neoadjuvant Chemotherapy: Yes     Completely Submitted for Microscopic Analysis: Yes     External: No                    Malignant - Ultrasound Guided Biopsy, Left - 6/18/2024        Pathology Code Malignancy Type    Invasive ductal carcinoma Invasive    Fibrocystic change               Additional Information              Concordance: Concordant Estrogen: Negative      Progesterone: Negative    Histology Grade: G2     HER2/kobe IHC: 2+     HER2/kobe FISH: Negative           External: No                              Objective:          PHYSICAL EXAMINATION:    /79 (BP Location: Right arm, Patient Position: Sitting, Cuff Size: Adult Regular)   Pulse 73   Temp 97.9  F (36.6  C) (Oral)   Resp 16   Wt 60.4 kg (133 lb 1.6 oz)   SpO2 98%   BMI 26.88 kg/m      Gen: Alert, in NAD pleasant interactive, well nourished  Eyes: EOMI, sclera anicteric  HENT     Head: NC/AT  Chest: Bilateral good breast symmetry.   Well-healed surgical scar of the left breast and axilla  consistent with a recent history of surgery.  No surrounding erythema, warmth or drainage.    Musculoskeletal: Normal muscle bulk and tone  Skin: Normal tone and turgor, warm, dry, intact  Neurologic: A/Ox3,  face symmetric, speech fluent, no focal motor deficits, gait normal and unaided  Psychiatric: Appropriate mood and affect    Intent of Therapy: Curative  Patient on concurrent Herceptin No  Adjuvant hormonal therapy: No   Chemotherapy: NACT: AC+ T   Intended fractionation schedule 4256 cGy in 16 fractions    Breast cancer risk factors:   No obstetric history on file.  LMP Dates from Last 4 Encounters:   No data found for LMP       Side effects that may occur during or within weeks after radiation therapy    Fatigue and general weakness  Darkening, irritation, itchiness, redness, dryness, erythema, peeling, scabbing, ulceration and contraction of the skin of the breast and chest  Swelling of the breast  Loss of armpit hair  Lung irritation  Decrease in appetite    Side effects that may occur months or years after radiation therapy    Development of another tumor or cancer  Thickening, telangiectasias (development of spider like blood vessels in the skin) and ulceration of the skin of the breast and chest  Firming, fibrosis (scar tissue), fat necrosis, and distortion of the breast  Poor healing after a trauma or surgery in the irradiated area  Nerve damage resulting in loss of arm strength and sensation  Coronary artery blockage  causing angina pain or a heart attack  Lung inflammation of fibrosis causing cough, fever and shortness of breath  Fracture of the ribs  Swellingof an arm and hand    The risks, benefits and alternatives to radiation therapy were outlined with the patient. All questions were answered and a consent was signed.     Impression   59-year-old with left breast invasive ductal carcinoma stage cT2 N0 M0 --> ypT0N0 status post neoadjuvant chemotherapy followed by lumpectomy and sentinel lymph node biopsy with pathologic complete response.    Visit Dx:  (C50.912) Invasive ductal carcinoma of breast, female, left (H)       Cancer Staging   Invasive ductal carcinoma of breast, female, left (H)  Staging form: Breast, AJCC 8th Edition  - Clinical stage from 6/18/2024: Stage IIB (cT2, cN0, cM0, G2, ER-, IN-, HER2-) - Signed by Yu Cabral MD on 2/27/2025      Assessment & Plan:   Discussed adjuvant radiation therapy with patient and her daughter as well as grandson.  The indications for, process of, alternatives, potential side effects and complications of RT to the left breast were discussed.    Reviewed data on triple negative breast cancer with neoadjuvant chemotherapy and recommendations for adjuvant radiation therapy even in the presence of complete response to neoadjuvant chemotherapy  They asked multiple questions which were answered to their satisfaction and they verbalized understanding.    They wish to proceed with radiation and consent is signed today.  They will return to clinic in the next week or 2 for CT simulation for RT planning.     Anticipate 4256 cGy in 16 fractions, no boost given pathologic complete response    Entire visit done with use of .    Thank you for allowing me to participate in the care of this patient. Feel free to contact me with all questions or concerns.      Total time of this visit, including time spent face-to-face with patient and or via video/audio, and also in preparing  for today's visit for MDM and documentation. Medical decision-making included consideration and possible diagnoses, management options, complex record review, review of diagnostic tests and information, consideration and discussion of significant complications based on comorbidities, discussion with providers involved in the care of the patient.     90 Minutes spent.     This note has been dictated using voice recognition software and as a result may contain minor grammatical errors and unintended word substitutions.      Sincerely,      Yu Cabral MD  Department of Radiation Oncology   [facility] Radiation Oncology  Tel: [phone]  Page: [phone]    [facility]  [address] [place] [address]     [place] [id number] [place] [address]    CC:  Patient Care Team:  Ceasar Zuñiga MD as PCP - General (Family Practice)  Marycruz Tate, RN as Specialty Care Coordinator (Hematology & Oncology)  Vidhi Carpenter GC as Genetic Counselor (Genetic )  Iwona Choe DO as Physician (Surgery)  Mynor Adamson MD as Assigned Cancer Care Provider  Iwona Choe DO as Assigned Surgical Provider  Yu Cabral MD as MD (Radiation Oncology)

## 2025-03-06 ENCOUNTER — APPOINTMENT (OUTPATIENT)
Dept: RADIATION ONCOLOGY | Facility: CLINIC | Age: 60
End: 2025-03-06
Attending: STUDENT IN AN ORGANIZED HEALTH CARE EDUCATION/TRAINING PROGRAM
Payer: COMMERCIAL

## 2025-03-06 PROCEDURE — 77263 THER RADIOLOGY TX PLNG CPLX: CPT | Performed by: STUDENT IN AN ORGANIZED HEALTH CARE EDUCATION/TRAINING PROGRAM

## 2025-03-10 ENCOUNTER — ALLIED HEALTH/NURSE VISIT (OUTPATIENT)
Dept: RADIATION ONCOLOGY | Facility: HOSPITAL | Age: 60
End: 2025-03-10
Attending: STUDENT IN AN ORGANIZED HEALTH CARE EDUCATION/TRAINING PROGRAM
Payer: COMMERCIAL

## 2025-03-10 DIAGNOSIS — C50.912 INVASIVE DUCTAL CARCINOMA OF BREAST, FEMALE, LEFT (H): Primary | ICD-10-CM

## 2025-03-10 PROCEDURE — 77290 THER RAD SIMULAJ FIELD CPLX: CPT | Mod: 26 | Performed by: STUDENT IN AN ORGANIZED HEALTH CARE EDUCATION/TRAINING PROGRAM

## 2025-03-10 PROCEDURE — 77290 THER RAD SIMULAJ FIELD CPLX: CPT | Performed by: STUDENT IN AN ORGANIZED HEALTH CARE EDUCATION/TRAINING PROGRAM

## 2025-03-10 PROCEDURE — 77334 RADIATION TREATMENT AID(S): CPT | Mod: 26 | Performed by: STUDENT IN AN ORGANIZED HEALTH CARE EDUCATION/TRAINING PROGRAM

## 2025-03-10 PROCEDURE — 99214 OFFICE O/P EST MOD 30 MIN: CPT | Mod: 25 | Performed by: STUDENT IN AN ORGANIZED HEALTH CARE EDUCATION/TRAINING PROGRAM

## 2025-03-10 PROCEDURE — T1013 SIGN LANG/ORAL INTERPRETER: HCPCS | Mod: U4

## 2025-03-10 PROCEDURE — 77334 RADIATION TREATMENT AID(S): CPT | Performed by: STUDENT IN AN ORGANIZED HEALTH CARE EDUCATION/TRAINING PROGRAM

## 2025-03-10 NOTE — LETTER
3/10/2025      Kerry Mills  [address] [place] [address]      Dear Colleague,    Thank you for referring your patient, Kerry Mills, to the [facility] RADIATION ONCOLOGY [place]. Please see a copy of my visit note below.    [facility] Radiation Oncology Follow Up     Patient: Kerry Mills  MRN: [id number]  Date of Service: 03/10/2025       DISEASE:   left breast stage cT2 N0 M0 --> ypT0N0 status post neoadjuvant chemotherapy followed by lumpectomy and sentinel lymph node biopsy with pathologic complete response.       SUBJECTIVE:  Ms. Mills is a 59 year old female who  presented with a palpable mass in the left breast     6/13/2024 diagnostic mammogram: Irregular mass in the left breast  Ultrasound: At the 10 o'clock position 1 cm from the nipple a 2.3 x 3 x 3.1 cm irregular hypoechoic mass with lobulated margins and mild increased internal vascularity.  Left axilla with nonenlarged morphologically normal lymph nodes.     6/18/2024 ultrasound-guided core needle biopsy, pathology ([id number] ):  Invasive ductal carcinoma, grade 2.  ER negative (less than 1%), NC negative (less than 1%) HER2/kobe equivocal by IHC.  Negative on FISH.     7/18/2024 MR breast:  Left breast: Heterogeneously enhancing irregularly shaped mass with indistinct margins measuring 3.9 x 3 x 3.3 cm.  No lymphadenopathy  Right breast: No concerns, no lymphadenopathy.     7/22/2024 neoadjuvant AC (CarboTaxol and Pembro denied by insurance)     9/10/2024 left breast ultrasound: Decrease size irregular hypoechoic solid mass now measuring 1.9 x 1.5 x 1.2 cm (previous 3.1 x 3 x 2.3 cm)     9/17/2024 neoadjuvant paclitaxel-- last 2 weekly dose of Taxol held due to neuropathy      9/29/2024 CT chest: Groundglass nodular opacity left upper lobe indeterminant.  Other few scattered sub-6 mm pulmonary nodules.     12/18/2024 MRI breast:  Left breast: Some residual architectural distortion and biopsy marker without measurable mass or abnormal  enhancement.  No lymphadenopathy     2/6/2025 left breast lumpectomy with sentinel lymph node biopsy, pathology ([id number]): No residual invasive ductal carcinoma and a 19 x 11 x 6 mm treatment bed.  No DCIS.  1 lymph node negative for metastatic carcinoma (0/1)  ypT0N0.    Returns to clinic today for CT simulation.  She asks to be seen after she has multiple questions regarding radiation therapy as well as a request to reduce number of planned radiation treatments.    Medications were reviewed and are up to date on EPIC.    The following portions of the patient's history were reviewed and updated as appropriate: allergies, current medications, past family history, past medical history, past social history, past surgical history and problem list.    Objective:        PHYSICAL EXAMINATION:    There were no vitals taken for this visit.    Well-healed surgical incisions of the left breast periareolar as well as axillary.  No lymphedema of breast or upper extremity    Imaging: Imaging results 6 weeks:US Breast Loc w [place] Node Injection Left    Result Date: 2/6/2025  INDICATION: Pre-operative localization of invasive ductal carcinoma at 10 o'clock, 1 cm from the nipple. PROCEDURE: Informed consent was obtained from the patient. The breast was cleansed with ChloraPrep. Lidocaine was used for local anesthesia. A -gauge needle was then advanced to the area of abnormality. A localization wire was then deployed. 490uCi 99mTc Lymphoseek was injected subdermally along the upper outer aspect of the areolar margin. Post-procedure mammograms demonstrate the localization wire in appropriate position. The previously placed marker was visualized. The patient tolerated this well.    IMPRESSION: Sonographically guided  wire localization. A specimen was sent for radiography. Specimen radiograph demonstrates the area of abnormality and the localization wire to be included in the specimen.     MA Post Procedure Left    Result Date:  2/6/2025  INDICATION: Pre-operative localization of invasive ductal carcinoma at 10 o'clock, 1 cm from the nipple. PROCEDURE: Informed consent was obtained from the patient. The breast was cleansed with ChloraPrep. Lidocaine was used for local anesthesia. A -gauge needle was then advanced to the area of abnormality. A localization wire was then deployed. 490uCi 99mTc Lymphoseek was injected subdermally along the upper outer aspect of the areolar margin. Post-procedure mammograms demonstrate the localization wire in appropriate position. The previously placed marker was visualized. The patient tolerated this well.    IMPRESSION: Sonographically guided  wire localization. A specimen was sent for radiography. Specimen radiograph demonstrates the area of abnormality and the localization wire to be included in the specimen.     MA Breast Specimen Left    Result Date: 2/6/2025  INDICATION: Pre-operative localization of invasive ductal carcinoma at 10 o'clock, 1 cm from the nipple. PROCEDURE: Informed consent was obtained from the patient. The breast was cleansed with ChloraPrep. Lidocaine was used for local anesthesia. A -gauge needle was then advanced to the area of abnormality. A localization wire was then deployed. 490uCi 99mTc Lymphoseek was injected subdermally along the upper outer aspect of the areolar margin. Post-procedure mammograms demonstrate the localization wire in appropriate position. The previously placed marker was visualized. The patient tolerated this well.    IMPRESSION: Sonographically guided  wire localization. A specimen was sent for radiography. Specimen radiograph demonstrates the area of abnormality and the localization wire to be included in the specimen.       Impression   59-year-old with left breast invasive ductal carcinoma stage cT2 N0 M0 --> ypT0N0 status post neoadjuvant chemotherapy followed by lumpectomy and sentinel lymph node biopsy with pathologic complete response.     Visit  Dx:    (C50.912) Invasive ductal carcinoma of breast, female, left (H)  (primary encounter diagnosis)      Cancer Staging   Invasive ductal carcinoma of breast, female, left (H)  Staging form: Breast, AJCC 8th Edition  - Clinical stage from 6/18/2024: Stage IIB (cT2, cN0, cM0, G2, ER-, OR-, HER2-) - Signed by Yu Cabral MD on 2/27/2025      Assessment & Plan:   She is somewhat concerned regarding the side effects of radiation therapy in particular fatigue and her ability to continue her normal daily activities.  Other questions regarding restrictions during radiation therapy.  She strongly feels she does not want to pursue originally planned 4256 cGy in 16 fractions to the whole breast.  She requests shorter fractionation.  Reviewed Fast Forward trial comparing 2600 or 2700 cGy in 5 fraction course 4256 cGy in 16 fractions was comprised mostly of patients with favorable breast cancer.  Triple negative patients and that was receiving neoadjuvant chemotherapy were underrepresented in the trial (3% of patients had neoadjuvant chemotherapy).  5-year follow-up suggests equivalent outcomes but again majority of patients had more favorable pathology and thus more likely favorable outcomes.  She did have a pathologic complete response which is favorable.  Per her request we will alter fractionation 2600 cGy in 5 fractions +1000 cGy in 5 fraction boost (also used in Fast Forward trial for some patients)    Visit completed with use of  as well as her daughter-in-law.    Total time of this visit, including time spent face-to-face with patient and or via video/audio, and also in preparing for today's visit for MDM and documentation. Medical decision-making included consideration and possible diagnoses, management options, complex record review, review of diagnostic tests and information, consideration and discussion of significant complications based on comorbidities, discussion with providers involved in  the care of the patient.     30 Minutes spent.     This note has been dictated using voice recognition software and as a result may contain minor grammatical errors and unintended word substitutions.      Yu Cabral MD  Department of Radiation Oncology   [facility] Radiation Oncology  Tel: [phone]  Page: [phone]    [facility]  [address] [place] [address]     [facility]   [address] [place] [address]    CC:  Patient Care Team:  Ceasar Zuñiga MD as PCP - General (Family Practice)  Marycruz Tate, MASHA as Specialty Care Coordinator (Hematology & Oncology)  Vidhi Carpenter GC as Genetic Counselor (Genetic )  Iwona Choe DO as Physician (Surgery)  Mynor Adamson MD as Assigned Cancer Care Provider  Iwona Choe DO as Assigned Surgical Provider  Yu Cabral MD as MD (Radiation Oncology)      Again, thank you for allowing me to participate in the care of your patient.        Sincerely,        Yu Cabral MD    Electronically signed

## 2025-03-10 NOTE — PROGRESS NOTES
Area(s) simulated:  Left breast  For planning:  CT Scan  Custom devices to be used: Breast board, Vaculock  Patient position: Supine  Field arrangement: Obliques  Signed Informed Consent obtained.  Planned dose:  2600 cGy in 5 fractions whole breast hypofractionation + 1000 cGy boost    Comments:  DIAGNOSIS: 59 year old female with a diagnosis of left breast stage cT2 N0 M0 --> ypT0N0 status post neoadjuvant chemotherapy followed by lumpectomy and sentinel lymph node biopsy with pathologic complete response.     Simulation for an adjuvant course of radiotherapy was performed. The patient was  placed in the supine position on the CT simulator couch and positioned using a breast board.  Scar and borders wired.  Planning CT of the chest was done with and without DIBH. The procedure was well tolerated.    CTV will include the whole breast, boost will include the cavity.  Complete documentation for simulation, devices, treatment planning, calculations,  in ARIA

## 2025-03-11 NOTE — PROGRESS NOTES
[facility] Radiation Oncology Follow Up     Patient: Kerry Mills  MRN: [id number]  Date of Service: 03/10/2025       DISEASE:   left breast stage cT2 N0 M0 --> ypT0N0 status post neoadjuvant chemotherapy followed by lumpectomy and sentinel lymph node biopsy with pathologic complete response.       SUBJECTIVE:  Ms. Mills is a 59 year old female who  presented with a palpable mass in the left breast     6/13/2024 diagnostic mammogram: Irregular mass in the left breast  Ultrasound: At the 10 o'clock position 1 cm from the nipple a 2.3 x 3 x 3.1 cm irregular hypoechoic mass with lobulated margins and mild increased internal vascularity.  Left axilla with nonenlarged morphologically normal lymph nodes.     6/18/2024 ultrasound-guided core needle biopsy, pathology ([id number] ):  Invasive ductal carcinoma, grade 2.  ER negative (less than 1%), HI negative (less than 1%) HER2/kobe equivocal by IHC.  Negative on FISH.     7/18/2024 MR breast:  Left breast: Heterogeneously enhancing irregularly shaped mass with indistinct margins measuring 3.9 x 3 x 3.3 cm.  No lymphadenopathy  Right breast: No concerns, no lymphadenopathy.     7/22/2024 neoadjuvant AC (CarboTaxol and Pembro denied by insurance)     9/10/2024 left breast ultrasound: Decrease size irregular hypoechoic solid mass now measuring 1.9 x 1.5 x 1.2 cm (previous 3.1 x 3 x 2.3 cm)     9/17/2024 neoadjuvant paclitaxel-- last 2 weekly dose of Taxol held due to neuropathy      9/29/2024 CT chest: Groundglass nodular opacity left upper lobe indeterminant.  Other few scattered sub-6 mm pulmonary nodules.     12/18/2024 MRI breast:  Left breast: Some residual architectural distortion and biopsy marker without measurable mass or abnormal enhancement.  No lymphadenopathy     2/6/2025 left breast lumpectomy with sentinel lymph node biopsy, pathology ([id number]): No residual invasive ductal carcinoma and a 19 x 11 x 6 mm treatment bed.  No DCIS.  1 lymph node negative  for metastatic carcinoma (0/1)  ypT0N0.    Returns to clinic today for CT simulation.  She asks to be seen after she has multiple questions regarding radiation therapy as well as a request to reduce number of planned radiation treatments.    Medications were reviewed and are up to date on EPIC.    The following portions of the patient's history were reviewed and updated as appropriate: allergies, current medications, past family history, past medical history, past social history, past surgical history and problem list.    Objective:        PHYSICAL EXAMINATION:    There were no vitals taken for this visit.    Well-healed surgical incisions of the left breast periareolar as well as axillary.  No lymphedema of breast or upper extremity    Imaging: Imaging results 6 weeks:US Breast Loc w [place] Node Injection Left    Result Date: 2/6/2025  INDICATION: Pre-operative localization of invasive ductal carcinoma at 10 o'clock, 1 cm from the nipple. PROCEDURE: Informed consent was obtained from the patient. The breast was cleansed with ChloraPrep. Lidocaine was used for local anesthesia. A -gauge needle was then advanced to the area of abnormality. A localization wire was then deployed. 490uCi 99mTc Lymphoseek was injected subdermally along the upper outer aspect of the areolar margin. Post-procedure mammograms demonstrate the localization wire in appropriate position. The previously placed marker was visualized. The patient tolerated this well.    IMPRESSION: Sonographically guided  wire localization. A specimen was sent for radiography. Specimen radiograph demonstrates the area of abnormality and the localization wire to be included in the specimen.     MA Post Procedure Left    Result Date: 2/6/2025  INDICATION: Pre-operative localization of invasive ductal carcinoma at 10 o'clock, 1 cm from the nipple. PROCEDURE: Informed consent was obtained from the patient. The breast was cleansed with ChloraPrep. Lidocaine was used  for local anesthesia. A -gauge needle was then advanced to the area of abnormality. A localization wire was then deployed. 490uCi 99mTc Lymphoseek was injected subdermally along the upper outer aspect of the areolar margin. Post-procedure mammograms demonstrate the localization wire in appropriate position. The previously placed marker was visualized. The patient tolerated this well.    IMPRESSION: Sonographically guided  wire localization. A specimen was sent for radiography. Specimen radiograph demonstrates the area of abnormality and the localization wire to be included in the specimen.     MA Breast Specimen Left    Result Date: 2/6/2025  INDICATION: Pre-operative localization of invasive ductal carcinoma at 10 o'clock, 1 cm from the nipple. PROCEDURE: Informed consent was obtained from the patient. The breast was cleansed with ChloraPrep. Lidocaine was used for local anesthesia. A -gauge needle was then advanced to the area of abnormality. A localization wire was then deployed. 490uCi 99mTc Lymphoseek was injected subdermally along the upper outer aspect of the areolar margin. Post-procedure mammograms demonstrate the localization wire in appropriate position. The previously placed marker was visualized. The patient tolerated this well.    IMPRESSION: Sonographically guided  wire localization. A specimen was sent for radiography. Specimen radiograph demonstrates the area of abnormality and the localization wire to be included in the specimen.       Impression   59-year-old with left breast invasive ductal carcinoma stage cT2 N0 M0 --> ypT0N0 status post neoadjuvant chemotherapy followed by lumpectomy and sentinel lymph node biopsy with pathologic complete response.     Visit Dx:    (C50.912) Invasive ductal carcinoma of breast, female, left (H)  (primary encounter diagnosis)      Cancer Staging   Invasive ductal carcinoma of breast, female, left (H)  Staging form: Breast, AJCC 8th Edition  - Clinical stage from  6/18/2024: Stage IIB (cT2, cN0, cM0, G2, ER-, FL-, HER2-) - Signed by Yu Cabral MD on 2/27/2025      Assessment & Plan:   She is somewhat concerned regarding the side effects of radiation therapy in particular fatigue and her ability to continue her normal daily activities.  Other questions regarding restrictions during radiation therapy.  She strongly feels she does not want to pursue originally planned 4256 cGy in 16 fractions to the whole breast.  She requests shorter fractionation.  Reviewed Fast Forward trial comparing 2600 or 2700 cGy in 5 fraction course 4256 cGy in 16 fractions was comprised mostly of patients with favorable breast cancer.  Triple negative patients and that was receiving neoadjuvant chemotherapy were underrepresented in the trial (3% of patients had neoadjuvant chemotherapy).  5-year follow-up suggests equivalent outcomes but again majority of patients had more favorable pathology and thus more likely favorable outcomes.  She did have a pathologic complete response which is favorable.  Per her request we will alter fractionation 2600 cGy in 5 fractions +1000 cGy in 5 fraction boost (also used in Fast Forward trial for some patients)    Visit completed with use of  as well as her daughter-in-law.    Total time of this visit, including time spent face-to-face with patient and or via video/audio, and also in preparing for today's visit for MDM and documentation. Medical decision-making included consideration and possible diagnoses, management options, complex record review, review of diagnostic tests and information, consideration and discussion of significant complications based on comorbidities, discussion with providers involved in the care of the patient.     30 Minutes spent.     This note has been dictated using voice recognition software and as a result may contain minor grammatical errors and unintended word substitutions.      Yu Cabral MD  Department of  Radiation Oncology   [facility] Radiation Oncology  Tel: [phone]  Page: [phone]    [facility]  [address]  [place] [address]     [place] [id number] [facility] [place] [address]    CC:  Patient Care Team:  Ceasar Zuñiga MD as PCP - General (Family Practice)  Marycruz Tate, RN as Specialty Care Coordinator (Hematology & Oncology)  Vidhi Carpenter GC as Genetic Counselor (Genetic )  Iwona Choe DO as Physician (Surgery)  Mynor Adamson MD as Assigned Cancer Care Provider  Iwona Choe DO as Assigned Surgical Provider  Yu Cabral MD as MD (Radiation Oncology)

## 2025-03-12 ENCOUNTER — APPOINTMENT (OUTPATIENT)
Dept: RADIATION ONCOLOGY | Facility: CLINIC | Age: 60
End: 2025-03-12
Attending: STUDENT IN AN ORGANIZED HEALTH CARE EDUCATION/TRAINING PROGRAM
Payer: COMMERCIAL

## 2025-03-12 PROCEDURE — 77300 RADIATION THERAPY DOSE PLAN: CPT | Performed by: STUDENT IN AN ORGANIZED HEALTH CARE EDUCATION/TRAINING PROGRAM

## 2025-03-12 PROCEDURE — 77334 RADIATION TREATMENT AID(S): CPT | Mod: 26 | Performed by: STUDENT IN AN ORGANIZED HEALTH CARE EDUCATION/TRAINING PROGRAM

## 2025-03-12 PROCEDURE — 77334 RADIATION TREATMENT AID(S): CPT | Performed by: STUDENT IN AN ORGANIZED HEALTH CARE EDUCATION/TRAINING PROGRAM

## 2025-03-12 PROCEDURE — 77300 RADIATION THERAPY DOSE PLAN: CPT | Mod: 26 | Performed by: STUDENT IN AN ORGANIZED HEALTH CARE EDUCATION/TRAINING PROGRAM

## 2025-03-12 PROCEDURE — 77295 3-D RADIOTHERAPY PLAN: CPT | Mod: 26 | Performed by: STUDENT IN AN ORGANIZED HEALTH CARE EDUCATION/TRAINING PROGRAM

## 2025-03-12 PROCEDURE — 77295 3-D RADIOTHERAPY PLAN: CPT | Performed by: STUDENT IN AN ORGANIZED HEALTH CARE EDUCATION/TRAINING PROGRAM

## 2025-03-20 ENCOUNTER — APPOINTMENT (OUTPATIENT)
Dept: RADIATION ONCOLOGY | Facility: CLINIC | Age: 60
End: 2025-03-20
Attending: STUDENT IN AN ORGANIZED HEALTH CARE EDUCATION/TRAINING PROGRAM
Payer: COMMERCIAL

## 2025-03-20 VITALS
OXYGEN SATURATION: 98 % | RESPIRATION RATE: 16 BRPM | TEMPERATURE: 97.7 F | BODY MASS INDEX: 26.26 KG/M2 | HEART RATE: 84 BPM | DIASTOLIC BLOOD PRESSURE: 79 MMHG | WEIGHT: 130 LBS | SYSTOLIC BLOOD PRESSURE: 127 MMHG

## 2025-03-20 DIAGNOSIS — C50.912 INVASIVE DUCTAL CARCINOMA OF BREAST, FEMALE, LEFT (H): Primary | ICD-10-CM

## 2025-03-20 PROCEDURE — 77280 THER RAD SIMULAJ FIELD SMPL: CPT | Mod: 26 | Performed by: STUDENT IN AN ORGANIZED HEALTH CARE EDUCATION/TRAINING PROGRAM

## 2025-03-20 PROCEDURE — 77412 RADIATION TX DELIVERY LVL 3: CPT | Performed by: STUDENT IN AN ORGANIZED HEALTH CARE EDUCATION/TRAINING PROGRAM

## 2025-03-20 PROCEDURE — 77280 THER RAD SIMULAJ FIELD SMPL: CPT | Performed by: STUDENT IN AN ORGANIZED HEALTH CARE EDUCATION/TRAINING PROGRAM

## 2025-03-20 ASSESSMENT — PAIN SCALES - GENERAL: PAINLEVEL_OUTOF10: NO PAIN (0)

## 2025-03-20 NOTE — LETTER
3/20/2025      Kerry Mills  [address] [place] [address]      Dear Colleague,    Thank you for referring your patient, Kerry Mills, to the [facility] RADIATION ONCOLOGY [place]. Please see a copy of my visit note below.    RADIATION ONCOLOGY WEEKLY TREATMENT VISIT NOTE      Assessment / Impression       Visit Dx:  (C59.212) Invasive ductal carcinoma of breast, female, left (H)  (primary encounter diagnosis)       Cancer Staging   Invasive ductal carcinoma of breast, female, left (H)  Staging form: Breast, AJCC 8th Edition  - Clinical stage from 2024: Stage IIB (cT2, cN0, cM0, G2, ER-, AK-, HER2-) - Signed by Yu Cabral MD on 2025       Tolerating radiation therapy well.  All questions and concerns addressed.  Day 1 went well    Plan:     Continue radiation treatment as prescribed.  Radiation:   Site: Lt. Breast  Stereotactic Radiosurgery: No  Today's Dose: 520  Total Dose for Breast: 3600  Today's Fraction/Total Fraction Breast:     Discussed skin care  Pain Management Plan: N/A    Subjective:      HPI: Kerry Mills is a 59 year old female with  Invasive ductal carcinoma of breast, female, left (H) [C50.912]    First day went well today.  No acute side effects.  Discussed skin care.  Questions answered.    The following portions of the patient's history were reviewed and updated as appropriate: allergies, current medications, past family history, past medical history, past social history, past surgical history and problem list.    Assessment                  Body Site:  Breast                           Site: Lt. Breast  Stereotactic Radiosurgery: No  Today's Dose: 520  Total Dose for Breast: 3600  Today's Fraction/Total Fraction Breast:                                    Sexuality Alteration                    Emotional Alteration    Copin: Effective  Comfort Alteration   KPS: 100 % Normal, no complaints  Fatigue (ONS scale): 0: No Fatigue  Pain Location:  denies  Pain Intensity. Rate degree of pain ranging from 0 (no pain) to 10 (severe pain): 0   Nutrition Alteration   Anorexia: 0: None  Nausea: 0: None  Vomitin: None  Weight: 59 kg (130 lb)  Skin Alteration   Skin Sensation: 0: No problem  Skin Reaction: 0: None (Radiaplex given with verbal/written instructions)  AUA Assessment                                           Accompanied by       Objective:     Exam:     Vitals:    25 1417   BP: 127/79   Pulse: 84   Resp: 16   Temp: 97.7  F (36.5  C)   TempSrc: Oral   SpO2: 98%   Weight: 59 kg (130 lb)   PainSc: No Pain (0)       Wt Readings from Last 8 Encounters:   25 59 kg (130 lb)   25 60.4 kg (133 lb 1.6 oz)   25 60.3 kg (133 lb)   25 60.2 kg (132 lb 12.8 oz)   25 59.9 kg (132 lb)   25 59.9 kg (132 lb)   24 60.3 kg (133 lb)   24 61.7 kg (136 lb 1.6 oz)       General: Alert and oriented, in no acute distress  Kerry has no Erythema.    Treatment Summary to Date    Aria chart and setup information reviewed    Yu Cabral MD      Again, thank you for allowing me to participate in the care of your patient.        Sincerely,        Yu Cabral MD    Electronically signed

## 2025-03-20 NOTE — PROGRESS NOTES
RADIATION ONCOLOGY WEEKLY TREATMENT VISIT NOTE      Assessment / Impression       Visit Dx:  (C50.942) Invasive ductal carcinoma of breast, female, left (H)  (primary encounter diagnosis)       Cancer Staging   Invasive ductal carcinoma of breast, female, left (H)  Staging form: Breast, AJCC 8th Edition  - Clinical stage from 2024: Stage IIB (cT2, cN0, cM0, G2, ER-, OR-, HER2-) - Signed by Yu Cabral MD on 2025       Tolerating radiation therapy well.  All questions and concerns addressed.  Day 1 went well    Plan:     Continue radiation treatment as prescribed.  Radiation:   Site: Lt. Breast  Stereotactic Radiosurgery: No  Today's Dose: 520  Total Dose for Breast: 3600  Today's Fraction/Total Fraction Breast:     Discussed skin care  Pain Management Plan: N/A    Subjective:      HPI: Kerry Mills is a 59 year old female with  Invasive ductal carcinoma of breast, female, left (H) [C50.012]    First day went well today.  No acute side effects.  Discussed skin care.  Questions answered.    The following portions of the patient's history were reviewed and updated as appropriate: allergies, current medications, past family history, past medical history, past social history, past surgical history and problem list.    Assessment                  Body Site:  Breast                           Site: Lt. Breast  Stereotactic Radiosurgery: No  Today's Dose: 520  Total Dose for Breast: 3600  Today's Fraction/Total Fraction Breast:                                    Sexuality Alteration                    Emotional Alteration    Copin: Effective  Comfort Alteration   KPS: 100 % Normal, no complaints  Fatigue (ONS scale): 0: No Fatigue  Pain Location: denies  Pain Intensity. Rate degree of pain ranging from 0 (no pain) to 10 (severe pain): 0   Nutrition Alteration   Anorexia: 0: None  Nausea: 0: None  Vomitin: None  Weight: 59 kg (130 lb)  Skin Alteration   Skin Sensation: 0: No  problem  Skin Reaction: 0: None (Radiaplex given with verbal/written instructions)  AUA Assessment                                           Accompanied by       Objective:     Exam:     Vitals:    03/20/25 1417   BP: 127/79   Pulse: 84   Resp: 16   Temp: 97.7  F (36.5  C)   TempSrc: Oral   SpO2: 98%   Weight: 59 kg (130 lb)   PainSc: No Pain (0)       Wt Readings from Last 8 Encounters:   03/20/25 59 kg (130 lb)   02/27/25 60.4 kg (133 lb 1.6 oz)   02/25/25 60.3 kg (133 lb)   02/19/25 60.2 kg (132 lb 12.8 oz)   02/06/25 59.9 kg (132 lb)   01/21/25 59.9 kg (132 lb)   12/30/24 60.3 kg (133 lb)   12/03/24 61.7 kg (136 lb 1.6 oz)       General: Alert and oriented, in no acute distress  Kerry has no Erythema.    Treatment Summary to Date    Aria chart and setup information reviewed    Yu Cabral MD

## 2025-03-21 ENCOUNTER — APPOINTMENT (OUTPATIENT)
Dept: RADIATION ONCOLOGY | Facility: CLINIC | Age: 60
End: 2025-03-21
Attending: STUDENT IN AN ORGANIZED HEALTH CARE EDUCATION/TRAINING PROGRAM
Payer: COMMERCIAL

## 2025-03-21 PROCEDURE — 77412 RADIATION TX DELIVERY LVL 3: CPT | Performed by: RADIOLOGY

## 2025-03-21 PROCEDURE — 77387 GUIDANCE FOR RADJ TX DLVR: CPT | Performed by: RADIOLOGY

## 2025-03-21 PROCEDURE — 77387 GUIDANCE FOR RADJ TX DLVR: CPT | Mod: 26 | Performed by: RADIOLOGY

## 2025-03-24 ENCOUNTER — APPOINTMENT (OUTPATIENT)
Dept: RADIATION ONCOLOGY | Facility: CLINIC | Age: 60
End: 2025-03-24
Attending: STUDENT IN AN ORGANIZED HEALTH CARE EDUCATION/TRAINING PROGRAM
Payer: COMMERCIAL

## 2025-03-24 PROCEDURE — 77387 GUIDANCE FOR RADJ TX DLVR: CPT | Mod: 26 | Performed by: STUDENT IN AN ORGANIZED HEALTH CARE EDUCATION/TRAINING PROGRAM

## 2025-03-24 PROCEDURE — 77412 RADIATION TX DELIVERY LVL 3: CPT | Performed by: STUDENT IN AN ORGANIZED HEALTH CARE EDUCATION/TRAINING PROGRAM

## 2025-03-24 PROCEDURE — 77387 GUIDANCE FOR RADJ TX DLVR: CPT | Performed by: STUDENT IN AN ORGANIZED HEALTH CARE EDUCATION/TRAINING PROGRAM

## 2025-03-25 ENCOUNTER — ALLIED HEALTH/NURSE VISIT (OUTPATIENT)
Dept: RADIATION ONCOLOGY | Facility: CLINIC | Age: 60
End: 2025-03-25
Attending: STUDENT IN AN ORGANIZED HEALTH CARE EDUCATION/TRAINING PROGRAM
Payer: COMMERCIAL

## 2025-03-25 PROCEDURE — 77290 THER RAD SIMULAJ FIELD CPLX: CPT | Performed by: RADIOLOGY

## 2025-03-25 PROCEDURE — 77290 THER RAD SIMULAJ FIELD CPLX: CPT | Mod: 26 | Performed by: RADIOLOGY

## 2025-03-25 PROCEDURE — 77412 RADIATION TX DELIVERY LVL 3: CPT | Performed by: RADIOLOGY

## 2025-03-26 ENCOUNTER — APPOINTMENT (OUTPATIENT)
Dept: RADIATION ONCOLOGY | Facility: CLINIC | Age: 60
End: 2025-03-26
Attending: STUDENT IN AN ORGANIZED HEALTH CARE EDUCATION/TRAINING PROGRAM
Payer: COMMERCIAL

## 2025-03-26 PROCEDURE — 77412 RADIATION TX DELIVERY LVL 3: CPT | Performed by: RADIOLOGY

## 2025-03-26 PROCEDURE — 77387 GUIDANCE FOR RADJ TX DLVR: CPT | Mod: 26 | Performed by: RADIOLOGY

## 2025-03-26 PROCEDURE — 77336 RADIATION PHYSICS CONSULT: CPT | Performed by: STUDENT IN AN ORGANIZED HEALTH CARE EDUCATION/TRAINING PROGRAM

## 2025-03-26 PROCEDURE — 77427 RADIATION TX MANAGEMENT X5: CPT | Performed by: STUDENT IN AN ORGANIZED HEALTH CARE EDUCATION/TRAINING PROGRAM

## 2025-03-26 PROCEDURE — 77387 GUIDANCE FOR RADJ TX DLVR: CPT | Performed by: RADIOLOGY

## 2025-03-27 ENCOUNTER — APPOINTMENT (OUTPATIENT)
Dept: RADIATION ONCOLOGY | Facility: CLINIC | Age: 60
End: 2025-03-27
Attending: STUDENT IN AN ORGANIZED HEALTH CARE EDUCATION/TRAINING PROGRAM
Payer: COMMERCIAL

## 2025-03-27 PROCEDURE — 77334 RADIATION TREATMENT AID(S): CPT | Performed by: STUDENT IN AN ORGANIZED HEALTH CARE EDUCATION/TRAINING PROGRAM

## 2025-03-27 PROCEDURE — 77387 GUIDANCE FOR RADJ TX DLVR: CPT | Performed by: RADIOLOGY

## 2025-03-27 PROCEDURE — 77387 GUIDANCE FOR RADJ TX DLVR: CPT | Mod: 26 | Performed by: RADIOLOGY

## 2025-03-27 PROCEDURE — 77321 SPECIAL TELETX PORT PLAN: CPT | Mod: 26 | Performed by: RADIOLOGY

## 2025-03-27 PROCEDURE — 77334 RADIATION TREATMENT AID(S): CPT | Mod: 26 | Performed by: RADIOLOGY

## 2025-03-27 PROCEDURE — 77412 RADIATION TX DELIVERY LVL 3: CPT | Performed by: RADIOLOGY

## 2025-03-27 PROCEDURE — 77321 SPECIAL TELETX PORT PLAN: CPT | Performed by: STUDENT IN AN ORGANIZED HEALTH CARE EDUCATION/TRAINING PROGRAM

## 2025-03-28 ENCOUNTER — APPOINTMENT (OUTPATIENT)
Dept: RADIATION ONCOLOGY | Facility: CLINIC | Age: 60
End: 2025-03-28
Attending: STUDENT IN AN ORGANIZED HEALTH CARE EDUCATION/TRAINING PROGRAM
Payer: COMMERCIAL

## 2025-03-28 PROCEDURE — 77412 RADIATION TX DELIVERY LVL 3: CPT | Performed by: STUDENT IN AN ORGANIZED HEALTH CARE EDUCATION/TRAINING PROGRAM

## 2025-03-28 PROCEDURE — 77387 GUIDANCE FOR RADJ TX DLVR: CPT | Performed by: STUDENT IN AN ORGANIZED HEALTH CARE EDUCATION/TRAINING PROGRAM

## 2025-03-28 PROCEDURE — 77387 GUIDANCE FOR RADJ TX DLVR: CPT | Mod: 26 | Performed by: STUDENT IN AN ORGANIZED HEALTH CARE EDUCATION/TRAINING PROGRAM

## 2025-03-31 ENCOUNTER — APPOINTMENT (OUTPATIENT)
Dept: RADIATION ONCOLOGY | Facility: CLINIC | Age: 60
End: 2025-03-31
Attending: STUDENT IN AN ORGANIZED HEALTH CARE EDUCATION/TRAINING PROGRAM
Payer: COMMERCIAL

## 2025-03-31 PROCEDURE — 77387 GUIDANCE FOR RADJ TX DLVR: CPT | Mod: 26 | Performed by: STUDENT IN AN ORGANIZED HEALTH CARE EDUCATION/TRAINING PROGRAM

## 2025-03-31 PROCEDURE — 77412 RADIATION TX DELIVERY LVL 3: CPT | Performed by: STUDENT IN AN ORGANIZED HEALTH CARE EDUCATION/TRAINING PROGRAM

## 2025-03-31 PROCEDURE — 77387 GUIDANCE FOR RADJ TX DLVR: CPT | Performed by: STUDENT IN AN ORGANIZED HEALTH CARE EDUCATION/TRAINING PROGRAM

## 2025-04-01 ENCOUNTER — APPOINTMENT (OUTPATIENT)
Dept: RADIATION ONCOLOGY | Facility: CLINIC | Age: 60
End: 2025-04-01
Attending: STUDENT IN AN ORGANIZED HEALTH CARE EDUCATION/TRAINING PROGRAM
Payer: COMMERCIAL

## 2025-04-01 ENCOUNTER — RADIATION TREATMENT SUMMARY (OUTPATIENT)
Dept: RADIATION ONCOLOGY | Facility: CLINIC | Age: 60
End: 2025-04-01

## 2025-04-01 DIAGNOSIS — C50.912 INVASIVE DUCTAL CARCINOMA OF BREAST, FEMALE, LEFT (H): Primary | ICD-10-CM

## 2025-04-01 PROCEDURE — 77336 RADIATION PHYSICS CONSULT: CPT | Performed by: STUDENT IN AN ORGANIZED HEALTH CARE EDUCATION/TRAINING PROGRAM

## 2025-04-01 PROCEDURE — 77427 RADIATION TX MANAGEMENT X5: CPT | Performed by: STUDENT IN AN ORGANIZED HEALTH CARE EDUCATION/TRAINING PROGRAM

## 2025-04-01 PROCEDURE — 77387 GUIDANCE FOR RADJ TX DLVR: CPT | Mod: 26 | Performed by: RADIOLOGY

## 2025-04-01 PROCEDURE — 77412 RADIATION TX DELIVERY LVL 3: CPT | Performed by: RADIOLOGY

## 2025-04-01 PROCEDURE — 77387 GUIDANCE FOR RADJ TX DLVR: CPT | Performed by: RADIOLOGY

## 2025-04-01 NOTE — LETTER
Radiation Treatment Summary    Patient: Kerry Mills   MRN: [id number]  : 1965  Care Provider: Yu Cabral MD    Encounter Date: 2025      HISTORY: Kerry Mills was treated with 3D conformal radiation therapy.    60 y/o with Left breast invasive ductal carcinoma TNBC stage cT2 N0 M0 --> ypT0N0 status post neoadjuvant chemotherapy  AC+ Taxol followed by lumpectomy and sentinel lymph node biopsy with pathologic complete response.    DX: (C50.912) Invasive ductal carcinoma of breast, female, left (H)  (primary encounter diagnosis)    SITE TREATED: Left breast   TOTAL DOSE: 2600 cGy + 1000 cGy boost  NUMBER OF FRACTIONS: 5 + 4 boost   RADIATION TREATMENT:  3/20/2025 - 2025  CONCURRENT CHEMOTHERAPY: No  ADJUVANT THERAPY: No    Kerry tolerated the treatment without unexpected side effects.     PLAN: Discharge instructions were given and Kerry knows to call if questions/issues arise. Kerry will be seen in f/u in 4  weeks without a scan.     Pain Management Plan: N/A    Signed by: Yu Cabral MD    cc:    Patient Care Team:  Ceasar Zuñiga MD as PCP - General (Family Practice)  Marycruz Tate RN as Specialty Care Coordinator (Hematology & Oncology)  iVdhi Carpenter GC as Genetic Counselor (Genetic )  Iwona Choe DO as Physician (Surgery)  Mynor Adamson MD as Assigned Cancer Care Provider  Iwona Choe DO as Assigned Surgical Provider  Yu Cabral MD as MD (Radiation Oncology)

## 2025-04-01 NOTE — PROGRESS NOTES
Pt ambulatory to radiation clinic for last tx, accompanied by daughter. Discharge instructions given verbally and in writing. Informed to call with any questions or concerns. Follow up to be made on departure from clinic.

## 2025-04-17 ENCOUNTER — OFFICE VISIT (OUTPATIENT)
Dept: RADIATION ONCOLOGY | Facility: CLINIC | Age: 60
End: 2025-04-17
Attending: STUDENT IN AN ORGANIZED HEALTH CARE EDUCATION/TRAINING PROGRAM
Payer: COMMERCIAL

## 2025-04-17 VITALS
TEMPERATURE: 97.7 F | OXYGEN SATURATION: 97 % | WEIGHT: 131.7 LBS | RESPIRATION RATE: 16 BRPM | SYSTOLIC BLOOD PRESSURE: 112 MMHG | HEART RATE: 73 BPM | BODY MASS INDEX: 26.6 KG/M2 | DIASTOLIC BLOOD PRESSURE: 73 MMHG

## 2025-04-17 DIAGNOSIS — C50.912 INVASIVE DUCTAL CARCINOMA OF BREAST, FEMALE, LEFT (H): Primary | ICD-10-CM

## 2025-04-17 RX ORDER — MONTELUKAST SODIUM 10 MG/1
TABLET ORAL
COMMUNITY
Start: 2025-03-28

## 2025-04-17 RX ORDER — ALBUTEROL SULFATE 0.83 MG/ML
SOLUTION RESPIRATORY (INHALATION)
COMMUNITY
Start: 2025-03-28

## 2025-04-17 ASSESSMENT — PAIN SCALES - GENERAL: PAINLEVEL_OUTOF10: NO PAIN (0)

## 2025-04-17 NOTE — PROGRESS NOTES
[facility] Radiation Oncology Follow Up     Patient: Kerry Mills  MRN: [id number]  Date of Service: 04/17/2025       DISEASE TREATED:   Left breast invasive ductal carcinoma TNBC stage cT2 N0 M0 --> ypT0N0 status post neoadjuvant chemotherapy  AC+ Taxol followed by lumpectomy and sentinel lymph node biopsy with pathologic complete response.          TYPE OF RADIATION THERAPY ADMINISTERED:    SITE TREATED: Left breast   TOTAL DOSE: 2600 cGy + 1000 cGy boost  NUMBER OF FRACTIONS: 5 + 4 boost   RADIATION TREATMENT:  3/20/2025 - 4/1/2025  CONCURRENT CHEMOTHERAPY: No  ADJUVANT THERAPY: No     INTERVAL SINCE COMPLETION OF RADIATION THERAPY: 2 wks      SUBJECTIVE:  Ms. Mills is a 59 year old female who presented with a palpable mass in the left breast     6/13/2024 diagnostic mammogram: Irregular mass in the left breast  Ultrasound: At the 10 o'clock position 1 cm from the nipple a 2.3 x 3 x 3.1 cm irregular hypoechoic mass with lobulated margins and mild increased internal vascularity.  Left axilla with nonenlarged morphologically normal lymph nodes.     6/18/2024 ultrasound-guided core needle biopsy, pathology ([id number] ):  Invasive ductal carcinoma, grade 2.  ER negative (less than 1%), AZ negative (less than 1%) HER2/kobe equivocal by IHC.  Negative on FISH.     7/18/2024 MR breast:  Left breast: Heterogeneously enhancing irregularly shaped mass with indistinct margins measuring 3.9 x 3 x 3.3 cm.  No lymphadenopathy  Right breast: No concerns, no lymphadenopathy.     7/22/2024 neoadjuvant AC (CarboTaxol and Pembro denied by insurance)     9/10/2024 left breast ultrasound: Decrease size irregular hypoechoic solid mass now measuring 1.9 x 1.5 x 1.2 cm (previous 3.1 x 3 x 2.3 cm)     9/17/2024 neoadjuvant paclitaxel-- last 2 weekly dose of Taxol held due to neuropathy      9/29/2024 CT chest: Groundglass nodular opacity left upper lobe indeterminant.  Other few scattered sub-6 mm pulmonary nodules.     12/18/2024  MRI breast:  Left breast: Some residual architectural distortion and biopsy marker without measurable mass or abnormal enhancement.  No lymphadenopathy     2/6/2025 left breast lumpectomy with sentinel lymph node biopsy, pathology ([id number]): No residual invasive ductal carcinoma and a 19 x 11 x 6 mm treatment bed.  No DCIS.  1 lymph node negative for metastatic carcinoma (0/1)  ypT0N0.    3/20/2025 - 4/1/2025  Left breast  2600 cGy/5 + 1000 cGy/4 boost    Returns to clinic today for routine follow-up visit.  She reports no significant redness or pruritus of the breast.  She continues to use cream daily.  No skin dryness.  No swelling of the breast or upper extremity.  She continues to have twinges in the breast around her incision as well as some general achiness in the breast with pressure.  Her energy is starting to improve but not yet back to baseline.    Medications were reviewed and are up to date on EPIC.    The following portions of the patient's history were reviewed and updated as appropriate: allergies, current medications, past family history, past medical history, past social history, past surgical history and problem list.    Review of Systems:      General  Constitutional  Constitutional (WDL): Exceptions to WDL  Hot Flashes: Mild symptoms OR intervention not indicated  EENT  Eye Disorders  Eye Disorder (WDL): All eye disorder elements are within defined limits  Ear Disorders  Ear Disorder (WDL): Exceptions to WDL (decreased hearing Right ear)  Respiratory  Respiratory  Respiratory (WDL): Exceptions to WDL  Dyspnea: Shortness of breath with moderate exertion  Cardiovascular  Cardiovascular  Cardiovascular (WDL): Exceptions to WDL (no pacemaker)  Palpitations: Mild symptoms OR intervention not indicated (occasionally)  Gastrointestinal  Gastrointestinal  Gastrointestinal (WDL): Exceptions to WDL  Anorexia: Loss of appetite without alteration in eating habits (improving)  Constipation: Occasional or  intermittent symptoms OR occasional use of stool softeners, laxatives, dietary modification, or enema  Musculoskeletal  Musculoskeletal and Connective Tissue Disorders  Musculoskeletal & Connective (WDL): All musculoskeletal & connective elements are within defined limits  Integumentary  Integumentary  Integumentary (WDL): Exceptions to WDL (healing left breast radiation dermatitis)  Neurological  Neurosensory  Neurosensory (WDL): Exceptions to WDL  Peripheral Sensory Neuropathy: Asymptomatic (fingers and toes)  Dizziness: Mild unsteadiness or sensation of movement (with quick position changes)  Genitourinary/Reproductive  Genitourinary  Genitourinary (WDL): All genitourinary elements are within defined limits  Lymphatic  Lymph System Disorders  Lymph (WDL): All lymph elements are within defined limits  Pain  Pain Score: No Pain (0)  AUA Assessment                                                              Accompanied by  Accompanied By: family (daughter in law, Juan Ramirez)    Objective:     PHYSICAL EXAMINATION:    /73 (BP Location: Right arm, Patient Position: Sitting, Cuff Size: Adult Regular)   Pulse 73   Temp 97.7  F (36.5  C) (Oral)   Resp 16   Wt 59.7 kg (131 lb 11.2 oz)   SpO2 97%   BMI 26.60 kg/m      Gen: Alert, in NAD pleasant interactive, well nourished  Eyes:  EOMI, sclera anicteric  HENT     Head: NC/AT  Chest: Breast symmetry is stable post radiation.  She has no RT dermatitis, subtle hyperpigmentation remains.  Good ROM, no lymphedema or other acute or subacute sequelae of her radiation.   Musculoskeletal: Normal muscle bulk and tone  Skin: Normal tone and turgor, warm, dry, intact  Neurologic: A/Ox3, face symmetric, speech fluent, no focal motor deficits, gait normal and unaided  Psychiatric: Appropriate mood and affect       Imaging: Imaging results 6 weeks:No results found.     Impression   60 y/o with Left breast invasive ductal carcinoma TNBC stage cT2 N0 M0 --> ypT0N0 status post  neoadjuvant chemotherapy  AC+ Taxol followed by lumpectomy and sentinel lymph node biopsy with pathologic complete response.     Recovering from acute side effects of radiation therapy.  Visit Dx:    (C50.912) Invasive ductal carcinoma of breast, female, left (H)  (primary encounter diagnosis)  Plan: MA Diagnostic Digital Bilateral, US Breast Left        Limited 1-3 Quadrants       Cancer Staging   Invasive ductal carcinoma of breast, female, left (H)  Staging form: Breast, AJCC 8th Edition  - Clinical stage from 6/18/2024: Stage IIB (cT2, cN0, cM0, G2, ER-, WI-, HER2-) - Signed by Yu Cabral MD on 2/27/2025      Assessment & Plan:   1. Continue follow-up with medical oncology plan    2.  Mammogram indicated-12-months-diagnostic with ultrasound ordered for June 2026    3.  Return to clinic in 6 months or sooner if needed    4.  May continue moisturizer as current or use as needed.    Pain Management Plan: N/A    Total time of this visit, including time spent face-to-face with patient and or via video/audio, and also in preparing for today's visit for MDM and documentation. Medical decision-making included consideration and possible diagnoses, management options, complex record review, review of diagnostic tests and information, consideration and discussion of significant complications based on comorbidities, discussion with providers involved in the care of the patient.     30 Minutes spent.     This note has been dictated using voice recognition software and as a result may contain minor grammatical errors and unintended word substitutions.    Yu Cabral MD  Department of Radiation Oncology   [facility] Radiation Oncology  Tel: [phone]  Page: [phone]    [facility]  [address] [place] [address]     [place] [id number] [facility]   [place] [address]    CC:  Patient Care Team:  Ceasar Zuñiga MD as PCP - General (Family Practice)  Marycruz Tate RN as Specialty Care  Coordinator (Hematology & Oncology)  Vidhi Carpenter GC as Genetic Counselor (Genetic )  Iwona Choe DO as Physician (Surgery)  Mynor Adamson MD as Assigned Cancer Care Provider  Iwona Choe DO as Assigned Surgical Provider  Yu Cabral MD as MD (Radiation Oncology)

## 2025-04-17 NOTE — PROGRESS NOTES
"Oncology Rooming Note    April 17, 2025 9:23 AM   Kerry Mills is a 59 year old female who presents for:    Chief Complaint   Patient presents with    Oncology Clinic Visit     Follow up with Dr. Cabral    Breast Cancer     Initial Vitals: /73 (BP Location: Right arm, Patient Position: Sitting, Cuff Size: Adult Regular)   Pulse 73   Temp 97.7  F (36.5  C) (Oral)   Resp 16   Wt 59.7 kg (131 lb 11.2 oz)   SpO2 97%   BMI 26.60 kg/m   Estimated body mass index is 26.6 kg/m  as calculated from the following:    Height as of 2/25/25: 1.499 m (4' 11\").    Weight as of this encounter: 59.7 kg (131 lb 11.2 oz). Body surface area is 1.58 meters squared.  No Pain (0) Comment: Data Unavailable   No LMP recorded. Patient is postmenopausal.  Allergies reviewed: Yes  Medications reviewed: Yes    Medications: Medication refills not needed today.  Pharmacy name entered into [facility]:    [facility] - [place], [place] - [address] [place] [address] DRUG STORE [id number] [place] [id number] [facility] AT [facility] [address]    Frailty Screening:   Is the patient here for a new oncology consult visit in cancer care? 2. No    PHQ9:  Did this patient require a PHQ9?: No      Clinical concerns: Patient here ambulatory accompanied by family for follow-up status post radiation for her breast cancer.  Patient states her skin is healing and did not peel.  Patient continues to use moisturizer.  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  Plan return to clinic for follow-up as directed by provider.   Dr. Cabral was notified.      Jamaica Solorzano RN              "

## 2025-04-17 NOTE — PROGRESS NOTES
Radiation Treatment Summary    Patient: Kerry Mills   MRN: [id number]  : 1965  Care Provider: Yu Cabral MD    Encounter Date: 2025      HISTORY: Kerry Mills was treated with 3D conformal radiation therapy.    60 y/o with Left breast invasive ductal carcinoma TNBC stage cT2 N0 M0 --> ypT0N0 status post neoadjuvant chemotherapy  AC+ Taxol followed by lumpectomy and sentinel lymph node biopsy with pathologic complete response.    DX: (C50.912) Invasive ductal carcinoma of breast, female, left (H)  (primary encounter diagnosis)    SITE TREATED: Left breast   TOTAL DOSE: 2600 cGy + 1000 cGy boost  NUMBER OF FRACTIONS: 5 + 4 boost   RADIATION TREATMENT:  3/20/2025 - 2025  CONCURRENT CHEMOTHERAPY: No  ADJUVANT THERAPY: No    Kerry tolerated the treatment without unexpected side effects.     PLAN: Discharge instructions were given and Kerry knows to call if questions/issues arise. Kerry will be seen in f/u in 4  weeks without a scan.     Pain Management Plan: N/A    Signed by: Yu Cabral MD    cc:    Patient Care Team:  Ceasar Zuñiga MD as PCP - General (Family Practice)  Marycruz Tate RN as Specialty Care Coordinator (Hematology & Oncology)  Vidhi Carpenter GC as Genetic Counselor (Genetic )  Iwona Choe DO as Physician (Surgery)  Mynor Adamson MD as Assigned Cancer Care Provider  Iwona Choe DO as Assigned Surgical Provider  Yu Cabral MD as MD (Radiation Oncology)

## 2025-04-17 NOTE — LETTER
4/17/2025      Kerry Mills  [address] [place] [address]      Dear Colleague,    Thank you for referring your patient, Kerry Mills, to the [facility] RADIATION ONCOLOGY [place]. Please see a copy of my visit note below.    [facility] Radiation Oncology Follow Up     Patient: Kerry Mills  MRN: [id number]  Date of Service: 04/17/2025       DISEASE TREATED:   Left breast invasive ductal carcinoma TNBC stage cT2 N0 M0 --> ypT0N0 status post neoadjuvant chemotherapy  AC+ Taxol followed by lumpectomy and sentinel lymph node biopsy with pathologic complete response.          TYPE OF RADIATION THERAPY ADMINISTERED:    SITE TREATED: Left breast   TOTAL DOSE: 2600 cGy + 1000 cGy boost  NUMBER OF FRACTIONS: 5 + 4 boost   RADIATION TREATMENT:  3/20/2025 - 4/1/2025  CONCURRENT CHEMOTHERAPY: No  ADJUVANT THERAPY: No     INTERVAL SINCE COMPLETION OF RADIATION THERAPY: 2 wks      SUBJECTIVE:  Ms. Mills is a 59 year old female who presented with a palpable mass in the left breast     6/13/2024 diagnostic mammogram: Irregular mass in the left breast  Ultrasound: At the 10 o'clock position 1 cm from the nipple a 2.3 x 3 x 3.1 cm irregular hypoechoic mass with lobulated margins and mild increased internal vascularity.  Left axilla with nonenlarged morphologically normal lymph nodes.     6/18/2024 ultrasound-guided core needle biopsy, pathology ([id number] ):  Invasive ductal carcinoma, grade 2.  ER negative (less than 1%), OH negative (less than 1%) HER2/kobe equivocal by IHC.  Negative on FISH.     7/18/2024 MR breast:  Left breast: Heterogeneously enhancing irregularly shaped mass with indistinct margins measuring 3.9 x 3 x 3.3 cm.  No lymphadenopathy  Right breast: No concerns, no lymphadenopathy.     7/22/2024 neoadjuvant AC (CarboTaxol and Pembro denied by insurance)     9/10/2024 left breast ultrasound: Decrease size irregular hypoechoic solid mass now measuring 1.9 x 1.5 x 1.2 cm (previous 3.1 x 3 x 2.3 cm)      9/17/2024 neoadjuvant paclitaxel-- last 2 weekly dose of Taxol held due to neuropathy      9/29/2024 CT chest: Groundglass nodular opacity left upper lobe indeterminant.  Other few scattered sub-6 mm pulmonary nodules.     12/18/2024 MRI breast:  Left breast: Some residual architectural distortion and biopsy marker without measurable mass or abnormal enhancement.  No lymphadenopathy     2/6/2025 left breast lumpectomy with sentinel lymph node biopsy, pathology ([id number]): No residual invasive ductal carcinoma and a 19 x 11 x 6 mm treatment bed.  No DCIS.  1 lymph node negative for metastatic carcinoma (0/1)  ypT0N0.    3/20/2025 - 4/1/2025  Left breast  2600 cGy/5 + 1000 cGy/4 boost    Returns to clinic today for routine follow-up visit.  She reports no significant redness or pruritus of the breast.  She continues to use cream daily.  No skin dryness.  No swelling of the breast or upper extremity.  She continues to have twinges in the breast around her incision as well as some general achiness in the breast with pressure.  Her energy is starting to improve but not yet back to baseline.    Medications were reviewed and are up to date on EPIC.    The following portions of the patient's history were reviewed and updated as appropriate: allergies, current medications, past family history, past medical history, past social history, past surgical history and problem list.    Review of Systems:      General  Constitutional  Constitutional (WDL): Exceptions to WDL  Hot Flashes: Mild symptoms OR intervention not indicated  EENT  Eye Disorders  Eye Disorder (WDL): All eye disorder elements are within defined limits  Ear Disorders  Ear Disorder (WDL): Exceptions to WDL (decreased hearing Right ear)  Respiratory  Respiratory  Respiratory (WDL): Exceptions to WDL  Dyspnea: Shortness of breath with moderate exertion  Cardiovascular  Cardiovascular  Cardiovascular (WDL): Exceptions to WDL (no pacemaker)  Palpitations: Mild  symptoms OR intervention not indicated (occasionally)  Gastrointestinal  Gastrointestinal  Gastrointestinal (WDL): Exceptions to WDL  Anorexia: Loss of appetite without alteration in eating habits (improving)  Constipation: Occasional or intermittent symptoms OR occasional use of stool softeners, laxatives, dietary modification, or enema  Musculoskeletal  Musculoskeletal and Connective Tissue Disorders  Musculoskeletal & Connective (WDL): All musculoskeletal & connective elements are within defined limits  Integumentary  Integumentary  Integumentary (WDL): Exceptions to WDL (healing left breast radiation dermatitis)  Neurological  Neurosensory  Neurosensory (WDL): Exceptions to WDL  Peripheral Sensory Neuropathy: Asymptomatic (fingers and toes)  Dizziness: Mild unsteadiness or sensation of movement (with quick position changes)  Genitourinary/Reproductive  Genitourinary  Genitourinary (WDL): All genitourinary elements are within defined limits  Lymphatic  Lymph System Disorders  Lymph (WDL): All lymph elements are within defined limits  Pain  Pain Score: No Pain (0)  AUA Assessment                                                              Accompanied by  Accompanied By: family (daughter in law, Juan Ramirez)    Objective:     PHYSICAL EXAMINATION:    /73 (BP Location: Right arm, Patient Position: Sitting, Cuff Size: Adult Regular)   Pulse 73   Temp 97.7  F (36.5  C) (Oral)   Resp 16   Wt 59.7 kg (131 lb 11.2 oz)   SpO2 97%   BMI 26.60 kg/m      Gen: Alert, in NAD pleasant interactive, well nourished  Eyes:  EOMI, sclera anicteric  HENT     Head: NC/AT  Chest: Breast symmetry is stable post radiation.  She has no RT dermatitis, subtle hyperpigmentation remains.  Good ROM, no lymphedema or other acute or subacute sequelae of her radiation.   Musculoskeletal: Normal muscle bulk and tone  Skin: Normal tone and turgor, warm, dry, intact  Neurologic: A/Ox3, face symmetric, speech fluent, no focal motor deficits,  gait normal and unaided  Psychiatric: Appropriate mood and affect       Imaging: Imaging results 6 weeks:No results found.     Impression   60 y/o with Left breast invasive ductal carcinoma TNBC stage cT2 N0 M0 --> ypT0N0 status post neoadjuvant chemotherapy  AC+ Taxol followed by lumpectomy and sentinel lymph node biopsy with pathologic complete response.     Recovering from acute side effects of radiation therapy.  Visit Dx:    (C50.912) Invasive ductal carcinoma of breast, female, left (H)  (primary encounter diagnosis)  Plan: MA Diagnostic Digital Bilateral, US Breast Left        Limited 1-3 Quadrants       Cancer Staging   Invasive ductal carcinoma of breast, female, left (H)  Staging form: Breast, AJCC 8th Edition  - Clinical stage from 6/18/2024: Stage IIB (cT2, cN0, cM0, G2, ER-, OR-, HER2-) - Signed by Yu Cabral MD on 2/27/2025      Assessment & Plan:   1. Continue follow-up with medical oncology plan    2.  Mammogram indicated-12-months-diagnostic with ultrasound ordered for June 2026    3.  Return to clinic in 6 months or sooner if needed    4.  May continue moisturizer as current or use as needed.    Pain Management Plan: N/A    Total time of this visit, including time spent face-to-face with patient and or via video/audio, and also in preparing for today's visit for MDM and documentation. Medical decision-making included consideration and possible diagnoses, management options, complex record review, review of diagnostic tests and information, consideration and discussion of significant complications based on comorbidities, discussion with providers involved in the care of the patient.     30 Minutes spent.     This note has been dictated using voice recognition software and as a result may contain minor grammatical errors and unintended word substitutions.    Yu Cabral MD  Department of Radiation Oncology   [facility] Radiation Oncology  Tel: [phone]  Page:  "[phone]    [facility]  [address]  [place] [address]     [facility]   [address] [place] [address]    CC:  Patient Care Team:  Ceasar Zuñiga MD as PCP - General (Family Practice)  Marycruz Tate, RN as Specialty Care Coordinator (Hematology & Oncology)  Vidhi Carpenter GC as Genetic Counselor (Genetic )  Iwona Choe DO as Physician (Surgery)  Mynor Adamson MD as Assigned Cancer Care Provider  Iwona Choe DO as Assigned Surgical Provider  Yu Cabral MD as MD (Radiation Oncology)      Oncology Rooming Note    April 17, 2025 9:23 AM   Kerry Mills is a 59 year old female who presents for:    Chief Complaint   Patient presents with     Oncology Clinic Visit     Follow up with Dr. Cabral     Breast Cancer     Initial Vitals: /73 (BP Location: Right arm, Patient Position: Sitting, Cuff Size: Adult Regular)   Pulse 73   Temp 97.7  F (36.5  C) (Oral)   Resp 16   Wt 59.7 kg (131 lb 11.2 oz)   SpO2 97%   BMI 26.60 kg/m   Estimated body mass index is 26.6 kg/m  as calculated from the following:    Height as of 2/25/25: 1.499 m (4' 11\").    Weight as of this encounter: 59.7 kg (131 lb 11.2 oz). Body surface area is 1.58 meters squared.  No Pain (0) Comment: Data Unavailable   No LMP recorded. Patient is postmenopausal.  Allergies reviewed: Yes  Medications reviewed: Yes    Medications: Medication refills not needed today.  Pharmacy name entered into EPIC:    [facility] - [place], [place] - [address] [place] [address] DRUG STORE #[id number] [address] AT [facility] [address]    Frailty Screening:   Is the patient here for a new oncology consult visit in cancer care? 2. No    PHQ9:  Did this patient require a PHQ9?: No      Clinical concerns: Patient here ambulatory accompanied by family for follow-up status post radiation for her breast cancer.  Patient states her skin is healing and did not peel.  Patient continues to " use moisturizer.  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  Plan return to clinic for follow-up as directed by provider.   Dr. Cabral was notified.      Jamaica Solorzano RN                Again, thank you for allowing me to participate in the care of your patient.        Sincerely,        Yu Cabral MD    Electronically signed

## 2025-04-22 ENCOUNTER — PATIENT OUTREACH (OUTPATIENT)
Dept: CARE COORDINATION | Facility: CLINIC | Age: 60
End: 2025-04-22
Payer: COMMERCIAL

## 2025-04-22 NOTE — PROGRESS NOTES
Social Work - Distress Screen Intervention  [facility]    Identified Concern and Score from Distress Screenin. How concerned are you about your ability to eat? 5 (lack of appetite)     2. How concerned are you about unintended weight loss or your current weight? 0     3. How concerned are you about feeling depressed or very sad?  (!) 8 (due to having to go thru cancer treatment)     4. How concerned are you about feeling anxious or very scared?  5     5. Do you struggle with the loss of meaning and olive in your life?  Somewhat     6. How concerned are you about work and home life issues that may be affected by your cancer?  5     7. How concerned are you about knowing what resources are available to help you?  0     8. Do you currently have what you would describe as Yarsani or spiritual struggles? Somewhat     9. If you want to be contacted by one of our professionals, I can send a message to them right now.  No data recorded     Date of Distress Screen: 25  Data: At time of last visit, patient scored positive on distress screening.  outreached to patient today to follow up on elevated distress and introduce psychosocial services and support.  Intervention/Education provided:  contacted patient by phone with [employer]  to discuss distress screening results. Left voicemail message  Follow-up Required:  to remain available for support and await return call from patient    DESEAN Uriarte, [facility]  Adult Oncology Clinics  [place] (M,W), [place] (T) & [place] (Th)  *I am off Friday  Office: [phone]

## 2025-05-20 ENCOUNTER — LAB (OUTPATIENT)
Dept: INFUSION THERAPY | Facility: HOSPITAL | Age: 60
End: 2025-05-20
Attending: INTERNAL MEDICINE
Payer: COMMERCIAL

## 2025-05-20 ENCOUNTER — ONCOLOGY VISIT (OUTPATIENT)
Dept: ONCOLOGY | Facility: HOSPITAL | Age: 60
End: 2025-05-20
Attending: INTERNAL MEDICINE
Payer: COMMERCIAL

## 2025-05-20 VITALS
DIASTOLIC BLOOD PRESSURE: 77 MMHG | OXYGEN SATURATION: 96 % | RESPIRATION RATE: 16 BRPM | HEART RATE: 63 BPM | WEIGHT: 132.3 LBS | BODY MASS INDEX: 26.67 KG/M2 | SYSTOLIC BLOOD PRESSURE: 106 MMHG | HEIGHT: 59 IN

## 2025-05-20 DIAGNOSIS — C50.912 INVASIVE DUCTAL CARCINOMA OF BREAST, FEMALE, LEFT (H): ICD-10-CM

## 2025-05-20 DIAGNOSIS — T45.1X5A CHEMOTHERAPY-INDUCED FATIGUE: ICD-10-CM

## 2025-05-20 DIAGNOSIS — R53.83 CHEMOTHERAPY-INDUCED FATIGUE: ICD-10-CM

## 2025-05-20 DIAGNOSIS — T45.1X5A CHEMOTHERAPY-INDUCED PERIPHERAL NEUROPATHY: ICD-10-CM

## 2025-05-20 DIAGNOSIS — G62.0 CHEMOTHERAPY-INDUCED PERIPHERAL NEUROPATHY: ICD-10-CM

## 2025-05-20 DIAGNOSIS — C50.912 INVASIVE DUCTAL CARCINOMA OF BREAST, FEMALE, LEFT (H): Primary | ICD-10-CM

## 2025-05-20 LAB
ALBUMIN SERPL BCG-MCNC: 4 G/DL (ref 3.5–5.2)
ALP SERPL-CCNC: 192 U/L (ref 40–150)
ALT SERPL W P-5'-P-CCNC: 22 U/L (ref 0–50)
ANION GAP SERPL CALCULATED.3IONS-SCNC: 11 MMOL/L (ref 7–15)
AST SERPL W P-5'-P-CCNC: 35 U/L (ref 0–45)
BASOPHILS # BLD AUTO: 0 10E3/UL (ref 0–0.2)
BASOPHILS NFR BLD AUTO: 0 %
BILIRUB SERPL-MCNC: 0.9 MG/DL
BUN SERPL-MCNC: 12 MG/DL (ref 6–20)
CALCIUM SERPL-MCNC: 8.9 MG/DL (ref 8.8–10.4)
CHLORIDE SERPL-SCNC: 105 MMOL/L (ref 98–107)
CREAT SERPL-MCNC: 0.68 MG/DL (ref 0.51–0.95)
EGFRCR SERPLBLD CKD-EPI 2021: >90 ML/MIN/1.73M2
EOSINOPHIL # BLD AUTO: 0.2 10E3/UL (ref 0–0.7)
EOSINOPHIL NFR BLD AUTO: 3 %
ERYTHROCYTE [DISTWIDTH] IN BLOOD BY AUTOMATED COUNT: 19.8 % (ref 10–15)
GLUCOSE SERPL-MCNC: 109 MG/DL (ref 70–99)
HCO3 SERPL-SCNC: 22 MMOL/L (ref 22–29)
HCT VFR BLD AUTO: 36.3 % (ref 35–47)
HGB BLD-MCNC: 11.5 G/DL (ref 11.7–15.7)
IMM GRANULOCYTES # BLD: 0 10E3/UL
IMM GRANULOCYTES NFR BLD: 0 %
LDH SERPL L TO P-CCNC: 140 U/L (ref 0–250)
LYMPHOCYTES # BLD AUTO: 2.8 10E3/UL (ref 0.8–5.3)
LYMPHOCYTES NFR BLD AUTO: 32 %
MCH RBC QN AUTO: 25.3 PG (ref 26.5–33)
MCHC RBC AUTO-ENTMCNC: 31.7 G/DL (ref 31.5–36.5)
MCV RBC AUTO: 80 FL (ref 78–100)
MONOCYTES # BLD AUTO: 0.9 10E3/UL (ref 0–1.3)
MONOCYTES NFR BLD AUTO: 10 %
NEUTROPHILS # BLD AUTO: 4.7 10E3/UL (ref 1.6–8.3)
NEUTROPHILS NFR BLD AUTO: 54 %
NRBC # BLD AUTO: 0 10E3/UL
NRBC BLD AUTO-RTO: 0 /100
PLATELET # BLD AUTO: 245 10E3/UL (ref 150–450)
POTASSIUM SERPL-SCNC: 4 MMOL/L (ref 3.4–5.3)
PROT SERPL-MCNC: 7.1 G/DL (ref 6.4–8.3)
RBC # BLD AUTO: 4.55 10E6/UL (ref 3.8–5.2)
SODIUM SERPL-SCNC: 138 MMOL/L (ref 135–145)
WBC # BLD AUTO: 8.7 10E3/UL (ref 4–11)

## 2025-05-20 PROCEDURE — 99214 OFFICE O/P EST MOD 30 MIN: CPT | Performed by: INTERNAL MEDICINE

## 2025-05-20 PROCEDURE — T1013 SIGN LANG/ORAL INTERPRETER: HCPCS | Mod: U4

## 2025-05-20 PROCEDURE — 85025 COMPLETE CBC W/AUTO DIFF WBC: CPT

## 2025-05-20 PROCEDURE — 36415 COLL VENOUS BLD VENIPUNCTURE: CPT

## 2025-05-20 PROCEDURE — 83615 LACTATE (LD) (LDH) ENZYME: CPT

## 2025-05-20 PROCEDURE — 80053 COMPREHEN METABOLIC PANEL: CPT

## 2025-05-20 PROCEDURE — G2211 COMPLEX E/M VISIT ADD ON: HCPCS | Performed by: INTERNAL MEDICINE

## 2025-05-20 PROCEDURE — G0463 HOSPITAL OUTPT CLINIC VISIT: HCPCS | Performed by: INTERNAL MEDICINE

## 2025-05-20 RX ORDER — GABAPENTIN 300 MG/1
300 CAPSULE ORAL 3 TIMES DAILY
Qty: 90 CAPSULE | Refills: 2 | Status: SHIPPED | OUTPATIENT
Start: 2025-05-20

## 2025-05-20 RX ORDER — BUDESONIDE AND FORMOTEROL FUMARATE DIHYDRATE 160; 4.5 UG/1; UG/1
AEROSOL RESPIRATORY (INHALATION)
COMMUNITY
Start: 2025-05-01

## 2025-05-20 ASSESSMENT — PAIN SCALES - GENERAL: PAINLEVEL_OUTOF10: NO PAIN (0)

## 2025-05-20 NOTE — LETTER
"5/20/2025      Kerry Mills  [address] [place] [address]      Dear Colleague,    Thank you for referring your patient, Kerry Mills, to the [facility] CANCER [facility] Please see a copy of my visit note below.    Oncology Rooming Note    May 20, 2025 10:37 AM   Kerry Mills is a 59 year old female who presents for:    Chief Complaint   Patient presents with     Oncology Clinic Visit     Invasive ductal carcinoma of breast, female, left   Chemotherapy induced fatigue     Initial Vitals: Ht 1.499 m (4' 11.02\")   BMI 26.59 kg/m   Estimated body mass index is 26.59 kg/m  as calculated from the following:    Height as of this encounter: 1.499 m (4' 11.02\").    Weight as of 4/17/25: 59.7 kg (131 lb 11.2 oz). Body surface area is 1.58 meters squared.  Data Unavailable Comment: Data Unavailable   No LMP recorded. Patient is postmenopausal.  Allergies reviewed: Yes  Medications reviewed: Yes    Medications: MEDICATION REFILLS NEEDED TODAY. Provider was notified.  Pharmacy name entered into [employer]:    [facility] - [place], [place] - [address] [facility] DRUG STORE #[id number] - [place], [place] - [address] [facility] [address]    Frailty Screening:   Is the patient here for a new oncology consult visit in cancer care? 2. No    PHQ9:  Did this patient require a PHQ9?: No      Clinical concerns: MD follow up, review labs      Brianna [place] MA                      [facility] Hematology and Oncology Progress Note    Patient: Kerry Mills  MRN: [id number]  Date of Service: May 20, 2025             ECOG Performance    0 - Independent          ______________________________________________________________________________  Oncologic history  1) invasive ductal carcinoma of the breast ER negative SD negative HER2/kobe 2+, negative by FISH.  3.9 cm in maximum dimension on MRI.  Diagnosed July 2024    Treatment  1) neoadjuvant AC started on 7/22/2024.  CarboTaxol and Pembro denied by insurance "   2) neoadjuvant weekly paclitaxel started on 9/17/2024  3) last 2 weekly dose of Taxol held due to neuropathy  4) patient underwent lumpectomy with sentinel lymph node sampling on 6 February 2025.  Found to have a complete pathologic response with no residual cancer  5) patient received adjuvant radiation to the breast with 2600 centigrade +1000 cGy boost in 9 fractions.  Radiation finishing on 4/1/2025      History of Present Illness  Patient is here in follow-up.  She completed radiation almost 2 months ago.  She is doing really well she has no new concerns.  Her continued complaint is about the neuropathy from the paclitaxel she still has difficulty doing fine motor movements and is dropping things.  She otherwise reports that her appetite and energy level have improved significantly.    Review of systems  12 point review of system was negative    Past History    Past Medical History:   Diagnosis Date     Asthma      Chronic constipation      Difficulty walking      Walking troubles        Past Surgical History:   Procedure Laterality Date     COLONOSCOPY N/A 12/31/2024    Procedure: COLONOSCOPY;  Surgeon: Ayla Byrd MD;  Location: [facility] OR     INCISION AND DRAINAGE OF WOUND N/A 11/27/2020    Procedure: INCISION AND DRAINAGE, NECK;  Surgeon: Arnel James MD;  Location: [facility] OR;  Service: ENT     INSERT PORT VASCULAR ACCESS Right 07/09/2024    Procedure: INSERTION,  RIGHT VASCULAR ACCESS PORT;  Surgeon: Iwona Choe DO;  Location: [facility] OR     LUMPECTOMY BREAST Left 2/6/2025    Procedure: BREAST WIRE LOCALIZED LUMPECTOMY WITH SENTINEL LYMPH NODE BIOPSY AND PORT-A-CATH REMOVAL;  Surgeon: Iwona Choe DO;  Location: [facility] OR     PICC INSERTION - TRIPLE LUMEN  11/26/2020          TRACHEOSTOMY N/A 11/26/2020    Procedure: EMERGENT INTUBATION IN OR WITH CREATION, TRACHEOSTOMY;  Surgeon: Dennise Justice MD;  Location: [facility] OR;  Service: General  "      Physical Exam    /77 (BP Location: Right arm, Patient Position: Left side, Cuff Size: Adult Regular)   Pulse 63   Resp 16   Ht 1.499 m (4' 11.02\")   Wt 60 kg (132 lb 4.8 oz)   SpO2 96%   BMI 26.71 kg/m      General: alert, awake, not in acute distress  HEENT: Head: Normal, normocephalic, atraumatic.  Eye: Normal external eye, conjunctiva, lids cornea, MIKAYLA.  Nose: Normal external nose, mucus membranes and septum.  Pharynx: Normal buccal mucosa. Normal pharynx.  Neck / Thyroid: Supple, no masses, nodes, nodules or enlargement.  Lymphatics: No abnormally enlarged lymph nodes.  Chest: Normal chest wall and respirations. Clear to auscultation.  No crackles or rhonchi's  Heart: S1 S2 RRR, no murmur.   Abdomen: abdomen is soft without significant tenderness, masses, organomegaly or guarding  Extremities: normal strength, tone, and muscle mass  Skin: normal. no rash or abnormalities  CNS: non focal.  Bilateral breast exam was done that was negative for any suspicious lumps or masses.  Lab Results    Recent Results (from the past 240 hours)   Comprehensive metabolic panel    Collection Time: 05/20/25 10:10 AM   Result Value Ref Range    Sodium 138 135 - 145 mmol/L    Potassium 4.0 3.4 - 5.3 mmol/L    Carbon Dioxide (CO2) 22 22 - 29 mmol/L    Anion Gap 11 7 - 15 mmol/L    Urea Nitrogen 12.0 6.0 - 20.0 mg/dL    Creatinine 0.68 0.51 - 0.95 mg/dL    GFR Estimate >90 >60 mL/min/1.73m2    Calcium 8.9 8.8 - 10.4 mg/dL    Chloride 105 98 - 107 mmol/L    Glucose 109 (H) 70 - 99 mg/dL    Alkaline Phosphatase 192 (H) 40 - 150 U/L    AST 35 0 - 45 U/L    ALT 22 0 - 50 U/L    Protein Total 7.1 6.4 - 8.3 g/dL    Albumin 4.0 3.5 - 5.2 g/dL    Bilirubin Total 0.9 <=1.2 mg/dL   Lactate Dehydrogenase    Collection Time: 05/20/25 10:10 AM   Result Value Ref Range    Lactate Dehydrogenase 140 0 - 250 U/L   CBC with platelets and differential    Collection Time: 05/20/25 10:10 AM   Result Value Ref Range    WBC Count 8.7 4.0 - " 11.0 10e3/uL    RBC Count 4.55 3.80 - 5.20 10e6/uL    Hemoglobin 11.5 (L) 11.7 - 15.7 g/dL    Hematocrit 36.3 35.0 - 47.0 %    MCV 80 78 - 100 fL    MCH 25.3 (L) 26.5 - 33.0 pg    MCHC 31.7 31.5 - 36.5 g/dL    RDW 19.8 (H) 10.0 - 15.0 %    Platelet Count 245 150 - 450 10e3/uL    % Neutrophils 54 %    % Lymphocytes 32 %    % Monocytes 10 %    % Eosinophils 3 %    % Basophils 0 %    % Immature Granulocytes 0 %    NRBCs per 100 WBC 0 <1 /100    Absolute Neutrophils 4.7 1.6 - 8.3 10e3/uL    Absolute Lymphocytes 2.8 0.8 - 5.3 10e3/uL    Absolute Monocytes 0.9 0.0 - 1.3 10e3/uL    Absolute Eosinophils 0.2 0.0 - 0.7 10e3/uL    Absolute Basophils 0.0 0.0 - 0.2 10e3/uL    Absolute Immature Granulocytes 0.0 <=0.4 10e3/uL    Absolute NRBCs 0.0 10e3/uL           Imaging    No results found.        Assessment and Plan      Breast cancer, triple negative patient receiving neoadjuvant chemotherapy   Patient is here in follow-up.  She has completed her adjuvant treatment.  She completed radiation on 1 January.  She has recovered from that.  I will continue 3 monthly follow-up.  Her next mammogram is due on 16 June and it is already scheduled.  She is aware of that and plans to go ahead and get that done.    Neuropathy   Patient continues to complain of significant neuropathy that does interfere with daily activities.  We talked about different interventions and I have prescribed gabapentin 300 mg 3 times a day for her.    Varsha Adamson MD            CC: HIRAL CHAVEZ MD     Again, thank you for allowing me to participate in the care of your patient.        Sincerely,        Varsha Adamson MD    Electronically signed

## 2025-05-20 NOTE — PROGRESS NOTES
[facility] Hematology and Oncology Progress Note    Patient: Kerry Mills  MRN: [id number]  Date of Service: May 20, 2025             ECOG Performance    0 - Independent          ______________________________________________________________________________  Oncologic history  1) invasive ductal carcinoma of the breast ER negative TX negative HER2/kobe 2+, negative by FISH.  3.9 cm in maximum dimension on MRI.  Diagnosed July 2024    Treatment  1) neoadjuvant AC started on 7/22/2024.  CarboTaxol and Pembro denied by insurance   2) neoadjuvant weekly paclitaxel started on 9/17/2024  3) last 2 weekly dose of Taxol held due to neuropathy  4) patient underwent lumpectomy with sentinel lymph node sampling on 6 February 2025.  Found to have a complete pathologic response with no residual cancer  5) patient received adjuvant radiation to the breast with 2600 centigrade +1000 cGy boost in 9 fractions.  Radiation finishing on 4/1/2025      History of Present Illness  Patient is here in follow-up.  She completed radiation almost 2 months ago.  She is doing really well she has no new concerns.  Her continued complaint is about the neuropathy from the paclitaxel she still has difficulty doing fine motor movements and is dropping things.  She otherwise reports that her appetite and energy level have improved significantly.    Review of systems  12 point review of system was negative    Past History    Past Medical History:   Diagnosis Date    Asthma     Chronic constipation     Difficulty walking     Walking troubles        Past Surgical History:   Procedure Laterality Date    COLONOSCOPY N/A 12/31/2024    Procedure: COLONOSCOPY;  Surgeon: Ayla Byrd MD;  Location: [facility]    INCISION AND DRAINAGE OF WOUND N/A 11/27/2020    Procedure: INCISION AND DRAINAGE, NECK;  Surgeon: Arnel James MD;  Location: [facility] OR;  Service: ENT    INSERT PORT VASCULAR ACCESS Right 07/09/2024    Procedure:  "INSERTION,  RIGHT VASCULAR ACCESS PORT;  Surgeon: Iwona Choe DO;  Location: [facility] OR    LUMPECTOMY BREAST Left 2/6/2025    Procedure: BREAST WIRE LOCALIZED LUMPECTOMY WITH SENTINEL LYMPH NODE BIOPSY AND PORT-A-CATH REMOVAL;  Surgeon: Iwona Choe DO;  Location: [facility] OR    PICC INSERTION - TRIPLE LUMEN  11/26/2020         TRACHEOSTOMY N/A 11/26/2020    Procedure: EMERGENT INTUBATION IN OR WITH CREATION, TRACHEOSTOMY;  Surgeon: Dennise Justice MD;  Location: [facility] OR;  Service: General       Physical Exam    /77 (BP Location: Right arm, Patient Position: Left side, Cuff Size: Adult Regular)   Pulse 63   Resp 16   Ht 1.499 m (4' 11.02\")   Wt 60 kg (132 lb 4.8 oz)   SpO2 96%   BMI 26.71 kg/m      General: alert, awake, not in acute distress  HEENT: Head: Normal, normocephalic, atraumatic.  Eye: Normal external eye, conjunctiva, lids cornea, MIKAYLA.  Nose: Normal external nose, mucus membranes and septum.  Pharynx: Normal buccal mucosa. Normal pharynx.  Neck / Thyroid: Supple, no masses, nodes, nodules or enlargement.  Lymphatics: No abnormally enlarged lymph nodes.  Chest: Normal chest wall and respirations. Clear to auscultation.  No crackles or rhonchi's  Heart: S1 S2 RRR, no murmur.   Abdomen: abdomen is soft without significant tenderness, masses, organomegaly or guarding  Extremities: normal strength, tone, and muscle mass  Skin: normal. no rash or abnormalities  CNS: non focal.  Bilateral breast exam was done that was negative for any suspicious lumps or masses.  Lab Results    Recent Results (from the past 240 hours)   Comprehensive metabolic panel    Collection Time: 05/20/25 10:10 AM   Result Value Ref Range    Sodium 138 135 - 145 mmol/L    Potassium 4.0 3.4 - 5.3 mmol/L    Carbon Dioxide (CO2) 22 22 - 29 mmol/L    Anion Gap 11 7 - 15 mmol/L    Urea Nitrogen 12.0 6.0 - 20.0 mg/dL    Creatinine 0.68 0.51 - 0.95 mg/dL    GFR Estimate >90 >60 mL/min/1.73m2    " Calcium 8.9 8.8 - 10.4 mg/dL    Chloride 105 98 - 107 mmol/L    Glucose 109 (H) 70 - 99 mg/dL    Alkaline Phosphatase 192 (H) 40 - 150 U/L    AST 35 0 - 45 U/L    ALT 22 0 - 50 U/L    Protein Total 7.1 6.4 - 8.3 g/dL    Albumin 4.0 3.5 - 5.2 g/dL    Bilirubin Total 0.9 <=1.2 mg/dL   Lactate Dehydrogenase    Collection Time: 05/20/25 10:10 AM   Result Value Ref Range    Lactate Dehydrogenase 140 0 - 250 U/L   CBC with platelets and differential    Collection Time: 05/20/25 10:10 AM   Result Value Ref Range    WBC Count 8.7 4.0 - 11.0 10e3/uL    RBC Count 4.55 3.80 - 5.20 10e6/uL    Hemoglobin 11.5 (L) 11.7 - 15.7 g/dL    Hematocrit 36.3 35.0 - 47.0 %    MCV 80 78 - 100 fL    MCH 25.3 (L) 26.5 - 33.0 pg    MCHC 31.7 31.5 - 36.5 g/dL    RDW 19.8 (H) 10.0 - 15.0 %    Platelet Count 245 150 - 450 10e3/uL    % Neutrophils 54 %    % Lymphocytes 32 %    % Monocytes 10 %    % Eosinophils 3 %    % Basophils 0 %    % Immature Granulocytes 0 %    NRBCs per 100 WBC 0 <1 /100    Absolute Neutrophils 4.7 1.6 - 8.3 10e3/uL    Absolute Lymphocytes 2.8 0.8 - 5.3 10e3/uL    Absolute Monocytes 0.9 0.0 - 1.3 10e3/uL    Absolute Eosinophils 0.2 0.0 - 0.7 10e3/uL    Absolute Basophils 0.0 0.0 - 0.2 10e3/uL    Absolute Immature Granulocytes 0.0 <=0.4 10e3/uL    Absolute NRBCs 0.0 10e3/uL           Imaging    No results found.        Assessment and Plan      Breast cancer, triple negative patient receiving neoadjuvant chemotherapy   Patient is here in follow-up.  She has completed her adjuvant treatment.  She completed radiation on 1 January.  She has recovered from that.  I will continue 3 monthly follow-up.  Her next mammogram is due on 16 June and it is already scheduled.  She is aware of that and plans to go ahead and get that done.    Neuropathy   Patient continues to complain of significant neuropathy that does interfere with daily activities.  We talked about different interventions and I have prescribed gabapentin 300 mg 3 times a  yamilet for her.    Varsha Adamson MD            CC: HIRAL CHAVEZ MD

## 2025-05-20 NOTE — PROGRESS NOTES
"Oncology Rooming Note    May 20, 2025 10:37 AM   Kerry Mills is a 59 year old female who presents for:    Chief Complaint   Patient presents with    Oncology Clinic Visit     Invasive ductal carcinoma of breast, female, left   Chemotherapy induced fatigue     Initial Vitals: Ht 1.499 m (4' 11.02\")   BMI 26.59 kg/m   Estimated body mass index is 26.59 kg/m  as calculated from the following:    Height as of this encounter: 1.499 m (4' 11.02\").    Weight as of 4/17/25: 59.7 kg (131 lb 11.2 oz). Body surface area is 1.58 meters squared.  Data Unavailable Comment: Data Unavailable   No LMP recorded. Patient is postmenopausal.  Allergies reviewed: Yes  Medications reviewed: Yes    Medications: MEDICATION REFILLS NEEDED TODAY. Provider was notified.  Pharmacy name entered into [employer]:    [facility] - [place], [place] - [address] [place] [address] [id number] [address]    Frailty Screening:   Is the patient here for a new oncology consult visit in cancer care? 2. No    PHQ9:  Did this patient require a PHQ9?: No      Clinical concerns: MD follow up, review labs      Brianna Dennis MA            "

## 2025-06-25 NOTE — PROGRESS NOTES
Infusion Nursing Note:  Kerry Mills presents today for D1C8 Taxol.    Patient seen by provider today: No   present during visit today: Yes, Language: hmong pt daughter Nate translating and pt did not want  called.     Note: pt given zofran prior to infusion.      Intravenous Access:  Implanted Port.    Treatment Conditions:  Lab Results   Component Value Date    HGB 10.0 (L) 10/15/2024    WBC 5.0 10/15/2024    ANEU 4.7 10/08/2024    ANEUTAUTO 3.3 10/15/2024     (L) 10/15/2024        Lab Results   Component Value Date     10/08/2024    POTASSIUM 3.3 (L) 10/08/2024    MAG 2.0 09/30/2024    CR 0.58 10/08/2024    DONNA 8.4 (L) 10/08/2024    BILITOTAL 0.4 10/08/2024    ALBUMIN 3.9 10/08/2024    ALT 44 10/08/2024    AST 37 10/08/2024       Results reviewed, labs MET treatment parameters, ok to proceed with treatment.      Post Infusion Assessment:  Patient tolerated infusion without incident.  Blood return noted pre and post infusion.  Site patent and intact, free from redness, edema or discomfort.  Access discontinued per protocol.       Discharge Plan:   Patient and/or family verbalized understanding of discharge instructions and all questions answered.      Edith White RN    What Is The Reason For Today's Visit?: Preventative Skin Check

## (undated) DEVICE — PROTECTOR ARM STANDARD ONE STEP

## (undated) DEVICE — SYRINGE 10ML FILL SALINE FLUSH STERILE 306553

## (undated) DEVICE — ELECTRODE PATIENT RETURN ADULT L10 FT 2 PLATE CORD 0855C

## (undated) DEVICE — GLOVE BIOGEL PI ULTRATOUCH G SZ 6.0 42160

## (undated) DEVICE — DECANTER VIAL 2006S

## (undated) DEVICE — DRAPE THYROID

## (undated) DEVICE — CUSTOM PACK MINOR SBA5BMNHEA

## (undated) DEVICE — SU MONOCRYL+ 4-0 18IN PS2 UND MCP496G

## (undated) DEVICE — BLADE KNIFE SURG 11 371111

## (undated) DEVICE — SYR 10ML LL W/O NDL 302995

## (undated) DEVICE — PREP CHLORAPREP 26ML TINTED HI-LITE ORANGE 930815

## (undated) DEVICE — DECANTER BAG 2002S

## (undated) DEVICE — SU DERMABOND ADVANCED .7ML DNX12

## (undated) DEVICE — SUTURE VICRYL+ 2-0 27IN SH UND VCP417H

## (undated) DEVICE — SOL WATER IRRIG 1000ML BOTTLE 2F7114

## (undated) DEVICE — SUCTION MANIFOLD NEPTUNE 2 SYS 1 PORT 702-025-000

## (undated) DEVICE — DRAPE C-ARM 60X42" 1013

## (undated) DEVICE — SU VICRYL+ 3-0 27IN SH UND VCP416H

## (undated) DEVICE — TUBING SUCTION MEDI-VAC 1/4"X20' N620A

## (undated) DEVICE — COVER ULTRASOUND PROBE STRL 9001C0197

## (undated) DEVICE — GOWN IMPERVIOUS BREATHABLE SMART LG 89015

## (undated) DEVICE — ESU PENCIL SMOKE EVAC W/ROCKER SWITCH 0703-047-000

## (undated) DEVICE — SOL NACL 0.9% INJ 250ML BAG 2B1322Q

## (undated) DEVICE — ESU ELEC BLADE 2.75" COATED/INSULATED E1455

## (undated) RX ORDER — GLYCOPYRROLATE 0.2 MG/ML
INJECTION, SOLUTION INTRAMUSCULAR; INTRAVENOUS
Status: DISPENSED
Start: 2024-07-09

## (undated) RX ORDER — PROPOFOL 10 MG/ML
INJECTION, EMULSION INTRAVENOUS
Status: DISPENSED
Start: 2024-07-09

## (undated) RX ORDER — ONDANSETRON 2 MG/ML
INJECTION INTRAMUSCULAR; INTRAVENOUS
Status: DISPENSED
Start: 2024-07-09

## (undated) RX ORDER — FENTANYL CITRATE 50 UG/ML
INJECTION, SOLUTION INTRAMUSCULAR; INTRAVENOUS
Status: DISPENSED
Start: 2024-07-09

## (undated) RX ORDER — DEXAMETHASONE SODIUM PHOSPHATE 10 MG/ML
INJECTION, EMULSION INTRAMUSCULAR; INTRAVENOUS
Status: DISPENSED
Start: 2024-07-09

## (undated) RX ORDER — LIDOCAINE HYDROCHLORIDE 10 MG/ML
INJECTION, SOLUTION EPIDURAL; INFILTRATION; INTRACAUDAL; PERINEURAL
Status: DISPENSED
Start: 2024-07-09